# Patient Record
Sex: FEMALE | Race: WHITE | Employment: OTHER | URBAN - METROPOLITAN AREA
[De-identification: names, ages, dates, MRNs, and addresses within clinical notes are randomized per-mention and may not be internally consistent; named-entity substitution may affect disease eponyms.]

---

## 2017-01-04 ENCOUNTER — MED REC SCAN ONLY (OUTPATIENT)
Dept: INTERNAL MEDICINE CLINIC | Facility: CLINIC | Age: 82
End: 2017-01-04

## 2017-01-06 ENCOUNTER — OFFICE VISIT (OUTPATIENT)
Dept: INTERNAL MEDICINE CLINIC | Facility: CLINIC | Age: 82
End: 2017-01-06

## 2017-01-06 VITALS
HEART RATE: 72 BPM | HEIGHT: 60 IN | WEIGHT: 172 LBS | DIASTOLIC BLOOD PRESSURE: 68 MMHG | BODY MASS INDEX: 33.77 KG/M2 | SYSTOLIC BLOOD PRESSURE: 128 MMHG | TEMPERATURE: 99 F | RESPIRATION RATE: 16 BRPM
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
## 2017-01-06 DIAGNOSIS — R19.7 DIARRHEA, UNSPECIFIED TYPE: ICD-10-CM

## 2017-01-06 DIAGNOSIS — R53.83 FATIGUE, UNSPECIFIED TYPE: Primary | ICD-10-CM

## 2017-01-06 PROCEDURE — 99213 OFFICE O/P EST LOW 20 MIN: CPT | Performed by: NURSE PRACTITIONER

## 2017-01-06 NOTE — PROGRESS NOTES
Patient presents with:  Bite Sting,Insect (integumentary): two bites on right ankle- Aparment building had bed bugs       HPI:  Presents with 2 \"bug\" bites to right ankle/shin region that appeared red.  Stated noticed first bite on Wednesday morning and f Rotator cuff tendonitis     Cervical radiculopathy at C5     Osteoarthritis of right knee, unspecified osteoarthritis type     Lumbar disc herniation     At risk for falling     Irritable bowel syndrome with diarrhea     Coronary artery disease involving n Oral Tab Take 0.25 mg by mouth TID CC and HS. Pt takes 0.5mg at night  Disp:  Rfl:    EPINEPHrine 0.3 MG/0.3ML Injection Solution Auto-injector Inject 0.3 mL as directed one time.  Disp:  Rfl:    loratadine (CLARITIN) 10 MG Oral Tab Take 10 mg by mouth chip

## 2017-01-09 ENCOUNTER — TELEPHONE (OUTPATIENT)
Dept: INTERNAL MEDICINE CLINIC | Facility: CLINIC | Age: 82
End: 2017-01-09

## 2017-01-09 NOTE — TELEPHONE ENCOUNTER
Called pt to advise info per JV- Pt states that she will call tomorrow if she is not feeling better. Pt verbalized understanding, agreed with POC and had no further questions.

## 2017-01-09 NOTE — TELEPHONE ENCOUNTER
If she is feeling this sick she should go to urgent care or emergency room. Otherwise can she some see me tomorrow or the next day?

## 2017-01-09 NOTE — TELEPHONE ENCOUNTER
1/6/17- JV                Pt states that she was feeling nauseous and had a h/a on Friday night. Pt denies n/v/d or bite getting worst. Pt states that she was feeling weak and tired all weekend.   Today- Pt states that she has rib pain and chest congestion

## 2017-01-10 ENCOUNTER — HOSPITAL ENCOUNTER (EMERGENCY)
Facility: HOSPITAL | Age: 82
Discharge: HOME | End: 2017-01-10
Attending: EMERGENCY MEDICINE
Payer: MEDICARE

## 2017-01-10 ENCOUNTER — PRIOR ORIGINAL RECORDS (OUTPATIENT)
Dept: OTHER | Age: 82
End: 2017-01-10

## 2017-01-10 ENCOUNTER — APPOINTMENT (OUTPATIENT)
Dept: GENERAL RADIOLOGY | Facility: HOSPITAL | Age: 82
End: 2017-01-10
Attending: EMERGENCY MEDICINE
Payer: MEDICARE

## 2017-01-10 VITALS
WEIGHT: 165 LBS | HEART RATE: 82 BPM | OXYGEN SATURATION: 97 % | TEMPERATURE: 98 F | RESPIRATION RATE: 16 BRPM | SYSTOLIC BLOOD PRESSURE: 132 MMHG | HEIGHT: 60 IN | BODY MASS INDEX: 32.39 KG/M2 | DIASTOLIC BLOOD PRESSURE: 78 MMHG

## 2017-01-10 DIAGNOSIS — J20.9 ACUTE BRONCHITIS, UNSPECIFIED ORGANISM: Primary | ICD-10-CM

## 2017-01-10 LAB
ALBUMIN SERPL-MCNC: 3.8 G/DL (ref 3.5–4.8)
ALP LIVER SERPL-CCNC: 57 U/L (ref 55–142)
ALT SERPL-CCNC: 20 U/L (ref 14–54)
AST SERPL-CCNC: 22 U/L (ref 15–41)
ATRIAL RATE: 77 BPM
BASOPHILS # BLD AUTO: 0.06 X10(3) UL (ref 0–0.1)
BASOPHILS NFR BLD AUTO: 0.6 %
BILIRUB SERPL-MCNC: 0.6 MG/DL (ref 0.1–2)
BUN BLD-MCNC: 20 MG/DL (ref 8–20)
CALCIUM BLD-MCNC: 9.2 MG/DL (ref 8.3–10.3)
CHLORIDE: 106 MMOL/L (ref 101–111)
CO2: 24 MMOL/L (ref 22–32)
CREAT BLD-MCNC: 0.96 MG/DL (ref 0.55–1.02)
EOSINOPHIL # BLD AUTO: 0.11 X10(3) UL (ref 0–0.3)
EOSINOPHIL NFR BLD AUTO: 1.1 %
ERYTHROCYTE [DISTWIDTH] IN BLOOD BY AUTOMATED COUNT: 14.3 % (ref 11.5–16)
GLUCOSE BLD-MCNC: 115 MG/DL (ref 70–99)
HCT VFR BLD AUTO: 41.7 % (ref 34–50)
HGB BLD-MCNC: 14 G/DL (ref 12–16)
IMMATURE GRANULOCYTE COUNT: 0.04 X10(3) UL (ref 0–1)
IMMATURE GRANULOCYTE RATIO %: 0.4 %
LYMPHOCYTES # BLD AUTO: 3.78 X10(3) UL (ref 0.9–4)
LYMPHOCYTES NFR BLD AUTO: 39.3 %
M PROTEIN MFR SERPL ELPH: 7.3 G/DL (ref 6.1–8.3)
MCH RBC QN AUTO: 29.3 PG (ref 27–33.2)
MCHC RBC AUTO-ENTMCNC: 33.6 G/DL (ref 31–37)
MCV RBC AUTO: 87.2 FL (ref 81–100)
MONOCYTES # BLD AUTO: 0.67 X10(3) UL (ref 0.1–0.6)
MONOCYTES NFR BLD AUTO: 7 %
NEUTROPHIL ABS PRELIM: 4.96 X10 (3) UL (ref 1.3–6.7)
NEUTROPHILS # BLD AUTO: 4.96 X10(3) UL (ref 1.3–6.7)
NEUTROPHILS NFR BLD AUTO: 51.6 %
P AXIS: 33 DEGREES
P-R INTERVAL: 136 MS
PLATELET # BLD AUTO: 264 10(3)UL (ref 150–450)
POTASSIUM SERPL-SCNC: 4.3 MMOL/L (ref 3.6–5.1)
PRO-BETA NATRIURETIC PEPTIDE: 167 PG/ML (ref ?–450)
Q-T INTERVAL: 344 MS
QRS DURATION: 78 MS
QTC CALCULATION (BEZET): 389 MS
R AXIS: -7 DEGREES
RBC # BLD AUTO: 4.78 X10(6)UL (ref 3.8–5.1)
RED CELL DISTRIBUTION WIDTH-SD: 46.2 FL (ref 35.1–46.3)
SODIUM SERPL-SCNC: 140 MMOL/L (ref 136–144)
T AXIS: 44 DEGREES
TROPONIN: <0.046 NG/ML (ref ?–0.05)
VENTRICULAR RATE: 77 BPM
WBC # BLD AUTO: 9.6 X10(3) UL (ref 4–13)

## 2017-01-10 PROCEDURE — 93010 ELECTROCARDIOGRAM REPORT: CPT

## 2017-01-10 PROCEDURE — 36415 COLL VENOUS BLD VENIPUNCTURE: CPT

## 2017-01-10 PROCEDURE — 80053 COMPREHEN METABOLIC PANEL: CPT | Performed by: EMERGENCY MEDICINE

## 2017-01-10 PROCEDURE — 85025 COMPLETE CBC W/AUTO DIFF WBC: CPT | Performed by: EMERGENCY MEDICINE

## 2017-01-10 PROCEDURE — 93005 ELECTROCARDIOGRAM TRACING: CPT

## 2017-01-10 PROCEDURE — 71010 XR CHEST AP PORTABLE  (CPT=71010): CPT

## 2017-01-10 PROCEDURE — 84484 ASSAY OF TROPONIN QUANT: CPT | Performed by: EMERGENCY MEDICINE

## 2017-01-10 PROCEDURE — 99285 EMERGENCY DEPT VISIT HI MDM: CPT

## 2017-01-10 PROCEDURE — 83880 ASSAY OF NATRIURETIC PEPTIDE: CPT | Performed by: EMERGENCY MEDICINE

## 2017-01-10 RX ORDER — DOXYCYCLINE HYCLATE 100 MG/1
100 CAPSULE ORAL 2 TIMES DAILY
Qty: 20 CAPSULE | Refills: 0 | Status: SHIPPED | OUTPATIENT
Start: 2017-01-10 | End: 2017-01-20

## 2017-01-10 NOTE — ED INITIAL ASSESSMENT (HPI)
Pt started with diarrhea last Thursday and stopped the next day. Headache and nausea started Friday. KRISTEN especially lying down in bed at night since last week. Denies fever.

## 2017-01-10 NOTE — ED PROVIDER NOTES
Patient Seen in: [facility] Emergency Department    History   Patient presents with:  Dyspnea KRISTEN SOB (respiratory)    Stated Complaint: SOB    HPI    Patient is an 58-year-old female comes in to emergency room with chief complaint of shortness of b HYSTERECTOMY      Comment complete    CHOLECYSTECTOMY      BENIGN BIOPSY LEFT  a long time ago    Comment x 2    OTHER SURGICAL HISTORY  8/7/12    Comment Diag cardiac stent, multi-link mini vision 2mmx 12mm.      INJECTION, W/WO CONTRAST, DX/THERAPEUTIC GOMEZ Medications :   Doxycycline Hyclate 100 MG Oral Cap,  Take 1 capsule (100 mg total) by mouth 2 (two) times daily. predniSONE 5 MG Oral Tab,  Take 1 tablet (5 mg total) by mouth daily.    HYDROcodone-acetaminophen (NORCO) 5-325 MG Oral Tab,  Take 1 t • Heart Attack Maternal Grandfather    • Heart Attack Maternal Grandmother    • Heart Attack Brother    • Autoimmune disease [Other] [OTHER] Brother    • Thyroid Disorder Sister    • Asthma Sister    • Cancer Brother    • Allergies Daughter    • Domonique Rothman present. No clubbing. No cyanosis. Trace lower extremity edema  SKIN EXAMINATIoN: Warm and dry with normal appearance. No rashes or lesions. NEUROLOGICAL:  Motor strength intact all groups.   normal sensation, speech intact    ED Course     Labs Reviewed evidence of a moderate to large left knee joint effusion suggesting underlying osteoarthritis. SOFT TISSUES:  No soft tissue mass lesions or adenopathy about the left thigh noted. The musculature reveals no evidence of strain, edema or atrophy.  No tendinop congestive heart failure. I believe she is stable for discharge home.         Disposition and Plan     Clinical Impression:  Acute bronchitis, unspecified organism  (primary encounter diagnosis)    Disposition:  Discharge    Follow-up:  Harjeet Chester MD

## 2017-01-30 ENCOUNTER — OFFICE VISIT (OUTPATIENT)
Dept: INTERNAL MEDICINE CLINIC | Facility: CLINIC | Age: 82
End: 2017-01-30

## 2017-01-30 VITALS
RESPIRATION RATE: 17 BRPM | WEIGHT: 167 LBS | DIASTOLIC BLOOD PRESSURE: 68 MMHG | BODY MASS INDEX: 32.79 KG/M2 | TEMPERATURE: 98 F | OXYGEN SATURATION: 98 % | HEART RATE: 103 BPM | HEIGHT: 60 IN | SYSTOLIC BLOOD PRESSURE: 130 MMHG

## 2017-01-30 DIAGNOSIS — J40 BRONCHITIS: Primary | ICD-10-CM

## 2017-01-30 DIAGNOSIS — Z91.030 BEE STING ALLERGY: ICD-10-CM

## 2017-01-30 PROCEDURE — 99213 OFFICE O/P EST LOW 20 MIN: CPT | Performed by: INTERNAL MEDICINE

## 2017-01-30 RX ORDER — LEVALBUTEROL TARTRATE 45 UG/1
AEROSOL, METERED ORAL
Qty: 1 INHALER | Refills: 3 | Status: SHIPPED | OUTPATIENT
Start: 2017-01-30 | End: 2017-01-31

## 2017-01-30 RX ORDER — LEVALBUTEROL TARTRATE 45 UG/1
2 AEROSOL, METERED ORAL EVERY 4 HOURS PRN
Qty: 1 INHALER | Refills: 1 | Status: SHIPPED | OUTPATIENT
Start: 2017-01-30 | End: 2017-01-30

## 2017-01-30 RX ORDER — PREDNISONE 10 MG/1
TABLET ORAL
Qty: 10 TABLET | Refills: 0 | Status: SHIPPED | OUTPATIENT
Start: 2017-01-30 | End: 2017-04-27

## 2017-01-30 NOTE — PROGRESS NOTES
Mera Vickers is a 80year old female.   Patient presents with:  Er F/u: Bronchitis; had trouble coughing during vacation in [place] two weeks after Er visit l   Shortness Of Breath: During some activity thorughout the day at times       HPI:     Patient (VITAMIN B COMPLEX OR) Inject 1 tablet as directed daily.  Disp:  Rfl:    Verapamil HCl  MG Oral Capsule SR 24 Hr TK ONE C PO D Disp:  Rfl: 2   LOPERAMIDE HCL 2 MG Oral Cap TAKE ONE CAPSULE BY MOUTH AS NEEDED Disp: 90 capsule Rfl: 0   Calcium 250 MG O reaction    • High cholesterol    • Atherosclerosis of coronary artery    • Unspecified essential hypertension    • Other and unspecified hyperlipidemia    • Glucose intolerance (pre-diabetes)    • Back problem      cervical pain      Social History:    Sussy Nsaids                      Comment:Short of Breath  Other                   Rash, Shortness of Breath    Comment:Transdermal \"memo \" patch ;novacaine Pt states             she is okay with marcaine and xylocaine per             allergy Instructions on file for this visit. The patient indicates understanding of these issues and agrees to the plan.

## 2017-01-31 RX ORDER — LEVALBUTEROL TARTRATE 45 UG/1
AEROSOL, METERED ORAL
Qty: 3 INHALER | Refills: 3 | Status: SHIPPED | OUTPATIENT
Start: 2017-01-31 | End: 2018-02-02

## 2017-02-07 ENCOUNTER — HOSPITAL ENCOUNTER (EMERGENCY)
Facility: HOSPITAL | Age: 82
Discharge: HOME | End: 2017-02-07
Attending: EMERGENCY MEDICINE
Payer: MEDICARE

## 2017-02-07 ENCOUNTER — PRIOR ORIGINAL RECORDS (OUTPATIENT)
Dept: OTHER | Age: 82
End: 2017-02-07

## 2017-02-07 VITALS
RESPIRATION RATE: 16 BRPM | HEIGHT: 60 IN | OXYGEN SATURATION: 96 % | HEART RATE: 85 BPM | WEIGHT: 160 LBS | BODY MASS INDEX: 31.41 KG/M2 | SYSTOLIC BLOOD PRESSURE: 142 MMHG | DIASTOLIC BLOOD PRESSURE: 81 MMHG | TEMPERATURE: 98 F

## 2017-02-07 DIAGNOSIS — R19.7 DIARRHEA, UNSPECIFIED TYPE: Primary | ICD-10-CM

## 2017-02-07 LAB
ALBUMIN SERPL-MCNC: 3.4 G/DL (ref 3.5–4.8)
ALP LIVER SERPL-CCNC: 67 U/L (ref 55–142)
ALT SERPL-CCNC: 21 U/L (ref 14–54)
AST SERPL-CCNC: 14 U/L (ref 15–41)
BASOPHILS # BLD AUTO: 0.05 X10(3) UL (ref 0–0.1)
BASOPHILS NFR BLD AUTO: 0.5 %
BILIRUB SERPL-MCNC: 0.5 MG/DL (ref 0.1–2)
BUN BLD-MCNC: 17 MG/DL (ref 8–20)
CALCIUM BLD-MCNC: 8.5 MG/DL (ref 8.3–10.3)
CHLORIDE: 106 MMOL/L (ref 101–111)
CO2: 24 MMOL/L (ref 22–32)
CREAT BLD-MCNC: 1.01 MG/DL (ref 0.55–1.02)
EOSINOPHIL # BLD AUTO: 0.17 X10(3) UL (ref 0–0.3)
EOSINOPHIL NFR BLD AUTO: 1.7 %
ERYTHROCYTE [DISTWIDTH] IN BLOOD BY AUTOMATED COUNT: 14.8 % (ref 11.5–16)
GLUCOSE BLD-MCNC: 154 MG/DL (ref 70–99)
HCT VFR BLD AUTO: 41.8 % (ref 34–50)
HGB BLD-MCNC: 13.5 G/DL (ref 12–16)
IMMATURE GRANULOCYTE COUNT: 0.12 X10(3) UL (ref 0–1)
IMMATURE GRANULOCYTE RATIO %: 1.2 %
LYMPHOCYTES # BLD AUTO: 5.27 X10(3) UL (ref 0.9–4)
LYMPHOCYTES NFR BLD AUTO: 52.9 %
M PROTEIN MFR SERPL ELPH: 6.9 G/DL (ref 6.1–8.3)
MCH RBC QN AUTO: 29 PG (ref 27–33.2)
MCHC RBC AUTO-ENTMCNC: 32.3 G/DL (ref 31–37)
MCV RBC AUTO: 89.9 FL (ref 81–100)
MONOCYTES # BLD AUTO: 0.46 X10(3) UL (ref 0.1–0.6)
MONOCYTES NFR BLD AUTO: 4.6 %
NEUTROPHIL ABS PRELIM: 3.89 X10 (3) UL (ref 1.3–6.7)
NEUTROPHILS # BLD AUTO: 3.89 X10(3) UL (ref 1.3–6.7)
NEUTROPHILS NFR BLD AUTO: 39.1 %
PLATELET # BLD AUTO: 255 10(3)UL (ref 150–450)
POTASSIUM SERPL-SCNC: 3.8 MMOL/L (ref 3.6–5.1)
RBC # BLD AUTO: 4.65 X10(6)UL (ref 3.8–5.1)
RED CELL DISTRIBUTION WIDTH-SD: 49.1 FL (ref 35.1–46.3)
SODIUM SERPL-SCNC: 139 MMOL/L (ref 136–144)
WBC # BLD AUTO: 10 X10(3) UL (ref 4–13)

## 2017-02-07 PROCEDURE — 99284 EMERGENCY DEPT VISIT MOD MDM: CPT

## 2017-02-07 PROCEDURE — 80053 COMPREHEN METABOLIC PANEL: CPT | Performed by: EMERGENCY MEDICINE

## 2017-02-07 PROCEDURE — 87046 STOOL CULTR AEROBIC BACT EA: CPT | Performed by: EMERGENCY MEDICINE

## 2017-02-07 PROCEDURE — 87425 ROTAVIRUS AG IA: CPT | Performed by: EMERGENCY MEDICINE

## 2017-02-07 PROCEDURE — 85025 COMPLETE CBC W/AUTO DIFF WBC: CPT | Performed by: EMERGENCY MEDICINE

## 2017-02-07 PROCEDURE — 87045 FECES CULTURE AEROBIC BACT: CPT | Performed by: EMERGENCY MEDICINE

## 2017-02-07 PROCEDURE — 96360 HYDRATION IV INFUSION INIT: CPT

## 2017-02-07 PROCEDURE — 87427 SHIGA-LIKE TOXIN AG IA: CPT | Performed by: EMERGENCY MEDICINE

## 2017-02-07 PROCEDURE — 87015 SPECIMEN INFECT AGNT CONCNTJ: CPT | Performed by: EMERGENCY MEDICINE

## 2017-02-07 PROCEDURE — 87493 C DIFF AMPLIFIED PROBE: CPT | Performed by: EMERGENCY MEDICINE

## 2017-02-07 PROCEDURE — 96361 HYDRATE IV INFUSION ADD-ON: CPT

## 2017-02-07 RX ORDER — SODIUM CHLORIDE 9 MG/ML
INJECTION, SOLUTION INTRAVENOUS ONCE
Status: DISCONTINUED | OUTPATIENT
Start: 2017-02-07 | End: 2017-02-07

## 2017-02-07 NOTE — ED PROVIDER NOTES
Patient Seen in: [facility] Emergency Department    History   Patient presents with:  Nausea/Vomiting/Diarrhea (gastrointestinal)    Stated Complaint: diarrhea    HPI    Patient is a pleasant 24-year-old female, presenting for evaluation of diarrhea CERVICAL EPIDURAL;  Surgeon: Jose Carbone MD;  Location: [facility] FOR PAIN [facility]    PATIENT [address] FOR IV ANTIBIOTIC SURGICAL SITE INFECTION PROPHYLAXIS.  9/23/2013    Comment Procedure: CERVICAL EPIDURAL;  Surgeon: Hector Villagran 6 (six) hours as needed for Pain. B Complex Vitamins (VITAMIN B COMPLEX OR),  Inject 1 tablet as directed daily. Fluticasone Propionate  MCG/ACT Inhalation Aerosol,  Inhale 2 puffs into the lungs 2 (two) times daily.   Patient taking differently: Alcohol Use: No              Review of Systems    Positive for stated complaint: diarrhea  Other systems are as noted in HPI. Constitutional and vital signs reviewed. All other systems reviewed and negative except as noted above.     PSFH elements rev (*)     All other components within normal limits   CBC WITH DIFFERENTIAL WITH PLATELET    Narrative: The following orders were created for panel order CBC WITH DIFFERENTIAL WITH PLATELET.   Procedure                               Abnormality         St understanding and was subsequently discharged without incident.     Disposition and Plan     Clinical Impression:  Diarrhea, unspecified type  (primary encounter diagnosis)    Disposition:  Discharge    Follow-up:  Rex Mcpherson MD  [address]

## 2017-02-08 ENCOUNTER — OFFICE VISIT (OUTPATIENT)
Dept: RHEUMATOLOGY | Facility: CLINIC | Age: 82
End: 2017-02-08

## 2017-02-08 ENCOUNTER — TELEPHONE (OUTPATIENT)
Dept: RHEUMATOLOGY | Facility: CLINIC | Age: 82
End: 2017-02-08

## 2017-02-08 VITALS
HEART RATE: 76 BPM | WEIGHT: 166 LBS | RESPIRATION RATE: 20 BRPM | BODY MASS INDEX: 32 KG/M2 | DIASTOLIC BLOOD PRESSURE: 70 MMHG | SYSTOLIC BLOOD PRESSURE: 122 MMHG

## 2017-02-08 DIAGNOSIS — M17.12 PRIMARY OSTEOARTHRITIS OF LEFT KNEE: ICD-10-CM

## 2017-02-08 DIAGNOSIS — R29.898 WEAKNESS OF BOTH LEGS: ICD-10-CM

## 2017-02-08 DIAGNOSIS — M17.11 OSTEOARTHRITIS OF RIGHT KNEE, UNSPECIFIED OSTEOARTHRITIS TYPE: Primary | ICD-10-CM

## 2017-02-08 DIAGNOSIS — K58.0 IRRITABLE BOWEL SYNDROME WITH DIARRHEA: ICD-10-CM

## 2017-02-08 DIAGNOSIS — Z98.1 S/P LUMBAR FUSION: ICD-10-CM

## 2017-02-08 PROCEDURE — 99213 OFFICE O/P EST LOW 20 MIN: CPT | Performed by: INTERNAL MEDICINE

## 2017-02-08 RX ORDER — PREDNISONE 1 MG/1
5 TABLET ORAL 2 TIMES DAILY
Qty: 180 TABLET | Refills: 0 | Status: SHIPPED | OUTPATIENT
Start: 2017-02-08 | End: 2017-02-24

## 2017-02-08 NOTE — TELEPHONE ENCOUNTER
Pt called requesting new script for prednisone 5 mg tab. mp    2/7/17 OV NOTES   Plan is to follow a DEVANG diet which is a diet that consists of bananas, applesauce, rice, tea, and toast..  Also eat other light foods such as chicken noodle soup, or crackers

## 2017-02-08 NOTE — PATIENT INSTRUCTIONS
Plan is to follow a DEVANG diet which is a diet that consists of bananas, applesauce, rice, tea, and toast..  Also eat other light foods such as chicken noodle soup, or crackers. Avoid heavy meats, fried foods, and sugary starches.   Try to get years intesti

## 2017-02-08 NOTE — PROGRESS NOTES
[facility]  Dr. Mandi Childs Progress Note     Subjective:   Tiffany Patino is a(n) 80year old female. Current complaints: Patient presents with: Follow - Up: Pt states 'was bit by something in right foot last month. Had bronchitis.  Developed di electrolytes slightly elevated glucose of 154 normal being less than 100 if this is a fasting sample. Return to see Dr John Bundy in 3 weeks.     Elaine Joya MD 3/4/7357 6:51 PM

## 2017-02-10 ENCOUNTER — OFFICE VISIT (OUTPATIENT)
Dept: INTERNAL MEDICINE CLINIC | Facility: CLINIC | Age: 82
End: 2017-02-10

## 2017-02-10 ENCOUNTER — LAB ENCOUNTER (OUTPATIENT)
Dept: LAB | Age: 82
End: 2017-02-10
Attending: INTERNAL MEDICINE
Payer: MEDICARE

## 2017-02-10 ENCOUNTER — PRIOR ORIGINAL RECORDS (OUTPATIENT)
Dept: OTHER | Age: 82
End: 2017-02-10

## 2017-02-10 VITALS
DIASTOLIC BLOOD PRESSURE: 72 MMHG | RESPIRATION RATE: 16 BRPM | SYSTOLIC BLOOD PRESSURE: 124 MMHG | WEIGHT: 166.81 LBS | BODY MASS INDEX: 32.75 KG/M2 | TEMPERATURE: 98 F | HEIGHT: 60 IN | HEART RATE: 96 BPM

## 2017-02-10 DIAGNOSIS — R35.0 URINARY FREQUENCY: ICD-10-CM

## 2017-02-10 DIAGNOSIS — R53.1 GENERALIZED WEAKNESS: ICD-10-CM

## 2017-02-10 DIAGNOSIS — K59.1 FUNCTIONAL DIARRHEA: Primary | ICD-10-CM

## 2017-02-10 DIAGNOSIS — R35.0 URINE FREQUENCY: ICD-10-CM

## 2017-02-10 DIAGNOSIS — R10.9 RIGHT SIDED ABDOMINAL PAIN: ICD-10-CM

## 2017-02-10 DIAGNOSIS — K59.1 FUNCTIONAL DIARRHEA: ICD-10-CM

## 2017-02-10 LAB
ALBUMIN SERPL-MCNC: 3.7 G/DL (ref 3.5–4.8)
ALP LIVER SERPL-CCNC: 78 U/L (ref 55–142)
ALT SERPL-CCNC: 24 U/L (ref 14–54)
AMYLASE: 45 U/L (ref 25–115)
APPEARANCE: CLEAR
AST SERPL-CCNC: 13 U/L (ref 15–41)
BASOPHILS # BLD AUTO: 0.04 X10(3) UL (ref 0–0.1)
BASOPHILS NFR BLD AUTO: 0.4 %
BILIRUB SERPL-MCNC: 0.4 MG/DL (ref 0.1–2)
BUN BLD-MCNC: 17 MG/DL (ref 8–20)
CALCIUM BLD-MCNC: 9 MG/DL (ref 8.3–10.3)
CHLORIDE: 106 MMOL/L (ref 101–111)
CO2: 26 MMOL/L (ref 22–32)
CREAT BLD-MCNC: 0.91 MG/DL (ref 0.55–1.02)
EOSINOPHIL # BLD AUTO: 0.01 X10(3) UL (ref 0–0.3)
EOSINOPHIL NFR BLD AUTO: 0.1 %
ERYTHROCYTE [DISTWIDTH] IN BLOOD BY AUTOMATED COUNT: 14.7 % (ref 11.5–16)
GLUCOSE BLD-MCNC: 140 MG/DL (ref 70–99)
HAV IGM SER QL: 2.2 MG/DL (ref 1.7–3)
HCT VFR BLD AUTO: 42.5 % (ref 34–50)
HGB BLD-MCNC: 13.6 G/DL (ref 12–16)
IMMATURE GRANULOCYTE COUNT: 0.06 X10(3) UL (ref 0–1)
IMMATURE GRANULOCYTE RATIO %: 0.6 %
LIPASE: 107 U/L (ref 73–393)
LYMPHOCYTES # BLD AUTO: 1.45 X10(3) UL (ref 0.9–4)
LYMPHOCYTES NFR BLD AUTO: 14.2 %
M PROTEIN MFR SERPL ELPH: 7.4 G/DL (ref 6.1–8.3)
MCH RBC QN AUTO: 29.3 PG (ref 27–33.2)
MCHC RBC AUTO-ENTMCNC: 32 G/DL (ref 31–37)
MCV RBC AUTO: 91.6 FL (ref 81–100)
MONOCYTES # BLD AUTO: 0.25 X10(3) UL (ref 0.1–0.6)
MONOCYTES NFR BLD AUTO: 2.4 %
MULTISTIX LOT#: NORMAL NUMERIC
NEUTROPHIL ABS PRELIM: 8.41 X10 (3) UL (ref 1.3–6.7)
NEUTROPHILS # BLD AUTO: 8.41 X10(3) UL (ref 1.3–6.7)
NEUTROPHILS NFR BLD AUTO: 82.3 %
PH, URINE: 5 (ref 4.5–8)
PLATELET # BLD AUTO: 238 10(3)UL (ref 150–450)
POTASSIUM SERPL-SCNC: 4 MMOL/L (ref 3.6–5.1)
RBC # BLD AUTO: 4.64 X10(6)UL (ref 3.8–5.1)
RED CELL DISTRIBUTION WIDTH-SD: 48.7 FL (ref 35.1–46.3)
SODIUM SERPL-SCNC: 141 MMOL/L (ref 136–144)
SPECIFIC GRAVITY: 1.01 (ref 1–1.03)
URINE-COLOR: YELLOW
UROBILINOGEN,SEMI-QN: 0.2 MG/DL (ref 0–1.9)
WBC # BLD AUTO: 10.2 X10(3) UL (ref 4–13)

## 2017-02-10 PROCEDURE — 85025 COMPLETE CBC W/AUTO DIFF WBC: CPT

## 2017-02-10 PROCEDURE — 81003 URINALYSIS AUTO W/O SCOPE: CPT | Performed by: INTERNAL MEDICINE

## 2017-02-10 PROCEDURE — 80053 COMPREHEN METABOLIC PANEL: CPT

## 2017-02-10 PROCEDURE — 99214 OFFICE O/P EST MOD 30 MIN: CPT | Performed by: INTERNAL MEDICINE

## 2017-02-10 PROCEDURE — 87086 URINE CULTURE/COLONY COUNT: CPT | Performed by: INTERNAL MEDICINE

## 2017-02-10 PROCEDURE — 83735 ASSAY OF MAGNESIUM: CPT

## 2017-02-10 PROCEDURE — 83690 ASSAY OF LIPASE: CPT

## 2017-02-10 PROCEDURE — 82150 ASSAY OF AMYLASE: CPT

## 2017-02-10 NOTE — PROGRESS NOTES
Destini Cancer is a 80year old female. Patient presents with:  Er F/u: Diarrhea - has still been having to take Imodium , today has not had to go to the bathroom .    Abdominal Pain  Fatigue  Urinary Frequency           Patient here with her daught Disp: 100 tablet Rfl: 0   acetaminophen 500 MG Oral Tab Take 500 mg by mouth every 6 (six) hours as needed for Pain. Disp:  Rfl:    B Complex Vitamins (VITAMIN B COMPLEX OR) Inject 1 tablet as directed daily.  Disp:  Rfl:    Fluticasone Propionate  M transfusion reaction    • High cholesterol    • Atherosclerosis of coronary artery    • Unspecified essential hypertension    • Other and unspecified hyperlipidemia    • Glucose intolerance (pre-diabetes)    • Back problem      cervical pain      Social Hi Metronidazole And R*      Mold                      Nsaids                      Comment:Short of Breath  Other                   Rash, Shortness of Breath    Comment:Transdermal \"memo \" patch ;novacaine Pt states             she is okay with marcaine Encounter  COMP METABOLIC PANEL [83152] [Q]  Amylase [E]  Lipase [E]  CBC W Differential W Platelet [E]  Magnesium [E]  Urine Dip, auto without Micro    Meds & Refills for this Visit:  No prescriptions requested or ordered in this encounter    [employer]

## 2017-02-16 ENCOUNTER — HOSPITAL ENCOUNTER (OUTPATIENT)
Dept: ULTRASOUND IMAGING | Facility: HOSPITAL | Age: 82
Discharge: HOME | End: 2017-02-16
Attending: INTERNAL MEDICINE
Payer: MEDICARE

## 2017-02-16 ENCOUNTER — TELEPHONE (OUTPATIENT)
Dept: INTERNAL MEDICINE CLINIC | Facility: CLINIC | Age: 82
End: 2017-02-16

## 2017-02-16 DIAGNOSIS — R10.9 RIGHT SIDED ABDOMINAL PAIN: ICD-10-CM

## 2017-02-16 PROCEDURE — 76700 US EXAM ABDOM COMPLETE: CPT

## 2017-02-16 NOTE — TELEPHONE ENCOUNTER
Pt said she has an appointment with Dr Clint Halsted (I think she means Christine Cunha) regarding her swallowing issues on 2/28/17. She does not want to return to Dr. Grayson Reese. Do you think Dr Christine Cunha is ok for this issue as well?

## 2017-02-16 NOTE — TELEPHONE ENCOUNTER
Pt calling to inform TB completed US ABDOMEN COMPLETE today. Pt experienced BM on Tuesday (2/14/17)-upon wiping noted white yellowish puss and red streak. Pt has had normal brown BM yesterday and today- no puss or red streaks. No abdominal pain.  No N/V.

## 2017-02-16 NOTE — TELEPHONE ENCOUNTER
S/w pt, she will contact GI to add on this problem to appt on the 28th. Advised tcb if she feels worse. Rest, simple digestible foods suggested, avoiding high spice and heavy foods. Pt verbs und.

## 2017-02-24 ENCOUNTER — OFFICE VISIT (OUTPATIENT)
Dept: INTERNAL MEDICINE CLINIC | Facility: CLINIC | Age: 82
End: 2017-02-24

## 2017-02-24 VITALS
HEART RATE: 70 BPM | WEIGHT: 166 LBS | SYSTOLIC BLOOD PRESSURE: 124 MMHG | DIASTOLIC BLOOD PRESSURE: 84 MMHG | RESPIRATION RATE: 16 BRPM | HEIGHT: 60 IN | BODY MASS INDEX: 32.59 KG/M2 | TEMPERATURE: 98 F

## 2017-02-24 DIAGNOSIS — J06.9 URI, ACUTE: ICD-10-CM

## 2017-02-24 DIAGNOSIS — K58.0 IRRITABLE BOWEL SYNDROME WITH DIARRHEA: Primary | ICD-10-CM

## 2017-02-24 PROCEDURE — 99213 OFFICE O/P EST LOW 20 MIN: CPT | Performed by: INTERNAL MEDICINE

## 2017-02-24 NOTE — PROGRESS NOTES
Diego Flanagan is a 80year old female. Patient presents with:  Diarrhea: still having frequent bowel movements (not loose)  Cough  Weakness      HPI:     Patient c/o diarrhea for several weeks now. Intermittent, controlled with imodium. No n/v.  Gifty Almodovar Propionate  MCG/ACT Inhalation Aerosol Inhale 2 puffs into the lungs 2 (two) times daily.  (Patient taking differently: Inhale 2 puffs into the lungs every morning.  ) Disp: 12 g Rfl: 1   Verapamil HCl  MG Oral Capsule SR 24 Hr TK ONE C PO D Di Social History:    Smoking Status: Former Smoker                   Packs/Day: 0.30  Years: 5         Quit date: 02/13/1972    Smokeless Status: Never Used                        Alcohol Use: No              Family History   Problem Relation Age of Onset with marcaine and xylocaine per             allergy testing.   Pcn [Penicillins]           Comment:Short of Breath  Penicillin G            Rash    Comment:sob  Plavix [Clopidogrel*        Comment:Redness/choking  Shellfish-Derived P*        Comment:Sob  Alcazar

## 2017-02-26 ENCOUNTER — HOSPITAL ENCOUNTER (OUTPATIENT)
Dept: MRI IMAGING | Facility: HOSPITAL | Age: 82
Discharge: HOME | End: 2017-02-26
Attending: ORTHOPAEDIC SURGERY
Payer: MEDICARE

## 2017-02-26 DIAGNOSIS — R29.898 BILATERAL LEG WEAKNESS: ICD-10-CM

## 2017-02-26 DIAGNOSIS — M79.605 LEG PAIN, BILATERAL: ICD-10-CM

## 2017-02-26 DIAGNOSIS — M54.50 LOW BACK PAIN: ICD-10-CM

## 2017-02-26 DIAGNOSIS — M79.604 LEG PAIN, BILATERAL: ICD-10-CM

## 2017-02-26 PROCEDURE — 72148 MRI LUMBAR SPINE W/O DYE: CPT

## 2017-02-27 ENCOUNTER — OFFICE VISIT (OUTPATIENT)
Dept: NEUROLOGY | Facility: CLINIC | Age: 82
End: 2017-02-27

## 2017-02-27 VITALS
BODY MASS INDEX: 30.55 KG/M2 | HEIGHT: 62 IN | SYSTOLIC BLOOD PRESSURE: 116 MMHG | RESPIRATION RATE: 18 BRPM | HEART RATE: 104 BPM | DIASTOLIC BLOOD PRESSURE: 76 MMHG | WEIGHT: 166 LBS

## 2017-02-27 DIAGNOSIS — M54.50 ACUTE RIGHT-SIDED LOW BACK PAIN WITHOUT SCIATICA: Primary | ICD-10-CM

## 2017-02-27 DIAGNOSIS — G72.9 MYOPATHY: ICD-10-CM

## 2017-02-27 DIAGNOSIS — R29.898 BILATERAL LEG WEAKNESS: ICD-10-CM

## 2017-02-27 PROCEDURE — 99213 OFFICE O/P EST LOW 20 MIN: CPT | Performed by: OTHER

## 2017-02-27 NOTE — PROGRESS NOTES
HPI:    Patient ID: Les Pendleton is a 80year old female. HPI     Patient presents for follow up for myopathy likely post infectious.  She states the leg weakness has improved however 2 weeks ago she had an acute right low back pain and pain in Comment Diag cardiac stent, multi-link mini vision 2mmx 12mm.      INJECTION, W/WO CONTRAST, DX/THERAPEUTIC SUBSTANCE, EPIDURAL/SUBARACHNOID; CERVICAL/THORACIC  9/23/2013    Comment Procedure: CERVICAL EPIDURAL;  Surgeon: Cherylene Deters, MD;  Location: D • Heart Disease Father    • High Blood Pressure Mother    • Heart Attack Paternal Grandfather    • Heart Disease Paternal Grandfather    • Arthritis Paternal Grandmother    • Allergies Paternal Grandmother    • Cancer Maternal Grandfather    • Heart [place] [employer] B Complex Vitamins (VITAMIN B COMPLEX OR) Inject 1 tablet as directed daily. Disp:  Rfl:    Fluticasone Propionate  MCG/ACT Inhalation Aerosol Inhale 2 puffs into the lungs 2 (two) times daily.  (Patient taking differently: Inhale 2 puffs into the [Bupiv*      Metronidazole And R*      Mold                      Nsaids                      Comment:Short of Breath  Other                   Rash, Shortness of Breath    Comment:Transdermal \"memo \" patch ;novacaine Pt states             she is okay wit for support       ASSESSMENT/PLAN:   Acute right-sided low back pain without sciatica  (primary encounter diagnosis)  Bilateral leg weakness  Myopathy    Repeat MRI lumbar spine shows a minimal compression deformity at T11.  Back pain improved  Patient to f

## 2017-02-27 NOTE — PATIENT INSTRUCTIONS
Refill policies:    • Allow 2 business days for refills; controlled substances may take longer.   • Contact your pharmacy at least 5 days prior to running out of medication and have them send an electronic request or submit request through the “request re your physician has recommended that you have a procedure or additional testing performed. [facility]) will contact your insurance carrier to obtain pre-certification or prior authorization.     Unfortunately, JOHAN has seen an increas

## 2017-03-01 ENCOUNTER — APPOINTMENT (OUTPATIENT)
Dept: LAB | Age: 82
End: 2017-03-01
Attending: INTERNAL MEDICINE
Payer: MEDICARE

## 2017-03-01 DIAGNOSIS — R19.7 DIARRHEA, UNSPECIFIED TYPE: ICD-10-CM

## 2017-03-01 DIAGNOSIS — R13.10 DYSPHAGIA, UNSPECIFIED TYPE: ICD-10-CM

## 2017-03-01 PROCEDURE — 87045 FECES CULTURE AEROBIC BACT: CPT

## 2017-03-01 PROCEDURE — 87493 C DIFF AMPLIFIED PROBE: CPT

## 2017-03-01 PROCEDURE — 87046 STOOL CULTR AEROBIC BACT EA: CPT

## 2017-03-04 ENCOUNTER — OFFICE VISIT (OUTPATIENT)
Dept: FAMILY MEDICINE CLINIC | Facility: CLINIC | Age: 82
End: 2017-03-04

## 2017-03-04 VITALS
SYSTOLIC BLOOD PRESSURE: 138 MMHG | OXYGEN SATURATION: 96 % | HEART RATE: 64 BPM | BODY MASS INDEX: 31.41 KG/M2 | DIASTOLIC BLOOD PRESSURE: 76 MMHG | WEIGHT: 160 LBS | TEMPERATURE: 98 F | RESPIRATION RATE: 16 BRPM | HEIGHT: 60 IN

## 2017-03-04 DIAGNOSIS — R39.9 UTI SYMPTOMS: Primary | ICD-10-CM

## 2017-03-04 LAB
APPEARANCE: CLEAR
MULTISTIX LOT#: NORMAL NUMERIC
PH, URINE: 5.5 (ref 4.5–8)
SPECIFIC GRAVITY: 1 (ref 1–1.03)
URINE-COLOR: YELLOW
UROBILINOGEN,SEMI-QN: 0.2 MG/DL (ref 0–1.9)

## 2017-03-04 PROCEDURE — 99213 OFFICE O/P EST LOW 20 MIN: CPT | Performed by: NURSE PRACTITIONER

## 2017-03-04 PROCEDURE — 81003 URINALYSIS AUTO W/O SCOPE: CPT | Performed by: NURSE PRACTITIONER

## 2017-03-04 PROCEDURE — 87086 URINE CULTURE/COLONY COUNT: CPT | Performed by: NURSE PRACTITIONER

## 2017-03-04 PROCEDURE — 87147 CULTURE TYPE IMMUNOLOGIC: CPT | Performed by: NURSE PRACTITIONER

## 2017-03-04 NOTE — PROGRESS NOTES
CHIEF COMPLAINT:   Patient presents with:  UTI: frequent urination x 1 dy       HPI:   Carolyn Coleman is a 80year old female who presents with symptoms of UTI. Complaining of urinary frequency x1 day.   She called PCP who is not open today and was LOPERAMIDE HCL 2 MG Oral Cap TAKE ONE CAPSULE BY MOUTH AS NEEDED Disp: 90 capsule Rfl: 0   Calcium 250 MG Oral Cap Take 2 tablets by mouth 2 (two) times daily. Disp:  Rfl:    Cholecalciferol (VITAMIN D3) 5000 UNITS Oral Cap Take 1 tablet by mouth daily.  Esme [facility] GENERAL: Denies fever, chills, or body aches  SKIN: no rashes, no skin wounds or ulcers. GI: See HPI. No N/V/C/D. : See HPI. NEURO: no headaches.     EXAM:   /76 mmHg  Pulse 64  Temp(Src) 97.9 °F (36.6 °C) (Oral)  Resp 16  Ht 60\"  Wt 160 lb  BM Urine is normally doesn't have any bacteria in it. But bacteria can get into the urinary tract from the skin around the rectum. Or they can travel in the blood from elsewhere in the body.  Once they are in your urinary tract, they can cause infection in the · Procedures such as having a catheter inserted  · Older age  · Not emptying your bladder. This can allow bacteria a chance to grow in your urine.   · Dehydration  · Constipation  · Sex  · Use of a diaphragm for birth control   Treatment  Bladder infections · Urinate right after intercourse to flush out your bladder. · If you use birth control pills and have frequent bladder infections, discuss it with your doctor.   Follow-up care  Call your healthcare provider if all symptoms are not gone after 3 days of tr

## 2017-03-04 NOTE — PATIENT INSTRUCTIONS
We will call you with the urine culture results. Urine dip negative. Bladder Infection, Female (Adult)    Urine is normally doesn't have any bacteria in it. But bacteria can get into the urinary tract from the skin around the rectum.  Or they can meme · Wiping improperly after urinating. Always wipe from front to back. · Bowel incontinence  · Pregnancy  · Procedures such as having a catheter inserted  · Older age  · Not emptying your bladder. This can allow bacteria a chance to grow in your urine.   · D · Avoid sex until your symptoms are gone. · Avoid caffeine, alcohol, and spicy foods. These can irritate your bladder. · Urinate right after intercourse to flush out your bladder.   · If you use birth control pills and have frequent bladder infections, di

## 2017-03-07 ENCOUNTER — TELEPHONE (OUTPATIENT)
Dept: FAMILY MEDICINE CLINIC | Facility: CLINIC | Age: 82
End: 2017-03-07

## 2017-03-08 NOTE — TELEPHONE ENCOUNTER
Pt was called today to see how she was feeling, left  with call back number, info on why she was called and answering service number for her to return call, including when to call. . Lab stated that bacteria that grew out of the specimen was not urine ayaka

## 2017-03-30 ENCOUNTER — LAB ENCOUNTER (OUTPATIENT)
Dept: LAB | Age: 82
End: 2017-03-30
Attending: INTERNAL MEDICINE
Payer: MEDICARE

## 2017-03-30 ENCOUNTER — PRIOR ORIGINAL RECORDS (OUTPATIENT)
Dept: OTHER | Age: 82
End: 2017-03-30

## 2017-03-30 DIAGNOSIS — R19.7 DIARRHEA, UNSPECIFIED TYPE: ICD-10-CM

## 2017-03-30 PROCEDURE — 84302 ASSAY OF SWEAT SODIUM: CPT

## 2017-03-30 PROCEDURE — 84443 ASSAY THYROID STIM HORMONE: CPT

## 2017-03-30 PROCEDURE — 82438 ASSAY OTHER FLUID CHLORIDES: CPT

## 2017-03-30 PROCEDURE — 84999 UNLISTED CHEMISTRY PROCEDURE: CPT

## 2017-03-30 PROCEDURE — 82705 FATS/LIPIDS FECES QUAL: CPT

## 2017-03-30 PROCEDURE — 83516 IMMUNOASSAY NONANTIBODY: CPT

## 2017-03-30 PROCEDURE — 82656 EL-1 FECAL QUAL/SEMIQ: CPT

## 2017-03-30 PROCEDURE — 84439 ASSAY OF FREE THYROXINE: CPT

## 2017-03-30 PROCEDURE — 83735 ASSAY OF MAGNESIUM: CPT

## 2017-03-30 PROCEDURE — 36415 COLL VENOUS BLD VENIPUNCTURE: CPT

## 2017-03-30 PROCEDURE — 82784 ASSAY IGA/IGD/IGG/IGM EACH: CPT

## 2017-03-31 ENCOUNTER — OFFICE VISIT (OUTPATIENT)
Dept: RHEUMATOLOGY | Facility: CLINIC | Age: 82
End: 2017-03-31

## 2017-03-31 VITALS
HEART RATE: 68 BPM | DIASTOLIC BLOOD PRESSURE: 80 MMHG | WEIGHT: 160 LBS | BODY MASS INDEX: 31 KG/M2 | RESPIRATION RATE: 16 BRPM | SYSTOLIC BLOOD PRESSURE: 132 MMHG

## 2017-03-31 DIAGNOSIS — M81.0 OSTEOPOROSIS: Primary | ICD-10-CM

## 2017-03-31 DIAGNOSIS — M48.54XD: ICD-10-CM

## 2017-03-31 DIAGNOSIS — M17.12 PRIMARY OSTEOARTHRITIS OF LEFT KNEE: ICD-10-CM

## 2017-03-31 DIAGNOSIS — Z98.890 S/P LUMBAR LAMINECTOMY: ICD-10-CM

## 2017-03-31 DIAGNOSIS — G72.9 MYOPATHY, UNSPECIFIED: ICD-10-CM

## 2017-03-31 PROBLEM — S22.000A THORACIC COMPRESSION FRACTURE (HCC): Status: ACTIVE | Noted: 2017-03-31

## 2017-03-31 PROCEDURE — 99214 OFFICE O/P EST MOD 30 MIN: CPT | Performed by: INTERNAL MEDICINE

## 2017-03-31 RX ORDER — HYDROCODONE BITARTRATE AND ACETAMINOPHEN 5; 325 MG/1; MG/1
1 TABLET ORAL EVERY 6 HOURS PRN
Qty: 100 TABLET | Refills: 0 | Status: SHIPPED | OUTPATIENT
Start: 2017-03-31 | End: 2017-05-08

## 2017-03-31 RX ORDER — CALCITONIN SALMON 200 [IU]/.09ML
1 SPRAY, METERED NASAL DAILY
Qty: 1 BOTTLE | Refills: 1 | Status: SHIPPED | OUTPATIENT
Start: 2017-03-31 | End: 2017-04-05

## 2017-03-31 RX ORDER — CALCITONIN SALMON 200 [IU]/.09ML
SPRAY, METERED NASAL
Qty: 7.4 ML | Refills: 1 | OUTPATIENT
Start: 2017-03-31

## 2017-03-31 NOTE — PATIENT INSTRUCTIONS
Plan of action is to cut the prednisone from 10 mg to 5 mg per day. Do that for the next 2-3 weeks. If you continue to feel strong in your muscles then completely stop the prednisone altogether.   For the new compression fracture at T11, talk to Dr. Cristy Weaver

## 2017-03-31 NOTE — PROGRESS NOTES
[facility]  Dr. Ashlyn Nash Progress Note     Subjective:   Cruz Mcfarland is a(n) 80year old female. Current complaints: Patient presents with:  Osteoarthritis  Other: Pt states 'has a back for about six weeks.  Did not fall.'   Got a back fract are not sleeping within an hour and he can take another Norco pain pill if needed.   In the future after using this nasal spray we might need to try a bone building medicine such as Prolia which is 1 shot every 6 months to build the bones, or a pill called

## 2017-04-05 ENCOUNTER — OFFICE VISIT (OUTPATIENT)
Dept: NEUROLOGY | Facility: CLINIC | Age: 82
End: 2017-04-05

## 2017-04-05 VITALS
BODY MASS INDEX: 33 KG/M2 | DIASTOLIC BLOOD PRESSURE: 80 MMHG | RESPIRATION RATE: 20 BRPM | SYSTOLIC BLOOD PRESSURE: 130 MMHG | WEIGHT: 168 LBS | HEART RATE: 96 BPM

## 2017-04-05 DIAGNOSIS — M60.9 MYOSITIS OF MULTIPLE SITES, UNSPECIFIED MYOSITIS TYPE: ICD-10-CM

## 2017-04-05 DIAGNOSIS — M79.604 LEG PAIN, BILATERAL: Primary | ICD-10-CM

## 2017-04-05 DIAGNOSIS — M79.605 LEG PAIN, BILATERAL: Primary | ICD-10-CM

## 2017-04-05 PROCEDURE — 99213 OFFICE O/P EST LOW 20 MIN: CPT | Performed by: OTHER

## 2017-04-05 NOTE — PATIENT INSTRUCTIONS
Refill policies:    • Allow 2 business days for refills; controlled substances may take longer.   • Contact your pharmacy at least 5 days prior to running out of medication and have them send an electronic request or submit request through the “request re insurance carrier to obtain pre-certification or prior authorization. Unfortunately, JOHAN has seen an increase in denial of payment even though the procedure/test has been pre-certified.   You are strongly encouraged to contact your insurance carrier to v

## 2017-04-07 ENCOUNTER — OFFICE VISIT (OUTPATIENT)
Dept: ELECTROPHYSIOLOGY | Facility: HOSPITAL | Age: 82
End: 2017-04-07
Attending: Other
Payer: MEDICARE

## 2017-04-07 DIAGNOSIS — M79.604 LEG PAIN, BILATERAL: ICD-10-CM

## 2017-04-07 DIAGNOSIS — M79.605 LEG PAIN, BILATERAL: ICD-10-CM

## 2017-04-07 PROCEDURE — 95910 NRV CNDJ TEST 7-8 STUDIES: CPT

## 2017-04-07 PROCEDURE — 95886 MUSC TEST DONE W/N TEST COMP: CPT

## 2017-04-07 NOTE — PROCEDURES
[facility], [address] [place] [address]      PATIENT'S NAME: Karla Vuong   REFERRING PHYSICIAN: Jewel Galan M.D.    PATIENT ACCOUNT #: [de-identified] LOCATION: [facility]   MEDICAL RECORD #: [id number] DATE OF BIR Silent None None None   Normal   Normal   Normal  Lt med gastroc   Silent None None None   Normal   Normal   Normal  Lt ext nina       Silent None None None   Normal   Normal   Normal    IMPRESSION:  Nerve conduction studies and EMG of the lower extremities

## 2017-04-07 NOTE — PROGRESS NOTES
HPI:    Patient ID: Makayla Alonzo is a 80year old female. Neurologic Problem  The patient's pertinent negatives include no weakness. Associated symptoms include back pain. Patient presents with complaints of bilateral leg pain.  She is fo complete    CHOLECYSTECTOMY      BENIGN BIOPSY LEFT  a long time ago    Comment x 2    OTHER SURGICAL HISTORY  8/7/12    Comment Diag cardiac stent, multi-link mini vision 2mmx 12mm.      INJECTION, W/WO CONTRAST, DX/THERAPEUTIC SUBSTANCE, EPIDURAL/SUBARACH Relation Age of Onset   • Breast Cancer Mother 79   • Allergies Father    • Asthma Father    • Colon Cancer Father    • Heart Disease Father    • High Blood Pressure Mother    • Heart Attack Paternal Grandfather    • Heart Disease Paternal Grandfather    • tablet Rfl: 0   acetaminophen 500 MG Oral Tab Take 500 mg by mouth every 6 (six) hours as needed for Pain. Disp:  Rfl:    B Complex Vitamins (VITAMIN B COMPLEX OR) Inject 1 tablet as directed daily.  Disp:  Rfl:    Fluticasone Propionate  MCG/ACT Inh topical Iodine.   Levaquin [Levofloxa*        Comment:Nausea, leg pain  Linezolid               Diarrhea  Marcaine Hcl [Bupiv*      Metronidazole And R*      Mold                      Nsaids                      Comment:Short of Breath  Other achilles reflexes. No pathological reflexes  Coordination: Intact finger to nose test  Gait: waddling type.  Use walker for support       ASSESSMENT/PLAN:   Leg pain, bilateral  (primary encounter diagnosis)  Myositis of multiple sites, unspecified myositis

## 2017-04-10 ENCOUNTER — PRIOR ORIGINAL RECORDS (OUTPATIENT)
Dept: OTHER | Age: 82
End: 2017-04-10

## 2017-04-12 LAB
ALBUMIN: 3.4 G/DL
ALKALINE PHOSPHATATE(ALK PHOS): 67 IU/L
BILIRUBIN TOTAL: 0.5 MG/DL
BUN: 17 MG/DL
CALCIUM: 8.5 MG/DL
CHLORIDE: 106 MEQ/L
CREATININE, SERUM: 1.01 MG/DL
FREE T4: 1 MG/DL
GLUCOSE: 154 MG/DL
HEMATOCRIT: 42.5 %
HEMOGLOBIN: 13.6 G/DL
MAGNESIUM: 2.2 MG/DL
PLATELETS: 238 K/UL
POTASSIUM, SERUM: 3.8 MEQ/L
PROBNP: 167 PG/ML
PROTEIN, TOTAL: 6.9 G/DL
RED BLOOD COUNT: 4.64 X 10-6/U
SGOT (AST): 14 IU/L
SGPT (ALT): 21 IU/L
SODIUM: 139 MEQ/L
THYROID STIMULATING HORMONE: 1.81 MLU/L
WHITE BLOOD COUNT: 10.2 X 10-3/U

## 2017-04-13 ENCOUNTER — PATIENT OUTREACH (OUTPATIENT)
Dept: INTERNAL MEDICINE CLINIC | Facility: CLINIC | Age: 82
End: 2017-04-13

## 2017-04-13 NOTE — PROGRESS NOTES
Pt returned call. ACT completed. Score of 21. Pt also wanted to let you know that she has an upcoming appt next month for a sleep study. AAP pended for review. Please approve AAP/ route encounter back to me. I will contact pt to review AAP.    Pt states s

## 2017-04-14 NOTE — PROGRESS NOTES
Called pt and reviewed AAP with her. Pt at this time has no further questions and verbalized understanding of AAP directions. Asthma flow sheet updated. AAP mailed to patient. Pt thanked us for the call.

## 2017-04-17 ENCOUNTER — TELEPHONE (OUTPATIENT)
Dept: NEUROLOGY | Facility: CLINIC | Age: 82
End: 2017-04-17

## 2017-04-17 ENCOUNTER — PRIOR ORIGINAL RECORDS (OUTPATIENT)
Dept: OTHER | Age: 82
End: 2017-04-17

## 2017-04-17 DIAGNOSIS — M54.5 ACUTE LOW BACK PAIN, UNSPECIFIED BACK PAIN LATERALITY, WITH SCIATICA PRESENCE UNSPECIFIED: ICD-10-CM

## 2017-04-17 DIAGNOSIS — M54.12 CERVICAL RADICULOPATHY AT C5: ICD-10-CM

## 2017-04-17 DIAGNOSIS — M54.2 NECK PAIN: Primary | ICD-10-CM

## 2017-04-17 NOTE — TELEPHONE ENCOUNTER
EMG/NCS was fine. We will fax a copy to Dr Becerril Code office.  She would need to repeat MRI C spine to assess for any interval changes,

## 2017-04-17 NOTE — TELEPHONE ENCOUNTER
EMG report printed and faxed to pt's orthopaedic surgeon, Dr. Zach Edwards. Fax confirmation rec'd. MRI C Spine order pended for review by Dr. Esther Carpenter.   Will confer with her to determine if this test should completed prior to pts upcoming follow up in 3 w

## 2017-04-17 NOTE — TELEPHONE ENCOUNTER
Verbally consulted with Dr. Weller July. Order from MRI should come from Dr. Baljit Lacy. Pended order removed from [address] [place] [address] Rd. Spoke with patient and relayed EMG results.   Informed her that these results have been faxed to Dr. Baljit Lacy, and she should follow up

## 2017-04-17 NOTE — TELEPHONE ENCOUNTER
Pt calling for EMG results. EMG performed on 4/7/17 by Dr. Carlos Howell.  Results are as follows. Will consult with Dr. Jeff Quintanilla to see if she has other recommendations before calling patient back.     IMPRESSION:  Nerve conduction studies and EMG of the lowe

## 2017-04-21 ENCOUNTER — HOSPITAL ENCOUNTER (OUTPATIENT)
Dept: CARDIOLOGY CLINIC | Facility: HOSPITAL | Age: 82
Discharge: HOME | End: 2017-04-21
Attending: INTERNAL MEDICINE

## 2017-04-21 ENCOUNTER — PRIOR ORIGINAL RECORDS (OUTPATIENT)
Dept: OTHER | Age: 82
End: 2017-04-21

## 2017-04-21 ENCOUNTER — MYAURORA ACCOUNT LINK (OUTPATIENT)
Dept: OTHER | Age: 82
End: 2017-04-21

## 2017-04-21 DIAGNOSIS — I70.213 ATHEROSCLER OF NATIVE ARTERY OF BOTH LEGS WITH INTERMIT CLAUDICATION (HCC): ICD-10-CM

## 2017-04-24 ENCOUNTER — TELEPHONE (OUTPATIENT)
Dept: INTERNAL MEDICINE CLINIC | Facility: CLINIC | Age: 82
End: 2017-04-24

## 2017-04-27 ENCOUNTER — OFFICE VISIT (OUTPATIENT)
Dept: INTERNAL MEDICINE CLINIC | Facility: CLINIC | Age: 82
End: 2017-04-27

## 2017-04-27 VITALS
HEIGHT: 60 IN | BODY MASS INDEX: 33.89 KG/M2 | OXYGEN SATURATION: 95 % | RESPIRATION RATE: 16 BRPM | TEMPERATURE: 98 F | DIASTOLIC BLOOD PRESSURE: 76 MMHG | SYSTOLIC BLOOD PRESSURE: 124 MMHG | HEART RATE: 92 BPM | WEIGHT: 172.63 LBS

## 2017-04-27 DIAGNOSIS — R06.83 SNORING: ICD-10-CM

## 2017-04-27 DIAGNOSIS — R06.02 SHORTNESS OF BREATH: Primary | ICD-10-CM

## 2017-04-27 DIAGNOSIS — J45.40 MODERATE PERSISTENT ASTHMA WITHOUT COMPLICATION: ICD-10-CM

## 2017-04-27 DIAGNOSIS — Z88.9 MULTIPLE ALLERGIES: ICD-10-CM

## 2017-04-27 PROCEDURE — 99214 OFFICE O/P EST MOD 30 MIN: CPT | Performed by: INTERNAL MEDICINE

## 2017-04-27 NOTE — PROGRESS NOTES
Germaine Ellington is a 80year old female. Patient presents with:  Breathing Problem: Pt c/o hoarseness and SOB recently. Pt believes sx are due to bug spray in building.    Allergies  Sleep Problem      HPI:     Patient with asthma here with c/o SOB a 500 mg by mouth every 6 (six) hours as needed for Pain. Disp:  Rfl:    B Complex Vitamins (VITAMIN B COMPLEX OR) Inject 1 tablet as directed daily.  Disp:  Rfl:    Verapamil HCl  MG Oral Capsule SR 24 Hr TK ONE C PO D Disp:  Rfl: 2   LOPERAMIDE HCL 2 Atherosclerosis of coronary artery    • Unspecified essential hypertension    • Other and unspecified hyperlipidemia    • Glucose intolerance (pre-diabetes)    • Back problem      cervical pain      Social History:    Smoking Status: Former Smoker Nsaids                      Comment:Short of Breath  Other                   Rash, Shortness of Breath    Comment:Transdermal \"memo \" patch ;novacaine Pt states             she is okay with marcaine and xylocaine per             allergy testing. scheduled  Moderate persistent asthma without complication- increase flovent to 2p BID, f/u Dr. Edgar Castellon  Snoring- check sleep study next week    No orders of the defined types were placed in this encounter.        Meds & Refills for this Visit:    No prescrip

## 2017-04-28 ENCOUNTER — PRIOR ORIGINAL RECORDS (OUTPATIENT)
Dept: OTHER | Age: 82
End: 2017-04-28

## 2017-05-08 ENCOUNTER — OFFICE VISIT (OUTPATIENT)
Dept: NEUROLOGY | Facility: CLINIC | Age: 82
End: 2017-05-08

## 2017-05-08 VITALS
BODY MASS INDEX: 33.38 KG/M2 | SYSTOLIC BLOOD PRESSURE: 110 MMHG | RESPIRATION RATE: 16 BRPM | DIASTOLIC BLOOD PRESSURE: 76 MMHG | WEIGHT: 170 LBS | HEART RATE: 98 BPM | HEIGHT: 60 IN

## 2017-05-08 DIAGNOSIS — M47.816 DEGENERATIVE JOINT DISEASE OF CERVICAL AND LUMBAR SPINE: Primary | ICD-10-CM

## 2017-05-08 DIAGNOSIS — M47.812 DEGENERATIVE JOINT DISEASE OF CERVICAL AND LUMBAR SPINE: Primary | ICD-10-CM

## 2017-05-08 DIAGNOSIS — M54.50 RIGHT-SIDED LOW BACK PAIN WITHOUT SCIATICA, UNSPECIFIED CHRONICITY: ICD-10-CM

## 2017-05-08 DIAGNOSIS — M60.9 MYOSITIS OF MULTIPLE SITES, UNSPECIFIED MYOSITIS TYPE: ICD-10-CM

## 2017-05-08 PROCEDURE — 99213 OFFICE O/P EST LOW 20 MIN: CPT | Performed by: OTHER

## 2017-05-08 NOTE — PROGRESS NOTES
HPI:    Patient ID: Ja Moyer is a 80year old female. Leg Pain   Pertinent negatives include no numbness. Neurologic Problem  The patient's pertinent negatives include no weakness. Associated symptoms include back pain.  Pertinent negative vision 2mmx 12mm.      INJECTION, W/WO CONTRAST, DX/THERAPEUTIC SUBSTANCE, EPIDURAL/SUBARACHNOID; CERVICAL/THORACIC  9/23/2013    Comment Procedure: CERVICAL EPIDURAL;  Surgeon: Hollie Corrales MD;  Location: [address] Pressure Mother    • Heart Attack Paternal Grandfather    • Heart Disease Paternal Grandfather    • Arthritis Paternal Grandmother    • Allergies Paternal Grandmother    • Cancer Maternal Grandfather    • Heart Attack Maternal Grandfather    • Heart Attack Cholecalciferol (VITAMIN D3) 5000 UNITS Oral Cap Take 1 tablet by mouth daily. Disp:  Rfl:    Lactobacillus (ACIDOPHILUS PROBIOTIC) 10 MG Oral Tab Take 1 tablet by mouth daily.  Disp:  Rfl:    valsartan 80 MG Oral Tab Half tab po daily Disp: 45 tablet Rfl Comment:sob  Tramadol                    Comment:Arms turned red like a sunburn  Vancomycin              Rash    Comment:Rash to the face and neck  Wellbutrin [Bupropi*        Comment:constipation   PHYSICAL EXAM:   Physical Exam    Blood pressure 110/76, months  See orders and medications filed with this encounter. The patient indicates understanding of these issues and agrees with the plan. No orders of the defined types were placed in this encounter.        Meds This Visit:  No prescriptions re

## 2017-06-06 ENCOUNTER — OFFICE VISIT (OUTPATIENT)
Dept: RHEUMATOLOGY | Facility: CLINIC | Age: 82
End: 2017-06-06

## 2017-06-06 VITALS
WEIGHT: 167 LBS | SYSTOLIC BLOOD PRESSURE: 110 MMHG | DIASTOLIC BLOOD PRESSURE: 64 MMHG | RESPIRATION RATE: 16 BRPM | BODY MASS INDEX: 33 KG/M2 | HEART RATE: 68 BPM

## 2017-06-06 DIAGNOSIS — M81.0 AGE-RELATED OSTEOPOROSIS WITHOUT CURRENT PATHOLOGICAL FRACTURE: ICD-10-CM

## 2017-06-06 DIAGNOSIS — M17.12 PRIMARY OSTEOARTHRITIS OF LEFT KNEE: Primary | ICD-10-CM

## 2017-06-06 DIAGNOSIS — M17.11 OSTEOARTHRITIS OF RIGHT KNEE, UNSPECIFIED OSTEOARTHRITIS TYPE: ICD-10-CM

## 2017-06-06 DIAGNOSIS — Z98.890 S/P LUMBAR LAMINECTOMY: ICD-10-CM

## 2017-06-06 PROBLEM — M47.816 OSTEOARTHRITIS OF SPINE WITHOUT MYELOPATHY OR RADICULOPATHY, LUMBAR REGION: Status: ACTIVE | Noted: 2017-06-06

## 2017-06-06 PROCEDURE — 99213 OFFICE O/P EST LOW 20 MIN: CPT | Performed by: INTERNAL MEDICINE

## 2017-06-06 NOTE — PATIENT INSTRUCTIONS
If your allergies act up you can see Dr. Bella Estevez phone number [phone] she located and [address] [place]. For mild pain you can take Tylenol extra strength 1 or 2 tablets as needed twice a day.   If he gets severe pain you can take the hydrocodon

## 2017-06-06 NOTE — PROGRESS NOTES
[facility]  Dr. Mandi Childs Progress Note     Subjective:   Tiffany Patino is a(n) 80year old female. Current complaints: Patient presents with:  Osteoarthritis: Pt c/o bilateral leg stiffness. Wakes up in morning, legs are stiff.   After movin

## 2017-06-07 ENCOUNTER — HOSPITAL ENCOUNTER (OUTPATIENT)
Dept: CV DIAGNOSTICS | Facility: HOSPITAL | Age: 82
Discharge: HOME | End: 2017-06-07
Attending: INTERNAL MEDICINE
Payer: MEDICARE

## 2017-06-07 DIAGNOSIS — I25.10 CORONARY ATHEROSCLEROSIS OF NATIVE CORONARY ARTERY: ICD-10-CM

## 2017-06-07 DIAGNOSIS — R06.00 DYSPNEA: ICD-10-CM

## 2017-06-07 NOTE — PROGRESS NOTES
Patient here for outpatient Lexiscan nuclear stress test. Resting nuclear images completed. Patient c/o nausea just before stress portion of exam and did not want to continue. Patient instructed she may reschedule the 2nd portion of test within 2 weeks.  Al

## 2017-06-09 ENCOUNTER — PRIOR ORIGINAL RECORDS (OUTPATIENT)
Dept: OTHER | Age: 82
End: 2017-06-09

## 2017-06-23 ENCOUNTER — PRIOR ORIGINAL RECORDS (OUTPATIENT)
Dept: OTHER | Age: 82
End: 2017-06-23

## 2017-06-23 ENCOUNTER — MYAURORA ACCOUNT LINK (OUTPATIENT)
Dept: OTHER | Age: 82
End: 2017-06-23

## 2017-07-03 ENCOUNTER — OFFICE VISIT (OUTPATIENT)
Dept: INTERNAL MEDICINE CLINIC | Facility: CLINIC | Age: 82
End: 2017-07-03

## 2017-07-03 VITALS
DIASTOLIC BLOOD PRESSURE: 68 MMHG | BODY MASS INDEX: 33.77 KG/M2 | SYSTOLIC BLOOD PRESSURE: 118 MMHG | TEMPERATURE: 98 F | HEIGHT: 60 IN | HEART RATE: 88 BPM | RESPIRATION RATE: 16 BRPM | WEIGHT: 172 LBS

## 2017-07-03 DIAGNOSIS — M25.562 ACUTE PAIN OF LEFT KNEE: Primary | ICD-10-CM

## 2017-07-03 DIAGNOSIS — M17.12 PRIMARY OSTEOARTHRITIS OF LEFT KNEE: ICD-10-CM

## 2017-07-03 DIAGNOSIS — R51.9 ACUTE NONINTRACTABLE HEADACHE, UNSPECIFIED HEADACHE TYPE: ICD-10-CM

## 2017-07-03 DIAGNOSIS — R35.0 URINARY FREQUENCY: ICD-10-CM

## 2017-07-03 LAB
APPEARANCE: CLEAR
MULTISTIX LOT#: NORMAL NUMERIC
PH, URINE: 5 (ref 4.5–8)
SPECIFIC GRAVITY: 1.01 (ref 1–1.03)
URINE-COLOR: YELLOW
UROBILINOGEN,SEMI-QN: 0.2 MG/DL (ref 0–1.9)

## 2017-07-03 PROCEDURE — 99214 OFFICE O/P EST MOD 30 MIN: CPT | Performed by: INTERNAL MEDICINE

## 2017-07-03 NOTE — PROGRESS NOTES
Allegra Haynes is a 80year old female. Patient presents with:  Pain: pt c/o sharp pains in Left knee x  5days  Urinary Frequency  Headache      HPI:     Patient c/o acute on chronic left knee pain. She says pain became severe 5 days ago.  Known OA, ONE C PO D Disp:  Rfl: 2   LOPERAMIDE HCL 2 MG Oral Cap TAKE ONE CAPSULE BY MOUTH AS NEEDED Disp: 90 capsule Rfl: 0   Calcium 250 MG Oral Cap Take 2 tablets by mouth 2 (two) times daily.  Disp:  Rfl:    Cholecalciferol (VITAMIN D3) 5000 UNITS Oral Cap Take Family History   Problem Relation Age of Onset   • Breast Cancer Mother 79   • Allergies Father    • Asthma Father    • Colon Cancer Father    • Heart Disease Father    • High Blood Pressure Mother    • Heart Attack Paternal Grandfather    • H Comment:Redness/choking  Shellfish-Derived P*        Comment:Sob  Sulfa Antibiotics       Wheezing    Comment:sob  Tramadol                    Comment:Arms turned red like a sunburn  Vancomycin              Rash    Comment:Rash to the face and neck  Wellbu indicates understanding of these issues and agrees to the plan.

## 2017-07-11 ENCOUNTER — TELEPHONE (OUTPATIENT)
Dept: INTERNAL MEDICINE CLINIC | Facility: CLINIC | Age: 82
End: 2017-07-11

## 2017-07-11 NOTE — TELEPHONE ENCOUNTER
Pt stating received a cortisone injection in knee yesterday (7/10/17). Noted HR elevated @116 today in AM. When pt took BP noted HR is decreasing- does not recall either numbers at this time.   Informed after cortisone injection, slightly elevated HR is nor

## 2017-07-20 ENCOUNTER — TELEPHONE (OUTPATIENT)
Dept: INTERNAL MEDICINE CLINIC | Facility: CLINIC | Age: 82
End: 2017-07-20

## 2017-07-20 NOTE — TELEPHONE ENCOUNTER
Patient was in 2 weeks ago to see TB; just wasn't feeling good. Checked urine and that was fine at the time. This past Tuesday patient had left side back pain all day; then all evening she was up to urinate.   The next day she was fine and then went to

## 2017-07-20 NOTE — TELEPHONE ENCOUNTER
S/w pt. States she has noticed an increase in back pain on the L side (she does have chronic back pain but it is on the R side-even gets steroid injections). However, this pain was different. She also noted an increase in urination but denies any dysuria.

## 2017-07-27 ENCOUNTER — OFFICE VISIT (OUTPATIENT)
Dept: INTERNAL MEDICINE CLINIC | Facility: CLINIC | Age: 82
End: 2017-07-27

## 2017-07-27 VITALS
WEIGHT: 174 LBS | HEART RATE: 108 BPM | SYSTOLIC BLOOD PRESSURE: 110 MMHG | TEMPERATURE: 98 F | HEIGHT: 60 IN | BODY MASS INDEX: 34.16 KG/M2 | DIASTOLIC BLOOD PRESSURE: 78 MMHG

## 2017-07-27 DIAGNOSIS — G89.29 CHRONIC PAIN OF LEFT KNEE: ICD-10-CM

## 2017-07-27 DIAGNOSIS — R35.0 FREQUENCY OF URINATION: Primary | ICD-10-CM

## 2017-07-27 DIAGNOSIS — M25.562 CHRONIC PAIN OF LEFT KNEE: ICD-10-CM

## 2017-07-27 DIAGNOSIS — F41.1 GAD (GENERALIZED ANXIETY DISORDER): ICD-10-CM

## 2017-07-27 LAB
APPEARANCE: CLEAR
MULTISTIX EXPIRATION DATE: NORMAL DATE
PH, URINE: 7.5 (ref 4.5–8)
SPECIFIC GRAVITY: 1.01 (ref 1–1.03)
URINE-COLOR: YELLOW
UROBILINOGEN,SEMI-QN: 0.2 MG/DL (ref 0–1.9)

## 2017-07-27 PROCEDURE — 81003 URINALYSIS AUTO W/O SCOPE: CPT | Performed by: INTERNAL MEDICINE

## 2017-07-27 PROCEDURE — 99214 OFFICE O/P EST MOD 30 MIN: CPT | Performed by: INTERNAL MEDICINE

## 2017-07-27 NOTE — PROGRESS NOTES
Beni Solis is a 80year old female.   Patient presents with:  Urinary Frequency: pt concerned of possible UTI  Anxiety  Knee Pain      HPI:     7/20 she had urinary frequency and back pain, symptoms resolved so she did not come in until today, wa 6 (six) hours as needed for Pain. Disp:  Rfl:    B Complex Vitamins (VITAMIN B COMPLEX OR) Take 1 tablet by mouth daily.    Disp:  Rfl:    Verapamil HCl  MG Oral Capsule SR 24 Hr TK ONE C PO D Disp:  Rfl: 2   LOPERAMIDE HCL 2 MG Oral Cap TAKE ONE CAPS Shortness of breath    • Unspecified essential hypertension    • Visual impairment     glasses      Social History:  Smoking status: Former Smoker                                                              Packs/day: 0.30      Years: 5.00         Quit da Comment:BREATHING ISSUES -  Metronidazole And R*    Shortness of Breath  Mold                    Shortness of Breath  Nsaids                      Comment:Short of Breath  Other                   Rash, Shortness of Breath    Comment:Transdermal \"memo \" p On xanax daily which I do not recommend but pt says she cannot do without.  Referred to psychiatrist for evaluation and management  Chronic pain of left knee- we discussed that if injections no longer helping, replacement would be indicated for pain control

## 2017-08-08 ENCOUNTER — PRIOR ORIGINAL RECORDS (OUTPATIENT)
Dept: OTHER | Age: 82
End: 2017-08-08

## 2017-08-16 ENCOUNTER — PRIOR ORIGINAL RECORDS (OUTPATIENT)
Dept: OTHER | Age: 82
End: 2017-08-16

## 2017-08-16 ENCOUNTER — HOSPITAL ENCOUNTER (OUTPATIENT)
Dept: LAB | Facility: HOSPITAL | Age: 82
Discharge: HOME | End: 2017-08-16
Attending: INTERNAL MEDICINE
Payer: MEDICARE

## 2017-08-16 LAB
ALBUMIN SERPL-MCNC: 3.6 G/DL (ref 3.5–4.8)
ALP LIVER SERPL-CCNC: 94 U/L (ref 55–142)
ALT SERPL-CCNC: 31 U/L (ref 14–54)
AST SERPL-CCNC: 28 U/L (ref 15–41)
BASOPHILS # BLD AUTO: 0.08 X10(3) UL (ref 0–0.1)
BASOPHILS NFR BLD AUTO: 1 %
BILIRUB SERPL-MCNC: 0.5 MG/DL (ref 0.1–2)
BUN BLD-MCNC: 15 MG/DL (ref 8–20)
CALCIUM BLD-MCNC: 9.7 MG/DL (ref 8.3–10.3)
CHLORIDE: 110 MMOL/L (ref 101–111)
CO2: 23 MMOL/L (ref 22–32)
CREAT BLD-MCNC: 0.72 MG/DL (ref 0.55–1.02)
D-DIMER: 1.34 UG/ML FEU (ref 0–0.49)
EOSINOPHIL # BLD AUTO: 0.12 X10(3) UL (ref 0–0.3)
EOSINOPHIL NFR BLD AUTO: 1.6 %
ERYTHROCYTE [DISTWIDTH] IN BLOOD BY AUTOMATED COUNT: 14.8 % (ref 11.5–16)
GLUCOSE BLD-MCNC: 89 MG/DL (ref 70–99)
HCT VFR BLD AUTO: 43.2 % (ref 34–50)
HGB BLD-MCNC: 13.7 G/DL (ref 12–16)
IMMATURE GRANULOCYTE COUNT: 0.03 X10(3) UL (ref 0–1)
IMMATURE GRANULOCYTE RATIO %: 0.4 %
LYMPHOCYTES # BLD AUTO: 2.36 X10(3) UL (ref 0.9–4)
LYMPHOCYTES NFR BLD AUTO: 30.8 %
M PROTEIN MFR SERPL ELPH: 7.6 G/DL (ref 6.1–8.3)
MCH RBC QN AUTO: 28.3 PG (ref 27–33.2)
MCHC RBC AUTO-ENTMCNC: 31.7 G/DL (ref 31–37)
MCV RBC AUTO: 89.3 FL (ref 81–100)
MONOCYTES # BLD AUTO: 0.67 X10(3) UL (ref 0.1–0.6)
MONOCYTES NFR BLD AUTO: 8.7 %
NEUTROPHIL ABS PRELIM: 4.4 X10 (3) UL (ref 1.3–6.7)
NEUTROPHILS # BLD AUTO: 4.4 X10(3) UL (ref 1.3–6.7)
NEUTROPHILS NFR BLD AUTO: 57.5 %
PLATELET # BLD AUTO: 280 10(3)UL (ref 150–450)
POTASSIUM SERPL-SCNC: 4.1 MMOL/L (ref 3.6–5.1)
RBC # BLD AUTO: 4.84 X10(6)UL (ref 3.8–5.1)
RED CELL DISTRIBUTION WIDTH-SD: 48.2 FL (ref 35.1–46.3)
SODIUM SERPL-SCNC: 141 MMOL/L (ref 136–144)
TSI SER-ACNC: 1.4 MIU/ML (ref 0.35–5.5)
WBC # BLD AUTO: 7.7 X10(3) UL (ref 4–13)

## 2017-08-16 PROCEDURE — 80053 COMPREHEN METABOLIC PANEL: CPT | Performed by: INTERNAL MEDICINE

## 2017-08-16 PROCEDURE — 85378 FIBRIN DEGRADE SEMIQUANT: CPT | Performed by: INTERNAL MEDICINE

## 2017-08-16 PROCEDURE — 84443 ASSAY THYROID STIM HORMONE: CPT | Performed by: INTERNAL MEDICINE

## 2017-08-16 PROCEDURE — 36415 COLL VENOUS BLD VENIPUNCTURE: CPT | Performed by: INTERNAL MEDICINE

## 2017-08-16 PROCEDURE — 85025 COMPLETE CBC W/AUTO DIFF WBC: CPT | Performed by: INTERNAL MEDICINE

## 2017-08-17 ENCOUNTER — PRIOR ORIGINAL RECORDS (OUTPATIENT)
Dept: OTHER | Age: 82
End: 2017-08-17

## 2017-08-17 LAB
ALBUMIN: 3.6 G/DL
ALKALINE PHOSPHATATE(ALK PHOS): 94 IU/L
ALT (SGPT): 31 U/L
AST (SGOT): 28 U/L
BILIRUBIN TOTAL: 0.5 MG/DL
BUN: 15 MG/DL
CALCIUM: 9.7 MG/DL
CHLORIDE: 110 MEQ/L
CREATININE, SERUM: 0.72 MG/DL
GLUCOSE: 89 MG/DL
HEMATOCRIT: 43.2 %
HEMOGLOBIN: 13.7 G/DL
MCH: 28.3 PG
MCHC: 31.7 G/DL
MCV: 89.3 FL
PLATELETS: 280 K/UL
POTASSIUM, SERUM: 4.1 MEQ/L
PROTEIN, TOTAL: 7.6 G/DL
RED BLOOD COUNT: 4.84 X 10-6/U
SGOT (AST): 28 IU/L
SGPT (ALT): 31 IU/L
SODIUM: 141 MEQ/L
THYROID STIMULATING HORMONE: 1.4 MLU/L
WHITE BLOOD COUNT: 7.7 X 10-3/U

## 2017-08-18 ENCOUNTER — HOSPITAL ENCOUNTER (OUTPATIENT)
Dept: CT IMAGING | Facility: HOSPITAL | Age: 82
Discharge: HOME | End: 2017-08-18
Attending: INTERNAL MEDICINE
Payer: MEDICARE

## 2017-08-18 ENCOUNTER — PRIOR ORIGINAL RECORDS (OUTPATIENT)
Dept: OTHER | Age: 82
End: 2017-08-18

## 2017-08-18 ENCOUNTER — HOSPITAL ENCOUNTER (OUTPATIENT)
Dept: NUCLEAR MEDICINE | Facility: HOSPITAL | Age: 82
Discharge: HOME | End: 2017-08-18
Attending: INTERNAL MEDICINE
Payer: MEDICARE

## 2017-08-18 ENCOUNTER — HOSPITAL ENCOUNTER (OUTPATIENT)
Dept: GENERAL RADIOLOGY | Facility: HOSPITAL | Age: 82
Discharge: HOME | End: 2017-08-18
Attending: INTERNAL MEDICINE
Payer: MEDICARE

## 2017-08-18 DIAGNOSIS — R79.89 D-DIMER, ELEVATED: ICD-10-CM

## 2017-08-18 DIAGNOSIS — R06.00 DYSPNEA: ICD-10-CM

## 2017-08-18 PROCEDURE — 71020 XR CHEST PA + LAT CHEST (CPT=71020): CPT | Performed by: INTERNAL MEDICINE

## 2017-08-18 PROCEDURE — 78582 LUNG VENTILAT&PERFUS IMAGING: CPT | Performed by: INTERNAL MEDICINE

## 2017-08-21 ENCOUNTER — PRIOR ORIGINAL RECORDS (OUTPATIENT)
Dept: OTHER | Age: 82
End: 2017-08-21

## 2017-08-22 ENCOUNTER — PRIOR ORIGINAL RECORDS (OUTPATIENT)
Dept: OTHER | Age: 82
End: 2017-08-22

## 2017-08-29 ENCOUNTER — HOSPITAL ENCOUNTER (OUTPATIENT)
Dept: CV DIAGNOSTICS | Facility: HOSPITAL | Age: 82
Discharge: HOME | End: 2017-08-29
Attending: INTERNAL MEDICINE
Payer: MEDICARE

## 2017-08-29 PROCEDURE — 78452 HT MUSCLE IMAGE SPECT MULT: CPT | Performed by: INTERNAL MEDICINE

## 2017-08-29 PROCEDURE — 93017 CV STRESS TEST TRACING ONLY: CPT | Performed by: INTERNAL MEDICINE

## 2017-08-29 PROCEDURE — 93018 CV STRESS TEST I&R ONLY: CPT | Performed by: INTERNAL MEDICINE

## 2017-09-02 ENCOUNTER — HOSPITAL ENCOUNTER (EMERGENCY)
Facility: HOSPITAL | Age: 82
Discharge: HOME | End: 2017-09-02
Attending: EMERGENCY MEDICINE
Payer: MEDICARE

## 2017-09-02 ENCOUNTER — APPOINTMENT (OUTPATIENT)
Dept: GENERAL RADIOLOGY | Facility: HOSPITAL | Age: 82
End: 2017-09-02
Attending: EMERGENCY MEDICINE
Payer: MEDICARE

## 2017-09-02 VITALS
BODY MASS INDEX: 33.38 KG/M2 | SYSTOLIC BLOOD PRESSURE: 147 MMHG | RESPIRATION RATE: 16 BRPM | HEART RATE: 81 BPM | OXYGEN SATURATION: 97 % | TEMPERATURE: 98 F | HEIGHT: 60 IN | DIASTOLIC BLOOD PRESSURE: 75 MMHG | WEIGHT: 170 LBS

## 2017-09-02 DIAGNOSIS — M16.12 ARTHRITIS OF LEFT HIP: ICD-10-CM

## 2017-09-02 DIAGNOSIS — S76.012A HIP STRAIN, LEFT, INITIAL ENCOUNTER: Primary | ICD-10-CM

## 2017-09-02 PROCEDURE — 72110 X-RAY EXAM L-2 SPINE 4/>VWS: CPT | Performed by: EMERGENCY MEDICINE

## 2017-09-02 PROCEDURE — 99284 EMERGENCY DEPT VISIT MOD MDM: CPT

## 2017-09-02 PROCEDURE — 73502 X-RAY EXAM HIP UNI 2-3 VIEWS: CPT | Performed by: EMERGENCY MEDICINE

## 2017-09-02 RX ORDER — HYDROCODONE BITARTRATE AND ACETAMINOPHEN 5; 325 MG/1; MG/1
1 TABLET ORAL ONCE
Status: COMPLETED | OUTPATIENT
Start: 2017-09-02 | End: 2017-09-02

## 2017-09-02 RX ORDER — HYDROCODONE BITARTRATE AND ACETAMINOPHEN 5; 325 MG/1; MG/1
1-2 TABLET ORAL EVERY 4 HOURS PRN
Qty: 20 TABLET | Refills: 0 | Status: SHIPPED | OUTPATIENT
Start: 2017-09-02 | End: 2017-09-09

## 2017-09-02 NOTE — ED PROVIDER NOTES
Patient Seen in: [facility] Emergency Department    History   Patient presents with:  Back Pain (musculoskeletal)    Stated Complaint: back/leg pain    HPI    Patient presents with left lower lumbar and left buttock pain over the past 3 days.   Darrius hypertension    • Visual impairment     glasses       Past Surgical History:  No date: BACK SURGERY  a long time ago:  BENIGN BIOPSY LEFT      Comment: x 2  2012: CATH BARE METAL STENT (BMS)  No date: CHOLECYSTECTOMY  10/21/2013: COLONOSCOPY      Comment: P Linda Herring MD;  Location: [facility] FOR               PAIN MANAGEMENT  No date: UPPER GI ENDOSCOPY,EXAM    Medications :   HYDROcodone-acetaminophen 5-325 MG Oral Tab,  Take 1-2 tablets by mouth every 4 (four) hours as needed for [employer] Attack Maternal Grandmother    • Heart Attack Brother    • Autoimmune disease [OTHER] Brother    • Thyroid Disorder Sister    • Asthma Sister    • Cancer Brother    • Allergies Daughter    • Fibromyalgia Daughter    • Melanoma Daughter    • Hepatitis C [OT Course  ------------------------------------------------------------  Time: 09/02 2164  Comment: X-rays of lumbar spine and hip reveal degenerative changes without any acute bony abnormalities.   ------------------------------------------------------------

## 2017-09-07 ENCOUNTER — OFFICE VISIT (OUTPATIENT)
Dept: INTERNAL MEDICINE CLINIC | Facility: CLINIC | Age: 82
End: 2017-09-07

## 2017-09-07 VITALS
TEMPERATURE: 97 F | HEIGHT: 60 IN | BODY MASS INDEX: 33.57 KG/M2 | DIASTOLIC BLOOD PRESSURE: 80 MMHG | SYSTOLIC BLOOD PRESSURE: 122 MMHG | HEART RATE: 68 BPM | RESPIRATION RATE: 16 BRPM | WEIGHT: 171 LBS

## 2017-09-07 DIAGNOSIS — T78.40XA ALLERGIC REACTION TO DRUG, INITIAL ENCOUNTER: Primary | ICD-10-CM

## 2017-09-07 DIAGNOSIS — M54.42 CHRONIC LEFT-SIDED LOW BACK PAIN WITH LEFT-SIDED SCIATICA: ICD-10-CM

## 2017-09-07 DIAGNOSIS — G89.29 CHRONIC LEFT-SIDED LOW BACK PAIN WITH LEFT-SIDED SCIATICA: ICD-10-CM

## 2017-09-07 PROCEDURE — 99213 OFFICE O/P EST LOW 20 MIN: CPT | Performed by: INTERNAL MEDICINE

## 2017-09-07 RX ORDER — PREDNISONE 1 MG/1
30 TABLET ORAL
COMMUNITY
End: 2017-10-17 | Stop reason: ALTCHOICE

## 2017-09-07 NOTE — PROGRESS NOTES
Lei Humphrey is a 80year old female. Patient presents with:  Er F/u: for lower back pain  Allergies      HPI:     Patient here for ER f/u-  Went there for acute on chronic back pain and difficulty walking. Diagnosed with hip strain and OA.  Given Aerosol INHALE 2 PUFFS INTO THE LUNGS EVERY 4 HOURS AS NEEDED FOR WHEEZING Disp: 3 Inhaler Rfl: 3   acetaminophen 500 MG Oral Tab Take 500 mg by mouth every 6 (six) hours as needed for Pain.  Disp:  Rfl:    B Complex Vitamins (VITAMIN B COMPLEX OR) Take 1 t • Pinched nerve in neck    • Pneumonia, organism unspecified(486)    • PONV (postoperative nausea and vomiting)    • Problems with swallowing    • Shortness of breath    • Unspecified essential hypertension    • Visual impairment     glasses      Social Iodine.   Levaquin [Levofloxa*        Comment:Nausea, leg pain  Linezolid               Diarrhea  Marcaine Hcl [Bupiv*    Other (See Comments)    Comment:BREATHING ISSUES -  Metronidazole And R*    Shortness of Breath  Mold                    Shortness of B S2  EXTREMITIES: no cyanosis, clubbing or edema, normal strength and tone  Spine: lumbar spine tenderness, +SLR on left  NEURO: Alert and oriented    ASSESSMENT AND PLAN:   Allergic reaction to drug, initial encounter - note Jewels Wang as an allergy.  Prednis

## 2017-09-15 ENCOUNTER — HOSPITAL ENCOUNTER (OUTPATIENT)
Dept: MRI IMAGING | Facility: HOSPITAL | Age: 82
Discharge: HOME | End: 2017-09-15
Attending: ORTHOPAEDIC SURGERY
Payer: MEDICARE

## 2017-09-15 DIAGNOSIS — M79.604 LEG PAIN, BILATERAL: ICD-10-CM

## 2017-09-15 DIAGNOSIS — R29.898 LEG WEAKNESS, BILATERAL: ICD-10-CM

## 2017-09-15 DIAGNOSIS — M43.06 LUMBAR SPONDYLOLYSIS: ICD-10-CM

## 2017-09-15 DIAGNOSIS — M79.605 LEG PAIN, BILATERAL: ICD-10-CM

## 2017-09-15 PROCEDURE — 72148 MRI LUMBAR SPINE W/O DYE: CPT | Performed by: ORTHOPAEDIC SURGERY

## 2017-09-22 ENCOUNTER — HOSPITAL ENCOUNTER (OUTPATIENT)
Dept: MRI IMAGING | Facility: HOSPITAL | Age: 82
Discharge: HOME | End: 2017-09-22
Attending: ORTHOPAEDIC SURGERY
Payer: MEDICARE

## 2017-09-22 DIAGNOSIS — R10.32 GROIN PAIN, LEFT: ICD-10-CM

## 2017-09-22 DIAGNOSIS — M25.552 HIP PAIN, LEFT: ICD-10-CM

## 2017-09-22 PROCEDURE — 73721 MRI JNT OF LWR EXTRE W/O DYE: CPT | Performed by: ORTHOPAEDIC SURGERY

## 2017-10-02 ENCOUNTER — TELEPHONE (OUTPATIENT)
Dept: INTERNAL MEDICINE CLINIC | Facility: CLINIC | Age: 82
End: 2017-10-02

## 2017-10-02 NOTE — TELEPHONE ENCOUNTER
Pt is feeling nauseous since last weekend on and off no other symptoms. Today she is feeling nauseous.  Call given to triage

## 2017-10-02 NOTE — TELEPHONE ENCOUNTER
Per triage to schedule an appt. Pt unable to come in today. Requested an appt for tomorrow.  Future Appointments  Date Time Provider Jeremiah Gonzalez   10/3/2017 9:30 AM Vu Kitchen NP [facility]   10/27/2017 9:45 AM Perico Gamino MD [facility]

## 2017-10-03 ENCOUNTER — OFFICE VISIT (OUTPATIENT)
Dept: INTERNAL MEDICINE CLINIC | Facility: CLINIC | Age: 82
End: 2017-10-03

## 2017-10-03 VITALS
WEIGHT: 170 LBS | OXYGEN SATURATION: 92 % | HEIGHT: 61 IN | HEART RATE: 102 BPM | DIASTOLIC BLOOD PRESSURE: 80 MMHG | RESPIRATION RATE: 16 BRPM | SYSTOLIC BLOOD PRESSURE: 128 MMHG | BODY MASS INDEX: 32.1 KG/M2 | TEMPERATURE: 98 F

## 2017-10-03 DIAGNOSIS — R53.83 FATIGUE, UNSPECIFIED TYPE: ICD-10-CM

## 2017-10-03 DIAGNOSIS — J02.9 SORE THROAT: Primary | ICD-10-CM

## 2017-10-03 DIAGNOSIS — R11.0 NAUSEA: ICD-10-CM

## 2017-10-03 PROCEDURE — 99213 OFFICE O/P EST LOW 20 MIN: CPT | Performed by: NURSE PRACTITIONER

## 2017-10-03 NOTE — PATIENT INSTRUCTIONS
Gargle with warm salt water solution 3-5 times daily. Dissolve 1/2 teaspoon salt in half cup of warm tap water. Gargle and spit.        Try a premixed saline nasal spray, available over the counter, such as Ocean Nasal Spray, 4 times daily

## 2017-10-03 NOTE — PROGRESS NOTES
Patient presents with:  Nausea: pt states that she was sick last week with nausea and sore throat. Pt states that as of today shes better, no nausea or difficulty swallowing.       HPI:  Presents with approx 1 week history of sore throat, fatigue, nausea an • PONV (postoperative nausea and vomiting)    • Problems with swallowing    • Shortness of breath    • Unspecified essential hypertension    • Visual impairment     glasses       Patient Active Problem List:     Rotator cuff tendonitis     Cervical radic (ACIDOPHILUS PROBIOTIC) 10 MG Oral Tab Take 1 tablet by mouth daily. Disp:  Rfl:    valsartan 80 MG Oral Tab Half tab po daily Disp: 45 tablet Rfl: 0   alprazolam (XANAX) 0.5 MG Oral Tab Take 0.25 mg by mouth TID CC and HS.  Pt takes 0.5mg at night  Disp: requested or ordered in this encounter    Imaging & Consults:  None    No Follow-up on file. Patient Instructions                     Gargle with warm salt water solution 3-5 times daily. Dissolve 1/2 teaspoon salt in half cup of warm tap water.  Gargle an

## 2017-10-05 ENCOUNTER — TELEPHONE (OUTPATIENT)
Dept: NEUROLOGY | Facility: CLINIC | Age: 82
End: 2017-10-05

## 2017-10-05 NOTE — TELEPHONE ENCOUNTER
We will see what Dr Cullen Reeves from [facility] recommends for left hip issues. If he does not think the left leg is due to hip arthritis then we may consider restarting low dose steroids.  Can we give her a sooner appt than 10/31

## 2017-10-05 NOTE — TELEPHONE ENCOUNTER
Pt last seen in office on 5/8/17. Pt calling to update doctor the her lower extremity weakness is now affecting both legs, and left leg has gotten worse. Pt had MRI of lumbar spine on 9/15/17 and MRI left hip on 9/22/17.   Pt reports she has appt schedule

## 2017-10-09 ENCOUNTER — PRIOR ORIGINAL RECORDS (OUTPATIENT)
Dept: OTHER | Age: 82
End: 2017-10-09

## 2017-10-10 NOTE — TELEPHONE ENCOUNTER
Spoke with patient. She stated she is seeing her orthopedic doctor tomorrow. She states she started herself on steroids on Saturday from a script she had from Dr Harsha Carey in February. Patient currently taking Prednisone 5 mg BID since Saturday 10/7/17.

## 2017-10-11 ENCOUNTER — MED REC SCAN ONLY (OUTPATIENT)
Dept: INTERNAL MEDICINE CLINIC | Facility: CLINIC | Age: 82
End: 2017-10-11

## 2017-10-11 NOTE — TELEPHONE ENCOUNTER
Patient saw ortho yesterday who told her to stop Prednisone which she did. Ortho advised either doing injections or seeing hip specialist. Patient has appointment with hip specialist 11/2/17 and wants to keep her 10/31/17 appointment with Dr El Hobson.

## 2017-10-17 ENCOUNTER — OFFICE VISIT (OUTPATIENT)
Dept: NEUROLOGY | Facility: CLINIC | Age: 82
End: 2017-10-17

## 2017-10-17 VITALS
HEART RATE: 95 BPM | WEIGHT: 175 LBS | DIASTOLIC BLOOD PRESSURE: 80 MMHG | HEIGHT: 60 IN | BODY MASS INDEX: 34.36 KG/M2 | SYSTOLIC BLOOD PRESSURE: 122 MMHG | RESPIRATION RATE: 16 BRPM

## 2017-10-17 DIAGNOSIS — R29.898 LEFT LEG WEAKNESS: ICD-10-CM

## 2017-10-17 DIAGNOSIS — M54.32 LEFT SIDED SCIATICA: ICD-10-CM

## 2017-10-17 DIAGNOSIS — M25.552 LEFT HIP PAIN: Primary | ICD-10-CM

## 2017-10-17 PROCEDURE — 99213 OFFICE O/P EST LOW 20 MIN: CPT | Performed by: OTHER

## 2017-10-17 RX ORDER — GABAPENTIN 100 MG/1
100 CAPSULE ORAL 3 TIMES DAILY
Qty: 90 CAPSULE | Refills: 1 | Status: SHIPPED | OUTPATIENT
Start: 2017-10-17 | End: 2018-02-15

## 2017-10-17 NOTE — PATIENT INSTRUCTIONS
Refill policies:    • Allow 2-3 business days for refills; controlled substances may take longer.   • Contact your pharmacy at least 5 days prior to running out of medication and have them send an electronic request or submit request through the [facility] have a procedure or additional testing performed. [facility]) will contact your insurance carrier to obtain pre-certification or prior authorization.     Unfortunately, JOHAN has seen an increase in denial of payment even though the p

## 2017-10-18 NOTE — PROGRESS NOTES
HPI:    Patient ID: Sharon Hammonds is a 80year old female. HPI     Patient presented with complaints of left leg pain and weakness.  She has history of lumbar degenerative disease s/p fusion surgery, cervical spinal stenosis, polymyalgia rheumati essential hypertension    • Visual impairment     glasses      Past Surgical History:  No date: BACK SURGERY  a long time ago:  BENIGN BIOPSY LEFT      Comment: x 2  2012: CATH BARE METAL STENT (BMS)  No date: CHOLECYSTECTOMY  10/21/2013: COLONOSCOPY      C Surgeon:                Marylee Parry, MD;  Location: [facility] FOR               PAIN MANAGEMENT  No date: UPPER GI ENDOSCOPY,EXAM   Family History   Problem Relation Age of Onset   • Breast Cancer Mother 79   • High Blood Pressure Mother    • Allergi B Complex Vitamins (VITAMIN B COMPLEX OR) Take 1 tablet by mouth daily.    Disp:  Rfl:    Verapamil HCl  MG Oral Capsule SR 24 Hr TK ONE C PO D Disp:  Rfl: 2   LOPERAMIDE HCL 2 MG Oral Cap TAKE ONE CAPSULE BY MOUTH AS NEEDED Disp: 90 capsule Rfl: 0 Shortness of Breath  Nsaids                      Comment:Short of Breath  Other                   Rash, Shortness of Breath    Comment:Transdermal \"memo \" patch ;novacaine Pt states             she is okay with marcaine and xylocaine per             all pain  (primary encounter diagnosis)  Left sided sciatica  Left leg weakness    Left leg pain and mild weakness due to left hip arthritis and possible left knee arthritis.  MRI lumbar spine shows stable degenerative and post operative changes    No indicatio

## 2017-11-27 ENCOUNTER — TELEPHONE (OUTPATIENT)
Dept: RHEUMATOLOGY | Facility: CLINIC | Age: 82
End: 2017-11-27

## 2017-11-27 NOTE — TELEPHONE ENCOUNTER
Pt called stating that she is feeling tired and weak with continued left hip pain. Pt stated she took left over prednisone 5mg BID left over from previous script. Informed pt she should not self medicate and should go to PCP since Aman Méndez is OOT.  Pt verb

## 2017-12-05 ENCOUNTER — TELEPHONE (OUTPATIENT)
Dept: NEUROLOGY | Facility: CLINIC | Age: 82
End: 2017-12-05

## 2017-12-07 ENCOUNTER — OFFICE VISIT (OUTPATIENT)
Dept: INTERNAL MEDICINE CLINIC | Facility: CLINIC | Age: 82
End: 2017-12-07

## 2017-12-07 VITALS
SYSTOLIC BLOOD PRESSURE: 134 MMHG | RESPIRATION RATE: 16 BRPM | DIASTOLIC BLOOD PRESSURE: 72 MMHG | WEIGHT: 174 LBS | HEIGHT: 60 IN | HEART RATE: 83 BPM | BODY MASS INDEX: 34.16 KG/M2

## 2017-12-07 DIAGNOSIS — M81.0 AGE-RELATED OSTEOPOROSIS WITHOUT CURRENT PATHOLOGICAL FRACTURE: ICD-10-CM

## 2017-12-07 DIAGNOSIS — I25.10 CORONARY ARTERY DISEASE INVOLVING NATIVE CORONARY ARTERY OF NATIVE HEART WITHOUT ANGINA PECTORIS: ICD-10-CM

## 2017-12-07 DIAGNOSIS — E78.5 DYSLIPIDEMIA: ICD-10-CM

## 2017-12-07 DIAGNOSIS — Z00.00 WELLNESS EXAMINATION: Primary | ICD-10-CM

## 2017-12-07 DIAGNOSIS — J45.40 MODERATE PERSISTENT ASTHMA WITHOUT COMPLICATION: ICD-10-CM

## 2017-12-07 DIAGNOSIS — I10 BENIGN ESSENTIAL HTN: ICD-10-CM

## 2017-12-07 DIAGNOSIS — M16.12 PRIMARY OSTEOARTHRITIS OF LEFT HIP: ICD-10-CM

## 2017-12-07 PROCEDURE — G0439 PPPS, SUBSEQ VISIT: HCPCS | Performed by: INTERNAL MEDICINE

## 2017-12-07 NOTE — PROGRESS NOTES
Germaine Ellington is a 80year old female who presents for a Medicare Annual Wellness visit.     Patient with multiple problems including advanced left hip OA (thinking about replacement in Spring), chronic back pain, cervical stenosis, asthma, anxiety, MCG/ACT Inhalation Aerosol INHALE 2 PUFFS INTO THE LUNGS EVERY 4 HOURS AS NEEDED FOR WHEEZING Disp: 3 Inhaler Rfl: 3   acetaminophen 500 MG Oral Tab Take 500 mg by mouth every 6 (six) hours as needed for Pain.  Disp:  Rfl:    B Complex Vitamins (VITAMIN B C Results  Component Value Date   TRIG 271 (H) 06/13/2016   TRIG 116 05/12/2013   TRIG 311 (H) 12/21/2012       Lab Results  Component Value Date    (H) 06/13/2016    (H) 05/12/2013    (H) 12/21/2012       Lab Results  Component Value Da daily activities? : Yes    Memory Problems?: No      Fall/Risk Assessment          Have you fallen in the last 12 months?: 0-No                                        Fall/Risk Scorin          Depression Screening (PHQ-2/PHQ-9): Over the LAST 2 WEEKS flowsheet data found. Glaucoma Screening      Ophthalmology Visit Annually y    Bone Density Screening      Dexascan Every two years Last Dexa Scan:   DEXA BONE DENSITY AXIAL (CPT=77080) 03/13/2015    No flowsheet data found.     Pap and Pelvic      Pap: LDL  Annually LDL CHOLESTROL (mg/dL)   Date Value   05/12/2013 198 (H)     LDL Cholesterol (mg/dL)   Date Value   06/13/2016 169 (H)    No flowsheet data found. Dilated Eye exam  Annually No flowsheet data found. No flowsheet data found.     COPD Comment:Arms turned red like a sunburn  Vancomycin              Rash    Comment:Rash to the face and neck  Wellbutrin [Bupropi*        Comment:constipation    CURRENT MEDICATIONS:     Current Outpatient Prescriptions:  Fluticasone Propionate HFA disease)    • Cancer ([facility] 2010    skin cancer    • Coronary atherosclerosis of unspecified type of vessel, native or graft    • Dyslipidemia    • Esophageal reflux    • Extrinsic asthma, unspecified    • Glucose intolerance (pre-diabetes)    • Hearing imp 9/23/2013: PATIENT DOCUMENTED NOT TO HAVE EXPERIENCED ANY*      Comment: Procedure: CERVICAL EPIDURAL;  Surgeon:                Harvey Anton MD;  Location: [place] [address] MANAGEMENT  8/17/2015: PATIENT DOCUMENTED NOT TO HAVE EXPERI lesions  EYES: denies blurred vision or double vision  HEENT: denies nasal congestion, sinus pain or ST  LUNGS: denies shortness of breath   CARDIOVASCULAR: denies chest pain on exertion  GI: denies abdominal pain, denies heartburn  : denies dysuria   MU asthma without complication- symptoms controlled, f/u Dr. Hakan Thorpe    Age-related osteoporosis without current pathological fracture- continue calcium and vit d.  Patient declined any other medications or repeat dexa    Benign essential HTN- controlled, cpm

## 2017-12-09 ENCOUNTER — TELEPHONE (OUTPATIENT)
Dept: INTERNAL MEDICINE CLINIC | Facility: CLINIC | Age: 82
End: 2017-12-09

## 2017-12-09 NOTE — TELEPHONE ENCOUNTER
D/w pt. C/o congestion. No f/c/SOB  On claritin for congestion/allergies, advised to continue and call back next week if still not feeling well.

## 2017-12-09 NOTE — TELEPHONE ENCOUNTER
Pt states she saw TB the other day for wellness visit and she told her she did not feel well-head congestion-she called today stating she feels worse-no cough-no fever just does not feel good-not sure if TB wants to give her something or not? ?  Please call

## 2017-12-13 ENCOUNTER — HOSPITAL ENCOUNTER (EMERGENCY)
Facility: HOSPITAL | Age: 82
Discharge: HOME | End: 2017-12-13
Attending: EMERGENCY MEDICINE
Payer: MEDICARE

## 2017-12-13 ENCOUNTER — APPOINTMENT (OUTPATIENT)
Dept: GENERAL RADIOLOGY | Facility: HOSPITAL | Age: 82
End: 2017-12-13
Attending: EMERGENCY MEDICINE
Payer: MEDICARE

## 2017-12-13 VITALS
OXYGEN SATURATION: 97 % | TEMPERATURE: 98 F | HEIGHT: 60 IN | RESPIRATION RATE: 14 BRPM | DIASTOLIC BLOOD PRESSURE: 76 MMHG | WEIGHT: 170 LBS | SYSTOLIC BLOOD PRESSURE: 153 MMHG | BODY MASS INDEX: 33.38 KG/M2 | HEART RATE: 101 BPM

## 2017-12-13 DIAGNOSIS — J06.9 UPPER RESPIRATORY TRACT INFECTION, UNSPECIFIED TYPE: Primary | ICD-10-CM

## 2017-12-13 PROCEDURE — 94640 AIRWAY INHALATION TREATMENT: CPT

## 2017-12-13 PROCEDURE — 93005 ELECTROCARDIOGRAM TRACING: CPT

## 2017-12-13 PROCEDURE — 99284 EMERGENCY DEPT VISIT MOD MDM: CPT

## 2017-12-13 PROCEDURE — 93010 ELECTROCARDIOGRAM REPORT: CPT

## 2017-12-13 PROCEDURE — 71020 XR CHEST PA + LAT CHEST (CPT=71020): CPT | Performed by: EMERGENCY MEDICINE

## 2017-12-13 PROCEDURE — 99285 EMERGENCY DEPT VISIT HI MDM: CPT

## 2017-12-13 RX ORDER — ALBUTEROL SULFATE 2.5 MG/3ML
2.5 SOLUTION RESPIRATORY (INHALATION) ONCE
Status: COMPLETED | OUTPATIENT
Start: 2017-12-13 | End: 2017-12-13

## 2017-12-13 RX ORDER — PREDNISONE 20 MG/1
40 TABLET ORAL DAILY
Qty: 6 TABLET | Refills: 0 | Status: SHIPPED | OUTPATIENT
Start: 2017-12-13 | End: 2017-12-16

## 2017-12-13 RX ORDER — DOXYCYCLINE HYCLATE 100 MG/1
100 CAPSULE ORAL 2 TIMES DAILY
Qty: 14 CAPSULE | Refills: 0 | Status: SHIPPED | OUTPATIENT
Start: 2017-12-13 | End: 2017-12-20

## 2017-12-13 NOTE — ED INITIAL ASSESSMENT (HPI)
Pt states feeling SOB. Pt states she used her inhaler and felt better. Pt states she woke with laryngitis. Pt states today she feels that she is having a harder time taking deep breath.

## 2017-12-13 NOTE — ED PROVIDER NOTES
Patient Seen in: [facility] Emergency Department    History   Patient presents with:  Dyspnea ALLISON SOB (respiratory)    Stated Complaint: allison    HPI    Triage complaint reviewed and I disagree slightly with the history obtained.       Is an 82-year-ol (postoperative nausea and vomiting)    • Problems with swallowing    • Shortness of breath    • Unspecified essential hypertension    • Visual impairment     glasses       Past Surgical History:  No date: BACK SURGERY  a long time ago:  BENIGN BIOPSY LEFT MANAGEMENT  8/17/2015: PATIENT [place] PREOPERATIVE ORDER FOR IV ANT* N/A      Comment: Procedure: CERVICAL EPIDURAL;  Surgeon:                Roberta Sarmiento MD;  Location: [facility] FOR               PAIN MANAGEMENT  No date: UPPER GI ENDOSCOPY,EXAM Report. Rate: 93  Rhythm: Sinus Rhythm  Reading: Left bundle branch block. A new finding.            ED Course as of Dec 13 1752  ------------------------------------------------------------       MDM     80-year-old female here with what sounds like an u [id number]  [phone]    In 1 week          Medications Prescribed:  Current Discharge Medication List    START taking these medications    Doxycycline Hyclate 100 MG Oral Cap  Take 1 capsule (100 mg total) by mouth 2 (two) times daily.   Qty: 14 capsule

## 2017-12-13 NOTE — ED NOTES
D/c instructions and scripts given to pt, pt in no distress, pt requires wheelchair out of ED, thanks staff for care.

## 2017-12-14 ENCOUNTER — TELEPHONE (OUTPATIENT)
Dept: INTERNAL MEDICINE CLINIC | Facility: CLINIC | Age: 82
End: 2017-12-14

## 2017-12-14 NOTE — TELEPHONE ENCOUNTER
This is just an FYI to you. Pt had changes on her EKG (when compared to previous EKG) when in the ER so surgeon would like for her to have cardiac clearance. Seeing cardiologist Monday 12/18/17. Will call back to reschedule pre-op.

## 2017-12-14 NOTE — TELEPHONE ENCOUNTER
TAHIR. Yuvonne Manifold Yuvonne Manifold Yuvonne Manifold Yuvonne Manifold Yuvonne Manifold Pt had pre-op scheduled Monday 12/18/17, canceled appt. Pt may not be having surgery until she see cardio, have appt through [facility], on Monday 12/18/17.  Pt already  Talked to Dr. Alicia Cyr office, twice, about previous EKG being different/changed t

## 2017-12-15 ENCOUNTER — HOSPITAL ENCOUNTER (EMERGENCY)
Facility: HOSPITAL | Age: 82
Discharge: HOME | End: 2017-12-15
Attending: EMERGENCY MEDICINE
Payer: MEDICARE

## 2017-12-15 ENCOUNTER — PRIOR ORIGINAL RECORDS (OUTPATIENT)
Dept: OTHER | Age: 82
End: 2017-12-15

## 2017-12-15 VITALS
DIASTOLIC BLOOD PRESSURE: 87 MMHG | HEART RATE: 88 BPM | TEMPERATURE: 98 F | RESPIRATION RATE: 18 BRPM | WEIGHT: 160 LBS | BODY MASS INDEX: 31.41 KG/M2 | HEIGHT: 60 IN | OXYGEN SATURATION: 99 % | SYSTOLIC BLOOD PRESSURE: 135 MMHG

## 2017-12-15 DIAGNOSIS — R00.2 PALPITATIONS: Primary | ICD-10-CM

## 2017-12-15 PROCEDURE — 99285 EMERGENCY DEPT VISIT HI MDM: CPT

## 2017-12-15 PROCEDURE — 93010 ELECTROCARDIOGRAM REPORT: CPT

## 2017-12-15 PROCEDURE — 99284 EMERGENCY DEPT VISIT MOD MDM: CPT

## 2017-12-15 PROCEDURE — 83735 ASSAY OF MAGNESIUM: CPT | Performed by: EMERGENCY MEDICINE

## 2017-12-15 PROCEDURE — 84484 ASSAY OF TROPONIN QUANT: CPT | Performed by: EMERGENCY MEDICINE

## 2017-12-15 PROCEDURE — 84443 ASSAY THYROID STIM HORMONE: CPT | Performed by: EMERGENCY MEDICINE

## 2017-12-15 PROCEDURE — 36415 COLL VENOUS BLD VENIPUNCTURE: CPT

## 2017-12-15 PROCEDURE — 80053 COMPREHEN METABOLIC PANEL: CPT | Performed by: EMERGENCY MEDICINE

## 2017-12-15 PROCEDURE — 93005 ELECTROCARDIOGRAM TRACING: CPT

## 2017-12-15 PROCEDURE — 85025 COMPLETE CBC W/AUTO DIFF WBC: CPT | Performed by: EMERGENCY MEDICINE

## 2017-12-15 NOTE — ED PROVIDER NOTES
Patient Seen in: [facility] Emergency Department    History   Patient presents with:  Arrythmia/Palpitations (cardiovascular)    Stated Complaint: palpitation    HPI    Patient is an 44-year-old female comes emergency room for evaluation of palpitat COLONOSCOPY      Comment: Procedure: COLONOSCOPY;  Surgeon: Azucena Ibrahim MD;  Location: [facility] ENDOSCOPY  9/23/2013: [address] NDL/CATH SPI DX/THER JRK      Comment: Procedure: CERVICAL EPIDURAL;  Surgeon:                Sena Diaz Packs/day: 0.30      Years: 5.00         Quit date: 2/13/1972  Smokeless tobacco: Never Used                      Alcohol use: No                Review of Systems    Positive for stated complaint: palpitation  Other systems are as noted in HPI.   Constituti following orders were created for panel order CBC WITH DIFFERENTIAL WITH PLATELET.   Procedure                               Abnormality         Status                     ---------                               -----------         ------ treatment, but to return immediately to the ER for worsening, concerning, new, changing or persisting symptoms. I discussed the case with the patient and they had no questions, complaints, or concerns. Patient felt comfortable going home.               Sara Perea

## 2017-12-15 NOTE — ED NOTES
Calling the [facility] to see if they have monitor DR ramos will be the ordering physician and the results should go to her

## 2017-12-15 NOTE — ED NOTES
Up to the bathroom via wheel chair family is at the bedside I discharged the patient with the Plan to follow up with [facility]

## 2017-12-15 NOTE — ED NOTES
I called [facility] [phone] They do not have a 30 day monitor available at this time . I gave her the patient name and home number and she feels sometime next week one will be made ready for her.

## 2017-12-15 NOTE — ED INITIAL ASSESSMENT (HPI)
Patient lives alone last night she was experiencing palpitations. Constantly during the night when she got up this am she took her morning meds and felt better.   She was seen here a few days ago for the same think no new findings

## 2017-12-18 ENCOUNTER — PRIOR ORIGINAL RECORDS (OUTPATIENT)
Dept: OTHER | Age: 82
End: 2017-12-18

## 2017-12-18 ENCOUNTER — HOSPITAL ENCOUNTER (OUTPATIENT)
Dept: CV DIAGNOSTICS | Facility: HOSPITAL | Age: 82
Discharge: HOME | End: 2017-12-18
Attending: EMERGENCY MEDICINE
Payer: MEDICARE

## 2017-12-18 DIAGNOSIS — R00.2 PALPITATIONS: ICD-10-CM

## 2017-12-18 PROCEDURE — 93271 ECG/MONITORING AND ANALYSIS: CPT | Performed by: EMERGENCY MEDICINE

## 2017-12-18 PROCEDURE — 93272 ECG/REVIEW INTERPRET ONLY: CPT | Performed by: EMERGENCY MEDICINE

## 2017-12-18 PROCEDURE — 93270 REMOTE 30 DAY ECG REV/REPORT: CPT | Performed by: EMERGENCY MEDICINE

## 2017-12-20 ENCOUNTER — PRIOR ORIGINAL RECORDS (OUTPATIENT)
Dept: OTHER | Age: 82
End: 2017-12-20

## 2017-12-21 ENCOUNTER — OFFICE VISIT (OUTPATIENT)
Dept: RHEUMATOLOGY | Facility: CLINIC | Age: 82
End: 2017-12-21

## 2017-12-21 VITALS
WEIGHT: 170 LBS | DIASTOLIC BLOOD PRESSURE: 80 MMHG | SYSTOLIC BLOOD PRESSURE: 134 MMHG | BODY MASS INDEX: 33 KG/M2 | HEART RATE: 72 BPM | RESPIRATION RATE: 16 BRPM

## 2017-12-21 DIAGNOSIS — Z98.890 S/P LUMBAR LAMINECTOMY: ICD-10-CM

## 2017-12-21 DIAGNOSIS — Z88.6 ALLERGY TO NONSTEROIDAL ANTI-INFLAMMATORY DRUG (NSAID): ICD-10-CM

## 2017-12-21 DIAGNOSIS — I25.10 CORONARY ARTERY DISEASE INVOLVING NATIVE CORONARY ARTERY OF NATIVE HEART WITHOUT ANGINA PECTORIS: Primary | ICD-10-CM

## 2017-12-21 DIAGNOSIS — M16.12 OSTEOARTHRITIS OF ONE HIP, LEFT: ICD-10-CM

## 2017-12-21 DIAGNOSIS — M17.11 OSTEOARTHRITIS OF RIGHT KNEE, UNSPECIFIED OSTEOARTHRITIS TYPE: ICD-10-CM

## 2017-12-21 DIAGNOSIS — M17.12 PRIMARY OSTEOARTHRITIS OF LEFT KNEE: ICD-10-CM

## 2017-12-21 PROCEDURE — 99213 OFFICE O/P EST LOW 20 MIN: CPT | Performed by: INTERNAL MEDICINE

## 2017-12-21 RX ORDER — PREDNISONE 1 MG/1
5 TABLET ORAL DAILY
Qty: 90 TABLET | Refills: 1 | Status: SHIPPED | OUTPATIENT
Start: 2017-12-21 | End: 2018-03-27 | Stop reason: ALTCHOICE

## 2017-12-21 RX ORDER — ACETAMINOPHEN AND CODEINE PHOSPHATE 300; 30 MG/1; MG/1
1 TABLET ORAL EVERY 8 HOURS PRN
Qty: 60 TABLET | Refills: 1 | Status: SHIPPED | OUTPATIENT
Start: 2017-12-21 | End: 2018-05-07

## 2017-12-21 NOTE — PROGRESS NOTES
[facility]  Dr. Radha Fisher Progress Note     Subjective:   Marichuy Lewis is a(n) 80year old female. Current complaints: Patient presents with:  Osteoarthritis: 3 month f/u.  Pt states 'was supposed to have hip surgery this weekend but canceled

## 2017-12-21 NOTE — PATIENT INSTRUCTIONS
Tonya Enamorado is allergic to NSAIDS. Therefore for pain of arthritis, use Prednisone 5 mg per day. Allergic to tramadol also. For pain use tylenol #3 one every 8 hours in addition. Apply capsacin cream, otc salves, to your joints as needed.   For swallowing is

## 2017-12-27 ENCOUNTER — TELEPHONE (OUTPATIENT)
Dept: INTERNAL MEDICINE CLINIC | Facility: CLINIC | Age: 82
End: 2017-12-27

## 2017-12-27 NOTE — TELEPHONE ENCOUNTER
Pt is feeling weak vomiting yesterday she also has diarrhea. She declined an appt. She would like to know what she can do at home.  Please advise

## 2017-12-27 NOTE — TELEPHONE ENCOUNTER
Pt stating sx started yesterday (12/26/17). 1 episode of vomiting after lunch. Several episodes of diarrhea. Today feeling much better- 2 episodes of diarrhea. No blood in stool. No N/V. No abdominal pain or swelling. No weakness. Fatigued as did not sleep well yesterday due to constantly up to go to BR. No OTC meds. No Dizziness. Pushing fluids. No fever at this time. No body aches or chills. Reviewed bland/BRAT diet. Continue to keep hydrated. Advanced diet as tolerated. If worsening sx or new sx- abdominal pain. N/V. To go to ER. Pt verbalized understanding and agreed with POC, will follow up if symptoms fail to improve or any other concerns. TAHIR.

## 2018-01-04 ENCOUNTER — TELEPHONE (OUTPATIENT)
Dept: INTERNAL MEDICINE CLINIC | Facility: CLINIC | Age: 83
End: 2018-01-04

## 2018-01-04 NOTE — TELEPHONE ENCOUNTER
Pt stating experiencing cough, sinus and head congestion x2 weeks. No fever at this time. No N/V. No body aches or chills. No weakness. Diarrhea started after Marjorie and subsided after imodium. Eating bland foods and soups.  Feeling slightly better today

## 2018-01-04 NOTE — TELEPHONE ENCOUNTER
Patient called a couple of weeks ago and spoke to a nurse regarding diarrhea, etc.  Patient now has the cough,sinus, congestion. Patient needs an appointment for tomorrow and is patient of TB.   Please advise

## 2018-01-05 ENCOUNTER — OFFICE VISIT (OUTPATIENT)
Dept: INTERNAL MEDICINE CLINIC | Facility: CLINIC | Age: 83
End: 2018-01-05

## 2018-01-05 VITALS
HEART RATE: 111 BPM | BODY MASS INDEX: 32.79 KG/M2 | RESPIRATION RATE: 16 BRPM | TEMPERATURE: 98 F | WEIGHT: 167 LBS | HEIGHT: 60 IN | DIASTOLIC BLOOD PRESSURE: 80 MMHG | SYSTOLIC BLOOD PRESSURE: 130 MMHG

## 2018-01-05 DIAGNOSIS — R19.7 DIARRHEA OF PRESUMED INFECTIOUS ORIGIN: ICD-10-CM

## 2018-01-05 DIAGNOSIS — J11.1 FLU SYNDROME: Primary | ICD-10-CM

## 2018-01-05 LAB
ALBUMIN: 3.8 G/DL
ALKALINE PHOSPHATATE(ALK PHOS): 76 IU/L
BILIRUBIN TOTAL: 0.6 MG/DL
BUN: 22 MG/DL
CALCIUM: 9.9 MG/DL
CHLORIDE: 108 MEQ/L
CREATININE, SERUM: 0.92 MG/DL
GLUCOSE: 92 MG/DL
HEMATOCRIT: 43.3 %
HEMOGLOBIN: 14.2 G/DL
PLATELETS: 289 K/UL
POTASSIUM, SERUM: 3.9 MEQ/L
PROTEIN, TOTAL: 7.6 G/DL
RED BLOOD COUNT: 5.02 X 10-6/U
SGOT (AST): 16 IU/L
SGPT (ALT): 22 IU/L
SODIUM: 139 MEQ/L
THYROID STIMULATING HORMONE: 1.43 MLU/L
WHITE BLOOD COUNT: 9.8 X 10-3/U

## 2018-01-05 PROCEDURE — 99213 OFFICE O/P EST LOW 20 MIN: CPT | Performed by: INTERNAL MEDICINE

## 2018-01-05 NOTE — PROGRESS NOTES
Marichuy Lewis is a 80year old female. Patient presents with:  Cough: patient has been experiencing symptoms of the flu as well as diarrhea and vomiting, thought it was food poisioning but not so sure anymore. ..having strong bowel movements      H total) by mouth daily. Disp: 90 tablet Rfl: 1   Acetaminophen-Codeine (TYLENOL WITH CODEINE #3) 300-30 MG Oral Tab Take 1 tablet by mouth every 8 (eight) hours as needed for Pain.  Disp: 60 tablet Rfl: 1   gabapentin 100 MG Oral Cap Take 1 capsule (100 mg t ([facility] 2010    skin cancer    • Coronary atherosclerosis of unspecified type of vessel, native or graft    • Diarrhea, unspecified    • Dyslipidemia    • Esophageal reflux    • Extrinsic asthma, unspecified    • Fatigue    • Glucose intolerance (pre-diabete Anaphylaxis  Iodine Solution [Po*    Anaphylaxis    Comment:IVP Dye  Lidocaine               Anaphylaxis    Comment:\"Breathing issues\"-- per patient okay with             marcaine and zylacaine  Nsaids                  Shortness of Breath  Ra arm, Patient Position: Sitting, Cuff Size: adult)   Pulse 111   Temp 98 °F (36.7 °C) (Oral)   Resp 16   Ht 60\"   Wt 167 lb   BMI 32.61 kg/m²   GENERAL: well developed, well nourished,in no apparent distress  SKIN: no rashes,no suspicious lesions  HEENT: a

## 2018-01-11 ENCOUNTER — HOSPITAL ENCOUNTER (OUTPATIENT)
Dept: CV DIAGNOSTICS | Facility: HOSPITAL | Age: 83
Discharge: HOME | End: 2018-01-11
Attending: INTERNAL MEDICINE

## 2018-01-11 ENCOUNTER — MYAURORA ACCOUNT LINK (OUTPATIENT)
Dept: OTHER | Age: 83
End: 2018-01-11

## 2018-01-11 DIAGNOSIS — R94.31 NONSPECIFIC ABNORMAL ELECTROCARDIOGRAM (ECG) (EKG): ICD-10-CM

## 2018-01-11 DIAGNOSIS — R06.00 DYSPNEA, UNSPECIFIED TYPE: ICD-10-CM

## 2018-01-11 DIAGNOSIS — R00.2 PALPITATIONS: ICD-10-CM

## 2018-01-15 ENCOUNTER — PRIOR ORIGINAL RECORDS (OUTPATIENT)
Dept: OTHER | Age: 83
End: 2018-01-15

## 2018-01-16 ENCOUNTER — LAB ENCOUNTER (OUTPATIENT)
Dept: LAB | Age: 83
End: 2018-01-16
Attending: INTERNAL MEDICINE
Payer: MEDICARE

## 2018-01-16 DIAGNOSIS — R19.7 DIARRHEA OF PRESUMED INFECTIOUS ORIGIN: ICD-10-CM

## 2018-01-16 PROCEDURE — 87427 SHIGA-LIKE TOXIN AG IA: CPT

## 2018-01-16 PROCEDURE — 87046 STOOL CULTR AEROBIC BACT EA: CPT

## 2018-01-16 PROCEDURE — 87045 FECES CULTURE AEROBIC BACT: CPT

## 2018-01-16 PROCEDURE — 87493 C DIFF AMPLIFIED PROBE: CPT

## 2018-01-16 PROCEDURE — 87077 CULTURE AEROBIC IDENTIFY: CPT

## 2018-01-17 ENCOUNTER — TELEPHONE (OUTPATIENT)
Dept: INTERNAL MEDICINE CLINIC | Facility: CLINIC | Age: 83
End: 2018-01-17

## 2018-01-17 ENCOUNTER — PRIOR ORIGINAL RECORDS (OUTPATIENT)
Dept: OTHER | Age: 83
End: 2018-01-17

## 2018-01-17 ENCOUNTER — HOSPITAL ENCOUNTER (EMERGENCY)
Facility: HOSPITAL | Age: 83
Discharge: HOME | End: 2018-01-17
Attending: EMERGENCY MEDICINE
Payer: MEDICARE

## 2018-01-17 VITALS
SYSTOLIC BLOOD PRESSURE: 121 MMHG | DIASTOLIC BLOOD PRESSURE: 63 MMHG | RESPIRATION RATE: 18 BRPM | HEART RATE: 74 BPM | OXYGEN SATURATION: 95 % | TEMPERATURE: 99 F | WEIGHT: 165 LBS | HEIGHT: 60 IN | BODY MASS INDEX: 32.39 KG/M2

## 2018-01-17 DIAGNOSIS — R19.7 DIARRHEA, UNSPECIFIED TYPE: Primary | ICD-10-CM

## 2018-01-17 DIAGNOSIS — E86.0 DEHYDRATION: ICD-10-CM

## 2018-01-17 LAB
ALBUMIN SERPL-MCNC: 3.4 G/DL (ref 3.5–4.8)
ALP LIVER SERPL-CCNC: 78 U/L (ref 55–142)
ALT SERPL-CCNC: 31 U/L (ref 14–54)
BASOPHILS # BLD AUTO: 0.06 X10(3) UL (ref 0–0.1)
BASOPHILS NFR BLD AUTO: 0.6 %
BILIRUB SERPL-MCNC: 0.6 MG/DL (ref 0.1–2)
BILIRUB UR QL STRIP.AUTO: NEGATIVE
BUN BLD-MCNC: 8 MG/DL (ref 8–20)
CALCIUM BLD-MCNC: 9.3 MG/DL (ref 8.3–10.3)
CHLORIDE: 108 MMOL/L (ref 101–111)
CLARITY UR REFRACT.AUTO: CLEAR
CO2: 22 MMOL/L (ref 22–32)
CREAT BLD-MCNC: 0.82 MG/DL (ref 0.55–1.02)
EOSINOPHIL # BLD AUTO: 0.06 X10(3) UL (ref 0–0.3)
EOSINOPHIL NFR BLD AUTO: 0.6 %
ERYTHROCYTE [DISTWIDTH] IN BLOOD BY AUTOMATED COUNT: 14.7 % (ref 11.5–16)
GLUCOSE BLD-MCNC: 100 MG/DL (ref 70–99)
GLUCOSE UR STRIP.AUTO-MCNC: NEGATIVE MG/DL
HCT VFR BLD AUTO: 42.5 % (ref 34–50)
HGB BLD-MCNC: 13.8 G/DL (ref 12–16)
IMMATURE GRANULOCYTE COUNT: 0.07 X10(3) UL (ref 0–1)
IMMATURE GRANULOCYTE RATIO %: 0.8 %
KETONES UR STRIP.AUTO-MCNC: NEGATIVE MG/DL
LYMPHOCYTES # BLD AUTO: 2.7 X10(3) UL (ref 0.9–4)
LYMPHOCYTES NFR BLD AUTO: 29.2 %
M PROTEIN MFR SERPL ELPH: 7.3 G/DL (ref 6.1–8.3)
MCH RBC QN AUTO: 27.8 PG (ref 27–33.2)
MCHC RBC AUTO-ENTMCNC: 32.5 G/DL (ref 31–37)
MCV RBC AUTO: 85.7 FL (ref 81–100)
MONOCYTES # BLD AUTO: 0.84 X10(3) UL (ref 0.1–0.6)
MONOCYTES NFR BLD AUTO: 9.1 %
NEUTROPHIL ABS PRELIM: 5.53 X10 (3) UL (ref 1.3–6.7)
NEUTROPHILS # BLD AUTO: 5.53 X10(3) UL (ref 1.3–6.7)
NEUTROPHILS NFR BLD AUTO: 59.7 %
NITRITE UR QL STRIP.AUTO: NEGATIVE
PH UR STRIP.AUTO: 6 [PH] (ref 4.5–8)
PLATELET # BLD AUTO: 331 10(3)UL (ref 150–450)
POTASSIUM SERPL-SCNC: 4.3 MMOL/L (ref 3.6–5.1)
PROT UR STRIP.AUTO-MCNC: NEGATIVE MG/DL
RBC # BLD AUTO: 4.96 X10(6)UL (ref 3.8–5.1)
RBC UR QL AUTO: NEGATIVE
RED CELL DISTRIBUTION WIDTH-SD: 45.8 FL (ref 35.1–46.3)
SODIUM SERPL-SCNC: 140 MMOL/L (ref 136–144)
SP GR UR STRIP.AUTO: <1.005 (ref 1–1.03)
UROBILINOGEN UR STRIP.AUTO-MCNC: <2 MG/DL
WBC # BLD AUTO: 9.3 X10(3) UL (ref 4–13)

## 2018-01-17 PROCEDURE — 96360 HYDRATION IV INFUSION INIT: CPT

## 2018-01-17 PROCEDURE — 80053 COMPREHEN METABOLIC PANEL: CPT | Performed by: EMERGENCY MEDICINE

## 2018-01-17 PROCEDURE — 96361 HYDRATE IV INFUSION ADD-ON: CPT

## 2018-01-17 PROCEDURE — 85025 COMPLETE CBC W/AUTO DIFF WBC: CPT | Performed by: EMERGENCY MEDICINE

## 2018-01-17 PROCEDURE — 84132 ASSAY OF SERUM POTASSIUM: CPT | Performed by: EMERGENCY MEDICINE

## 2018-01-17 PROCEDURE — 99285 EMERGENCY DEPT VISIT HI MDM: CPT

## 2018-01-17 PROCEDURE — 87086 URINE CULTURE/COLONY COUNT: CPT | Performed by: EMERGENCY MEDICINE

## 2018-01-17 PROCEDURE — 81001 URINALYSIS AUTO W/SCOPE: CPT | Performed by: EMERGENCY MEDICINE

## 2018-01-17 PROCEDURE — 99284 EMERGENCY DEPT VISIT MOD MDM: CPT

## 2018-01-17 NOTE — ED INITIAL ASSESSMENT (HPI)
Pt c/o diarrhea on and off since December. Patient sts she thinks she had the flu but never was tested. Pt sts dropped a stool sample off negative for c.diff, cultures are still pending from yesterday. PMD was concerned for dehydration.

## 2018-01-17 NOTE — TELEPHONE ENCOUNTER
Patient called for lab test results. I told her that I see that they were done yesterday and are still In Process, it usually takes a full 24-48 hours. She said when she called the lab, they said some results were in and she would like those results.

## 2018-01-17 NOTE — TELEPHONE ENCOUNTER
Called pt to inform, per TB, agrees to go to ER as may need IVF due to possible dehydration. Pt verbalized understanding and agreed with POC, stating will call Daughter to take to ER.

## 2018-01-17 NOTE — ED PROVIDER NOTES
Patient Seen in: [facility] Emergency Department    History   Patient presents with:  Nausea/Vomiting/Diarrhea (gastrointestinal)    Stated Complaint: diarrhea, dehydration    HPI    Patient is an 26-year-old woman presents the emergency room with i vomiting)    • Problems with swallowing    • Shortness of breath    • Unspecified essential hypertension    • Visual impairment     glasses   • Wears glasses        Past Surgical History:  No date: ANGIOPLASTY (CORONARY)  No date: APPENDECTOMY  No date: BA Birgit Heard MD;  Location: [place] [address] MANAGEMENT  [facility]17/2015: [facility]T* N/A      Comment: Procedure: CERVICAL EPIDURAL;  Surgeon:                Marshall Montero MD;  Location: [address] [place] [address]  components within normal limits   URINALYSIS WITH CULTURE REFLEX - Abnormal; Notable for the following:     Leukocyte Esterase Urine Trace (*)     Mucous Urine 1+ (*)     All other components within normal limits   CBC W/ DIFFERENTIAL - Abnormal; Notable f

## 2018-01-17 NOTE — TELEPHONE ENCOUNTER
Called pt only test finalized is c-diff which is negative. Patient verbalized understanding that all other tests are still pending.   Pt still having about 3 episodes of watery stools a day, currently using Imodium as previously instructed and following a B

## 2018-01-24 ENCOUNTER — TELEPHONE (OUTPATIENT)
Dept: INTERNAL MEDICINE CLINIC | Facility: CLINIC | Age: 83
End: 2018-01-24

## 2018-01-24 NOTE — TELEPHONE ENCOUNTER
Pt stating feeling better. No flu-like sx at this time. Cough subsided. No body aches or chills. Runny nose subsides, now nasal congestion. No fever at this time. No N/V. HA last week- subsided. Appetite ok. No chest congestion. Pushing fluids.  Last week f

## 2018-01-24 NOTE — TELEPHONE ENCOUNTER
Pt saw TB on 1/5 for flu-no meds given-this week now has opposite issue-her head is congested-was runny before-no cough-call to advise-some chest congestion-did use inhaler

## 2018-02-02 RX ORDER — LEVALBUTEROL TARTRATE 45 UG/1
AEROSOL, METERED ORAL
Qty: 3 INHALER | Refills: 3 | Status: SHIPPED | OUTPATIENT
Start: 2018-02-02 | End: 2021-02-01

## 2018-02-02 NOTE — TELEPHONE ENCOUNTER
LOV: 1/5/18 TB   Future office visit: No upcoming visit   Last labs: 1/17/18 Cmp Cbc   Last RX: 1/31/17 #3 #3 Refill   Per protocol: Route to provider

## 2018-02-02 NOTE — TELEPHONE ENCOUNTER
Patient states the Pharmacy faxed over a refill for LEVALBUTEROL TARTRATE 45 MCG/ACT Inhalation Aerosol. Patient states she would like this refilled today because they will deliver this to her home.

## 2018-02-05 ENCOUNTER — PRIOR ORIGINAL RECORDS (OUTPATIENT)
Dept: OTHER | Age: 83
End: 2018-02-05

## 2018-02-08 ENCOUNTER — MED REC SCAN ONLY (OUTPATIENT)
Dept: INTERNAL MEDICINE CLINIC | Facility: CLINIC | Age: 83
End: 2018-02-08

## 2018-02-12 LAB
ALBUMIN: 3.4 G/DL
ALKALINE PHOSPHATATE(ALK PHOS): 78 IU/L
BILIRUBIN TOTAL: 0.6 MG/DL
BUN: 8 MG/DL
CALCIUM: 9.3 MG/DL
CHLORIDE: 108 MEQ/L
CREATININE, SERUM: 0.82 MG/DL
GLUCOSE: 100 MG/DL
HEMATOCRIT: 42.5 %
HEMOGLOBIN: 13.8 G/DL
PLATELETS: 331 K/UL
POTASSIUM, SERUM: 4.3 MEQ/L
PROTEIN, TOTAL: 7.3 G/DL
RED BLOOD COUNT: 4.96 X 10-6/U
SGPT (ALT): 31 IU/L
SODIUM: 140 MEQ/L
WHITE BLOOD COUNT: 9.3 X 10-3/U

## 2018-02-15 ENCOUNTER — OFFICE VISIT (OUTPATIENT)
Dept: INTERNAL MEDICINE CLINIC | Facility: CLINIC | Age: 83
End: 2018-02-15

## 2018-02-15 VITALS
WEIGHT: 170 LBS | DIASTOLIC BLOOD PRESSURE: 78 MMHG | HEART RATE: 104 BPM | SYSTOLIC BLOOD PRESSURE: 136 MMHG | BODY MASS INDEX: 33 KG/M2 | OXYGEN SATURATION: 96 % | RESPIRATION RATE: 17 BRPM | TEMPERATURE: 98 F

## 2018-02-15 DIAGNOSIS — J01.90 ACUTE RHINOSINUSITIS: Primary | ICD-10-CM

## 2018-02-15 PROCEDURE — 99212 OFFICE O/P EST SF 10 MIN: CPT | Performed by: INTERNAL MEDICINE

## 2018-02-15 NOTE — PROGRESS NOTES
Marissa Le  [birth year]    Patient presents with:  Sinus Problem: Per patient called a few weeks ago when it was starting, per patient did not come in but was seeing if it would go away and it has not. All the pain is on the right side.  Mostly ju OR) Take 1 tablet by mouth daily.    Disp:  Rfl:    Verapamil HCl  MG Oral Capsule SR 24 Hr TK ONE C PO D Disp:  Rfl: 2   LOPERAMIDE HCL 2 MG Oral Cap TAKE ONE CAPSULE BY MOUTH AS NEEDED Disp: 90 capsule Rfl: 0   Calcium 250 MG Oral Cap Take 2 tablets Comment:Short of Breath  Other                   Rash, Shortness of Breath    Comment:Transdermal \"memo \" patch ;novacaine Pt states             she is okay with marcaine and xylocaine per             allergy testing.   Pcn [Penicillins]           Commen tendonitis     Cervical radiculopathy at C5     Osteoarthritis of right knee, unspecified osteoarthritis type     Lumbar disc herniation     At risk for falling     Irritable bowel syndrome with diarrhea     Coronary artery disease involving native coronar mini vision                2mmx 12mm.    9/23/2013: PATIENT DOCUMENTED NOT TO HAVE EXPERIENCED ANY*      Comment: Procedure: CERVICAL EPIDURAL;  Surgeon:                Alpesh Meneses MD;  Location: [place] [address] MANAGEMENT  8/17/2015: normal. No respiratory distress. Lymphadenopathy: No cervical adenopathy. ASSESSMENT AND PLAN:   Duane Danes is a 80year old female who presents with a two-week history of sinus congestion.     1 - Acute rhinosinusitis:   No suspected bacter

## 2018-02-26 ENCOUNTER — OFFICE VISIT (OUTPATIENT)
Dept: INTERNAL MEDICINE CLINIC | Facility: CLINIC | Age: 83
End: 2018-02-26

## 2018-02-26 VITALS
WEIGHT: 169 LBS | DIASTOLIC BLOOD PRESSURE: 70 MMHG | HEART RATE: 66 BPM | RESPIRATION RATE: 16 BRPM | BODY MASS INDEX: 33.18 KG/M2 | TEMPERATURE: 98 F | HEIGHT: 60 IN | SYSTOLIC BLOOD PRESSURE: 132 MMHG

## 2018-02-26 DIAGNOSIS — J01.01 ACUTE RECURRENT MAXILLARY SINUSITIS: Primary | ICD-10-CM

## 2018-02-26 PROCEDURE — 99213 OFFICE O/P EST LOW 20 MIN: CPT | Performed by: INTERNAL MEDICINE

## 2018-02-26 RX ORDER — LEVOFLOXACIN 250 MG/1
250 TABLET ORAL DAILY
Qty: 10 TABLET | Refills: 0 | Status: SHIPPED | OUTPATIENT
Start: 2018-02-26 | End: 2018-03-27 | Stop reason: ALTCHOICE

## 2018-02-26 NOTE — PROGRESS NOTES
Makayla Alonzo is a 80year old female. Patient presents with:  Sinus Problem: AB RM 4, pt c/o still having sinus injection, sinus pressure, bug bite, diarrhea X 2 days       HPI:     C/o sinus tenderness and headache for 1 month now.  Drained yello 1 tablet by mouth every 8 (eight) hours as needed for Pain. Disp: 60 tablet Rfl: 1   Fluticasone Propionate  MCG/ACT Inhalation Aerosol Inhale 2 puffs into the lungs 2 (two) times daily.  Disp: 12 g Rfl: 1   acetaminophen 500 MG Oral Tab Take 500 mg • Hip pain    • History of blood transfusion AT AGE 44    AFTER HYST   • HTN (hypertension)    • Loss of appetite    • Osteoarthrosis, unspecified whether generalized or localized, unspecified site    • Other and unspecified hyperlipidemia    • Pinched n Breath  Ctd Aspirin [Aspiri*    Wheezing    Comment:Asthma attack  Effient [Prasugrel *    Shortness of Breath  Erythromycin                Comment:Short of Breath  Flagyl [Metronidazo*    Asthma  Iodine (Topical)        Rash, Shortness of Breath    Commen levaquin 250mg daily for 10 days.  Continue flonase and nasal saline prn  - continue probiotic        Meds & Refills for this Visit:  Signed Prescriptions Disp Refills    levofloxacin 250 MG Oral Tab 10 tablet 0      Sig: Take 1 tablet (250 mg total) by winston

## 2018-03-12 ENCOUNTER — OFFICE VISIT (OUTPATIENT)
Dept: INTERNAL MEDICINE CLINIC | Facility: CLINIC | Age: 83
End: 2018-03-12

## 2018-03-12 VITALS
HEIGHT: 60 IN | SYSTOLIC BLOOD PRESSURE: 138 MMHG | HEART RATE: 64 BPM | BODY MASS INDEX: 33.18 KG/M2 | TEMPERATURE: 98 F | DIASTOLIC BLOOD PRESSURE: 80 MMHG | RESPIRATION RATE: 16 BRPM | WEIGHT: 169 LBS

## 2018-03-12 DIAGNOSIS — H92.01 RIGHT EAR PAIN: ICD-10-CM

## 2018-03-12 DIAGNOSIS — R11.0 NAUSEA: ICD-10-CM

## 2018-03-12 DIAGNOSIS — R51.9 PERSISTENT HEADACHES: Primary | ICD-10-CM

## 2018-03-12 PROCEDURE — 99213 OFFICE O/P EST LOW 20 MIN: CPT | Performed by: INTERNAL MEDICINE

## 2018-03-12 NOTE — PROGRESS NOTES
Fideleverett Hagen is a 80year old female. Patient presents with: Follow - Up: AB RM 3, pt states still having a headache, nausea, ear pain,       HPI:     Patient here for f/u-  Sinus pressure better but still with headaches and nausea daily.  Headac #3) 300-30 MG Oral Tab Take 1 tablet by mouth every 8 (eight) hours as needed for Pain. Disp: 60 tablet Rfl: 1   acetaminophen 500 MG Oral Tab Take 500 mg by mouth every 6 (six) hours as needed for Pain.  Disp:  Rfl:    B Complex Vitamins (VITAMIN B COMPLEX PRESSURE    • High cholesterol    • Hip pain    • History of blood transfusion AT AGE 39    AFTER HYST   • HTN (hypertension)    • Loss of appetite    • Osteoarthrosis, unspecified whether generalized or localized, unspecified site    • Other and unspecifi [Citalopram]     Shortness of Breath  Ctd Aspirin [Aspiri*    Wheezing    Comment:Asthma attack  Effient [Prasugrel *    Shortness of Breath  Erythromycin                Comment:Short of Breath  Flagyl [Metronidazo*    Asthma  Iodine (Topical)        Rash, S2  GI: normal bowel sounds, no masses, HSM or tenderness  EXTREMITIES: no cyanosis, clubbing or edema, normal strength and tone  NEURO: Alert and oriented, uses walker to ambulate (chronic back issues), no acute abnormalities    ASSESSMENT AND PLAN:   Per

## 2018-03-17 ENCOUNTER — HOSPITAL ENCOUNTER (OUTPATIENT)
Dept: CT IMAGING | Facility: HOSPITAL | Age: 83
Discharge: HOME | End: 2018-03-17
Attending: INTERNAL MEDICINE
Payer: MEDICARE

## 2018-03-17 DIAGNOSIS — R51.9 PERSISTENT HEADACHES: ICD-10-CM

## 2018-03-17 DIAGNOSIS — R11.0 NAUSEA: ICD-10-CM

## 2018-03-17 PROCEDURE — 70450 CT HEAD/BRAIN W/O DYE: CPT | Performed by: INTERNAL MEDICINE

## 2018-03-22 ENCOUNTER — OFFICE VISIT (OUTPATIENT)
Dept: RHEUMATOLOGY | Facility: CLINIC | Age: 83
End: 2018-03-22

## 2018-03-22 VITALS
HEART RATE: 76 BPM | WEIGHT: 172 LBS | DIASTOLIC BLOOD PRESSURE: 80 MMHG | RESPIRATION RATE: 20 BRPM | BODY MASS INDEX: 34 KG/M2 | SYSTOLIC BLOOD PRESSURE: 132 MMHG

## 2018-03-22 DIAGNOSIS — M17.11 OSTEOARTHRITIS OF RIGHT KNEE, UNSPECIFIED OSTEOARTHRITIS TYPE: ICD-10-CM

## 2018-03-22 DIAGNOSIS — M16.12 OSTEOARTHRITIS OF ONE HIP, LEFT: ICD-10-CM

## 2018-03-22 DIAGNOSIS — S22.000D: ICD-10-CM

## 2018-03-22 DIAGNOSIS — M47.816 OSTEOARTHRITIS OF SPINE WITHOUT MYELOPATHY OR RADICULOPATHY, LUMBAR REGION: Primary | ICD-10-CM

## 2018-03-22 DIAGNOSIS — M17.12 PRIMARY OSTEOARTHRITIS OF LEFT KNEE: ICD-10-CM

## 2018-03-22 DIAGNOSIS — Z91.81 AT RISK FOR FALLING: ICD-10-CM

## 2018-03-22 DIAGNOSIS — Z98.890 S/P LUMBAR LAMINECTOMY: ICD-10-CM

## 2018-03-22 DIAGNOSIS — Z88.6 ALLERGY TO NONSTEROIDAL ANTI-INFLAMMATORY DRUG (NSAID): ICD-10-CM

## 2018-03-22 PROCEDURE — 99213 OFFICE O/P EST LOW 20 MIN: CPT | Performed by: INTERNAL MEDICINE

## 2018-03-22 RX ORDER — PREDNISONE 2.5 MG
2.5 TABLET ORAL DAILY
Qty: 90 TABLET | Refills: 1 | Status: SHIPPED | OUTPATIENT
Start: 2018-03-22 | End: 2018-04-18

## 2018-03-22 NOTE — PATIENT INSTRUCTIONS
No NSAIDs, no ibuprofen due to allergies. Decreased Prednisone to  2.5 mg dose, use daily as needed. You can take tylenol also. Use a very rare Tylenol#3. There is no easy treatment for fatigue, get enough rest.   Try to exercise regularly.   Return to

## 2018-03-26 ENCOUNTER — TELEPHONE (OUTPATIENT)
Dept: RHEUMATOLOGY | Facility: CLINIC | Age: 83
End: 2018-03-26

## 2018-03-26 NOTE — TELEPHONE ENCOUNTER
Pt stated that she has decreased prednisone to 2.5mg daily. Due to side effects pt would like to decrease to 2.5mg every other day. Pt to start every other day on Thursday. Pt to call if symptoms worsen. Pt verbalized understanding.

## 2018-03-27 RX ORDER — MULTIVIT WITH MINERALS/LUTEIN
1000 TABLET ORAL DAILY
COMMUNITY
End: 2018-07-27

## 2018-04-03 ENCOUNTER — OFFICE VISIT (OUTPATIENT)
Dept: INTERNAL MEDICINE CLINIC | Facility: CLINIC | Age: 83
End: 2018-04-03

## 2018-04-03 VITALS
RESPIRATION RATE: 16 BRPM | TEMPERATURE: 98 F | SYSTOLIC BLOOD PRESSURE: 126 MMHG | WEIGHT: 173 LBS | HEIGHT: 60 IN | BODY MASS INDEX: 33.96 KG/M2 | DIASTOLIC BLOOD PRESSURE: 72 MMHG | HEART RATE: 92 BPM

## 2018-04-03 DIAGNOSIS — J32.2 CHRONIC ETHMOIDAL SINUSITIS: ICD-10-CM

## 2018-04-03 DIAGNOSIS — R19.7 ACUTE DIARRHEA: Primary | ICD-10-CM

## 2018-04-03 PROCEDURE — 99213 OFFICE O/P EST LOW 20 MIN: CPT | Performed by: INTERNAL MEDICINE

## 2018-04-03 NOTE — PROGRESS NOTES
Marissa Le  [birth year]    Patient presents with:  Diarrhea: x 3 days. LB-room 14  Headache      SUBJECTIVE   Marissa Le is a 80year old female who presents with a three day history of loose stools.     The patient was in her usual sta Rfl: 1   Levalbuterol Tartrate 45 MCG/ACT Inhalation Aerosol INHALE 2 PUFFS INTO THE LUNGS EVERY 4 HOURS AS NEEDED FOR WHEEZING Disp: 3 Inhaler Rfl: 3   Acetaminophen-Codeine (TYLENOL WITH CODEINE #3) 300-30 MG Oral Tab Take 1 tablet by mouth every 8 (eigh Other (See Comments)    Comment:Pneumonia  Celexa [Citalopram]     Shortness of Breath  Ctd Aspirin [Warfar*    Wheezing    Comment:Asthma attack  Effient [Prasugrel *    Shortness of Breath  Erythromycin                Comment:Short of Breath  Flagyl [Met of appetite    • Osteoarthrosis, unspecified whether generalized or localized, unspecified site    • Other and unspecified hyperlipidemia    • Pinched nerve in neck    • Pneumonia, organism unspecified(486)    • PONV (postoperative nausea and vomiting) Surgeon:                Aguilar Ocampo MD;  Location: [place] [address] MANAGEMENT  8/17/2015: INJECTION, W/WO CONTRAST, DX/THERAPEUTIC SUBST* N/A      Comment: Procedure: CERVICAL EPIDURAL;  Surgeon:                Sen Pang MD; to person, place, and time. No distress. HEENT:  Normocephalic and atraumatic. TM's wnl. Right ethmoid and maxillary sinus tenderness. nose normal. Oropharynx is clear and moist.   Eyes: Conjunctivae wnl. Neck: Normal range of motion. Neck supple.  Nor Consults:  None    Return if symptoms worsen or fail to improve. There are no Patient Instructions on file for this visit. All questions were answered and the patient agrees with the plan.      Thank you,  Marianna Lira MD

## 2018-04-06 ENCOUNTER — PRIOR ORIGINAL RECORDS (OUTPATIENT)
Dept: OTHER | Age: 83
End: 2018-04-06

## 2018-04-06 ENCOUNTER — TELEPHONE (OUTPATIENT)
Dept: INTERNAL MEDICINE CLINIC | Facility: CLINIC | Age: 83
End: 2018-04-06

## 2018-04-06 NOTE — TELEPHONE ENCOUNTER
Called s/w patient informed to keep appointment with ENT next week. Patient verbalized understanding and agreeable to POC.

## 2018-04-06 NOTE — TELEPHONE ENCOUNTER
I am glad to hear that the patient's symptoms have improved. Given that her sinus congestion has been chronic, Dr. Rj Rocha and I advise that she be evaluated by ENT, and thus, keep her appointment for next week.  Please advise the patient of these recommen

## 2018-04-06 NOTE — TELEPHONE ENCOUNTER
Pt was to call today and let AD know how she is doing-Wed she had 3 bouts of diarrhea and yesterday did not have any diarrhea-thinks she is done with that now-her sinus issue/HA are better now as well-should she cx appt w/Sinus splst on Monday?

## 2018-04-18 ENCOUNTER — TELEPHONE (OUTPATIENT)
Dept: RHEUMATOLOGY | Facility: CLINIC | Age: 83
End: 2018-04-18

## 2018-04-18 ENCOUNTER — PRIOR ORIGINAL RECORDS (OUTPATIENT)
Dept: OTHER | Age: 83
End: 2018-04-18

## 2018-04-18 RX ORDER — PREDNISONE 2.5 MG
2.5 TABLET ORAL DAILY
Qty: 90 TABLET | Refills: 1 | Status: SHIPPED | OUTPATIENT
Start: 2018-04-18 | End: 2018-07-24

## 2018-04-18 NOTE — TELEPHONE ENCOUNTER
Pt called. Pt seen in office 3/22/18. Pt was given a printed script for prednisone 2.5 mg, once daily, qty 90 w/1 refill. Pt states 'can not find the script, would like medication sent to [place].' Med sent.  mp     Future Appointments  Date Time Provider

## 2018-04-24 ENCOUNTER — SURGERY (OUTPATIENT)
Age: 83
End: 2018-04-24

## 2018-04-24 ENCOUNTER — ANESTHESIA EVENT (OUTPATIENT)
Dept: ENDOSCOPY | Facility: HOSPITAL | Age: 83
End: 2018-04-24
Payer: MEDICARE

## 2018-04-24 ENCOUNTER — ANESTHESIA (OUTPATIENT)
Dept: ENDOSCOPY | Facility: HOSPITAL | Age: 83
End: 2018-04-24
Payer: MEDICARE

## 2018-04-24 ENCOUNTER — HOSPITAL ENCOUNTER (OUTPATIENT)
Facility: HOSPITAL | Age: 83
Setting detail: HOSPITAL OUTPATIENT SURGERY
Discharge: HOME | End: 2018-04-24
Attending: INTERNAL MEDICINE | Admitting: INTERNAL MEDICINE
Payer: MEDICARE

## 2018-04-24 VITALS
SYSTOLIC BLOOD PRESSURE: 138 MMHG | DIASTOLIC BLOOD PRESSURE: 76 MMHG | TEMPERATURE: 98 F | WEIGHT: 170 LBS | BODY MASS INDEX: 33.38 KG/M2 | HEIGHT: 60 IN | OXYGEN SATURATION: 98 % | RESPIRATION RATE: 16 BRPM | HEART RATE: 82 BPM

## 2018-04-24 DIAGNOSIS — R13.10 DYSPHAGIA, UNSPECIFIED TYPE: ICD-10-CM

## 2018-04-24 PROCEDURE — 0DB68ZX EXCISION OF STOMACH, VIA NATURAL OR ARTIFICIAL OPENING ENDOSCOPIC, DIAGNOSTIC: ICD-10-PCS | Performed by: INTERNAL MEDICINE

## 2018-04-24 PROCEDURE — 0DB58ZX EXCISION OF ESOPHAGUS, VIA NATURAL OR ARTIFICIAL OPENING ENDOSCOPIC, DIAGNOSTIC: ICD-10-PCS | Performed by: INTERNAL MEDICINE

## 2018-04-24 PROCEDURE — 0DB98ZX EXCISION OF DUODENUM, VIA NATURAL OR ARTIFICIAL OPENING ENDOSCOPIC, DIAGNOSTIC: ICD-10-PCS | Performed by: INTERNAL MEDICINE

## 2018-04-24 PROCEDURE — 0D748ZZ DILATION OF ESOPHAGOGASTRIC JUNCTION, VIA NATURAL OR ARTIFICIAL OPENING ENDOSCOPIC: ICD-10-PCS | Performed by: INTERNAL MEDICINE

## 2018-04-24 PROCEDURE — 88305 TISSUE EXAM BY PATHOLOGIST: CPT | Performed by: INTERNAL MEDICINE

## 2018-04-24 RX ORDER — NALOXONE HYDROCHLORIDE 0.4 MG/ML
80 INJECTION, SOLUTION INTRAMUSCULAR; INTRAVENOUS; SUBCUTANEOUS AS NEEDED
Status: DISCONTINUED | OUTPATIENT
Start: 2018-04-24 | End: 2018-04-24

## 2018-04-24 RX ORDER — SODIUM CHLORIDE, SODIUM LACTATE, POTASSIUM CHLORIDE, CALCIUM CHLORIDE 600; 310; 30; 20 MG/100ML; MG/100ML; MG/100ML; MG/100ML
INJECTION, SOLUTION INTRAVENOUS CONTINUOUS
Status: DISCONTINUED | OUTPATIENT
Start: 2018-04-24 | End: 2018-04-24

## 2018-04-24 NOTE — H&P
[place] Patient Status:  Hospital Outpatient Surgery    1934 MRN [id number]   [facility] ENDOSCOPY Attending Marco Fenton MD   Hosp Day # 0 PCP Rachelle Anderson MD     CC: Dysphagia DX/THER VUU      Comment: Procedure: CERVICAL EPIDURAL;  Surgeon:                Lela aLne MD;  Location: [place] [address]  No date: HYSTERECTOMY      Comment: complete  9/23/2013: INJECTION, W/WO CONTRAST, DX/THERAPEUTIC Father    • Heart Disease Father    • Heart Attack Father    • Heart Attack Paternal Grandfather    • Heart Disease Paternal Grandfather    • Arthritis Paternal Grandmother    • Allergies Paternal Grandmother    • Cancer Maternal Grandfather    • Heart Att Rash    Comment:sob  Plavix [Clopidogrel*        Comment:Redness/choking  Questran [Cholestyr*    Hives, Coughing  Risperdal [Risperid*    Shortness of Breath  Shellfish-Derived P*        Comment:Sob  Sulfa Antibiotics       Wheezing    Comment:so Stan  4/24/2018  10:06 AM

## 2018-04-24 NOTE — OPERATIVE REPORT
Tiffany Patino Patient Status:  Hospital Outpatient Surgery    1934 MRN [id number]   [facility] ENDOSCOPY Attending Frank Wood MD   Hosp Day # 0 PCP Anthony Jefferson MD     PREOPERATIVE DIAGNOSIS/INDICATION: Dysphagia reactions   - Follow biopsies.       Mary Oliveira  4/24/2018  10:50 AM

## 2018-04-24 NOTE — ANESTHESIA POSTPROCEDURE EVALUATION
[id number] [facility] Patient Status:  Hospital Outpatient Surgery   Age/Gender 80year old female MRN [id number]   Location [address] [facility]. Attending Allan Franco MD   [facility] Day # 0 PCP Rosita Rojas MD       Anesthesia P

## 2018-04-24 NOTE — ANESTHESIA PREPROCEDURE EVALUATION
PRE-OP EVALUATION    Patient Name: Salomon Sainz    Pre-op Diagnosis: Dysphagia, unspecified type [R13.10]    Procedure(s):  ESOPHAGOGASTRODUODENOSCOPY (EGD)    Surgeon(s) and Role:     Paula Aguiar MD - Primary    Pre-op vitals reviewed. Disp:  Rfl:    vitamin E 400 UNITS Oral Cap Take 400 Units by mouth daily. Disp:  Rfl:        Allergies: Celebrex [Celecoxib]; Fish Oil; Iodine Solution [Povidone Iodine]; Lidocaine; Nsaids; Radiology Contrast Iodinated Dyes;  Advair Diskus [Fluticasone-Sal Mary Monteiro MD;  Location: [address] [place] [address] Drive MANAGEMENT  8/17/2015: INJECTION, W/WO CONTRAST, DX/THERAPEUTIC SUBST* N/A      Comment: Procedure: CERVICAL EPIDURAL;  Surgeon:                Lucille Mata MD;  Location: [facility] normal.  Breath sounds clear to auscultation bilaterally. Other findings            ASA: 3   Plan: MAC  NPO status verified and patient meets guidelines. Post-procedure pain management plan discussed with surgeon and patient.       Plan/ris

## 2018-05-07 ENCOUNTER — OFFICE VISIT (OUTPATIENT)
Dept: INTERNAL MEDICINE CLINIC | Facility: CLINIC | Age: 83
End: 2018-05-07

## 2018-05-07 VITALS
SYSTOLIC BLOOD PRESSURE: 126 MMHG | RESPIRATION RATE: 17 BRPM | BODY MASS INDEX: 34 KG/M2 | DIASTOLIC BLOOD PRESSURE: 70 MMHG | TEMPERATURE: 98 F | WEIGHT: 172 LBS | HEART RATE: 72 BPM

## 2018-05-07 DIAGNOSIS — L98.9 SKIN LESION OF RIGHT UPPER EXTREMITY: ICD-10-CM

## 2018-05-07 DIAGNOSIS — L98.9 SKIN LESION OF LEFT UPPER EXTREMITY: ICD-10-CM

## 2018-05-07 DIAGNOSIS — J02.9 SORE THROAT: Primary | ICD-10-CM

## 2018-05-07 PROCEDURE — 87880 STREP A ASSAY W/OPTIC: CPT | Performed by: INTERNAL MEDICINE

## 2018-05-07 PROCEDURE — 99213 OFFICE O/P EST LOW 20 MIN: CPT | Performed by: INTERNAL MEDICINE

## 2018-05-07 NOTE — PROGRESS NOTES
Duane Danes is a 80year old female.   Patient presents with:  Sore Throat: Room 3 AH- Per patient not feeling well thought she was getting possible flu   Skin: Skin tag like areas on fore arms       HPI:     Patient c/o sore throat since 4/24/18, Rfl: 1   acetaminophen 500 MG Oral Tab Take 500 mg by mouth every 6 (six) hours as needed for Pain. Disp:  Rfl:    B Complex Vitamins (VITAMIN B COMPLEX OR) Take 1 tablet by mouth daily.    Disp:  Rfl:    Verapamil HCl  MG Oral Capsule SR 24 Hr TK ONE (postoperative nausea and vomiting)    • Problems with swallowing    • Shortness of breath     no oxygen   • Unspecified essential hypertension    • Visual impairment     glasses   • Wears glasses       Social History:  Smoking status: Former Smoker problem  Ctd Aspirin [Warfar*    WHEEZING    Comment:Asthma attack  Effient [Prasugrel *    SHORTNESS OF BREATH  Erythromycin                Comment:Short of Breath  Flagyl [Metronidazo*    ASTHMA  Iodine (Topical)        RASH, SHORTNESS OF BREATH    Comme above      Orders Placed This Encounter      Rapid Strep    Meds & Refills for this Visit:  No prescriptions requested or ordered in this encounter    Imaging & Consults:  DERM - INTERNAL    Return in about 3 months (around 8/7/2018) for f/u.   There are no

## 2018-05-14 PROBLEM — M26.609 TMJ DYSFUNCTION: Status: ACTIVE | Noted: 2018-05-14

## 2018-05-21 ENCOUNTER — MED REC SCAN ONLY (OUTPATIENT)
Dept: INTERNAL MEDICINE CLINIC | Facility: CLINIC | Age: 83
End: 2018-05-21

## 2018-06-01 ENCOUNTER — OFFICE VISIT (OUTPATIENT)
Dept: SURGERY | Facility: CLINIC | Age: 83
End: 2018-06-01

## 2018-06-01 VITALS
HEIGHT: 60 IN | BODY MASS INDEX: 33.77 KG/M2 | OXYGEN SATURATION: 99 % | RESPIRATION RATE: 20 BRPM | WEIGHT: 172 LBS | DIASTOLIC BLOOD PRESSURE: 80 MMHG | SYSTOLIC BLOOD PRESSURE: 135 MMHG | HEART RATE: 91 BPM

## 2018-06-01 DIAGNOSIS — N64.89 NIPPLE CRUSTING: Primary | ICD-10-CM

## 2018-06-01 PROCEDURE — 99204 OFFICE O/P NEW MOD 45 MIN: CPT | Performed by: SURGERY

## 2018-06-04 NOTE — PROGRESS NOTES
Breast Surgery New Patient Consultation    This is the first visit for this 80year old woman, referred by Dr. Nlea Tijerina, who presents for evaluation of Left nipple changes.     History of Present Illness:   Ms. Kenneth Banks is a 80year old [facility] PONV (postoperative nausea and vomiting)    • Problems with swallowing    • Shortness of breath     no oxygen   • Unspecified essential hypertension    • Visual impairment     glasses   • Wears glasses        Past Surgical History:  No date: ANGIOPLASTY (C EPIDURAL;  Surgeon:                Cherylene Deters, MD;  Location: [place] [address] MANAGEMENT  8/17/2015: [facility] [place] [facility] PREOPERAT[facility] ORDER FOR IV ANT* N/A      Comment: Procedure: CERVICAL EPIDURAL;  Surgeon:                Agnieszka Montes, tablet Rfl: 0   alprazolam (XANAX) 0.5 MG Oral Tab Take 0.25 mg by mouth TID CC and HS. Pt takes 0.5mg at night  Disp:  Rfl:    loratadine (CLARITIN) 10 MG Oral Tab Take 10 mg by mouth daily.  Disp:  Rfl:    vitamin E 400 UNITS Oral Cap Take 400 Units by mo Comment:constipation    Family History:   Family History   Problem Relation Age of Onset   • Breast Cancer Mother 79   • High Blood Pressure Mother    • Allergies Father    • Asthma Father    • Colon Cancer Father    • Heart Disease Father    • Heart Attac walking.     Breasts:  See history of present illness    Gastrointestinal:     There is no history of difficulty or pain with swallowing, reflux symptoms, vomiting, dark or bloody stools, constipation, yellowing of the skin, indigestion, nausea, change in b appropriate. HEENT: The head is normocephalic. The neck is supple. The thyroid is not enlarged and is without palpable masses/nodules. There are no palpable masses. The trachea is in the midline. Conjunctiva are clear, non-icteric.     Chest: The chest e I found a hyperpigmented spot near the 11 o'clock position on the base of the left nipple with no other clinical findings. The etiology of this lesion is unknown. It is superficial and not amenable to a punch biopsy.   I do think pathological biopsy is ne

## 2018-06-05 ENCOUNTER — PRIOR ORIGINAL RECORDS (OUTPATIENT)
Dept: OTHER | Age: 83
End: 2018-06-05

## 2018-06-05 ENCOUNTER — HOSPITAL ENCOUNTER (EMERGENCY)
Facility: HOSPITAL | Age: 83
Discharge: HOME | End: 2018-06-05
Attending: EMERGENCY MEDICINE
Payer: MEDICARE

## 2018-06-05 VITALS
HEART RATE: 87 BPM | SYSTOLIC BLOOD PRESSURE: 146 MMHG | BODY MASS INDEX: 33.38 KG/M2 | WEIGHT: 170 LBS | DIASTOLIC BLOOD PRESSURE: 73 MMHG | RESPIRATION RATE: 15 BRPM | OXYGEN SATURATION: 98 % | TEMPERATURE: 98 F | HEIGHT: 60 IN

## 2018-06-05 DIAGNOSIS — K92.1 BLACK STOOLS: Primary | ICD-10-CM

## 2018-06-05 PROCEDURE — 99285 EMERGENCY DEPT VISIT HI MDM: CPT

## 2018-06-05 PROCEDURE — 96361 HYDRATE IV INFUSION ADD-ON: CPT

## 2018-06-05 PROCEDURE — 82272 OCCULT BLD FECES 1-3 TESTS: CPT

## 2018-06-05 PROCEDURE — 96360 HYDRATION IV INFUSION INIT: CPT

## 2018-06-05 PROCEDURE — 80053 COMPREHEN METABOLIC PANEL: CPT | Performed by: EMERGENCY MEDICINE

## 2018-06-05 PROCEDURE — 85025 COMPLETE CBC W/AUTO DIFF WBC: CPT | Performed by: EMERGENCY MEDICINE

## 2018-06-05 PROCEDURE — 99284 EMERGENCY DEPT VISIT MOD MDM: CPT

## 2018-06-05 RX ORDER — PANTOPRAZOLE SODIUM 40 MG/1
40 TABLET, DELAYED RELEASE ORAL DAILY
Qty: 30 TABLET | Refills: 0 | Status: SHIPPED | OUTPATIENT
Start: 2018-06-05 | End: 2018-06-28

## 2018-06-05 RX ORDER — SODIUM CHLORIDE 9 MG/ML
125 INJECTION, SOLUTION INTRAVENOUS CONTINUOUS
Status: DISCONTINUED | OUTPATIENT
Start: 2018-06-05 | End: 2018-06-05

## 2018-06-05 NOTE — ED NOTES
Pt assisted to washroom. Solid brown bowel movement noted. Pt mostly missed collection canister. Small amount of stool on edge hemoccult tested, test negative.

## 2018-06-05 NOTE — ED INITIAL ASSESSMENT (HPI)
Pt had diarrhea all last week, was using imodium to help. Yesterday had a bowel movement that was half normal and half soft black in color. Pt states she has not had any bowel movements since yesterday.   Today was feeling tired, called her PCP and was in

## 2018-06-05 NOTE — ED PROVIDER NOTES
Patient Seen in: [facility] Emergency Department    History   Patient presents with:  GI Bleeding (gastrointestinal)    Stated Complaint: gi bleed    HPI  This is a 80-year-old female who arrives here with black stools.   The patient states she had d vomiting)    • Problems with swallowing    • Shortness of breath     no oxygen   • Unspecified essential hypertension    • Visual impairment     glasses   • Wears glasses        Past Surgical History:  No date: ANGIOPLASTY (CORONARY)  No date: APPENDECTOMY Monster Oliver MD;  Location: [place] [address] MANAGEMENT  8/17/2015: PATIENT [place] PREOPERATIVE ORDER FOR IV ANT* N/A      Comment: Procedure: CERVICAL EPIDURAL;  Surgeon:                Ren Unger MD;  Location: [address] [place] [address] Course     Labs Reviewed   COMP METABOLIC PANEL (14) - Abnormal; Notable for the following:        Result Value    Albumin 3.2 (*)     Chloride 112 (*)     CO2 21.0 (*)     All other components within normal limits   CBC WITH DIFFERENTIAL WITH PLATELET MD  [address] [place] [address]  [phone]              Medications Prescribed:  Current Discharge Medication List    START taking these medications    Pantoprazole Sodium 40 MG Oral Tab EC  Take 1 tablet (40 mg total) by mouth da

## 2018-06-07 ENCOUNTER — TELEPHONE (OUTPATIENT)
Dept: SURGERY | Facility: CLINIC | Age: 83
End: 2018-06-07

## 2018-06-07 ENCOUNTER — HOSPITAL ENCOUNTER (OUTPATIENT)
Dept: MAMMOGRAPHY | Facility: HOSPITAL | Age: 83
Discharge: HOME | End: 2018-06-07
Attending: SURGERY
Payer: MEDICARE

## 2018-06-07 DIAGNOSIS — N64.89 NIPPLE CRUSTING: ICD-10-CM

## 2018-06-07 PROCEDURE — 76642 ULTRASOUND BREAST LIMITED: CPT | Performed by: SURGERY

## 2018-06-07 PROCEDURE — 77066 DX MAMMO INCL CAD BI: CPT | Performed by: SURGERY

## 2018-06-07 PROCEDURE — 77062 BREAST TOMOSYNTHESIS BI: CPT | Performed by: SURGERY

## 2018-06-07 NOTE — TELEPHONE ENCOUNTER
I contacted the patient to let her know the mammogram did not explain the changes to her left nipple. Reviewed that Dr Ciera Mary has recommended with schedule a biopsy in the OR. Pt a little reluctant.  I offered to talk to her daughter about it, and she vivi

## 2018-06-08 ENCOUNTER — TELEPHONE (OUTPATIENT)
Dept: INTERNAL MEDICINE CLINIC | Facility: CLINIC | Age: 83
End: 2018-06-08

## 2018-06-08 ENCOUNTER — TELEPHONE (OUTPATIENT)
Dept: SURGERY | Facility: CLINIC | Age: 83
End: 2018-06-08

## 2018-06-08 NOTE — TELEPHONE ENCOUNTER
Patient is cancelling appointment for now. She has too many appointments next week and will call to r/s.

## 2018-06-08 NOTE — TELEPHONE ENCOUNTER
Pt's daughter phoned in for clarification about what was recommended for her mother. Reviewed plan for biopsy in the OR  \"the pictures did not explain the appearance of the nipple and therefore she will need an open biopsy.  This will need to be coordina

## 2018-06-11 ENCOUNTER — TELEPHONE (OUTPATIENT)
Dept: SURGERY | Facility: CLINIC | Age: 83
End: 2018-06-11

## 2018-06-11 NOTE — TELEPHONE ENCOUNTER
I contacted the patient about the biopsy scheduled for Monday 6/18/18. She talked with 1 of her daughters over the weekend, and doesn't have a ride that day.      In additional, she states, that \"that spot has been there a long time, years even and is unc

## 2018-06-11 NOTE — TELEPHONE ENCOUNTER
We discussed in detail, that a biopsy was recommended because the mammogram and US did not explain the appearance of the nipple. The radiologist had also told her this at her imaging appointment.    Pt does not want to do anything about this right now, but

## 2018-06-15 ENCOUNTER — LAB ENCOUNTER (OUTPATIENT)
Dept: LAB | Facility: HOSPITAL | Age: 83
End: 2018-06-15
Attending: INTERNAL MEDICINE
Payer: MEDICARE

## 2018-06-15 ENCOUNTER — PRIOR ORIGINAL RECORDS (OUTPATIENT)
Dept: OTHER | Age: 83
End: 2018-06-15

## 2018-06-15 DIAGNOSIS — E78.00 PURE HYPERCHOLESTEROLEMIA: ICD-10-CM

## 2018-06-15 DIAGNOSIS — I25.10 CORONARY ATHEROSCLEROSIS OF NATIVE CORONARY ARTERY: Primary | ICD-10-CM

## 2018-06-15 DIAGNOSIS — E55.9 VITAMIN D DEFICIENCY: ICD-10-CM

## 2018-06-15 PROCEDURE — 80053 COMPREHEN METABOLIC PANEL: CPT

## 2018-06-15 PROCEDURE — 82306 VITAMIN D 25 HYDROXY: CPT

## 2018-06-15 PROCEDURE — 80061 LIPID PANEL: CPT

## 2018-06-15 PROCEDURE — 36415 COLL VENOUS BLD VENIPUNCTURE: CPT

## 2018-06-18 ENCOUNTER — PRIOR ORIGINAL RECORDS (OUTPATIENT)
Dept: OTHER | Age: 83
End: 2018-06-18

## 2018-06-19 LAB
ALBUMIN: 3.4 G/DL
ALKALINE PHOSPHATATE(ALK PHOS): 72 IU/L
BILIRUBIN TOTAL: 0.5 MG/DL
BUN: 12 MG/DL
CALCIUM: 9.6 MG/DL
CHLORIDE: 112 MEQ/L
CHOLESTEROL, TOTAL: 218 MG/DL
CREATININE, SERUM: 0.75 MG/DL
GLUCOSE: 96 MG/DL
HDL CHOLESTEROL: 45 MG/DL
LDL CHOLESTEROL: 136 MG/DL
NON-HDL CHOLESTEROL: 173 MG/DL
POTASSIUM, SERUM: 3.8 MEQ/L
PROTEIN, TOTAL: 6.7 G/DL
SGOT (AST): 19 IU/L
SGPT (ALT): 25 IU/L
SODIUM: 145 MEQ/L
TOTAL CHOLESTEROL / HDL RATIO: 4.84 RATIO UN
TRIGLYCERIDES: 186 MG/DL
VITAMIN D 25-OH: 35.8 NG/ML

## 2018-06-25 ENCOUNTER — APPOINTMENT (OUTPATIENT)
Dept: CT IMAGING | Facility: HOSPITAL | Age: 83
End: 2018-06-25
Attending: EMERGENCY MEDICINE
Payer: MEDICARE

## 2018-06-25 ENCOUNTER — APPOINTMENT (OUTPATIENT)
Dept: GENERAL RADIOLOGY | Facility: HOSPITAL | Age: 83
End: 2018-06-25
Attending: EMERGENCY MEDICINE
Payer: MEDICARE

## 2018-06-25 ENCOUNTER — HOSPITAL ENCOUNTER (EMERGENCY)
Facility: HOSPITAL | Age: 83
Discharge: HOME | End: 2018-06-25
Attending: EMERGENCY MEDICINE
Payer: MEDICARE

## 2018-06-25 VITALS
HEART RATE: 81 BPM | RESPIRATION RATE: 14 BRPM | BODY MASS INDEX: 33.38 KG/M2 | OXYGEN SATURATION: 97 % | TEMPERATURE: 98 F | DIASTOLIC BLOOD PRESSURE: 71 MMHG | WEIGHT: 170 LBS | SYSTOLIC BLOOD PRESSURE: 143 MMHG | HEIGHT: 60 IN

## 2018-06-25 DIAGNOSIS — R42 DIZZINESS: Primary | ICD-10-CM

## 2018-06-25 PROCEDURE — 87086 URINE CULTURE/COLONY COUNT: CPT | Performed by: EMERGENCY MEDICINE

## 2018-06-25 PROCEDURE — 81001 URINALYSIS AUTO W/SCOPE: CPT | Performed by: EMERGENCY MEDICINE

## 2018-06-25 PROCEDURE — 82962 GLUCOSE BLOOD TEST: CPT

## 2018-06-25 PROCEDURE — 96360 HYDRATION IV INFUSION INIT: CPT

## 2018-06-25 PROCEDURE — 71045 X-RAY EXAM CHEST 1 VIEW: CPT | Performed by: EMERGENCY MEDICINE

## 2018-06-25 PROCEDURE — 85025 COMPLETE CBC W/AUTO DIFF WBC: CPT | Performed by: EMERGENCY MEDICINE

## 2018-06-25 PROCEDURE — 80053 COMPREHEN METABOLIC PANEL: CPT | Performed by: EMERGENCY MEDICINE

## 2018-06-25 PROCEDURE — 93005 ELECTROCARDIOGRAM TRACING: CPT

## 2018-06-25 PROCEDURE — 70450 CT HEAD/BRAIN W/O DYE: CPT | Performed by: EMERGENCY MEDICINE

## 2018-06-25 PROCEDURE — 84484 ASSAY OF TROPONIN QUANT: CPT | Performed by: EMERGENCY MEDICINE

## 2018-06-25 PROCEDURE — 99285 EMERGENCY DEPT VISIT HI MDM: CPT

## 2018-06-25 PROCEDURE — 93010 ELECTROCARDIOGRAM REPORT: CPT

## 2018-06-25 PROCEDURE — 96361 HYDRATE IV INFUSION ADD-ON: CPT

## 2018-06-25 PROCEDURE — 84450 TRANSFERASE (AST) (SGOT): CPT | Performed by: EMERGENCY MEDICINE

## 2018-06-25 PROCEDURE — 84132 ASSAY OF SERUM POTASSIUM: CPT | Performed by: EMERGENCY MEDICINE

## 2018-06-25 RX ORDER — SODIUM CHLORIDE 9 MG/ML
125 INJECTION, SOLUTION INTRAVENOUS CONTINUOUS
Status: DISCONTINUED | OUTPATIENT
Start: 2018-06-25 | End: 2018-06-25

## 2018-06-25 NOTE — ED NOTES
Lab called with low blood suger. Pt is alert with no current symptoms of hypoglycemia. Apple juice given and taken well.

## 2018-06-25 NOTE — ED NOTES
Pt has been getting up to bedside commode with assist due to monitor lines. No problems during transfer. No further dizziness.   Feels well at this time

## 2018-06-25 NOTE — ED PROVIDER NOTES
Patient Seen in: [facility] Emergency Department    History   Patient presents with:  Dizziness (neurologic)    Stated Complaint: dizziness    HPI    Patient is a pleasant 20-year-old female who presents via EMS from home for evaluation of weakness Osteoarthrosis, unspecified whether generalized or localized, unspecified site    • Other and unspecified hyperlipidemia    • Pinched nerve in neck    • Pneumonia, organism unspecified(486)    • PONV (postoperative nausea and vomiting)    • Problems with s Surgeon:                Katy Draper MD;  Location: [place] [address] MANAGEMENT  9/23/2013: PATIENT [place] PREOPERATIVE ORDER FOR IV ANT*      Comment: Procedure: CERVICAL EPIDURAL;  Surgeon:                Gracie Ramos MD;  Donna Daniel Neurological: She is alert and oriented to person, place, and time. Cranial nerves II through XII intact. Strength 5 out of 5 bilateral upper and lower extremities. Skin: Skin is warm and dry. Psychiatric: She has a normal mood and affect.    Nursi No lobar pneumonia or overt congestive failure. Mild atelectasis/scarring in the lower lungs.      Dictated by: Barney Marcus MD on 6/25/2018 at 9:59     Approved by: Barney Marcus MD            [facility]  [place] (TVA=[id number]) Melia Hammer MD            EKG    Rate, intervals and axes as noted on EKG Report. Rate: 80  Rhythm: Sinus Rhythm  Reading: No ST segment or T-wave changes. No old available for comparison.            ED Course as of Jun 25 1401  -----------------------

## 2018-06-27 ENCOUNTER — TELEPHONE (OUTPATIENT)
Dept: INTERNAL MEDICINE CLINIC | Facility: CLINIC | Age: 83
End: 2018-06-27

## 2018-06-27 NOTE — TELEPHONE ENCOUNTER
Called pt to inform will need to keep ER F/U on 7/5 as scheduled. Pt with intermittent diarrhea for several months, managed by GI- Dr. Cal Schafer.  Pt awaiting call back regarding recent episodes as was instructed to call GI if episodes reoccurred- pt will tr

## 2018-06-27 NOTE — TELEPHONE ENCOUNTER
Pt had appt today w/CB for ED fup for dizziness and I called pt and she states she called lastnight to ans service and cancelled the appt-she stated she has diarrhea again and Dr. Nazanin Nieves told her to call him if she got it again so her daughter told her t

## 2018-06-30 PROBLEM — M21.41 ACQUIRED PES PLANUS OF RIGHT FOOT: Status: ACTIVE | Noted: 2018-06-30

## 2018-07-05 ENCOUNTER — OFFICE VISIT (OUTPATIENT)
Dept: INTERNAL MEDICINE CLINIC | Facility: CLINIC | Age: 83
End: 2018-07-05

## 2018-07-05 VITALS
BODY MASS INDEX: 32.86 KG/M2 | HEART RATE: 76 BPM | TEMPERATURE: 98 F | DIASTOLIC BLOOD PRESSURE: 70 MMHG | WEIGHT: 171.81 LBS | SYSTOLIC BLOOD PRESSURE: 138 MMHG | HEIGHT: 60.5 IN

## 2018-07-05 DIAGNOSIS — R92.8 ABNORMAL MAMMOGRAM OF LEFT BREAST: ICD-10-CM

## 2018-07-05 DIAGNOSIS — M25.571 ACUTE RIGHT ANKLE PAIN: ICD-10-CM

## 2018-07-05 DIAGNOSIS — R42 DIZZINESS: ICD-10-CM

## 2018-07-05 DIAGNOSIS — R19.7 ACUTE DIARRHEA: Primary | ICD-10-CM

## 2018-07-05 PROCEDURE — 99214 OFFICE O/P EST MOD 30 MIN: CPT | Performed by: INTERNAL MEDICINE

## 2018-07-05 NOTE — PROGRESS NOTES
Gabriel Urbano is a 80year old female. Patient presents with: Follow - Up: cn room 3 : follow up to multiple health issues. diarrhea, dizziness appt with gastro scedualed,trouble with ankle, brace has been fitted and ordered.   Abnormal Mammogram by mouth daily.  Disp:  Rfl:    Levalbuterol Tartrate 45 MCG/ACT Inhalation Aerosol INHALE 2 PUFFS INTO THE LUNGS EVERY 4 HOURS AS NEEDED FOR WHEEZING Disp: 3 Inhaler Rfl: 3   Fluticasone Propionate  MCG/ACT Inhalation Aerosol Inhale 2 puffs into the cholesterol    • Hip pain    • History of blood transfusion AT AGE 44    AFTER HYST   • HTN (hypertension)    • Loss of appetite    • Osteoarthrosis, unspecified whether generalized or localized, unspecified site    • Other and unspecified hyperlipidemia ANAPHYLAXIS    Comment:\"Breathing issues\"-- per patient okay with             marcaine  Advair Diskus [Flut*    Coughing, SHORTNESS OF BREATH  Breo Ellipta            OTHER (SEE COMMENTS)    Comment:Pneumonia  Celexa [Citalopram]     SHORTNESS OF BREATH distress, lungs clear to auscultation  CARDIO: RRR nl S1 S2  GI: normal bowel sounds, no masses, HSM or tenderness  EXTREMITIES: no cyanosis, clubbing or edema, normal strength and tone  NEURO: Alert and oriented    ASSESSMENT AND PLAN:   Acute diarrhea -

## 2018-07-09 LAB
HEMATOCRIT: 41.6 %
HEMOGLOBIN: 13.2 G/DL
PLATELETS: 258 K/UL
RED BLOOD COUNT: 4.72 X 10-6/U
WHITE BLOOD COUNT: 7.5 X 10-3/U

## 2018-07-18 ENCOUNTER — TELEPHONE (OUTPATIENT)
Dept: SURGERY | Facility: CLINIC | Age: 83
End: 2018-07-18

## 2018-07-18 NOTE — TELEPHONE ENCOUNTER
Pt phoned in to let me know she has reconsidered, and wants to schedule the biopsy. She is unsure if it looks any different from when she was seen in the office. She does have some crusting, that seems to go away with a shower.    I let her know I would ve

## 2018-07-24 ENCOUNTER — OFFICE VISIT (OUTPATIENT)
Dept: RHEUMATOLOGY | Facility: CLINIC | Age: 83
End: 2018-07-24
Payer: MEDICARE

## 2018-07-24 VITALS — RESPIRATION RATE: 16 BRPM | DIASTOLIC BLOOD PRESSURE: 60 MMHG | SYSTOLIC BLOOD PRESSURE: 124 MMHG | HEART RATE: 82 BPM

## 2018-07-24 DIAGNOSIS — I25.10 CORONARY ARTERY DISEASE INVOLVING NATIVE CORONARY ARTERY OF NATIVE HEART WITHOUT ANGINA PECTORIS: ICD-10-CM

## 2018-07-24 DIAGNOSIS — M47.816 OSTEOARTHRITIS OF SPINE WITHOUT MYELOPATHY OR RADICULOPATHY, LUMBAR REGION: Primary | ICD-10-CM

## 2018-07-24 DIAGNOSIS — M17.12 PRIMARY OSTEOARTHRITIS OF LEFT KNEE: ICD-10-CM

## 2018-07-24 DIAGNOSIS — M17.11 OSTEOARTHRITIS OF RIGHT KNEE, UNSPECIFIED OSTEOARTHRITIS TYPE: ICD-10-CM

## 2018-07-24 DIAGNOSIS — Z98.890 S/P LUMBAR LAMINECTOMY: ICD-10-CM

## 2018-07-24 DIAGNOSIS — Z88.6 ALLERGY TO NONSTEROIDAL ANTI-INFLAMMATORY DRUG (NSAID): ICD-10-CM

## 2018-07-24 PROCEDURE — 99213 OFFICE O/P EST LOW 20 MIN: CPT | Performed by: INTERNAL MEDICINE

## 2018-07-24 RX ORDER — PREDNISONE 2.5 MG
2.5 TABLET ORAL DAILY
Qty: 90 TABLET | Refills: 3 | Status: SHIPPED | OUTPATIENT
Start: 2018-07-24 | End: 2019-03-14

## 2018-07-24 NOTE — PROGRESS NOTES
[facility]  Dr. Layla Rowland Progress Note     Subjective:   Gomez Cheng is a(n) 80year old female. Current complaints: Patient presents with:  Osteoarthritis: 4 month f/u. Followed by Dr. Santa Sharif ankle brace ordered.  Wears abdominal brace of antidepressant medicine. Eat a well-balanced diet. Try to lose a little weight. That might help your knees. Get your new right ankle brace as soon as possible. Return to office  6 months.      Soledad Tucker MD 8/71/9922 12:31 PM

## 2018-07-24 NOTE — PATIENT INSTRUCTIONS
Continue to take Prednisone 2.5 mg per day for arthrits. Use the strength Tylenol 500 mg 1-2 tablets at a time 2-3 times a day if needed. No nonsteroidal anti-inflammatory drugs due to previous allergies.   No codeine products due to intolerance of codein

## 2018-07-25 ENCOUNTER — TELEPHONE (OUTPATIENT)
Dept: SURGERY | Facility: CLINIC | Age: 83
End: 2018-07-25

## 2018-07-25 NOTE — TELEPHONE ENCOUNTER
CINTHYAM to confirm with patient that she has a ride to have the procedure August 6th. Asked her to call the office.

## 2018-07-26 ENCOUNTER — TELEPHONE (OUTPATIENT)
Dept: SURGERY | Facility: CLINIC | Age: 83
End: 2018-07-26

## 2018-07-27 ENCOUNTER — ANESTHESIA EVENT (OUTPATIENT)
Dept: SURGERY | Facility: HOSPITAL | Age: 83
End: 2018-07-27
Payer: MEDICARE

## 2018-07-30 ENCOUNTER — PRIOR ORIGINAL RECORDS (OUTPATIENT)
Dept: OTHER | Age: 83
End: 2018-07-30

## 2018-07-31 ENCOUNTER — MED REC SCAN ONLY (OUTPATIENT)
Dept: INTERNAL MEDICINE CLINIC | Facility: CLINIC | Age: 83
End: 2018-07-31

## 2018-08-06 ENCOUNTER — ANESTHESIA (OUTPATIENT)
Dept: SURGERY | Facility: HOSPITAL | Age: 83
End: 2018-08-06
Payer: MEDICARE

## 2018-08-06 ENCOUNTER — SURGERY (OUTPATIENT)
Age: 83
End: 2018-08-06

## 2018-08-06 ENCOUNTER — HOSPITAL ENCOUNTER (OUTPATIENT)
Facility: HOSPITAL | Age: 83
Setting detail: HOSPITAL OUTPATIENT SURGERY
Discharge: HOME | End: 2018-08-06
Attending: SURGERY | Admitting: SURGERY
Payer: MEDICARE

## 2018-08-06 VITALS
DIASTOLIC BLOOD PRESSURE: 59 MMHG | SYSTOLIC BLOOD PRESSURE: 111 MMHG | WEIGHT: 167.06 LBS | RESPIRATION RATE: 16 BRPM | HEIGHT: 61 IN | HEART RATE: 79 BPM | BODY MASS INDEX: 31.54 KG/M2 | OXYGEN SATURATION: 95 % | TEMPERATURE: 98 F

## 2018-08-06 DIAGNOSIS — N64.89 NIPPLE CRUSTING: ICD-10-CM

## 2018-08-06 PROCEDURE — 88342 IMHCHEM/IMCYTCHM 1ST ANTB: CPT | Performed by: SURGERY

## 2018-08-06 PROCEDURE — 0HBU0ZZ EXCISION OF LEFT BREAST, OPEN APPROACH: ICD-10-PCS | Performed by: SURGERY

## 2018-08-06 PROCEDURE — 88305 TISSUE EXAM BY PATHOLOGIST: CPT | Performed by: SURGERY

## 2018-08-06 PROCEDURE — S0028 INJECTION, FAMOTIDINE, 20 MG: HCPCS

## 2018-08-06 PROCEDURE — 88341 IMHCHEM/IMCYTCHM EA ADD ANTB: CPT | Performed by: SURGERY

## 2018-08-06 RX ORDER — HYDROCODONE BITARTRATE AND ACETAMINOPHEN 5; 325 MG/1; MG/1
1 TABLET ORAL AS NEEDED
Status: DISCONTINUED | OUTPATIENT
Start: 2018-08-06 | End: 2018-08-06

## 2018-08-06 RX ORDER — HEPARIN SODIUM 5000 [USP'U]/ML
5000 INJECTION, SOLUTION INTRAVENOUS; SUBCUTANEOUS ONCE
Status: DISCONTINUED | OUTPATIENT
Start: 2018-08-06 | End: 2018-08-06 | Stop reason: HOSPADM

## 2018-08-06 RX ORDER — BUPIVACAINE HYDROCHLORIDE AND EPINEPHRINE 5; 5 MG/ML; UG/ML
INJECTION, SOLUTION EPIDURAL; INTRACAUDAL; PERINEURAL AS NEEDED
Status: DISCONTINUED | OUTPATIENT
Start: 2018-08-06 | End: 2018-08-06 | Stop reason: HOSPADM

## 2018-08-06 RX ORDER — SODIUM CHLORIDE, SODIUM LACTATE, POTASSIUM CHLORIDE, CALCIUM CHLORIDE 600; 310; 30; 20 MG/100ML; MG/100ML; MG/100ML; MG/100ML
INJECTION, SOLUTION INTRAVENOUS CONTINUOUS
Status: DISCONTINUED | OUTPATIENT
Start: 2018-08-06 | End: 2018-08-06

## 2018-08-06 RX ORDER — DEXAMETHASONE SODIUM PHOSPHATE 4 MG/ML
8 VIAL (ML) INJECTION AS NEEDED
Status: DISCONTINUED | OUTPATIENT
Start: 2018-08-06 | End: 2018-08-06

## 2018-08-06 RX ORDER — MEPERIDINE HYDROCHLORIDE 25 MG/ML
12.5 INJECTION INTRAMUSCULAR; INTRAVENOUS; SUBCUTANEOUS AS NEEDED
Status: DISCONTINUED | OUTPATIENT
Start: 2018-08-06 | End: 2018-08-06

## 2018-08-06 RX ORDER — NALOXONE HYDROCHLORIDE 0.4 MG/ML
80 INJECTION, SOLUTION INTRAMUSCULAR; INTRAVENOUS; SUBCUTANEOUS AS NEEDED
Status: DISCONTINUED | OUTPATIENT
Start: 2018-08-06 | End: 2018-08-06

## 2018-08-06 RX ORDER — CLINDAMYCIN PHOSPHATE 900 MG/50ML
900 INJECTION INTRAVENOUS ONCE
Status: COMPLETED | OUTPATIENT
Start: 2018-08-06 | End: 2018-08-06

## 2018-08-06 RX ORDER — DEXAMETHASONE SODIUM PHOSPHATE 4 MG/ML
VIAL (ML) INJECTION
Status: COMPLETED
Start: 2018-08-06 | End: 2018-08-06

## 2018-08-06 RX ORDER — ONDANSETRON 2 MG/ML
4 INJECTION INTRAMUSCULAR; INTRAVENOUS AS NEEDED
Status: DISCONTINUED | OUTPATIENT
Start: 2018-08-06 | End: 2018-08-06

## 2018-08-06 RX ORDER — METOCLOPRAMIDE HYDROCHLORIDE 5 MG/ML
10 INJECTION INTRAMUSCULAR; INTRAVENOUS AS NEEDED
Status: DISCONTINUED | OUTPATIENT
Start: 2018-08-06 | End: 2018-08-06

## 2018-08-06 RX ORDER — ACETAMINOPHEN 500 MG
1000 TABLET ORAL ONCE
Status: DISCONTINUED | OUTPATIENT
Start: 2018-08-06 | End: 2018-08-06 | Stop reason: HOSPADM

## 2018-08-06 RX ORDER — MIDAZOLAM HYDROCHLORIDE 1 MG/ML
1 INJECTION INTRAMUSCULAR; INTRAVENOUS EVERY 5 MIN PRN
Status: DISCONTINUED | OUTPATIENT
Start: 2018-08-06 | End: 2018-08-06

## 2018-08-06 RX ORDER — HYDROCODONE BITARTRATE AND ACETAMINOPHEN 5; 325 MG/1; MG/1
2 TABLET ORAL AS NEEDED
Status: DISCONTINUED | OUTPATIENT
Start: 2018-08-06 | End: 2018-08-06

## 2018-08-06 RX ORDER — ONDANSETRON 2 MG/ML
INJECTION INTRAMUSCULAR; INTRAVENOUS
Status: COMPLETED
Start: 2018-08-06 | End: 2018-08-06

## 2018-08-06 NOTE — BRIEF OP NOTE
Pre-Operative Diagnosis: Nipple crusting [N64.89]     Post-Operative Diagnosis: Nipple crusting [N64.89]      Procedure Performed:   Procedure(s):  Left nipple biopsy    Surgeon(s) and Role:     Sven Shen MD - Primary    Assistant(s):        Monique

## 2018-08-06 NOTE — OPERATIVE REPORT
[address]    PATIENT'S NAME: Edie Thompson   ATTENDING PHYSICIAN: Abner Manzano M.D. OPERATING PHYSICIAN: Abner Manzano M.D.    PATIENT ACCOUNT#:   [de-identified]    LOCATION:  PACU [facility] PACU 3 EDWP 10  MEDICAL RECORD #:   [id number] used to infiltrate the skin and subcutaneous tissues at the incision site. An elliptical incision was made encompassing the entirety of the hyperpigmented lesion near the 12 o'clock position of the nipple with care to ensure adequate margins of excision.

## 2018-08-06 NOTE — ANESTHESIA POSTPROCEDURE EVALUATION
[id number] [facility] Patient Status:  Hospital Outpatient Surgery   Age/Gender 80year old female MRN [id number]   [facility] SURGERY Attending Henrry Louis MD   Hosp Day # 0 PCP Josué Barillas MD       Anesthesia Pos

## 2018-08-06 NOTE — H&P
History of Present Illness:   Ms. Beni Solis is a 80year old woman who presents with concerns of a discoloration and abnormal appearance to the left nipple that has developed over the past year.   She was seen by her dermatologist recently who n hypertension     • Visual impairment       glasses   • Wears glasses           Past Surgical History:  No date: ANGIOPLASTY (CORONARY)  No date: APPENDECTOMY  No date: BACK SURGERY  a long time ago:  BENIGN BIOPSY LEFT      Comment: x 2  2012: CATH ESTEBAN LOZANO [facility] PREOPERATIVE ORDER FOR IV ANT* N/A      Comment: Procedure: CERVICAL EPIDURAL;  Surgeon:                Katy Draper MD;  Location: [facility] FOR               PAIN MANAGEMENT  No date: STENT, COATED/COV W/DEL SYS  No date: TONSILLECTOMY  No loratadine (CLARITIN) 10 MG Oral Tab Take 10 mg by mouth daily. Disp:  Rfl:    vitamin E 400 UNITS Oral Cap Take 400 Units by mouth daily.  Disp:  Rfl:          Allergies:       Celebrex [Celecoxib]    SHORTNESS OF BREATH  Fish Oil                ANAPHYLA High Blood Pressure Mother     • Allergies Father     • Asthma Father     • Colon Cancer Father     • Heart Disease Father     • Heart Attack Father     • Heart Attack Paternal Grandfather     • Heart Disease Paternal Grandfather     • Arthritis Paternal G is no history of difficulty or pain with swallowing, reflux symptoms, vomiting, dark or bloody stools, constipation, yellowing of the skin, indigestion, nausea, change in bowel habits, diarrhea, abdominal pain or vomiting blood.      Genitourinary:  The pa not enlarged and is without palpable masses/nodules. There are no palpable masses. The trachea is in the midline. Conjunctiva are clear, non-icteric.     Chest: The chest expands symmetrically. The lungs are clear to auscultation.     Heart:  The rhythm is the left nipple with no other clinical findings. The etiology of this lesion is unknown. It is superficial and not amenable to a punch biopsy. I do think pathological biopsy is necessary to confirm the underlying pathology.   I recommended a diagnostic e

## 2018-08-06 NOTE — ANESTHESIA PREPROCEDURE EVALUATION
PRE-OP EVALUATION    Patient Name: Lei Humphrey    Pre-op Diagnosis: Nipple crusting [N64.89]    Procedure(s):  Left nipple biopsy    Surgeon(s) and Role:     Harriet Del Rosario MD - Primary    Pre-op vitals reviewed.   Temp: 97.7 °F (36.5 °C)  P Advair Diskus [Fluticasone-Salmeterol]; Breo Ellipta; Celexa [Citalopram]; Codeine; Ctd Aspirin [Warfarin Sodium]; Effient [Prasugrel Hydrochloride]; Erythromycin; Flagyl [Metronidazole]; Iodine (Topical);  Linezolid; Metronidazole And Related; Mold; Pcn [P of both legs     Osteoarthritis of left knee     Thoracic compression fracture (HCC)     S/P lumbar laminectomy     Osteoarthritis of spine without myelopathy or radiculopathy, lumbar region     Osteoarthritis of one hip, left     Allergy to nonsteroidal a [address] MANAGEMENT  9/23/2013: PATIENT [place] PREOPERATIVE ORDER FOR IV ANT*      Comment: Procedure: CERVICAL EPIDURAL;  Surgeon:                Lisandra Martinez MD;  Location: [facility]                 PAIN MANAGEMENT  8/17 procedure is unlikely although intraop recall, if it occurs, may be a reasonable and comfortable experience with this anesthetic.   Aware that general anesthesia is not intended though necessary care may involve deep sedation with transient moments of gener

## 2018-08-07 ENCOUNTER — TELEPHONE (OUTPATIENT)
Dept: SURGERY | Facility: CLINIC | Age: 83
End: 2018-08-07

## 2018-08-07 NOTE — TELEPHONE ENCOUNTER
Pt phoned in after reading her discharge instructions. She did have a HA last night, and this morning. She did take a tylenol this am, and has been pushing fluids. She is urinating a lot, but is drinking a lot of fluids, including gatorade.      Denies

## 2018-08-08 ENCOUNTER — PRIOR ORIGINAL RECORDS (OUTPATIENT)
Dept: OTHER | Age: 83
End: 2018-08-08

## 2018-08-10 ENCOUNTER — TELEPHONE (OUTPATIENT)
Dept: SURGERY | Facility: CLINIC | Age: 83
End: 2018-08-10

## 2018-08-10 NOTE — TELEPHONE ENCOUNTER
I contacted the patient regarding her pathology, benign adenoma. Pt verbalized understanding, No issues with the incision. Pt has made an appt with her PCP for frequency of urination, but otherwise she is feeling well.    Confirmed her post op appt on T

## 2018-08-14 ENCOUNTER — OFFICE VISIT (OUTPATIENT)
Dept: SURGERY | Facility: CLINIC | Age: 83
End: 2018-08-14
Payer: MEDICARE

## 2018-08-14 VITALS
WEIGHT: 167 LBS | HEIGHT: 61 IN | RESPIRATION RATE: 20 BRPM | SYSTOLIC BLOOD PRESSURE: 124 MMHG | DIASTOLIC BLOOD PRESSURE: 73 MMHG | HEART RATE: 82 BPM | OXYGEN SATURATION: 98 % | BODY MASS INDEX: 31.53 KG/M2

## 2018-08-14 DIAGNOSIS — D24.2 ADENOMA OF LEFT NIPPLE: Primary | ICD-10-CM

## 2018-08-14 PROCEDURE — 99024 POSTOP FOLLOW-UP VISIT: CPT | Performed by: SURGERY

## 2018-08-14 RX ORDER — OLMESARTAN MEDOXOMIL 5 MG/1
TABLET ORAL
COMMUNITY
Start: 2018-08-09 | End: 2018-10-19 | Stop reason: ALTCHOICE

## 2018-08-20 PROBLEM — R13.19 ESOPHAGEAL DYSPHAGIA: Status: ACTIVE | Noted: 2018-08-20

## 2018-08-21 ENCOUNTER — PRIOR ORIGINAL RECORDS (OUTPATIENT)
Dept: OTHER | Age: 83
End: 2018-08-21

## 2018-08-24 ENCOUNTER — PRIOR ORIGINAL RECORDS (OUTPATIENT)
Dept: OTHER | Age: 83
End: 2018-08-24

## 2018-08-31 ENCOUNTER — MED REC SCAN ONLY (OUTPATIENT)
Dept: INTERNAL MEDICINE CLINIC | Facility: CLINIC | Age: 83
End: 2018-08-31

## 2018-08-31 PROBLEM — M25.561 RIGHT KNEE PAIN, UNSPECIFIED CHRONICITY: Status: ACTIVE | Noted: 2018-08-31

## 2018-09-30 ENCOUNTER — HOSPITAL ENCOUNTER (EMERGENCY)
Facility: HOSPITAL | Age: 83
Discharge: HOME | End: 2018-09-30
Attending: EMERGENCY MEDICINE
Payer: MEDICARE

## 2018-09-30 VITALS
HEART RATE: 82 BPM | RESPIRATION RATE: 18 BRPM | BODY MASS INDEX: 32.1 KG/M2 | SYSTOLIC BLOOD PRESSURE: 132 MMHG | OXYGEN SATURATION: 98 % | TEMPERATURE: 99 F | WEIGHT: 170 LBS | DIASTOLIC BLOOD PRESSURE: 78 MMHG | HEIGHT: 61 IN

## 2018-09-30 DIAGNOSIS — T18.108A FOREIGN BODY IN ESOPHAGUS, INITIAL ENCOUNTER: Primary | ICD-10-CM

## 2018-09-30 PROCEDURE — 99285 EMERGENCY DEPT VISIT HI MDM: CPT

## 2018-09-30 NOTE — ED NOTES
Difficulty swallowing. Patient went up to GI lab for possible endoscopy. After evaluation of GI, they do not feel that she needs this emergently. She is swallowing. Will follow up with GI as an outpatient.   Return if unable swallow, vomiting, new compl

## 2018-09-30 NOTE — ED PROVIDER NOTES
Patient Seen in: [facility] Emergency Department    History   Patient presents with:  FB in Throat (GI, respiratory)    Stated Complaint: food bolus    HPI    80-year-old female presents to the ER complaining having food bolus stuck in her throat. BREAST BIOPSY;  Left      Comment:  Performed by Katlin Mae MD at [facility] MAIN OR  2012: [place] ([facility]  8/17/2015: CERVICAL EPIDURAL; N/A      Comment:  Performed by Linda Herring MD at [place]  9/23 vision 2mmx 12mm.    9/23/2013: PATIENT DOCUMENTED NOT TO HAVE EXPERIENCED ANY OF THE   FOLLOWING EVENTS      Comment:  Procedure: CERVICAL EPIDURAL;  Surgeon: Kandy Holm MD;  Location: [address] [place] [address] MANAGEMENT  8/17/2015: PATIENT patient in no apparent distress alert and oriented ×3. She is constantly clearing her throat. HEENT: Pupils are equal reactive to light. Extra ocular motions are intact.   No scleral icterus or conjunctival pallor: Neck is supple without tenderness on pa

## 2018-09-30 NOTE — CONSULTS
[facility]                       Gastroenterology Consultation-[facility]    [facility]:  [facility]    1934 MRN [id number]   Location [address] [facility] Attending No at unspecified(486)    • Unspecified essential hypertension    • Visual impairment     glasses   • Wears glasses      PSHx: Past Surgical History:  No date: ANGIOPLASTY (CORONARY)      Comment:  stent  No date: APPENDECTOMY  No date: BACK SURGERY      Comment EPIDURAL/SUBARACHNOID; CERVICAL/THORACIC; N/A      Comment:  Procedure: CERVICAL EPIDURAL;  Surgeon: Violet Santos MD;  Location: [facility] FOR PAIN MANAGEMENT  1980'S: [facility] NEEDLE LOCALIZATION W/ SPECIMEN 1 SITE LEFT      Comment:  Be Comment:\"Breathing issues\"-- per patient okay with             marcaine  Advair Diskus [Flut*    Coughing, SHORTNESS OF BREATH  Breo Ellipta            OTHER (SEE COMMENTS)    Comment:Pneumonia  Celexa [Citalopram]     SHORTNESS OF BREATH  Codeine no history of known chronic anemia            Dermatologic: The patient reports no recent rashes or chronic skin disorders            Rheumatologic: The patient reports no history of chronic arthritis, myalgias, arthralgias            Genitourinary:  The p past admitted with recurrent dysphagia-currently tolerating liquids and farina without difficulty, suspect the sensation she's feeling is from residual esophagitis from the impaction    Recommendations:  1.  Resume Omeprazole or OTC Prilosec 40mg po daily

## 2018-09-30 NOTE — ED INITIAL ASSESSMENT (HPI)
Pt complaining of difficulty swallowing. Feels foreign body in esophagus since eating pizza last night. Pt points to midchest. Had EGD done April with esophageal dilatation. pt Able to drink fluids.

## 2018-10-02 ENCOUNTER — OFFICE VISIT (OUTPATIENT)
Dept: INTERNAL MEDICINE CLINIC | Facility: CLINIC | Age: 83
End: 2018-10-02
Payer: MEDICARE

## 2018-10-02 VITALS
WEIGHT: 171.38 LBS | DIASTOLIC BLOOD PRESSURE: 66 MMHG | HEART RATE: 96 BPM | SYSTOLIC BLOOD PRESSURE: 126 MMHG | BODY MASS INDEX: 32.36 KG/M2 | HEIGHT: 61 IN | RESPIRATION RATE: 18 BRPM | TEMPERATURE: 98 F

## 2018-10-02 DIAGNOSIS — R13.19 ESOPHAGEAL DYSPHAGIA: Primary | ICD-10-CM

## 2018-10-02 DIAGNOSIS — I10 ESSENTIAL HYPERTENSION: ICD-10-CM

## 2018-10-02 DIAGNOSIS — Z63.79 SICKNESS IN FAMILY: ICD-10-CM

## 2018-10-02 PROCEDURE — 99214 OFFICE O/P EST MOD 30 MIN: CPT | Performed by: INTERNAL MEDICINE

## 2018-10-02 RX ORDER — OMEPRAZOLE 20 MG/1
20 CAPSULE, DELAYED RELEASE ORAL
COMMUNITY
End: 2018-10-19 | Stop reason: ALTCHOICE

## 2018-10-02 NOTE — PROGRESS NOTES
Bandar Krishnamurthy is a 80year old female. Patient presents with: Follow - Up: cn room 3: E.R follow up choked on pizza feels like theres something stuck .    HTN: valsartan recalled  Stress      HPI:     Patient here with one of her daughters-  C/o c left     Allergy to nonsteroidal anti-inflammatory drug (NSAID)     TMJ dysfunction     Acquired pes planus of right foot     Esophageal dysphagia     Right knee pain, unspecified chronicity      Current Outpatient Medications:  omeprazole 20 MG Oral Capsu pain   • Black stools    • Blood in the stool    • Blood transfusion reaction    • CAD (coronary artery disease)    • [facility]    skin cancer    • Constipation    • Coronary atherosclerosis of unspecified type of vessel, native or graft    • Diarr Allergies    Celebrex [Celecoxib]    SHORTNESS OF BREATH  Fish Oil                ANAPHYLAXIS  Iodine Solution [Po*    ANAPHYLAXIS    Comment:IVP Dye  Nsaids                  SHORTNESS OF BREATH  Other                   RASH, SHORTNESS OF BREATH    Com denies focal weakness  PSYCH: as above  RHEUM: +OA and chronic pain  HEME: No adenopathy      EXAM:   /66 (BP Location: Right arm, Patient Position: Sitting, Cuff Size: large)   Pulse 96   Temp 97.9 °F (36.6 °C) (Oral)   Resp 18   Ht 61\"   Wt 171 lb

## 2018-10-15 NOTE — PROGRESS NOTES
Breast Surgery Post-Operative Visit    Diagnosis: Left nipple adenoma status post surgical excision on August 6, 2018. Stage: N/A    Disease Status:  Surgical treatment complete, no further treatment pending. History:    This 80year old woman present pain    • History of blood transfusion AT AGE 44    AFTER HYST   • HTN (hypertension)    • Loss of appetite    • Osteoarthrosis, unspecified whether generalized or localized, unspecified site    • Other and unspecified hyperlipidemia    • Pinched nerve in W/WO CONTRAST, DX/THERAPEUTIC SUBSTANCE, EPIDURAL/SUBARACHNOID; CERVICAL/THORACIC N/A 8/17/2015    Procedure: CERVICAL EPIDURAL;  Surgeon: Isabella Pelaez MD;  Location: [facility] FOR PAIN MANAGEMENT   • ALCIRA NEEDLE LOCALIZATION W/ SPECIMEN 1 SITE LEFT Inhalation Aerosol INHALE 2 PUFFS INTO THE LUNGS EVERY 4 HOURS AS NEEDED FOR WHEEZING Disp: 3 Inhaler Rfl: 3   Fluticasone Propionate  MCG/ACT Inhalation Aerosol Inhale 2 puffs into the lungs daily.    Disp: 12 g Rfl: 1   acetaminophen 500 MG Oral Ta Comment:Asthma attack  Effient [Prasugrel *    SHORTNESS OF BREATH  Erythromycin                Comment:Short of Breath  Flagyl [Metronidazo*    ASTHMA  Iodine (Topical)        RASH, SHORTNESS OF BREATH    Comment:Rash with topical Iodine.   Linezolid change in appetite or weight loss. HEENT:     The patient denies eye irritation, cataracts, redness, glaucoma, yellowing of the eyes, change in vision or color blindness.  The patient denies hearing loss, ringing in the ears, ear drainage, earaches, nasa memory problems, loss of sensation/numbness, problems walking, weakness, tingling or burning in hands/feet. There is no history of abusive relationship, bipolar disorder, sleep disturbance, anxiety, depression or feeling of despair. Endocrine:     There

## 2018-10-17 ENCOUNTER — PRIOR ORIGINAL RECORDS (OUTPATIENT)
Dept: OTHER | Age: 83
End: 2018-10-17

## 2018-10-17 ENCOUNTER — APPOINTMENT (OUTPATIENT)
Dept: GENERAL RADIOLOGY | Facility: HOSPITAL | Age: 83
End: 2018-10-17
Attending: EMERGENCY MEDICINE
Payer: MEDICARE

## 2018-10-17 ENCOUNTER — HOSPITAL ENCOUNTER (EMERGENCY)
Facility: HOSPITAL | Age: 83
Discharge: HOME | End: 2018-10-17
Attending: EMERGENCY MEDICINE
Payer: MEDICARE

## 2018-10-17 VITALS
HEIGHT: 61 IN | BODY MASS INDEX: 32.1 KG/M2 | OXYGEN SATURATION: 95 % | RESPIRATION RATE: 21 BRPM | WEIGHT: 170 LBS | TEMPERATURE: 97 F | DIASTOLIC BLOOD PRESSURE: 58 MMHG | SYSTOLIC BLOOD PRESSURE: 142 MMHG | HEART RATE: 76 BPM

## 2018-10-17 DIAGNOSIS — R06.00 DYSPNEA, UNSPECIFIED TYPE: Primary | ICD-10-CM

## 2018-10-17 PROCEDURE — 93010 ELECTROCARDIOGRAM REPORT: CPT

## 2018-10-17 PROCEDURE — 71046 X-RAY EXAM CHEST 2 VIEWS: CPT | Performed by: EMERGENCY MEDICINE

## 2018-10-17 PROCEDURE — 99285 EMERGENCY DEPT VISIT HI MDM: CPT

## 2018-10-17 PROCEDURE — 84484 ASSAY OF TROPONIN QUANT: CPT | Performed by: EMERGENCY MEDICINE

## 2018-10-17 PROCEDURE — 80053 COMPREHEN METABOLIC PANEL: CPT | Performed by: EMERGENCY MEDICINE

## 2018-10-17 PROCEDURE — 85025 COMPLETE CBC W/AUTO DIFF WBC: CPT | Performed by: EMERGENCY MEDICINE

## 2018-10-17 PROCEDURE — 36415 COLL VENOUS BLD VENIPUNCTURE: CPT

## 2018-10-17 PROCEDURE — 83880 ASSAY OF NATRIURETIC PEPTIDE: CPT | Performed by: EMERGENCY MEDICINE

## 2018-10-17 PROCEDURE — 93005 ELECTROCARDIOGRAM TRACING: CPT

## 2018-10-17 RX ORDER — ALPRAZOLAM 0.5 MG/1
0.5 TABLET ORAL ONCE
Status: COMPLETED | OUTPATIENT
Start: 2018-10-17 | End: 2018-10-17

## 2018-10-17 NOTE — ED INITIAL ASSESSMENT (HPI)
Pt presented to ED c/o of SOB after starting on after being started on mupirocin ointment for wound to lower left extremity. Started using medication 1 week ago. SOB x 2 days not improving prompting visit today.

## 2018-10-17 NOTE — ED NOTES
Pt presents to ER with SOB. Symptoms started yesterday morning, but resolved, however today has not resolved after breathing tx at home. Yes cough, productive. No fever. No chest pains. Pt had a lesion removed from left lower leg, 2 weeks ago.  Pt has rx Mu

## 2018-10-17 NOTE — ED PROVIDER NOTES
Patient Seen in: [facility] Emergency Department    History   Patient presents with:  Dyspnea ALLISON SOB (respiratory)    Stated Complaint: allison    HPI    The patient is an 77-year-old female with a history of coronary artery disease, has 1 stent that w Osteoarthrosis, unspecified whether generalized or localized, unspecified site    • Other and unspecified hyperlipidemia    • Pinched nerve in neck    • Pneumonia, organism unspecified(486)    • Unspecified essential hypertension    • Visual impairment CERVICAL EPIDURAL;  Surgeon: Marylee Parry, MD;  Location: [facility] FOR PAIN MANAGEMENT   • ALCIRA NEEDLE LOCALIZATION W/ SPECIMEN 1 SITE LEFT  1980'S    Benign   • OTHER SURGICAL HISTORY  8/7/12    Diag cardiac stent, multi-link mini vision 2mmx 12mm. Ht 154.9 cm (5' 1\")   Wt 77.1 kg   SpO2 95%   BMI 32.12 kg/m²         Physical Exam    General: Well-developed, well-nourished elderly female who is very pleasant, conversant, comfortable and well appearing. Alert and oriented in no distress.  She is clear Normal   PRO BETA NATRIURETIC PEPTIDE - Normal   CBC WITH DIFFERENTIAL WITH PLATELET    Narrative: The following orders were created for panel order CBC WITH DIFFERENTIAL WITH PLATELET.   Procedure                               Abnormality         Statu discussed it with her cardiologist, and ordered a Rian Severe, however she apparently had black tea this morning, and she will not be able to have a stress test today. I did offer admission for observation, for serial troponins and a stress test tomorrow.   H

## 2018-10-18 ENCOUNTER — PRIOR ORIGINAL RECORDS (OUTPATIENT)
Dept: OTHER | Age: 83
End: 2018-10-18

## 2018-10-18 ENCOUNTER — MYAURORA ACCOUNT LINK (OUTPATIENT)
Dept: OTHER | Age: 83
End: 2018-10-18

## 2018-10-19 ENCOUNTER — PRIOR ORIGINAL RECORDS (OUTPATIENT)
Dept: OTHER | Age: 83
End: 2018-10-19

## 2018-10-22 ENCOUNTER — PRIOR ORIGINAL RECORDS (OUTPATIENT)
Dept: OTHER | Age: 83
End: 2018-10-22

## 2018-10-25 ENCOUNTER — OFFICE VISIT (OUTPATIENT)
Dept: INTERNAL MEDICINE CLINIC | Facility: CLINIC | Age: 83
End: 2018-10-25
Payer: MEDICARE

## 2018-10-25 VITALS
TEMPERATURE: 98 F | BODY MASS INDEX: 31.75 KG/M2 | HEIGHT: 61 IN | WEIGHT: 168.19 LBS | DIASTOLIC BLOOD PRESSURE: 78 MMHG | SYSTOLIC BLOOD PRESSURE: 142 MMHG | RESPIRATION RATE: 16 BRPM | HEART RATE: 80 BPM

## 2018-10-25 DIAGNOSIS — R06.02 SHORTNESS OF BREATH: Primary | ICD-10-CM

## 2018-10-25 DIAGNOSIS — Z63.4 GRIEF AT LOSS OF CHILD: ICD-10-CM

## 2018-10-25 DIAGNOSIS — I10 BENIGN ESSENTIAL HTN: ICD-10-CM

## 2018-10-25 DIAGNOSIS — F43.21 GRIEF AT LOSS OF CHILD: ICD-10-CM

## 2018-10-25 PROCEDURE — 99213 OFFICE O/P EST LOW 20 MIN: CPT | Performed by: INTERNAL MEDICINE

## 2018-10-25 SDOH — SOCIAL STABILITY - SOCIAL INSECURITY: DISSAPEARANCE AND DEATH OF FAMILY MEMBER: Z63.4

## 2018-10-25 NOTE — PROGRESS NOTES
Clari Nephew is a 80year old female. Patient presents with: Follow - Up: RE rm 4: FU ER, her daughter passed away recently      HPI:     S/p ER visit last week for SOB, came for f/u  Patient s/p left leg skin biopsy 10/8 .  Patient says since sh Aerosol INHALE 2 PUFFS INTO THE LUNGS EVERY 4 HOURS AS NEEDED FOR WHEEZING Disp: 3 Inhaler Rfl: 3   Fluticasone Propionate  MCG/ACT Inhalation Aerosol Inhale 2 puffs into the lungs 2 (two) times daily.    Disp: 12 g Rfl: 1   acetaminophen 500 MG Oral generalized or localized, unspecified site    • Other and unspecified hyperlipidemia    • Pinched nerve in neck    • Pneumonia, organism unspecified(486)    • PONV (postoperative nausea and vomiting)    • Unspecified essential hypertension    • Visual impa [Flut*    Coughing, SHORTNESS OF BREATH  Breo Ellipta            OTHER (SEE COMMENTS)    Comment:Pneumonia  Celexa [Citalopram]     SHORTNESS OF BREATH  Codeine                 OTHER (SEE COMMENTS)    Comment:Causes breathing problem  Ctd Aspirin [Warfar* rxn to Bactroban.  Patient stopped it and feels much better  Grief at loss of child- Patient seeing therapist, medications did not suit her in the past and she does not want to try medications again      No orders of the defined types were placed in this en

## 2018-10-30 LAB
ALBUMIN: 3.8 G/DL
ALKALINE PHOSPHATATE(ALK PHOS): 75 IU/L
BILIRUBIN TOTAL: 0.6 MG/DL
BUN: 10 MG/DL
CALCIUM: 9.6 MG/DL
CHLORIDE: 110 MEQ/L
CREATININE, SERUM: 0.85 MG/DL
GLOBULIN: 3.5 G/DL
GLUCOSE: 84 MG/DL
HEMATOCRIT: 46.2 %
HEMOGLOBIN: 15.1 G/DL
PLATELETS: 225 K/UL
POTASSIUM, SERUM: 3.7 MEQ/L
PROBNP: 174 PG/ML
PROTEIN, TOTAL: 7.3 G/DL
RED BLOOD COUNT: 5.18 X 10-6/U
SGOT (AST): 16 IU/L
SGPT (ALT): 21 IU/L
SODIUM: 144 MEQ/L
WHITE BLOOD COUNT: 11.1 X 10-3/U

## 2018-11-01 ENCOUNTER — MED REC SCAN ONLY (OUTPATIENT)
Dept: INTERNAL MEDICINE CLINIC | Facility: CLINIC | Age: 83
End: 2018-11-01

## 2018-11-10 ENCOUNTER — HOSPITAL ENCOUNTER (OUTPATIENT)
Dept: CV DIAGNOSTICS | Facility: HOSPITAL | Age: 83
Discharge: HOME | End: 2018-11-10
Attending: INTERNAL MEDICINE
Payer: MEDICARE

## 2018-11-10 DIAGNOSIS — R06.00 DYSPNEA: ICD-10-CM

## 2018-11-10 DIAGNOSIS — I25.10 CORONARY ATHEROSCLEROSIS OF NATIVE CORONARY ARTERY: ICD-10-CM

## 2018-11-10 PROCEDURE — 78452 HT MUSCLE IMAGE SPECT MULT: CPT | Performed by: INTERNAL MEDICINE

## 2018-11-10 PROCEDURE — 93017 CV STRESS TEST TRACING ONLY: CPT | Performed by: INTERNAL MEDICINE

## 2018-11-10 PROCEDURE — 93018 CV STRESS TEST I&R ONLY: CPT | Performed by: INTERNAL MEDICINE

## 2018-11-13 ENCOUNTER — PRIOR ORIGINAL RECORDS (OUTPATIENT)
Dept: OTHER | Age: 83
End: 2018-11-13

## 2018-11-20 ENCOUNTER — OFFICE VISIT (OUTPATIENT)
Dept: INTERNAL MEDICINE CLINIC | Facility: CLINIC | Age: 83
End: 2018-11-20
Payer: MEDICARE

## 2018-11-20 VITALS
DIASTOLIC BLOOD PRESSURE: 78 MMHG | WEIGHT: 170 LBS | TEMPERATURE: 98 F | BODY MASS INDEX: 32.1 KG/M2 | SYSTOLIC BLOOD PRESSURE: 136 MMHG | HEART RATE: 76 BPM | RESPIRATION RATE: 16 BRPM | HEIGHT: 61 IN

## 2018-11-20 DIAGNOSIS — S81.802S WOUND OF LEFT LOWER EXTREMITY, SEQUELA: Primary | ICD-10-CM

## 2018-11-20 DIAGNOSIS — K58.0 IRRITABLE BOWEL SYNDROME WITH DIARRHEA: ICD-10-CM

## 2018-11-20 DIAGNOSIS — I10 BENIGN ESSENTIAL HTN: ICD-10-CM

## 2018-11-20 DIAGNOSIS — F41.9 ANXIETY: ICD-10-CM

## 2018-11-20 PROCEDURE — 99214 OFFICE O/P EST MOD 30 MIN: CPT | Performed by: INTERNAL MEDICINE

## 2018-11-20 NOTE — PROGRESS NOTES
Cruz Mcfarland is a 80year old female. Patient presents with:  Wound: Pt would like L leg wound checked. Pt had biopsy on 9/28/18.  LB-rm 3  HTN  Irritable Bowel      HPI:     Patient here for f/u  C/o persistent wound on Left shin since shave biop daily. For arthritis ) Disp: 90 tablet Rfl: 3   Levalbuterol Tartrate 45 MCG/ACT Inhalation Aerosol INHALE 2 PUFFS INTO THE LUNGS EVERY 4 HOURS AS NEEDED FOR WHEEZING Disp: 3 Inhaler Rfl: 3   Fluticasone Propionate  MCG/ACT Inhalation Aerosol Inhale • HTN (hypertension)    • Loss of appetite    • Osteoarthrosis, unspecified whether generalized or localized, unspecified site    • Other and unspecified hyperlipidemia    • Pinched nerve in neck    • Pneumonia, organism unspecified(486)    • PONV (posto amol  Bactroban [Mupiroci*    OTHER (SEE COMMENTS)    Comment:Felt short of breath  Advair Diskus [Flut*    Coughing, SHORTNESS OF BREATH  Breo Ellipta            OTHER (SEE COMMENTS)    Comment:Pneumonia  Celexa [Citalopram]     SHORTNESS OF BREATH  C auscultation  CARDIO: RRR nl S1 S2  GI: normal bowel sounds, soft, NT/ND  EXTREMITIES: no cyanosis, clubbing or edema, L shin with small scab (smaller than size of dime), no TTP, peeling dry skin LLE  NEURO: Alert and oriented, no focal deficits    ASSESSM

## 2018-12-04 ENCOUNTER — TELEPHONE (OUTPATIENT)
Dept: INTERNAL MEDICINE CLINIC | Facility: CLINIC | Age: 83
End: 2018-12-04

## 2018-12-04 NOTE — TELEPHONE ENCOUNTER
Pt stated she hasn't been feeling well since Thanksgiving. She was recently seen on 11/20 for a leg wound. Pt stated she is having some loose stool and she is feeling tired all the time. Not eating well.  Pt would like to talk to nurse first to make sure it

## 2018-12-04 NOTE — TELEPHONE ENCOUNTER
Pt returned call. Pt c/o \"not feeling well\", fatigue and some loose stools in which she takes OTC imodium for with some relief. Decreased appetite. Denies fevers/chills.   Offered to r/s appt for sooner with TB - r/s to 12/11 at 4:30 pm - pt verbalized

## 2018-12-11 ENCOUNTER — OFFICE VISIT (OUTPATIENT)
Dept: INTERNAL MEDICINE CLINIC | Facility: CLINIC | Age: 83
End: 2018-12-11
Payer: MEDICARE

## 2018-12-11 VITALS
SYSTOLIC BLOOD PRESSURE: 136 MMHG | WEIGHT: 168.19 LBS | OXYGEN SATURATION: 98 % | DIASTOLIC BLOOD PRESSURE: 80 MMHG | HEART RATE: 92 BPM | TEMPERATURE: 98 F | HEIGHT: 61 IN | BODY MASS INDEX: 31.75 KG/M2 | RESPIRATION RATE: 16 BRPM

## 2018-12-11 DIAGNOSIS — Z91.09 ENVIRONMENTAL ALLERGIES: ICD-10-CM

## 2018-12-11 DIAGNOSIS — J45.31 MILD PERSISTENT ASTHMA WITH ACUTE EXACERBATION: Primary | ICD-10-CM

## 2018-12-11 PROCEDURE — 99213 OFFICE O/P EST LOW 20 MIN: CPT | Performed by: INTERNAL MEDICINE

## 2018-12-11 RX ORDER — PREDNISONE 10 MG/1
TABLET ORAL
Qty: 30 TABLET | Refills: 0 | Status: SHIPPED | OUTPATIENT
Start: 2018-12-11 | End: 2019-02-04 | Stop reason: ALTCHOICE

## 2018-12-13 ENCOUNTER — LAB ENCOUNTER (OUTPATIENT)
Dept: LAB | Facility: HOSPITAL | Age: 83
End: 2018-12-13
Attending: INTERNAL MEDICINE
Payer: MEDICARE

## 2018-12-13 ENCOUNTER — PRIOR ORIGINAL RECORDS (OUTPATIENT)
Dept: OTHER | Age: 83
End: 2018-12-13

## 2018-12-13 DIAGNOSIS — I25.10 CORONARY ATHEROSCLEROSIS OF NATIVE CORONARY ARTERY: Primary | ICD-10-CM

## 2018-12-13 DIAGNOSIS — I10 ESSENTIAL HYPERTENSION, MALIGNANT: ICD-10-CM

## 2018-12-13 DIAGNOSIS — E78.00 PURE HYPERCHOLESTEROLEMIA: ICD-10-CM

## 2018-12-13 PROCEDURE — 80061 LIPID PANEL: CPT

## 2018-12-13 PROCEDURE — 80053 COMPREHEN METABOLIC PANEL: CPT

## 2018-12-13 PROCEDURE — 36415 COLL VENOUS BLD VENIPUNCTURE: CPT

## 2018-12-17 ENCOUNTER — PRIOR ORIGINAL RECORDS (OUTPATIENT)
Dept: OTHER | Age: 83
End: 2018-12-17

## 2018-12-17 ENCOUNTER — MYAURORA ACCOUNT LINK (OUTPATIENT)
Dept: OTHER | Age: 83
End: 2018-12-17

## 2018-12-17 LAB
ALBUMIN: 3.5 G/DL
ALKALINE PHOSPHATATE(ALK PHOS): 70 IU/L
BILIRUBIN TOTAL: 0.7 MG/DL
BUN: 26 MG/DL
CALCIUM: 9 MG/DL
CHLORIDE: 111 MEQ/L
CHOLESTEROL, TOTAL: 252 MG/DL
CREATININE, SERUM: 0.93 MG/DL
GLOBULIN: 3.4 G/DL
GLUCOSE: 84 MG/DL
HDL CHOLESTEROL: 61 MG/DL
LDL CHOLESTEROL: 162 MG/DL
NON-HDL CHOLESTEROL: 191 MG/DL
POTASSIUM, SERUM: 3.8 MEQ/L
PROTEIN, TOTAL: 6.9 G/DL
SGOT (AST): 9 IU/L
SGPT (ALT): 20 IU/L
SODIUM: 143 MEQ/L
TRIGLYCERIDES: 145 MG/DL

## 2018-12-26 ENCOUNTER — MED REC SCAN ONLY (OUTPATIENT)
Dept: INTERNAL MEDICINE CLINIC | Facility: CLINIC | Age: 83
End: 2018-12-26

## 2019-01-05 ENCOUNTER — APPOINTMENT (OUTPATIENT)
Dept: GENERAL RADIOLOGY | Facility: HOSPITAL | Age: 84
End: 2019-01-05
Attending: EMERGENCY MEDICINE
Payer: MEDICARE

## 2019-01-05 ENCOUNTER — HOSPITAL ENCOUNTER (EMERGENCY)
Facility: HOSPITAL | Age: 84
Discharge: HOME | End: 2019-01-05
Attending: EMERGENCY MEDICINE
Payer: MEDICARE

## 2019-01-05 ENCOUNTER — APPOINTMENT (OUTPATIENT)
Dept: ULTRASOUND IMAGING | Facility: HOSPITAL | Age: 84
End: 2019-01-05
Attending: EMERGENCY MEDICINE
Payer: MEDICARE

## 2019-01-05 VITALS
SYSTOLIC BLOOD PRESSURE: 187 MMHG | BODY MASS INDEX: 32 KG/M2 | WEIGHT: 168.19 LBS | RESPIRATION RATE: 22 BRPM | HEART RATE: 84 BPM | DIASTOLIC BLOOD PRESSURE: 107 MMHG | OXYGEN SATURATION: 94 %

## 2019-01-05 DIAGNOSIS — R07.89 CHEST PAIN, ATYPICAL: Primary | ICD-10-CM

## 2019-01-05 LAB
ALBUMIN SERPL-MCNC: 3.5 G/DL (ref 3.1–4.5)
ALBUMIN/GLOB SERPL: 1.1 {RATIO} (ref 1–2)
ALP LIVER SERPL-CCNC: 74 U/L (ref 55–142)
ALT SERPL-CCNC: 26 U/L (ref 14–54)
ANION GAP SERPL CALC-SCNC: 6 MMOL/L (ref 0–18)
AST SERPL-CCNC: 22 U/L (ref 15–41)
BASOPHILS # BLD AUTO: 0.03 X10(3) UL (ref 0–0.1)
BASOPHILS NFR BLD AUTO: 0.4 %
BILIRUB SERPL-MCNC: 0.5 MG/DL (ref 0.1–2)
BUN BLD-MCNC: 16 MG/DL (ref 8–20)
BUN/CREAT SERPL: 19 (ref 10–20)
CALCIUM BLD-MCNC: 8.9 MG/DL (ref 8.3–10.3)
CHLORIDE SERPL-SCNC: 112 MMOL/L (ref 101–111)
CO2 SERPL-SCNC: 26 MMOL/L (ref 22–32)
CREAT BLD-MCNC: 0.84 MG/DL (ref 0.55–1.02)
D-DIMER: 0.87 UG/ML FEU (ref 0–0.49)
EOSINOPHIL # BLD AUTO: 0.17 X10(3) UL (ref 0–0.3)
EOSINOPHIL NFR BLD AUTO: 2.5 %
ERYTHROCYTE [DISTWIDTH] IN BLOOD BY AUTOMATED COUNT: 14.6 % (ref 11.5–16)
GLOBULIN PLAS-MCNC: 3.1 G/DL (ref 2.8–4.4)
GLUCOSE BLD-MCNC: 88 MG/DL (ref 70–99)
HCT VFR BLD AUTO: 42.4 % (ref 34–50)
HGB BLD-MCNC: 13.7 G/DL (ref 12–16)
IMMATURE GRANULOCYTE COUNT: 0.03 X10(3) UL (ref 0–1)
IMMATURE GRANULOCYTE RATIO %: 0.4 %
LYMPHOCYTES # BLD AUTO: 2.93 X10(3) UL (ref 0.9–4)
LYMPHOCYTES NFR BLD AUTO: 42.4 %
M PROTEIN MFR SERPL ELPH: 6.6 G/DL (ref 6.4–8.2)
MCH RBC QN AUTO: 28.4 PG (ref 27–33.2)
MCHC RBC AUTO-ENTMCNC: 32.3 G/DL (ref 31–37)
MCV RBC AUTO: 87.8 FL (ref 81–100)
MONOCYTES # BLD AUTO: 0.57 X10(3) UL (ref 0.1–1)
MONOCYTES NFR BLD AUTO: 8.2 %
NEUTROPHIL ABS PRELIM: 3.18 X10 (3) UL (ref 1.3–6.7)
NEUTROPHILS # BLD AUTO: 3.18 X10(3) UL (ref 1.3–6.7)
NEUTROPHILS NFR BLD AUTO: 46.1 %
OSMOLALITY SERPL CALC.SUM OF ELEC: 299 MOSM/KG (ref 275–295)
PLATELET # BLD AUTO: 244 10(3)UL (ref 150–450)
POTASSIUM SERPL-SCNC: 4.2 MMOL/L (ref 3.6–5.1)
RBC # BLD AUTO: 4.83 X10(6)UL (ref 3.8–5.1)
RED CELL DISTRIBUTION WIDTH-SD: 46.5 FL (ref 35.1–46.3)
SODIUM SERPL-SCNC: 144 MMOL/L (ref 136–144)
TROPONIN I SERPL-MCNC: <0.046 NG/ML (ref ?–0.05)
WBC # BLD AUTO: 6.9 X10(3) UL (ref 4–13)

## 2019-01-05 PROCEDURE — 71045 X-RAY EXAM CHEST 1 VIEW: CPT | Performed by: EMERGENCY MEDICINE

## 2019-01-05 PROCEDURE — 85025 COMPLETE CBC W/AUTO DIFF WBC: CPT | Performed by: EMERGENCY MEDICINE

## 2019-01-05 PROCEDURE — 93971 EXTREMITY STUDY: CPT | Performed by: EMERGENCY MEDICINE

## 2019-01-05 PROCEDURE — 93010 ELECTROCARDIOGRAM REPORT: CPT

## 2019-01-05 PROCEDURE — 85378 FIBRIN DEGRADE SEMIQUANT: CPT | Performed by: EMERGENCY MEDICINE

## 2019-01-05 PROCEDURE — 80053 COMPREHEN METABOLIC PANEL: CPT | Performed by: EMERGENCY MEDICINE

## 2019-01-05 PROCEDURE — 36415 COLL VENOUS BLD VENIPUNCTURE: CPT

## 2019-01-05 PROCEDURE — 99285 EMERGENCY DEPT VISIT HI MDM: CPT

## 2019-01-05 PROCEDURE — 93005 ELECTROCARDIOGRAM TRACING: CPT

## 2019-01-05 PROCEDURE — 84484 ASSAY OF TROPONIN QUANT: CPT | Performed by: EMERGENCY MEDICINE

## 2019-01-06 LAB
ATRIAL RATE: 106 BPM
ATRIAL RATE: 90 BPM
P AXIS: 61 DEGREES
P AXIS: 62 DEGREES
P-R INTERVAL: 148 MS
P-R INTERVAL: 156 MS
Q-T INTERVAL: 358 MS
Q-T INTERVAL: 386 MS
QRS DURATION: 118 MS
QRS DURATION: 120 MS
QTC CALCULATION (BEZET): 472 MS
QTC CALCULATION (BEZET): 475 MS
R AXIS: -32 DEGREES
R AXIS: -35 DEGREES
T AXIS: 105 DEGREES
T AXIS: 113 DEGREES
VENTRICULAR RATE: 106 BPM
VENTRICULAR RATE: 90 BPM

## 2019-01-06 NOTE — ED PROVIDER NOTES
Patient Seen in: [facility] Emergency Department    History   Patient presents with:  Chest Pain Angina (cardiovascular)    Stated Complaint: chest pain    HPI    19-year-old woman who comes to the emergency department with chest tightness.   Was in glasses   • Wears glasses        Past Surgical History:   Procedure Laterality Date   • ANGIOPLASTY (CORONARY)      stent   • APPENDECTOMY     • BACK SURGERY      lumbar x 4   • BENIGN BIOPSY LEFT  a long time ago    x 2   • BREAST BIOPSY Left 8/6/2018 Resp 20   Temp    Temp src    SpO2 100 %   O2 Device None (Room air)       Current:BP (!) 187/107   Pulse 84   Resp 22   Wt 76.3 kg   SpO2 94%   BMI 31.78 kg/m²         Physical Exam    General:  Vitals as listed.   No acute distress   HEENT: Sclerae anic tests on the individual orders. RAINBOW DRAW BLUE   RAINBOW DRAW LAVENDER   RAINBOW DRAW LIGHT GREEN   RAINBOW DRAW GOLD     EKG done at 2007    Rate, intervals and axes as noted on EKG Report. Rate: 106  Rhythm: Sinus Rhythm  Reading: Frequent PVCs.   Allegra Baar

## 2019-01-10 ENCOUNTER — OFFICE VISIT (OUTPATIENT)
Dept: INTERNAL MEDICINE CLINIC | Facility: CLINIC | Age: 84
End: 2019-01-10
Payer: MEDICARE

## 2019-01-10 VITALS
WEIGHT: 169.38 LBS | HEART RATE: 96 BPM | OXYGEN SATURATION: 98 % | DIASTOLIC BLOOD PRESSURE: 84 MMHG | BODY MASS INDEX: 31.98 KG/M2 | RESPIRATION RATE: 16 BRPM | SYSTOLIC BLOOD PRESSURE: 130 MMHG | HEIGHT: 61 IN | TEMPERATURE: 98 F

## 2019-01-10 DIAGNOSIS — J45.40 MODERATE PERSISTENT ASTHMA WITHOUT COMPLICATION: ICD-10-CM

## 2019-01-10 DIAGNOSIS — R07.89 ATYPICAL CHEST PAIN: ICD-10-CM

## 2019-01-10 DIAGNOSIS — R30.0 DYSURIA: Primary | ICD-10-CM

## 2019-01-10 DIAGNOSIS — I10 BENIGN ESSENTIAL HTN: ICD-10-CM

## 2019-01-10 LAB
APPEARANCE: CLEAR
BILIRUBIN: NEGATIVE
GLUCOSE (URINE DIPSTICK): NEGATIVE MG/DL
KETONES (URINE DIPSTICK): NEGATIVE MG/DL
MULTISTIX LOT#: NORMAL NUMERIC
NITRITE, URINE: NEGATIVE
PH, URINE: 5.5 (ref 4.5–8)
PROTEIN (URINE DIPSTICK): NEGATIVE MG/DL
SPECIFIC GRAVITY: 1 (ref 1–1.03)
URINE-COLOR: YELLOW
UROBILINOGEN,SEMI-QN: 0.2 MG/DL (ref 0–1.9)

## 2019-01-10 PROCEDURE — 87086 URINE CULTURE/COLONY COUNT: CPT | Performed by: INTERNAL MEDICINE

## 2019-01-10 PROCEDURE — 81003 URINALYSIS AUTO W/O SCOPE: CPT | Performed by: INTERNAL MEDICINE

## 2019-01-10 PROCEDURE — 99214 OFFICE O/P EST MOD 30 MIN: CPT | Performed by: INTERNAL MEDICINE

## 2019-01-10 RX ORDER — NITROFURANTOIN 25; 75 MG/1; MG/1
100 CAPSULE ORAL 2 TIMES DAILY
Qty: 10 CAPSULE | Refills: 0 | Status: SHIPPED | OUTPATIENT
Start: 2019-01-10 | End: 2019-02-06

## 2019-01-10 NOTE — PROGRESS NOTES
Destini Cancer is a 80year old female. Patient presents with:  Er F/u: cn room 3: er follow up for chest pain , patient been having a hard time urinating       HPI:     C/o dysuria since yesterday. Lower abd discomfort/spasm as well.  Low grade tem PUFFS INTO THE LUNGS EVERY 4 HOURS AS NEEDED FOR WHEEZING Disp: 3 Inhaler Rfl: 3   Fluticasone Propionate  MCG/ACT Inhalation Aerosol Inhale 2 puffs into the lungs 2 (two) times daily.    Disp: 12 g Rfl: 1   acetaminophen 500 MG Oral Tab Take 1,000 m localized, unspecified site    • Other and unspecified hyperlipidemia    • Pinched nerve in neck    • Pneumonia, organism unspecified(486)    • PONV (postoperative nausea and vomiting)    • Unspecified essential hypertension    • Visual impairment     glas Coughing, SHORTNESS OF BREATH  Breo Ellipta            OTHER (SEE COMMENTS)    Comment:Pneumonia  Celexa [Citalopram]     SHORTNESS OF BREATH  Codeine                 OTHER (SEE COMMENTS)    Comment:Causes breathing problem  Ctd Aspirin [WarfarJone Sameer Dysuria  - urine dip with blood and rosanna so will start macrobid for presumed UTI and check urine culture . Atypical chest pain - resolved, cardiac w/u negative.  Likely it was muscular pain  Benign essential htn- controlled, CPM  Moderate persistent asth

## 2019-01-11 ENCOUNTER — TELEPHONE (OUTPATIENT)
Dept: INTERNAL MEDICINE CLINIC | Facility: CLINIC | Age: 84
End: 2019-01-11

## 2019-01-11 NOTE — TELEPHONE ENCOUNTER
Pt was seen yesterday by TB and stated she prescribed   Nitrofurantoin Monohyd Macro 100 MG Oral Cap 10 capsule 0 1/10/2019    Sig :  Take 1 capsule (100 mg total) by mouth 2 (two) times daily. Pt took the medication soon after appt (around noon).  Sh

## 2019-01-11 NOTE — TELEPHONE ENCOUNTER
Urine culture still pending but can hold ABX for now  Let me see what grows and then I can direct her further, will likely need to change ABX

## 2019-01-11 NOTE — TELEPHONE ENCOUNTER
Called pt to inform, per TB, urine culture still pending. Advised can hold abx for now. Lets see what grows and then will direct her further once we receive results. Pt stating is feeling better today. Pt verbalized understanding and agreed with POC.

## 2019-01-17 ENCOUNTER — TELEPHONE (OUTPATIENT)
Dept: INTERNAL MEDICINE CLINIC | Facility: CLINIC | Age: 84
End: 2019-01-17

## 2019-01-17 ENCOUNTER — OFFICE VISIT (OUTPATIENT)
Dept: INTERNAL MEDICINE CLINIC | Facility: CLINIC | Age: 84
End: 2019-01-17
Payer: MEDICARE

## 2019-01-17 ENCOUNTER — HOSPITAL ENCOUNTER (OUTPATIENT)
Dept: CT IMAGING | Age: 84
Discharge: HOME | End: 2019-01-17
Attending: PHYSICIAN ASSISTANT
Payer: MEDICARE

## 2019-01-17 VITALS
DIASTOLIC BLOOD PRESSURE: 80 MMHG | BODY MASS INDEX: 31.72 KG/M2 | RESPIRATION RATE: 16 BRPM | OXYGEN SATURATION: 96 % | SYSTOLIC BLOOD PRESSURE: 140 MMHG | TEMPERATURE: 98 F | HEART RATE: 100 BPM | WEIGHT: 168 LBS | HEIGHT: 61 IN

## 2019-01-17 DIAGNOSIS — R10.30 LOWER ABDOMINAL PAIN: Primary | ICD-10-CM

## 2019-01-17 DIAGNOSIS — R31.29 OTHER MICROSCOPIC HEMATURIA: ICD-10-CM

## 2019-01-17 DIAGNOSIS — R10.2 PELVIC PAIN: ICD-10-CM

## 2019-01-17 LAB
APPEARANCE: CLEAR
BILIRUBIN: NEGATIVE
GLUCOSE (URINE DIPSTICK): NEGATIVE MG/DL
KETONES (URINE DIPSTICK): NEGATIVE MG/DL
MULTISTIX LOT#: NORMAL NUMERIC
NITRITE, URINE: NEGATIVE
PH, URINE: 6.5 (ref 4.5–8)
PROTEIN (URINE DIPSTICK): NEGATIVE MG/DL
SPECIFIC GRAVITY: 1.01 (ref 1–1.03)
URINE-COLOR: YELLOW
UROBILINOGEN,SEMI-QN: 0.2 MG/DL (ref 0–1.9)

## 2019-01-17 PROCEDURE — 87186 SC STD MICRODIL/AGAR DIL: CPT | Performed by: PHYSICIAN ASSISTANT

## 2019-01-17 PROCEDURE — 87088 URINE BACTERIA CULTURE: CPT | Performed by: PHYSICIAN ASSISTANT

## 2019-01-17 PROCEDURE — 81003 URINALYSIS AUTO W/O SCOPE: CPT | Performed by: PHYSICIAN ASSISTANT

## 2019-01-17 PROCEDURE — 74176 CT ABD & PELVIS W/O CONTRAST: CPT | Performed by: PHYSICIAN ASSISTANT

## 2019-01-17 PROCEDURE — 87086 URINE CULTURE/COLONY COUNT: CPT | Performed by: PHYSICIAN ASSISTANT

## 2019-01-17 PROCEDURE — 99214 OFFICE O/P EST MOD 30 MIN: CPT | Performed by: PHYSICIAN ASSISTANT

## 2019-01-17 NOTE — PROGRESS NOTES
Patient presents with:  Pelvic Pain: Pt. is c/o pelvic pain while urinating. She states her flow will stop and she will start having the pelvic pain. Per pt. the pain is intermittent      HPI:  Pt presents with c/o continued pain when urinating.   Does not • Visual impairment     glasses   • Wears glasses        Patient Active Problem List:     Rotator cuff tendonitis     Cervical radiculopathy at C5     Primary osteoarthritis of right knee     Lumbar disc herniation     At risk for falling     Irritable b Cap Take by mouth. 500 mg BID  Disp:  Rfl:    Cholecalciferol (VITAMIN D3) 5000 UNITS Oral Cap Take 1 tablet by mouth daily. Disp:  Rfl:    alprazolam (XANAX) 0.5 MG Oral Tab Take 0.25 mg by mouth TID CC and HS.  Pt takes 0.5mg at night  Disp:  Rfl:    JAYLEN pain.  There are no Patient Instructions on file for this visit. All questions were answered and the patient understands the plan.

## 2019-01-17 NOTE — TELEPHONE ENCOUNTER
Re: stat order that was placed today. Pt arrived an 1 hr and a half earlier to  her barium. She is calling to clarify order placed and what pt is requesting do not match. Is pt confused ?  Did we order the wrong test? Please advise

## 2019-01-18 NOTE — PROGRESS NOTES
No kidney stone. Nothing specific to explain pt's pain. I would suggest pt follow up with Urology Xochilt Galan).

## 2019-01-21 ENCOUNTER — TELEPHONE (OUTPATIENT)
Dept: INTERNAL MEDICINE CLINIC | Facility: CLINIC | Age: 84
End: 2019-01-21

## 2019-01-21 NOTE — TELEPHONE ENCOUNTER
Pt spoke to TB on 01/20/19 about her UX results and called back and stated that she would like TB to prescribe what she thinks would work best and the pt will just see how it goes in regards to allergies     Please advise

## 2019-01-21 NOTE — TELEPHONE ENCOUNTER
Results never routed to Triage  Notes recorded by [place] MD on 1/20/2019 at 11:48 AM CST  D/w pt   She feels well, no urinary complaints and lower abdominal pain better. Drinking water and cranberry juice.    Will hold off on any ABX due to multipl

## 2019-01-22 ENCOUNTER — TELEPHONE (OUTPATIENT)
Dept: INTERNAL MEDICINE CLINIC | Facility: CLINIC | Age: 84
End: 2019-01-22

## 2019-01-22 RX ORDER — CIPROFLOXACIN 250 MG/1
250 TABLET, FILM COATED ORAL 2 TIMES DAILY
Qty: 6 TABLET | Refills: 0 | Status: SHIPPED | OUTPATIENT
Start: 2019-01-22 | End: 2019-01-25

## 2019-01-22 NOTE — TELEPHONE ENCOUNTER
Pt would like a call back to further discuss UTI. Pt will see urology in 2 weeks. Not sure if anything meds were called in.

## 2019-01-22 NOTE — TELEPHONE ENCOUNTER
Spoke with patient informed per TB sent x3 days of cipro to [facility] on file. Patient verbalized understanding and agreeable to POC.

## 2019-01-22 NOTE — TELEPHONE ENCOUNTER
Spoke with patient asking if should be taking antibiotic, complains of minimal dysuria, No blood in urine, no flank pain, No fevers or chills, always nauseas-not new. No other symptoms at this time.  Verified with patient has not had reaction to cipro in th

## 2019-01-23 NOTE — TELEPHONE ENCOUNTER
Called pt to confirm is not currently experiencing any SE or sx at this time while on Cipro. Confirmed dysuria is much better after starting Cipro yesterday.  Pt stating had experienced in past leg/ankle pain with Levaquin, had concerns will reoccur with Ci

## 2019-01-23 NOTE — TELEPHONE ENCOUNTER
Pt stated she had a problem with her ankle last time when she was on Levaquin. She stated she was just prescribed cipro and she read that is the same medication but a different name. Pt is concerned about her ankles. She is on a lower dose.  She hasn't had

## 2019-02-12 ENCOUNTER — PRIOR ORIGINAL RECORDS (OUTPATIENT)
Dept: OTHER | Age: 84
End: 2019-02-12

## 2019-02-12 ENCOUNTER — TELEPHONE (OUTPATIENT)
Dept: INTERNAL MEDICINE CLINIC | Facility: CLINIC | Age: 84
End: 2019-02-12

## 2019-02-12 NOTE — TELEPHONE ENCOUNTER
Pt went to the dentist, who suggested to her that she should have clearance going forward for a cleaning. She did have a stent in [place], 8-7-12. Also has appt coming up with eye doctor. Pt has a small pimple under her eye.  Again it was suggested that she let

## 2019-02-22 ENCOUNTER — OFFICE VISIT (OUTPATIENT)
Dept: INTERNAL MEDICINE CLINIC | Facility: CLINIC | Age: 84
End: 2019-02-22
Payer: MEDICARE

## 2019-02-22 VITALS
HEIGHT: 61 IN | TEMPERATURE: 98 F | BODY MASS INDEX: 31.15 KG/M2 | DIASTOLIC BLOOD PRESSURE: 82 MMHG | SYSTOLIC BLOOD PRESSURE: 136 MMHG | WEIGHT: 165 LBS | HEART RATE: 76 BPM | RESPIRATION RATE: 16 BRPM

## 2019-02-22 DIAGNOSIS — R11.0 NAUSEA: ICD-10-CM

## 2019-02-22 DIAGNOSIS — L81.4 SOLAR LENTIGO: Primary | ICD-10-CM

## 2019-02-22 PROCEDURE — 99213 OFFICE O/P EST LOW 20 MIN: CPT | Performed by: INTERNAL MEDICINE

## 2019-02-22 NOTE — PROGRESS NOTES
Tierney Carolina is a 80year old female. Patient presents with:  Skin: WG exam room 2 spots on hands and not feeling well, tired and some nausea      HPI:     Patient c/o fatigue, brown spots on her hand and nausea for a few days.  Fatigue is somewha hours as needed for Pain.    Disp:  Rfl:    Verapamil HCl  MG Oral Capsule SR 24 Hr TK ONE C PO D Disp:  Rfl: 2   LOPERAMIDE HCL 2 MG Oral Cap TAKE ONE CAPSULE BY MOUTH AS NEEDED Disp: 90 capsule Rfl: 0   Cholecalciferol (VITAMIN D3) 5000 UNITS Oral C Former Smoker        Packs/day: 0.30        Years: 3.00        Pack years: .5        Quit date: 1972        Years since quittin.0      Smokeless tobacco: Never Used    Alcohol use: No      Alcohol/week: 0.0 oz    Drug use: No    Family History SHORTNESS OF BREATH  Erythromycin                Comment:Short of Breath  Flagyl [Metronidazo*    ASTHMA  Iodine (Topical)        RASH, SHORTNESS OF BREATH    Comment:Rash with topical Iodine.   Linezolid               DIARRHEA  Metronidazole And R*    SHOR prescriptions requested or ordered in this encounter       Imaging & Consults:  None    No Follow-up on file. There are no Patient Instructions on file for this visit. The patient indicates understanding of these issues and agrees to the plan.

## 2019-02-25 ENCOUNTER — TELEPHONE (OUTPATIENT)
Dept: INTERNAL MEDICINE CLINIC | Facility: CLINIC | Age: 84
End: 2019-02-25

## 2019-02-25 NOTE — TELEPHONE ENCOUNTER
Pt calling to say she was just in on Friday, 2-22-19 and talked to TB over the weekend to discuss drainage down her throat. Said she had a bad connection and is not sure TB heard what she said. Does not know what to do.  The only thing she remembers gagandeep

## 2019-02-25 NOTE — TELEPHONE ENCOUNTER
Called pt stating had one episode over weekend where awoke in middle of night with nasal drainage into throat. Spit drainage out- brown-like in color. Takes Claritin daily. No further issues since. No sore throat. No body aches. No HA. No URI. No N/V.  No

## 2019-02-28 VITALS
HEIGHT: 62 IN | DIASTOLIC BLOOD PRESSURE: 62 MMHG | HEART RATE: 52 BPM | SYSTOLIC BLOOD PRESSURE: 134 MMHG | BODY MASS INDEX: 30.18 KG/M2 | WEIGHT: 164 LBS

## 2019-02-28 VITALS
SYSTOLIC BLOOD PRESSURE: 114 MMHG | HEIGHT: 62 IN | DIASTOLIC BLOOD PRESSURE: 70 MMHG | WEIGHT: 175 LBS | BODY MASS INDEX: 32.2 KG/M2 | HEART RATE: 75 BPM

## 2019-02-28 VITALS
HEIGHT: 62 IN | DIASTOLIC BLOOD PRESSURE: 60 MMHG | BODY MASS INDEX: 31.28 KG/M2 | WEIGHT: 170 LBS | SYSTOLIC BLOOD PRESSURE: 114 MMHG

## 2019-02-28 VITALS
HEIGHT: 62 IN | SYSTOLIC BLOOD PRESSURE: 130 MMHG | DIASTOLIC BLOOD PRESSURE: 70 MMHG | HEART RATE: 100 BPM | BODY MASS INDEX: 31.65 KG/M2 | WEIGHT: 172 LBS

## 2019-02-28 VITALS
HEIGHT: 61 IN | WEIGHT: 166 LBS | DIASTOLIC BLOOD PRESSURE: 70 MMHG | BODY MASS INDEX: 31.34 KG/M2 | HEART RATE: 82 BPM | SYSTOLIC BLOOD PRESSURE: 118 MMHG

## 2019-02-28 VITALS
SYSTOLIC BLOOD PRESSURE: 108 MMHG | DIASTOLIC BLOOD PRESSURE: 72 MMHG | BODY MASS INDEX: 31.34 KG/M2 | HEIGHT: 61 IN | HEART RATE: 96 BPM | WEIGHT: 166 LBS

## 2019-02-28 VITALS
BODY MASS INDEX: 31.28 KG/M2 | HEART RATE: 83 BPM | WEIGHT: 170 LBS | HEIGHT: 62 IN | SYSTOLIC BLOOD PRESSURE: 131 MMHG | DIASTOLIC BLOOD PRESSURE: 78 MMHG

## 2019-02-28 VITALS — HEART RATE: 70 BPM | SYSTOLIC BLOOD PRESSURE: 136 MMHG | WEIGHT: 169 LBS | DIASTOLIC BLOOD PRESSURE: 70 MMHG

## 2019-03-01 VITALS
WEIGHT: 165 LBS | HEART RATE: 75 BPM | SYSTOLIC BLOOD PRESSURE: 114 MMHG | HEIGHT: 62 IN | BODY MASS INDEX: 30.36 KG/M2 | DIASTOLIC BLOOD PRESSURE: 60 MMHG

## 2019-03-04 ENCOUNTER — OFFICE VISIT (OUTPATIENT)
Dept: INTERNAL MEDICINE CLINIC | Facility: CLINIC | Age: 84
End: 2019-03-04
Payer: MEDICARE

## 2019-03-04 VITALS
BODY MASS INDEX: 31.34 KG/M2 | RESPIRATION RATE: 16 BRPM | HEART RATE: 86 BPM | WEIGHT: 166 LBS | HEIGHT: 61 IN | TEMPERATURE: 98 F | OXYGEN SATURATION: 97 % | SYSTOLIC BLOOD PRESSURE: 128 MMHG | DIASTOLIC BLOOD PRESSURE: 80 MMHG

## 2019-03-04 DIAGNOSIS — J01.00 ACUTE NON-RECURRENT MAXILLARY SINUSITIS: Primary | ICD-10-CM

## 2019-03-04 PROCEDURE — 99213 OFFICE O/P EST LOW 20 MIN: CPT | Performed by: INTERNAL MEDICINE

## 2019-03-04 RX ORDER — ALPRAZOLAM 0.25 MG/1
TABLET ORAL
Refills: 0 | COMMUNITY
Start: 2019-02-11 | End: 2019-09-25

## 2019-03-04 RX ORDER — LEVOFLOXACIN 250 MG/1
250 TABLET ORAL DAILY
Qty: 10 TABLET | Refills: 0 | Status: SHIPPED | OUTPATIENT
Start: 2019-03-04 | End: 2019-03-14

## 2019-03-04 NOTE — PROGRESS NOTES
Salomon Sainz is a 80year old female. Patient presents with: Follow - Up: Room 5. No relief since she saw you. Pt states nasal congestion that is causing pressure in head. States brown mucus last week which is now yellow.        HPI:     C/o faci PUFFS INTO THE LUNGS EVERY 4 HOURS AS NEEDED FOR WHEEZING Disp: 3 Inhaler Rfl: 3   Fluticasone Propionate  MCG/ACT Inhalation Aerosol Inhale 2 puffs into the lungs 2 (two) times daily.    Disp: 12 g Rfl: 1   acetaminophen 500 MG Oral Tab Take 1,000 m Osteoarthrosis, unspecified whether generalized or localized, unspecified site    • Other and unspecified hyperlipidemia    • Pinched nerve in neck    • Pneumonia, organism unspecified(486)    • PONV (postoperative nausea and vomiting)    • Unspecified ess ANAPHYLAXIS    Comment:\"Breathing issues\"-- per patient okay with             marcaine  Bactroban [Mupiroci*    OTHER (SEE COMMENTS)    Comment:Felt short of breath  Advair Diskus [Flut*    Coughing, SHORTNESS OF BREATH  Celexa [Citalopram]     SHORTNESS days, nasal saline prn, tylenol prn fevers or chills    No orders of the defined types were placed in this encounter.       Meds & Refills for this Visit:  Requested Prescriptions     Signed Prescriptions Disp Refills   • levofloxacin 250 MG Oral Tab 10 tab

## 2019-03-05 ENCOUNTER — TELEPHONE (OUTPATIENT)
Dept: INTERNAL MEDICINE CLINIC | Facility: CLINIC | Age: 84
End: 2019-03-05

## 2019-03-05 NOTE — TELEPHONE ENCOUNTER
To provider on call SD. Spoke with patient stating was prescribed Levofloxacin at yesterday's visit with TB for acute non recurrent maxillary sinusitis.  She indicates every time she takes it she has slight SOB, no CP, no headaches, no blurred vision, no

## 2019-03-05 NOTE — TELEPHONE ENCOUNTER
Patient aware TB out of office will return tomorrow morning. Will hold message for TB. She will hold off on abx.

## 2019-03-05 NOTE — TELEPHONE ENCOUNTER
Re levofloxacin 250 MG Oral Tab. Pt was given script at yesterday's visit. Pt states that after she took the script she was short of breath. But then she used her nebulizer, it was better, was not fine. The same thing happened today.      Then today she vivi

## 2019-03-06 ENCOUNTER — TELEPHONE (OUTPATIENT)
Dept: INTERNAL MEDICINE CLINIC | Facility: CLINIC | Age: 84
End: 2019-03-06

## 2019-03-06 RX ORDER — DOXYCYCLINE HYCLATE 100 MG/1
100 CAPSULE ORAL 2 TIMES DAILY
Qty: 20 CAPSULE | Refills: 0 | Status: SHIPPED | OUTPATIENT
Start: 2019-03-06 | End: 2019-03-21 | Stop reason: ALTCHOICE

## 2019-03-06 NOTE — TELEPHONE ENCOUNTER
She tolerated in past so she can try again today but if symptoms persist, she can stop the medicine. I do not have much alternative medications for her due to allergies except possibly doxycycline. Does she have problems with it that she recalls?

## 2019-03-06 NOTE — TELEPHONE ENCOUNTER
Spoke with patient informed per TB she can try taking Levofloxacin again today but if symptoms persist, she can stop the medicine.  Patient refused to take again and would like to try the doxycycline, indicates this might have given her diarrhea in the past

## 2019-03-14 ENCOUNTER — OFFICE VISIT (OUTPATIENT)
Dept: RHEUMATOLOGY | Facility: CLINIC | Age: 84
End: 2019-03-14
Payer: MEDICARE

## 2019-03-14 ENCOUNTER — TELEPHONE (OUTPATIENT)
Dept: RHEUMATOLOGY | Facility: CLINIC | Age: 84
End: 2019-03-14

## 2019-03-14 VITALS
DIASTOLIC BLOOD PRESSURE: 72 MMHG | HEIGHT: 61 IN | BODY MASS INDEX: 31.15 KG/M2 | SYSTOLIC BLOOD PRESSURE: 142 MMHG | WEIGHT: 165 LBS | HEART RATE: 72 BPM

## 2019-03-14 DIAGNOSIS — M47.816 OSTEOARTHRITIS OF SPINE WITHOUT MYELOPATHY OR RADICULOPATHY, LUMBAR REGION: ICD-10-CM

## 2019-03-14 DIAGNOSIS — M17.11 PRIMARY OSTEOARTHRITIS OF RIGHT KNEE: Primary | ICD-10-CM

## 2019-03-14 DIAGNOSIS — Z91.81 AT RISK FOR FALLING: ICD-10-CM

## 2019-03-14 DIAGNOSIS — Z88.6 ALLERGY TO NONSTEROIDAL ANTI-INFLAMMATORY DRUG (NSAID): ICD-10-CM

## 2019-03-14 DIAGNOSIS — I25.10 CORONARY ARTERY DISEASE INVOLVING NATIVE CORONARY ARTERY OF NATIVE HEART WITHOUT ANGINA PECTORIS: ICD-10-CM

## 2019-03-14 DIAGNOSIS — Z95.5 HISTORY OF HEART ARTERY STENT: ICD-10-CM

## 2019-03-14 DIAGNOSIS — Z98.890 S/P LUMBAR LAMINECTOMY: ICD-10-CM

## 2019-03-14 DIAGNOSIS — M17.12 PRIMARY OSTEOARTHRITIS OF LEFT KNEE: ICD-10-CM

## 2019-03-14 PROBLEM — M26.609 TMJ DYSFUNCTION: Status: RESOLVED | Noted: 2018-05-14 | Resolved: 2019-03-14

## 2019-03-14 PROBLEM — M25.561 RIGHT KNEE PAIN, UNSPECIFIED CHRONICITY: Status: RESOLVED | Noted: 2018-08-31 | Resolved: 2019-03-14

## 2019-03-14 PROCEDURE — 99213 OFFICE O/P EST LOW 20 MIN: CPT | Performed by: INTERNAL MEDICINE

## 2019-03-14 RX ORDER — ACETAMINOPHEN AND CODEINE PHOSPHATE 300; 30 MG/1; MG/1
1 TABLET ORAL NIGHTLY PRN
Qty: 60 TABLET | Refills: 1 | Status: SHIPPED | OUTPATIENT
Start: 2019-03-14 | End: 2019-04-25

## 2019-03-14 RX ORDER — PREDNISONE 2.5 MG
2.5 TABLET ORAL DAILY
Qty: 90 TABLET | Refills: 3 | Status: SHIPPED | OUTPATIENT
Start: 2019-03-14 | End: 2019-07-23 | Stop reason: ALTCHOICE

## 2019-03-14 NOTE — PATIENT INSTRUCTIONS
For arthritis use prednisone 2.5 mg in the morning. No NSAIDs, no naproxen or ibuprofen due to heart diseases. Use Tylenol #3 at night to relieve arthritis pain. Use plain tylenol as needed during the day.   Use your knee brace as needed and your back br

## 2019-03-14 NOTE — TELEPHONE ENCOUNTER
[phone] Jarett MENDOZA done OTP for tylenol #3. Determination will be sent out in 48 hours.

## 2019-03-14 NOTE — TELEPHONE ENCOUNTER
Reference number [id number] approved for the 7 day supply limit exception. Pharmacy notified and tylenol #3 amount went through.

## 2019-03-14 NOTE — PROGRESS NOTES
[facility]  Dr. Mandi Childs Progress Note     Subjective:   Tiffany Patino is a(n) 80year old female. Current complaints: Patient presents with:  Osteoarthritis: 6 month f/u. Reports brown spots on right forearm.   History of skin cancer on lef cream regularly and if necessary 1% hydrocortisone cream which she can buy over-the-counter which is good for itching in the skin. Return to office 6 months.         David Joe MD 5/30/1441 11:11 AM

## 2019-03-18 ENCOUNTER — DOCUMENTATION (OUTPATIENT)
Dept: CARDIOLOGY | Age: 84
End: 2019-03-18

## 2019-03-20 ENCOUNTER — TELEPHONE (OUTPATIENT)
Dept: INTERNAL MEDICINE CLINIC | Facility: CLINIC | Age: 84
End: 2019-03-20

## 2019-03-20 NOTE — TELEPHONE ENCOUNTER
Called pt stating has been experiencing diarrhea x2 weeks. Will trial with imodium which helps for a couple days and diarrhea returns. No blood in stool. Keeping hydrated.  Tries to follow up bland/ BRAT diet- yesterday had corn beef and cabbage which does

## 2019-03-20 NOTE — TELEPHONE ENCOUNTER
Pt would like a call back to discuss change her bowel movements. Has taken IMODIUM and it stops her from having diarrhea. She has finished taking it. Today she had a BM that was green, which is not normal. Does not why it is green.

## 2019-03-21 ENCOUNTER — LAB ENCOUNTER (OUTPATIENT)
Dept: LAB | Age: 84
End: 2019-03-21
Attending: INTERNAL MEDICINE
Payer: MEDICARE

## 2019-03-21 ENCOUNTER — OFFICE VISIT (OUTPATIENT)
Dept: INTERNAL MEDICINE CLINIC | Facility: CLINIC | Age: 84
End: 2019-03-21
Payer: MEDICARE

## 2019-03-21 VITALS
RESPIRATION RATE: 16 BRPM | WEIGHT: 166.63 LBS | DIASTOLIC BLOOD PRESSURE: 84 MMHG | TEMPERATURE: 98 F | SYSTOLIC BLOOD PRESSURE: 134 MMHG | HEART RATE: 85 BPM | OXYGEN SATURATION: 96 % | BODY MASS INDEX: 31 KG/M2

## 2019-03-21 DIAGNOSIS — R19.7 DIARRHEA, UNSPECIFIED TYPE: ICD-10-CM

## 2019-03-21 DIAGNOSIS — R19.7 DIARRHEA, UNSPECIFIED TYPE: Primary | ICD-10-CM

## 2019-03-21 DIAGNOSIS — R06.02 SHORTNESS OF BREATH: ICD-10-CM

## 2019-03-21 DIAGNOSIS — J32.0 MAXILLARY SINUSITIS, UNSPECIFIED CHRONICITY: ICD-10-CM

## 2019-03-21 LAB
ALBUMIN SERPL-MCNC: 3.8 G/DL (ref 3.4–5)
ALBUMIN/GLOB SERPL: 1 {RATIO} (ref 1–2)
ALP LIVER SERPL-CCNC: 73 U/L (ref 55–142)
ALT SERPL-CCNC: 26 U/L (ref 13–56)
ANION GAP SERPL CALC-SCNC: 9 MMOL/L (ref 0–18)
AST SERPL-CCNC: 20 U/L (ref 15–37)
BASOPHILS # BLD AUTO: 0.06 X10(3) UL (ref 0–0.2)
BASOPHILS NFR BLD AUTO: 0.7 %
BILIRUB SERPL-MCNC: 0.6 MG/DL (ref 0.1–2)
BUN BLD-MCNC: 14 MG/DL (ref 7–18)
BUN/CREAT SERPL: 17.5 (ref 10–20)
CALCIUM BLD-MCNC: 9.7 MG/DL (ref 8.5–10.1)
CHLORIDE SERPL-SCNC: 107 MMOL/L (ref 98–107)
CO2 SERPL-SCNC: 25 MMOL/L (ref 21–32)
CREAT BLD-MCNC: 0.8 MG/DL (ref 0.55–1.02)
DEPRECATED RDW RBC AUTO: 49 FL (ref 35.1–46.3)
EOSINOPHIL # BLD AUTO: 0.08 X10(3) UL (ref 0–0.7)
EOSINOPHIL NFR BLD AUTO: 1 %
ERYTHROCYTE [DISTWIDTH] IN BLOOD BY AUTOMATED COUNT: 14.8 % (ref 11–15)
GLOBULIN PLAS-MCNC: 3.7 G/DL (ref 2.8–4.4)
GLUCOSE BLD-MCNC: 88 MG/DL (ref 70–99)
HCT VFR BLD AUTO: 47.4 % (ref 35–48)
HGB BLD-MCNC: 14.8 G/DL (ref 12–16)
IMM GRANULOCYTES # BLD AUTO: 0.04 X10(3) UL (ref 0–1)
IMM GRANULOCYTES NFR BLD: 0.5 %
LYMPHOCYTES # BLD AUTO: 2.26 X10(3) UL (ref 1–4)
LYMPHOCYTES NFR BLD AUTO: 27.5 %
M PROTEIN MFR SERPL ELPH: 7.5 G/DL (ref 6.4–8.2)
MCH RBC QN AUTO: 28.2 PG (ref 26–34)
MCHC RBC AUTO-ENTMCNC: 31.2 G/DL (ref 31–37)
MCV RBC AUTO: 90.5 FL (ref 80–100)
MONOCYTES # BLD AUTO: 0.5 X10(3) UL (ref 0.1–1)
MONOCYTES NFR BLD AUTO: 6.1 %
NEUTROPHILS # BLD AUTO: 5.28 X10 (3) UL (ref 1.5–7.7)
NEUTROPHILS # BLD AUTO: 5.28 X10(3) UL (ref 1.5–7.7)
NEUTROPHILS NFR BLD AUTO: 64.2 %
OSMOLALITY SERPL CALC.SUM OF ELEC: 292 MOSM/KG (ref 275–295)
PLATELET # BLD AUTO: 239 10(3)UL (ref 150–450)
POTASSIUM SERPL-SCNC: 4.1 MMOL/L (ref 3.5–5.1)
RBC # BLD AUTO: 5.24 X10(6)UL (ref 3.8–5.3)
SODIUM SERPL-SCNC: 141 MMOL/L (ref 136–145)
WBC # BLD AUTO: 8.2 X10(3) UL (ref 4–11)

## 2019-03-21 PROCEDURE — 99214 OFFICE O/P EST MOD 30 MIN: CPT | Performed by: INTERNAL MEDICINE

## 2019-03-21 PROCEDURE — 85025 COMPLETE CBC W/AUTO DIFF WBC: CPT

## 2019-03-21 PROCEDURE — 80053 COMPREHEN METABOLIC PANEL: CPT

## 2019-03-21 NOTE — PROGRESS NOTES
Allegra Haynes is a 80year old female. Patient presents with: Follow - Up: diarrhea, recent sinus infection, shortness of breath from spray      HPI:     C/o diarrhea since she started doxycycline on 3/6/19 for sinusitis.  At first, diarrhea was i Disp: 90 tablet Rfl: 3   Acetaminophen-Codeine (TYLENOL WITH CODEINE #3) 300-30 MG Oral Tab Take 1 tablet by mouth nightly as needed for Pain.  Disp: 60 tablet Rfl: 1   Levalbuterol Tartrate 45 MCG/ACT Inhalation Aerosol INHALE 2 PUFFS INTO THE LUNGS EVERY • Hearing impairment     HEARING LOSS BUT NO AIDS   • Heart palpitations    • HIGH BLOOD PRESSURE    • Hip pain    • History of blood transfusion AT AGE 44    AFTER HYST   • HTN (hypertension)    • Loss of appetite    • Osteoarthrosis, unspecified whethe BREATH  Nsaids                  SHORTNESS OF BREATH  Other                   RASH, SHORTNESS OF BREATH    Comment:Transdermal \"memo \" patch ;novacaine Pt states             she is okay with marcaine  per allergy testing.   Radiology Contrast Cheryl Mohan no sinus tenderness  NECK: supple,no adenopathy  LUNGS: normal rate without respiratory distress, lungs clear to auscultation  CARDIO: RRR nl S1 S2  GI: normal bowel sounds, soft, NT/ND  EXTREMITIES: no cyanosis, clubbing   NEURO: Alert and oriented, no fo

## 2019-03-23 ENCOUNTER — LAB ENCOUNTER (OUTPATIENT)
Dept: LAB | Facility: HOSPITAL | Age: 84
End: 2019-03-23
Attending: INTERNAL MEDICINE
Payer: MEDICARE

## 2019-03-23 DIAGNOSIS — R19.7 DIARRHEA, UNSPECIFIED TYPE: ICD-10-CM

## 2019-03-23 PROCEDURE — 87046 STOOL CULTR AEROBIC BACT EA: CPT

## 2019-03-23 PROCEDURE — 87427 SHIGA-LIKE TOXIN AG IA: CPT

## 2019-03-23 PROCEDURE — 87045 FECES CULTURE AEROBIC BACT: CPT

## 2019-03-25 ENCOUNTER — TELEPHONE (OUTPATIENT)
Dept: INTERNAL MEDICINE CLINIC | Facility: CLINIC | Age: 84
End: 2019-03-25

## 2019-03-25 NOTE — TELEPHONE ENCOUNTER
Meli Martínez from [facility] calling to say the canceled CDIFF by PGR stool test. Lab received a formed stool.

## 2019-04-05 ENCOUNTER — TELEPHONE (OUTPATIENT)
Dept: INTERNAL MEDICINE CLINIC | Facility: CLINIC | Age: 84
End: 2019-04-05

## 2019-04-05 RX ORDER — ACETAMINOPHEN 500 MG
TABLET ORAL
COMMUNITY

## 2019-04-05 RX ORDER — FLUTICASONE PROPIONATE 220 UG/1
AEROSOL, METERED RESPIRATORY (INHALATION)
COMMUNITY

## 2019-04-05 RX ORDER — PREDNISONE 2.5 MG/1
1 TABLET ORAL DAILY
COMMUNITY
Start: 2018-10-18 | End: 2019-06-28 | Stop reason: SDUPTHER

## 2019-04-05 RX ORDER — LOPERAMIDE HYDROCHLORIDE 2 MG/1
1 CAPSULE ORAL PRN
COMMUNITY
Start: 2018-12-17 | End: 2019-06-28 | Stop reason: SDUPTHER

## 2019-04-05 RX ORDER — VERAPAMIL HYDROCHLORIDE 120 MG/1
1 CAPSULE, EXTENDED RELEASE ORAL DAILY
COMMUNITY
Start: 2018-06-06 | End: 2019-06-28 | Stop reason: SDUPTHER

## 2019-04-05 RX ORDER — ALPRAZOLAM 0.5 MG/1
TABLET ORAL
COMMUNITY
Start: 2018-10-18 | End: 2019-06-28 | Stop reason: SDUPTHER

## 2019-04-05 RX ORDER — ALBUTEROL SULFATE 2.5 MG/3ML
SOLUTION RESPIRATORY (INHALATION)
COMMUNITY
End: 2019-12-01 | Stop reason: ALTCHOICE

## 2019-04-05 RX ORDER — LORATADINE 10 MG/1
1 TABLET ORAL DAILY
COMMUNITY
Start: 2017-04-10 | End: 2019-06-28 | Stop reason: SDUPTHER

## 2019-04-05 RX ORDER — EPINEPHRINE 0.3 MG/.3ML
INJECTION SUBCUTANEOUS
COMMUNITY

## 2019-04-05 RX ORDER — CYANOCOBALAMIN (VITAMIN B-12) 500 MCG
LOZENGE ORAL
COMMUNITY
End: 2019-06-28 | Stop reason: SDUPTHER

## 2019-04-05 NOTE — TELEPHONE ENCOUNTER
Pt saw TB on 3/21 for diarrhea and was told to call in a few weeks if no better-she called today to let us know she is no better-normal in the AM and then loose all day-call to advise

## 2019-04-05 NOTE — TELEPHONE ENCOUNTER
Spoke with pt, confirmed has GI appt on 4/10. Pt ate earlier in week normal food- stuffing, chicken, high fat foods, that upset stomach, worsened diarrhea. Reviewed and advised on bland/BRAT diet. keeping hydrated. No abd pain. No fever at this time.  No

## 2019-04-05 NOTE — TELEPHONE ENCOUNTER
Called pt to inform, per TB, ok to trial with low dose imodium. Continue bland/ BRAT diet. Push fluids. Pt stating no diarrhea episodes at this time- will continue to monitor.  Will follow up early next week if symptoms worsen, fail to improve, or any other

## 2019-04-10 ENCOUNTER — TELEPHONE (OUTPATIENT)
Dept: INTERNAL MEDICINE CLINIC | Facility: CLINIC | Age: 84
End: 2019-04-10

## 2019-04-10 ENCOUNTER — PATIENT OUTREACH (OUTPATIENT)
Dept: CASE MANAGEMENT | Age: 84
End: 2019-04-10

## 2019-04-10 NOTE — TELEPHONE ENCOUNTER
Future Appointments   Date Time Provider Jeremiah Gonzalez   5/16/2019  1:45 PM Neo Fitzgerald MD [facility]     Pt has , wants to do BW at time of appt, Too hard to get a second ride.

## 2019-04-15 ENCOUNTER — APPOINTMENT (OUTPATIENT)
Dept: LAB | Facility: HOSPITAL | Age: 84
End: 2019-04-15
Attending: NURSE PRACTITIONER
Payer: MEDICARE

## 2019-04-15 DIAGNOSIS — R19.7 DIARRHEA, UNSPECIFIED TYPE: ICD-10-CM

## 2019-04-15 PROCEDURE — 87493 C DIFF AMPLIFIED PROBE: CPT

## 2019-04-17 ENCOUNTER — MED REC SCAN ONLY (OUTPATIENT)
Dept: INTERNAL MEDICINE CLINIC | Facility: CLINIC | Age: 84
End: 2019-04-17

## 2019-04-25 ENCOUNTER — PATIENT OUTREACH (OUTPATIENT)
Dept: CASE MANAGEMENT | Age: 84
End: 2019-04-25

## 2019-04-25 DIAGNOSIS — I10 BENIGN ESSENTIAL HTN: ICD-10-CM

## 2019-04-25 DIAGNOSIS — M47.816 OSTEOARTHRITIS OF SPINE WITHOUT MYELOPATHY OR RADICULOPATHY, LUMBAR REGION: ICD-10-CM

## 2019-04-25 DIAGNOSIS — M81.0 AGE-RELATED OSTEOPOROSIS WITHOUT CURRENT PATHOLOGICAL FRACTURE: ICD-10-CM

## 2019-04-25 DIAGNOSIS — E78.5 DYSLIPIDEMIA: ICD-10-CM

## 2019-04-25 DIAGNOSIS — Z91.81 AT RISK FOR FALLING: ICD-10-CM

## 2019-04-25 NOTE — PROGRESS NOTES
I want to know a little about you. Patient lives in [address] living apartments on [facility]. • Pt states that she keeps herself busy with house chores.    · Likes to go on the computer and browse     Social support:  • Do you have someone you states that the meals were too simple and she prefers to cook at home. Pt states that she receives food pantry. She picks and chooses what she wants. They deliver to her once every month.     Pt states that she had her esophagus stretched because she was ha if she has a question or needs help with  something.        Follow up:  • You can call me anytime you have a question or need help with achieving your goals  • I will follow up with you in a few weeks to see how you are doing and if we need to change anythi Exercise : encouraged patient to stay active   Stress: managing, also taking xanax as needed   Current Issue: none   Pain: patient to follow up with her ortho doctor regarding back pain   Meds: Detailed medication review with Priscilla Vazquez.  Discussed with [facility] improve functional status, maintain a healthy quality of life.     Care Plan: Reviewed and updated where needed  Sending education on: none   Time Spent This Encounter Total: 34 min medical record review, telephone communication, care plan updates where nee

## 2019-04-30 PROCEDURE — 99490 CHRNC CARE MGMT STAFF 1ST 20: CPT

## 2019-05-16 ENCOUNTER — APPOINTMENT (OUTPATIENT)
Dept: LAB | Age: 84
End: 2019-05-16
Attending: INTERNAL MEDICINE
Payer: MEDICARE

## 2019-05-16 ENCOUNTER — OFFICE VISIT (OUTPATIENT)
Dept: INTERNAL MEDICINE CLINIC | Facility: CLINIC | Age: 84
End: 2019-05-16
Payer: MEDICARE

## 2019-05-16 VITALS
TEMPERATURE: 98 F | SYSTOLIC BLOOD PRESSURE: 128 MMHG | HEART RATE: 85 BPM | WEIGHT: 167 LBS | DIASTOLIC BLOOD PRESSURE: 70 MMHG | RESPIRATION RATE: 16 BRPM | HEIGHT: 60.04 IN | BODY MASS INDEX: 32.36 KG/M2

## 2019-05-16 DIAGNOSIS — Z12.11 SCREEN FOR COLON CANCER: ICD-10-CM

## 2019-05-16 DIAGNOSIS — M17.12 PRIMARY OSTEOARTHRITIS OF LEFT KNEE: ICD-10-CM

## 2019-05-16 DIAGNOSIS — Z00.00 ENCOUNTER FOR ANNUAL HEALTH EXAMINATION: Primary | ICD-10-CM

## 2019-05-16 DIAGNOSIS — I25.10 CORONARY ARTERY DISEASE INVOLVING NATIVE CORONARY ARTERY OF NATIVE HEART WITHOUT ANGINA PECTORIS: ICD-10-CM

## 2019-05-16 DIAGNOSIS — K59.1 FUNCTIONAL DIARRHEA: ICD-10-CM

## 2019-05-16 DIAGNOSIS — I10 BENIGN ESSENTIAL HTN: ICD-10-CM

## 2019-05-16 DIAGNOSIS — J45.40 MODERATE PERSISTENT ASTHMA WITHOUT COMPLICATION: ICD-10-CM

## 2019-05-16 DIAGNOSIS — M47.816 OSTEOARTHRITIS OF SPINE WITHOUT MYELOPATHY OR RADICULOPATHY, LUMBAR REGION: ICD-10-CM

## 2019-05-16 PROCEDURE — 84439 ASSAY OF FREE THYROXINE: CPT

## 2019-05-16 PROCEDURE — 84443 ASSAY THYROID STIM HORMONE: CPT

## 2019-05-16 PROCEDURE — G0439 PPPS, SUBSEQ VISIT: HCPCS | Performed by: INTERNAL MEDICINE

## 2019-05-16 NOTE — PROGRESS NOTES
HPI:   Ansel Burkitt is a 80year old female who presents for a Medicare Subsequent Annual Wellness visit (Pt already had Initial Annual Wellness).     Patient with multiple medical problems including CAD (follows with Dr. Zhanna Douglas), OA of spine and screening of functional status.    Difficulty walking?: Yes(\"uses walker\")             Depression Screening (PHQ-2/PHQ-9): Over the LAST 2 WEEKS   Little interest or pleasure in doing things (over the last two weeks)?: Several days  Feeling down, depresse diarrhea     Coronary artery disease involving native coronary artery of native heart without angina pectoris     Hx of migraines     Moderate persistent asthma without complication     Osteoporosis     Benign essential HTN     Dyslipidemia     Myopathy, u wellbutrin [bupropion].     CURRENT MEDICATIONS:     Outpatient Medications Marked as Taking for the 5/16/19 encounter (Office Visit) with Harjeet Chester MD:  Probiotic Product (PROBIOTIC-10 OR) Take by mouth.   predniSONE 2.5 MG Oral Tab Take 1 tablet (2. generalized or localized, unspecified site, Other and unspecified hyperlipidemia, Pinched nerve in neck, Pneumonia, organism unspecified(486), PONV (postoperative nausea and vomiting), Unspecified essential hypertension, Visual impairment, and Wears glasse daughter; Thyroid Disorder in her sister. SOCIAL HISTORY:   She  reports that she quit smoking about 47 years ago. She has a 0.90 pack-year smoking history. She has never used smokeless tobacco. She reports that she does not drink alcohol or use drugs. Inj 04/21/2014, 09/21/2016, 08/30/2018   • Kenalog Per 10mg Inj 07/11/2013, 06/09/2015, 03/14/2016, 07/10/2017   Deferred Date(s) Deferred   • Influenza 03/22/2016        ASSESSMENT AND OTHER RELEVANT CHRONIC CONDITIONS:   Paulvicki Bryant is a 80 yea activity?: Light  How would you describe your current health state?: Fair  How do you maintain positive mental well-being?: Social Interaction(\"computer\")      This section provided for quick review of chart, separate sheet to patient  PREVENTATIVE SERVI data found. Screening Mammogram      Mammogram Annually to 76, then as discussed There are no preventive care reminders to display for this patient.  Update Health Maintenance if applicable     Immunizations (Update Immunization Activity if applicable)

## 2019-05-16 NOTE — PATIENT INSTRUCTIONS
Abigail Vallecillo's SCREENING SCHEDULE   Tests on this list are recommended by your physician but may not be covered, or covered at this frequency, by your insurer. Please check with your insurance carrier before scheduling to verify coverage.    PREV record, not all encounters were searched. Please check Results Review for a complete set of results.  Limited to patients who meet one of the following criteria:   • Men who are 73-68 years old and have smoked more than 100 cigarettes in their lifetime   • No flowsheet data found. Screening Mammogram      Mammogram    Recommend Annually to at least age 76, and as needed after 76 There are no preventive care reminders to display for this patient.  Please get this Mammogram regularly   Immunizations      Inf searched. Please check Results Review for a complete set of results. This may be covered with your pharmacy  prescription benefits     Recommended Websites for Advanced Directives    [email or web address]  An informati

## 2019-05-17 ENCOUNTER — LAB ENCOUNTER (OUTPATIENT)
Dept: LAB | Facility: HOSPITAL | Age: 84
End: 2019-05-17
Attending: INTERNAL MEDICINE
Payer: MEDICARE

## 2019-05-17 DIAGNOSIS — Z12.11 SCREEN FOR COLON CANCER: ICD-10-CM

## 2019-05-24 ENCOUNTER — PATIENT OUTREACH (OUTPATIENT)
Dept: CASE MANAGEMENT | Age: 84
End: 2019-05-24

## 2019-05-24 DIAGNOSIS — I25.10 CORONARY ARTERY DISEASE INVOLVING NATIVE CORONARY ARTERY OF NATIVE HEART WITHOUT ANGINA PECTORIS: ICD-10-CM

## 2019-05-24 DIAGNOSIS — I10 BENIGN ESSENTIAL HTN: ICD-10-CM

## 2019-05-24 DIAGNOSIS — Z95.5 HISTORY OF HEART ARTERY STENT: ICD-10-CM

## 2019-05-24 DIAGNOSIS — J45.40 MODERATE PERSISTENT ASTHMA WITHOUT COMPLICATION: ICD-10-CM

## 2019-05-24 DIAGNOSIS — M17.12 PRIMARY OSTEOARTHRITIS OF LEFT KNEE: ICD-10-CM

## 2019-05-24 DIAGNOSIS — M81.0 AGE-RELATED OSTEOPOROSIS WITHOUT CURRENT PATHOLOGICAL FRACTURE: ICD-10-CM

## 2019-05-24 DIAGNOSIS — E78.5 DYSLIPIDEMIA: ICD-10-CM

## 2019-05-24 DIAGNOSIS — Z88.6 ALLERGY TO NONSTEROIDAL ANTI-INFLAMMATORY DRUG (NSAID): ICD-10-CM

## 2019-05-24 DIAGNOSIS — Z91.81 AT RISK FOR FALLING: ICD-10-CM

## 2019-05-24 PROCEDURE — 82274 ASSAY TEST FOR BLOOD FECAL: CPT

## 2019-05-24 NOTE — PROGRESS NOTES
5/24/2019  Spoke to Eliud Angeles for CCM.     Verified HIPPA    Patient Active Problem List:     Rotator cuff tendonitis     Cervical radiculopathy at C5     Primary osteoarthritis of right knee     Lumbar disc herniation     At risk for falling     Irritable bow Pt relates she is going to start medication today. Informed patient to always take med with meals, she verbalized understanding and reports she is going to take with her lunch.        Previous goal met: progressing     Self Management Goals/Action Plan: patient's concerns. Monthly Minute Total including today: 23    Physical assessment, complete health history, and care plan established by Mansoor Robertson MD, need for CCM determined from these assessments and data.

## 2019-05-31 PROCEDURE — 99490 CHRNC CARE MGMT STAFF 1ST 20: CPT

## 2019-06-18 ENCOUNTER — PATIENT OUTREACH (OUTPATIENT)
Dept: CASE MANAGEMENT | Age: 84
End: 2019-06-18

## 2019-06-18 DIAGNOSIS — I10 BENIGN ESSENTIAL HTN: ICD-10-CM

## 2019-06-18 DIAGNOSIS — Z91.81 AT RISK FOR FALLING: ICD-10-CM

## 2019-06-18 DIAGNOSIS — Z95.5 HISTORY OF HEART ARTERY STENT: ICD-10-CM

## 2019-06-18 DIAGNOSIS — M17.12 PRIMARY OSTEOARTHRITIS OF LEFT KNEE: ICD-10-CM

## 2019-06-18 DIAGNOSIS — M81.0 AGE-RELATED OSTEOPOROSIS WITHOUT CURRENT PATHOLOGICAL FRACTURE: ICD-10-CM

## 2019-06-18 DIAGNOSIS — Z88.6 ALLERGY TO NONSTEROIDAL ANTI-INFLAMMATORY DRUG (NSAID): ICD-10-CM

## 2019-06-18 DIAGNOSIS — E78.5 DYSLIPIDEMIA: ICD-10-CM

## 2019-06-18 DIAGNOSIS — M54.12 CERVICAL RADICULOPATHY AT C5: ICD-10-CM

## 2019-06-18 DIAGNOSIS — R13.19 ESOPHAGEAL DYSPHAGIA: ICD-10-CM

## 2019-06-18 NOTE — PROGRESS NOTES
6/18/2019  Spoke to Eliud Angeles for CCM.     Verified HIPPA    Patient Active Problem List:     Rotator cuff tendonitis     Cervical radiculopathy at C5     Primary osteoarthritis of right knee     Lumbar disc herniation     At risk for falling     Irritable bow leave her apartment for the day. Current issue: Diarrhea  Called MENDOZA Valentine's office. Relayed patients concerns over to medical assistant   At this point patient is unsure if she should start the creon or she needs to be re evaluated in the office.

## 2019-06-27 ENCOUNTER — MED REC SCAN ONLY (OUTPATIENT)
Dept: INTERNAL MEDICINE CLINIC | Facility: CLINIC | Age: 84
End: 2019-06-27

## 2019-06-28 ENCOUNTER — TELEPHONE (OUTPATIENT)
Dept: RHEUMATOLOGY | Facility: CLINIC | Age: 84
End: 2019-06-28

## 2019-06-28 ENCOUNTER — LAB ENCOUNTER (OUTPATIENT)
Dept: LAB | Facility: HOSPITAL | Age: 84
End: 2019-06-28
Attending: INTERNAL MEDICINE
Payer: MEDICARE

## 2019-06-28 DIAGNOSIS — R06.00 DYSPNEA, PAROXYSMAL NOCTURNAL: Primary | ICD-10-CM

## 2019-06-28 DIAGNOSIS — I25.10 CORONARY ATHEROSCLEROSIS OF NATIVE CORONARY ARTERY: ICD-10-CM

## 2019-06-28 DIAGNOSIS — E78.00 PURE HYPERCHOLESTEROLEMIA: ICD-10-CM

## 2019-06-28 LAB
CHOLEST SERPL-MCNC: 241 MG/DL
CHOLEST/HDLC SERPL: NORMAL {RATIO}
HDLC SERPL-MCNC: 56 MG/DL
LDLC SERPL CALC-MCNC: 136 MG/DL
LENGTH OF FAST TIME PATIENT: NORMAL H
NONHDLC SERPL-MCNC: 185 MG/DL
TRIGL SERPL-MCNC: 247 MG/DL
VLDLC SERPL CALC-MCNC: NORMAL MG/DL

## 2019-06-28 PROCEDURE — 83880 ASSAY OF NATRIURETIC PEPTIDE: CPT

## 2019-06-28 PROCEDURE — 80061 LIPID PANEL: CPT

## 2019-06-28 PROCEDURE — 85025 COMPLETE CBC W/AUTO DIFF WBC: CPT

## 2019-06-28 PROCEDURE — 80053 COMPREHEN METABOLIC PANEL: CPT

## 2019-06-28 PROCEDURE — 36415 COLL VENOUS BLD VENIPUNCTURE: CPT

## 2019-06-28 RX ORDER — ALPRAZOLAM 0.5 MG/1
0.5 TABLET ORAL
COMMUNITY
Start: 2019-02-11

## 2019-06-28 RX ORDER — PREDNISONE 2.5 MG/1
2.5 TABLET ORAL DAILY
COMMUNITY
Start: 2018-07-24 | End: 2021-01-05

## 2019-06-28 RX ORDER — VITAMIN E 268 MG
400 CAPSULE ORAL
COMMUNITY

## 2019-06-28 RX ORDER — LORATADINE 10 MG/1
10 TABLET ORAL PRN
COMMUNITY

## 2019-06-28 RX ORDER — LOPERAMIDE HYDROCHLORIDE 2 MG/1
CAPSULE ORAL
COMMUNITY
Start: 2016-10-17

## 2019-06-28 RX ORDER — LEVALBUTEROL TARTRATE 45 UG/1
AEROSOL, METERED ORAL
COMMUNITY
Start: 2018-02-02

## 2019-06-28 RX ORDER — VERAPAMIL HYDROCHLORIDE 120 MG/1
CAPSULE, EXTENDED RELEASE ORAL
COMMUNITY
Start: 2016-10-14 | End: 2019-08-21 | Stop reason: SDUPTHER

## 2019-06-28 NOTE — TELEPHONE ENCOUNTER
Called and spoke with pt. Offered two appointment times for pt- 7/1/19 @ 4 pm & 7/3/19 @ 11:30 am. Pt declined both appointments- has other appointments.  Pt aware that continues with pain or increased pain, new symptoms, SOB, should follow up with PCP and

## 2019-06-30 PROCEDURE — 99490 CHRNC CARE MGMT STAFF 1ST 20: CPT

## 2019-07-01 ENCOUNTER — OFFICE VISIT (OUTPATIENT)
Dept: CARDIOLOGY | Age: 84
End: 2019-07-01

## 2019-07-01 VITALS
HEART RATE: 86 BPM | WEIGHT: 170 LBS | HEIGHT: 61 IN | DIASTOLIC BLOOD PRESSURE: 62 MMHG | SYSTOLIC BLOOD PRESSURE: 118 MMHG | BODY MASS INDEX: 32.1 KG/M2

## 2019-07-01 DIAGNOSIS — K58.0 IRRITABLE BOWEL SYNDROME WITH DIARRHEA: ICD-10-CM

## 2019-07-01 DIAGNOSIS — E78.00 PURE HYPERCHOLESTEROLEMIA: ICD-10-CM

## 2019-07-01 DIAGNOSIS — R06.02 SHORTNESS OF BREATH: ICD-10-CM

## 2019-07-01 DIAGNOSIS — E78.5 DYSLIPIDEMIA: ICD-10-CM

## 2019-07-01 DIAGNOSIS — Z95.5 HISTORY OF HEART ARTERY STENT: ICD-10-CM

## 2019-07-01 DIAGNOSIS — Z86.69 HX OF MIGRAINES: ICD-10-CM

## 2019-07-01 DIAGNOSIS — I10 HYPERTENSION, BENIGN: ICD-10-CM

## 2019-07-01 DIAGNOSIS — M54.12 CERVICAL RADICULOPATHY AT C5: Primary | ICD-10-CM

## 2019-07-01 DIAGNOSIS — I25.10 CORONARY ARTERY DISEASE INVOLVING NATIVE CORONARY ARTERY OF NATIVE HEART WITHOUT ANGINA PECTORIS: ICD-10-CM

## 2019-07-01 PROBLEM — R06.00 DYSPNEA: Status: ACTIVE | Noted: 2017-04-10

## 2019-07-01 PROCEDURE — 99214 OFFICE O/P EST MOD 30 MIN: CPT | Performed by: INTERNAL MEDICINE

## 2019-07-01 SDOH — HEALTH STABILITY: PHYSICAL HEALTH: ON AVERAGE, HOW MANY DAYS PER WEEK DO YOU ENGAGE IN MODERATE TO STRENUOUS EXERCISE (LIKE A BRISK WALK)?: 0 DAYS

## 2019-07-01 SDOH — HEALTH STABILITY: PHYSICAL HEALTH: ON AVERAGE, HOW MANY MINUTES DO YOU ENGAGE IN EXERCISE AT THIS LEVEL?: 0 MIN

## 2019-07-02 ENCOUNTER — TELEPHONE (OUTPATIENT)
Dept: RHEUMATOLOGY | Facility: CLINIC | Age: 84
End: 2019-07-02

## 2019-07-02 NOTE — TELEPHONE ENCOUNTER
Pt returned phone call-pt continues with bilateral leg weakness worsening, difficult to walk. Offered appt for am could not take it,  Made appt for next Monday.

## 2019-07-08 ENCOUNTER — OFFICE VISIT (OUTPATIENT)
Dept: RHEUMATOLOGY | Facility: CLINIC | Age: 84
End: 2019-07-08
Payer: MEDICARE

## 2019-07-08 VITALS
SYSTOLIC BLOOD PRESSURE: 130 MMHG | DIASTOLIC BLOOD PRESSURE: 76 MMHG | HEART RATE: 88 BPM | WEIGHT: 167 LBS | RESPIRATION RATE: 20 BRPM | HEIGHT: 61 IN | BODY MASS INDEX: 31.53 KG/M2

## 2019-07-08 DIAGNOSIS — Z88.6 ALLERGY TO NONSTEROIDAL ANTI-INFLAMMATORY DRUG (NSAID): ICD-10-CM

## 2019-07-08 DIAGNOSIS — R29.898 WEAKNESS OF BOTH LEGS: ICD-10-CM

## 2019-07-08 DIAGNOSIS — J45.40 MODERATE PERSISTENT ASTHMA WITHOUT COMPLICATION: ICD-10-CM

## 2019-07-08 DIAGNOSIS — M17.11 PRIMARY OSTEOARTHRITIS OF RIGHT KNEE: Primary | ICD-10-CM

## 2019-07-08 DIAGNOSIS — Z98.890 S/P LUMBAR LAMINECTOMY: ICD-10-CM

## 2019-07-08 DIAGNOSIS — M47.816 OSTEOARTHRITIS OF SPINE WITHOUT MYELOPATHY OR RADICULOPATHY, LUMBAR REGION: ICD-10-CM

## 2019-07-08 PROCEDURE — 20610 DRAIN/INJ JOINT/BURSA W/O US: CPT | Performed by: INTERNAL MEDICINE

## 2019-07-08 PROCEDURE — 99213 OFFICE O/P EST LOW 20 MIN: CPT | Performed by: INTERNAL MEDICINE

## 2019-07-08 RX ORDER — PREDNISONE 1 MG/1
30 TABLET ORAL DAILY
Qty: 100 TABLET | Refills: 0 | Status: SHIPPED | OUTPATIENT
Start: 2019-07-08 | End: 2019-07-23 | Stop reason: ALTCHOICE

## 2019-07-08 RX ORDER — METHYLPREDNISOLONE ACETATE 40 MG/ML
40 INJECTION, SUSPENSION INTRA-ARTICULAR; INTRALESIONAL; INTRAMUSCULAR; SOFT TISSUE ONCE
Status: COMPLETED | OUTPATIENT
Start: 2019-07-08 | End: 2019-07-08

## 2019-07-08 RX ADMIN — METHYLPREDNISOLONE ACETATE 40 MG: 40 INJECTION, SUSPENSION INTRA-ARTICULAR; INTRALESIONAL; INTRAMUSCULAR; SOFT TISSUE at 17:43:00

## 2019-07-08 NOTE — PROGRESS NOTES
[facility]  Dr. Ramos Rosales Progress Note     Subjective:   Alelgra Haynes is a(n) 80year old female.    Current complaints: Patient presents with:  Osteoarthritis: 4 month f/u, having a allergic reaction, used inhaler today,arthritis about the feroz months. Once her breathing has improved return to use of prednisone at 2.5 mg once a day for treatment of your arthritis pain. You can take extra strength Tylenol with your arthritis medicine. 2 tablets twice a day.         David Doss MD 3/1/2594

## 2019-07-08 NOTE — PROCEDURES
After obtaining consent the patient, the right knee was cleaned with alcohol. Patient is allergic to Betadine. Then the right knee was injected with 40 mg of methylprednisolone and 1 cc of 1% lidocaine.   Patient tolerated the right knee injection without

## 2019-07-08 NOTE — PATIENT INSTRUCTIONS
Today a cortisone injection was done into the right knee. Go home and rest.  Apply ice if necessary for 15 minutes if there is pain. No NSAIDs due to your history of heart disease.   Due to recent difficulty with your breathing continue to use her inhaler

## 2019-07-09 ENCOUNTER — TELEPHONE (OUTPATIENT)
Dept: CARDIOLOGY | Age: 84
End: 2019-07-09

## 2019-07-15 RX ORDER — ALPRAZOLAM 0.25 MG/1
0.25 TABLET ORAL 3 TIMES DAILY PRN
COMMUNITY
End: 2019-12-01 | Stop reason: SDUPTHER

## 2019-07-19 ENCOUNTER — PATIENT OUTREACH (OUTPATIENT)
Dept: CASE MANAGEMENT | Age: 84
End: 2019-07-19

## 2019-07-19 DIAGNOSIS — R13.19 ESOPHAGEAL DYSPHAGIA: ICD-10-CM

## 2019-07-19 DIAGNOSIS — M17.11 PRIMARY OSTEOARTHRITIS OF RIGHT KNEE: ICD-10-CM

## 2019-07-19 DIAGNOSIS — K58.0 IRRITABLE BOWEL SYNDROME WITH DIARRHEA: ICD-10-CM

## 2019-07-19 DIAGNOSIS — M54.12 CERVICAL RADICULOPATHY AT C5: ICD-10-CM

## 2019-07-19 DIAGNOSIS — R29.898 WEAKNESS OF BOTH LEGS: ICD-10-CM

## 2019-07-19 DIAGNOSIS — M51.26 LUMBAR DISC HERNIATION: ICD-10-CM

## 2019-07-19 DIAGNOSIS — Z95.5 HISTORY OF HEART ARTERY STENT: ICD-10-CM

## 2019-07-19 DIAGNOSIS — Z88.6 ALLERGY TO NONSTEROIDAL ANTI-INFLAMMATORY DRUG (NSAID): ICD-10-CM

## 2019-07-19 DIAGNOSIS — M81.0 AGE-RELATED OSTEOPOROSIS WITHOUT CURRENT PATHOLOGICAL FRACTURE: ICD-10-CM

## 2019-07-19 DIAGNOSIS — I25.10 CORONARY ARTERY DISEASE INVOLVING NATIVE CORONARY ARTERY OF NATIVE HEART WITHOUT ANGINA PECTORIS: ICD-10-CM

## 2019-07-19 DIAGNOSIS — J45.40 MODERATE PERSISTENT ASTHMA WITHOUT COMPLICATION: ICD-10-CM

## 2019-07-19 DIAGNOSIS — Z98.890 S/P LUMBAR LAMINECTOMY: ICD-10-CM

## 2019-07-19 DIAGNOSIS — Z86.69 HX OF MIGRAINES: ICD-10-CM

## 2019-07-19 DIAGNOSIS — G72.9 MYOPATHY, UNSPECIFIED: ICD-10-CM

## 2019-07-19 DIAGNOSIS — M17.12 PRIMARY OSTEOARTHRITIS OF LEFT KNEE: ICD-10-CM

## 2019-07-19 DIAGNOSIS — E78.5 DYSLIPIDEMIA: ICD-10-CM

## 2019-07-19 DIAGNOSIS — I10 BENIGN ESSENTIAL HTN: ICD-10-CM

## 2019-07-19 DIAGNOSIS — M16.12 OSTEOARTHRITIS OF ONE HIP, LEFT: ICD-10-CM

## 2019-07-19 DIAGNOSIS — Z91.81 AT RISK FOR FALLING: ICD-10-CM

## 2019-07-19 NOTE — PROGRESS NOTES
7/19/2019  Spoke to Eliud Angeles for CCM.     Verified HIPPA    Patient Active Problem List:     Rotator cuff tendonitis     Cervical radiculopathy at C5     Primary osteoarthritis of right knee     Lumbar disc herniation     At risk for falling     Irritable bow apartment is not being worked on yet. Osteoarthritis of right knee: pt relates that she did get a cortisone injection done. Notes below from 7/8 visit w Dr. Norma Schroeder:     Return in about 6 months (around 1/8/2020).      Today a cortisone injection wa Up: review progress and or barriers towards patients goals. Care managers interventions: Continue to provide encouragement, support and education for healthy coping and dx.       Time Spent This Encounter Total: 24 min medical record review, telephone c

## 2019-07-23 ENCOUNTER — OFFICE VISIT (OUTPATIENT)
Dept: INTERNAL MEDICINE CLINIC | Facility: CLINIC | Age: 84
End: 2019-07-23
Payer: MEDICARE

## 2019-07-23 VITALS
BODY MASS INDEX: 33.25 KG/M2 | HEART RATE: 94 BPM | TEMPERATURE: 98 F | OXYGEN SATURATION: 98 % | HEIGHT: 60 IN | RESPIRATION RATE: 18 BRPM | SYSTOLIC BLOOD PRESSURE: 130 MMHG | DIASTOLIC BLOOD PRESSURE: 80 MMHG | WEIGHT: 169.38 LBS

## 2019-07-23 DIAGNOSIS — J45.30 MILD PERSISTENT ASTHMA WITHOUT COMPLICATION: ICD-10-CM
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
## 2019-07-23 PROCEDURE — 99213 OFFICE O/P EST LOW 20 MIN: CPT | Performed by: INTERNAL MEDICINE

## 2019-07-23 NOTE — PROGRESS NOTES
Ja Moyer is a 80year old female. Patient presents with: Insect Bite: Bed Bug Bites or pesticides room 4 as      HPI:     Patient c/o bug bites last week on her left upper arm.  Site was red and itchy for several days, she used aquaphor and t 4 HOURS AS NEEDED FOR WHEEZING Disp: 3 Inhaler Rfl: 3   Fluticasone Propionate  MCG/ACT Inhalation Aerosol Inhale 2 puffs into the lungs 2 (two) times daily.    Disp: 12 g Rfl: 1   acetaminophen 500 MG Oral Tab Take 1,000 mg by mouth every 6 (six) ho • Pneumonia, organism unspecified(486)    • PONV (postoperative nausea and vomiting)    • Unspecified essential hypertension    • Visual impairment     glasses   • Wears glasses       Social History:  Social History    Tobacco Use      Smoking status:  Fo amol  Bactroban [Mupiroci*    OTHER (SEE COMMENTS)    Comment:Felt short of breath  Advair Diskus [Flut*    Coughing, SHORTNESS OF BREATH  Celexa [Citalopram]     SHORTNESS OF BREATH  Codeine                 OTHER (SEE COMMENTS)    Comment:Causes br types were placed in this encounter. Meds & Refills for this Visit:  Requested Prescriptions      No prescriptions requested or ordered in this encounter       Imaging & Consults:  None    No follow-ups on file.   There are no Patient Instructions on f

## 2019-07-31 ENCOUNTER — TELEPHONE (OUTPATIENT)
Dept: CARDIOLOGY | Age: 84
End: 2019-07-31

## 2019-07-31 PROCEDURE — 99490 CHRNC CARE MGMT STAFF 1ST 20: CPT

## 2019-07-31 RX ORDER — VITAMIN E 268 MG
400 CAPSULE ORAL
Status: CANCELLED | OUTPATIENT
Start: 2019-07-31

## 2019-08-13 ENCOUNTER — TELEPHONE (OUTPATIENT)
Dept: INTERNAL MEDICINE CLINIC | Facility: CLINIC | Age: 84
End: 2019-08-13

## 2019-08-13 ENCOUNTER — PATIENT OUTREACH (OUTPATIENT)
Dept: CASE MANAGEMENT | Age: 84
End: 2019-08-13

## 2019-08-13 DIAGNOSIS — Z91.81 AT RISK FOR FALLING: ICD-10-CM

## 2019-08-13 DIAGNOSIS — R29.898 WEAKNESS OF BOTH LEGS: ICD-10-CM

## 2019-08-13 DIAGNOSIS — Z86.69 HX OF MIGRAINES: ICD-10-CM

## 2019-08-13 DIAGNOSIS — J45.40 MODERATE PERSISTENT ASTHMA WITHOUT COMPLICATION: ICD-10-CM

## 2019-08-13 DIAGNOSIS — G72.9 MYOPATHY, UNSPECIFIED: ICD-10-CM

## 2019-08-13 DIAGNOSIS — R13.19 ESOPHAGEAL DYSPHAGIA: ICD-10-CM

## 2019-08-13 DIAGNOSIS — M51.26 LUMBAR DISC HERNIATION: ICD-10-CM

## 2019-08-13 DIAGNOSIS — M17.12 PRIMARY OSTEOARTHRITIS OF LEFT KNEE: ICD-10-CM

## 2019-08-13 DIAGNOSIS — M21.41 ACQUIRED PES PLANUS OF RIGHT FOOT: ICD-10-CM

## 2019-08-13 DIAGNOSIS — M17.11 PRIMARY OSTEOARTHRITIS OF RIGHT KNEE: ICD-10-CM

## 2019-08-13 DIAGNOSIS — E78.5 DYSLIPIDEMIA: ICD-10-CM

## 2019-08-13 DIAGNOSIS — M54.12 CERVICAL RADICULOPATHY AT C5: ICD-10-CM

## 2019-08-13 DIAGNOSIS — I10 BENIGN ESSENTIAL HTN: ICD-10-CM

## 2019-08-13 DIAGNOSIS — Z95.5 HISTORY OF HEART ARTERY STENT: ICD-10-CM

## 2019-08-13 DIAGNOSIS — K58.0 IRRITABLE BOWEL SYNDROME WITH DIARRHEA: ICD-10-CM

## 2019-08-13 DIAGNOSIS — Z88.6 ALLERGY TO NONSTEROIDAL ANTI-INFLAMMATORY DRUG (NSAID): ICD-10-CM

## 2019-08-13 DIAGNOSIS — I25.10 CORONARY ARTERY DISEASE INVOLVING NATIVE CORONARY ARTERY OF NATIVE HEART WITHOUT ANGINA PECTORIS: ICD-10-CM

## 2019-08-13 NOTE — TELEPHONE ENCOUNTER
Patient called me. She states that she went to the dentist last week on Monday for a checkup. She states that today she woke up and her upper lip is swollen over her tooth. She states it is not an open sore but the skin inside is white and painful.    Darrius

## 2019-08-13 NOTE — TELEPHONE ENCOUNTER
Spoke with patient states she contacted her dentist office and they gave her further recommendations, to use salt water gargles as they believe it is a canker sore.  I offered OV for patient but declined at this time stating she will follow her dentist sarkis

## 2019-08-13 NOTE — TELEPHONE ENCOUNTER
FYI:   Patient called me, she has changed her mind and at this time wishes to see the doctor. Appointment made tomorrow with Dr. David Betancourt to be further evaluated. Thank you Dr. Milner Pretty.

## 2019-08-13 NOTE — PROGRESS NOTES
8/13/2019  Spoke to Eliud Angeles for CCM.     Verified HIPPA    Patient Active Problem List:     Rotator cuff tendonitis     Cervical radiculopathy at C5     Primary osteoarthritis of right knee     Lumbar disc herniation     At risk for falling     Irritable bow documentation:   Patient called me back and requesting to see the doctor  Appointment made for tomorrow with Dr. Jatin Cid   Date Time Provider Jeremiah Lisa   8/14/2019 11:45 AM Salty Harmon MD [facility]   9/13/2019 1

## 2019-08-14 ENCOUNTER — OFFICE VISIT (OUTPATIENT)
Dept: INTERNAL MEDICINE CLINIC | Facility: CLINIC | Age: 84
End: 2019-08-14
Payer: MEDICARE

## 2019-08-14 VITALS
BODY MASS INDEX: 33.54 KG/M2 | RESPIRATION RATE: 18 BRPM | HEART RATE: 92 BPM | SYSTOLIC BLOOD PRESSURE: 138 MMHG | WEIGHT: 170.81 LBS | DIASTOLIC BLOOD PRESSURE: 80 MMHG | HEIGHT: 60 IN

## 2019-08-14 DIAGNOSIS — K13.70 MOUTH LESION: Primary | ICD-10-CM

## 2019-08-14 DIAGNOSIS — I10 BENIGN ESSENTIAL HTN: ICD-10-CM

## 2019-08-14 PROCEDURE — 99213 OFFICE O/P EST LOW 20 MIN: CPT | Performed by: NURSE PRACTITIONER

## 2019-08-14 RX ORDER — CLINDAMYCIN HYDROCHLORIDE 300 MG/1
300 CAPSULE ORAL 3 TIMES DAILY
Qty: 21 CAPSULE | Refills: 0 | Status: SHIPPED | OUTPATIENT
Start: 2019-08-14 | End: 2019-09-09 | Stop reason: ALTCHOICE

## 2019-08-14 NOTE — PROGRESS NOTES
Pat Mistry is a 80year old female. Patient presents with:  Swelling: cn room 10: upper inside of lip swelling and pain       HPI:   Presents for c/o pain and swelling of her inside lip. Started with teeth pain and dentist visit.   Had teeth cl Levalbuterol Tartrate 45 MCG/ACT Inhalation Aerosol INHALE 2 PUFFS INTO THE LUNGS EVERY 4 HOURS AS NEEDED FOR WHEEZING Disp: 3 Inhaler Rfl: 3   Fluticasone Propionate  MCG/ACT Inhalation Aerosol Inhale 2 puffs into the lungs 2 (two) times daily. PRESSURE    • Hip pain    • History of blood transfusion AT AGE 44    AFTER HYST   • HTN (hypertension)    • Loss of appetite    • Osteoarthrosis, unspecified whether generalized or localized, unspecified site    • Other and unspecified hyperlipidemia    • RASH, SHORTNESS OF BREATH    Comment:Transdermal \"memo \" patch ;novacaine Pt states             she is okay with marcaine  per allergy testing.   Radiology Contrast *    ANAPHYLAXIS  Xylocaine [Lidocain*    ANAPHYLAXIS    Comment:\"Breathing issues\"-- p nourished,in no apparent distress  HEENT: atraumatic, normocephalic,ears and throat are clear  Upper inside lip right side with eryhematous ucleratiion. No swelling  No external erythema. No fluctulance.     NECK: supple,no adenopathy,  LUNGS: normal rate

## 2019-08-20 ENCOUNTER — TELEPHONE (OUTPATIENT)
Dept: CARDIOLOGY | Age: 84
End: 2019-08-20

## 2019-08-20 DIAGNOSIS — I10 HYPERTENSION, BENIGN: Primary | ICD-10-CM

## 2019-08-21 RX ORDER — VERAPAMIL HYDROCHLORIDE 120 MG/1
120 CAPSULE, EXTENDED RELEASE ORAL DAILY
Qty: 30 CAPSULE | Refills: 11 | Status: SHIPPED | OUTPATIENT
Start: 2019-08-21 | End: 2019-09-17 | Stop reason: SDUPTHER

## 2019-08-31 PROCEDURE — 99490 CHRNC CARE MGMT STAFF 1ST 20: CPT

## 2019-09-09 ENCOUNTER — PATIENT OUTREACH (OUTPATIENT)
Dept: CASE MANAGEMENT | Age: 84
End: 2019-09-09

## 2019-09-09 DIAGNOSIS — Z91.81 AT RISK FOR FALLING: ICD-10-CM

## 2019-09-09 DIAGNOSIS — Z88.6 ALLERGY TO NONSTEROIDAL ANTI-INFLAMMATORY DRUG (NSAID): ICD-10-CM

## 2019-09-09 DIAGNOSIS — I25.10 CORONARY ARTERY DISEASE INVOLVING NATIVE CORONARY ARTERY OF NATIVE HEART WITHOUT ANGINA PECTORIS: ICD-10-CM

## 2019-09-09 DIAGNOSIS — R13.19 ESOPHAGEAL DYSPHAGIA: ICD-10-CM

## 2019-09-09 DIAGNOSIS — M54.12 CERVICAL RADICULOPATHY AT C5: ICD-10-CM

## 2019-09-09 DIAGNOSIS — M81.0 AGE-RELATED OSTEOPOROSIS WITHOUT CURRENT PATHOLOGICAL FRACTURE: ICD-10-CM

## 2019-09-09 DIAGNOSIS — G72.9 MYOPATHY, UNSPECIFIED: ICD-10-CM

## 2019-09-09 DIAGNOSIS — M16.12 OSTEOARTHRITIS OF ONE HIP, LEFT: ICD-10-CM

## 2019-09-09 DIAGNOSIS — E78.5 DYSLIPIDEMIA: ICD-10-CM

## 2019-09-09 DIAGNOSIS — I10 BENIGN ESSENTIAL HTN: ICD-10-CM

## 2019-09-09 DIAGNOSIS — J45.40 MODERATE PERSISTENT ASTHMA WITHOUT COMPLICATION: ICD-10-CM

## 2019-09-09 DIAGNOSIS — Z98.890 S/P LUMBAR LAMINECTOMY: ICD-10-CM

## 2019-09-09 DIAGNOSIS — K58.0 IRRITABLE BOWEL SYNDROME WITH DIARRHEA: ICD-10-CM

## 2019-09-09 DIAGNOSIS — Z95.5 HISTORY OF HEART ARTERY STENT: ICD-10-CM

## 2019-09-09 NOTE — PROGRESS NOTES
9/9/2019  Spoke to Eliud Angeles for CCM.     Verified HIPPA    Patient Active Problem List:     Rotator cuff tendonitis     Cervical radiculopathy at C5     Primary osteoarthritis of right knee     Lumbar disc herniation     At risk for falling     Irritable jacqueline Plan:    Spoke with patient    Mouth lesion: patient relates she saw Rambo DONALDSON- patient relates that she finished the Clindamycin and it is gone. She relates that she started having dental issues then had the mouth sore.  She relates she may see ano for healthy coping and dx. Time Spent This Encounter Total: 33  min medical record review, telephone communication, care plan updates where needed, education, goals and action plan recreation/update.  Provided acknowledgment and validation to patient's

## 2019-09-12 ENCOUNTER — PATIENT OUTREACH (OUTPATIENT)
Dept: CASE MANAGEMENT | Age: 84
End: 2019-09-12

## 2019-09-12 NOTE — PROGRESS NOTES
Patient called me to discuss Colonoscopy. She relates that she has been doing research and found that there are MRIs that can be done and is requesting an order or an alternative to the colonoscopy.    Informed patient I would check with Dr. Rivas Blunt of

## 2019-09-13 ENCOUNTER — OFFICE VISIT (OUTPATIENT)
Dept: RHEUMATOLOGY | Facility: CLINIC | Age: 84
End: 2019-09-13
Payer: MEDICARE

## 2019-09-13 VITALS
DIASTOLIC BLOOD PRESSURE: 84 MMHG | RESPIRATION RATE: 16 BRPM | HEART RATE: 76 BPM | SYSTOLIC BLOOD PRESSURE: 136 MMHG | WEIGHT: 170 LBS | BODY MASS INDEX: 33 KG/M2

## 2019-09-13 DIAGNOSIS — Z91.81 AT RISK FOR FALLING: ICD-10-CM

## 2019-09-13 DIAGNOSIS — M17.12 PRIMARY OSTEOARTHRITIS OF LEFT KNEE: ICD-10-CM

## 2019-09-13 DIAGNOSIS — Z98.890 S/P LUMBAR LAMINECTOMY: ICD-10-CM

## 2019-09-13 DIAGNOSIS — Z98.1 STATUS POST LUMBAR SPINAL FUSION: ICD-10-CM

## 2019-09-13 DIAGNOSIS — M17.11 PRIMARY OSTEOARTHRITIS OF RIGHT KNEE: Primary | ICD-10-CM

## 2019-09-13 DIAGNOSIS — Z85.828 HISTORY OF SKIN CANCER: ICD-10-CM

## 2019-09-13 DIAGNOSIS — K86.89 PANCREATIC INSUFFICIENCY: ICD-10-CM

## 2019-09-13 DIAGNOSIS — I25.10 CORONARY ARTERY DISEASE INVOLVING NATIVE CORONARY ARTERY OF NATIVE HEART WITHOUT ANGINA PECTORIS: ICD-10-CM

## 2019-09-13 DIAGNOSIS — Z98.890 HISTORY OF BREAST BIOPSY: ICD-10-CM

## 2019-09-13 DIAGNOSIS — J45.40 MODERATE PERSISTENT ASTHMA WITHOUT COMPLICATION: ICD-10-CM

## 2019-09-13 DIAGNOSIS — M47.816 OSTEOARTHRITIS OF SPINE WITHOUT MYELOPATHY OR RADICULOPATHY, LUMBAR REGION: ICD-10-CM

## 2019-09-13 DIAGNOSIS — R29.898 WEAKNESS OF BOTH LEGS: ICD-10-CM

## 2019-09-13 DIAGNOSIS — Z95.5 HISTORY OF HEART ARTERY STENT: ICD-10-CM

## 2019-09-13 PROCEDURE — 99213 OFFICE O/P EST LOW 20 MIN: CPT | Performed by: INTERNAL MEDICINE

## 2019-09-13 NOTE — PROGRESS NOTES
[facility]  Dr. Ila Reagan Progress Note     Subjective:   Chelly Mg is a(n) 80year old female. Current complaints: Patient presents with:  Osteoarthritis: 3 month f/u. Pt states 'is on a new medication- zenpep.  Legs are feeling weaker.' stress as needed. No NSAIDs, ie ibuprofen or aleve or aspirin can't be used. Heating pad can be used as needed. Not interested in codeine. Another pain medication to consider is tramadol 50 ng one or two per day.   In 2 month you could have another cor

## 2019-09-17 DIAGNOSIS — I10 HYPERTENSION, BENIGN: ICD-10-CM

## 2019-09-18 RX ORDER — VERAPAMIL HYDROCHLORIDE 120 MG/1
CAPSULE, EXTENDED RELEASE ORAL
Qty: 90 CAPSULE | Refills: 0 | Status: SHIPPED | OUTPATIENT
Start: 2019-09-18 | End: 2019-11-06 | Stop reason: SDUPTHER

## 2019-09-23 ENCOUNTER — HOSPITAL ENCOUNTER (EMERGENCY)
Facility: HOSPITAL | Age: 84
Discharge: HOME | End: 2019-09-23
Attending: EMERGENCY MEDICINE
Payer: MEDICARE

## 2019-09-23 VITALS
DIASTOLIC BLOOD PRESSURE: 89 MMHG | HEIGHT: 61 IN | RESPIRATION RATE: 14 BRPM | OXYGEN SATURATION: 96 % | HEART RATE: 85 BPM | SYSTOLIC BLOOD PRESSURE: 190 MMHG | TEMPERATURE: 98 F | WEIGHT: 170 LBS | BODY MASS INDEX: 32.1 KG/M2

## 2019-09-23 DIAGNOSIS — R19.5 DARK STOOLS: Primary | ICD-10-CM

## 2019-09-23 LAB
ALBUMIN SERPL-MCNC: 4 G/DL (ref 3.4–5)
ALBUMIN/GLOB SERPL: 1 {RATIO} (ref 1–2)
ALP LIVER SERPL-CCNC: 80 U/L (ref 55–142)
ALT SERPL-CCNC: 22 U/L (ref 13–56)
ANION GAP SERPL CALC-SCNC: 8 MMOL/L (ref 0–18)
ANTIBODY SCREEN: NEGATIVE
APTT PPP: 28.1 SECONDS (ref 25.4–36.1)
AST SERPL-CCNC: 15 U/L (ref 15–37)
BASOPHILS # BLD AUTO: 0.05 X10(3) UL (ref 0–0.2)
BASOPHILS NFR BLD AUTO: 0.7 %
BILIRUB SERPL-MCNC: 0.7 MG/DL (ref 0.1–2)
BUN BLD-MCNC: 14 MG/DL (ref 7–18)
BUN/CREAT SERPL: 14.3 (ref 10–20)
CALCIUM BLD-MCNC: 9.5 MG/DL (ref 8.5–10.1)
CHLORIDE SERPL-SCNC: 108 MMOL/L (ref 98–112)
CO2 SERPL-SCNC: 26 MMOL/L (ref 21–32)
CREAT BLD-MCNC: 0.98 MG/DL (ref 0.55–1.02)
DEPRECATED RDW RBC AUTO: 46.5 FL (ref 35.1–46.3)
EOSINOPHIL # BLD AUTO: 0.07 X10(3) UL (ref 0–0.7)
EOSINOPHIL NFR BLD AUTO: 0.9 %
ERYTHROCYTE [DISTWIDTH] IN BLOOD BY AUTOMATED COUNT: 14.3 % (ref 11–15)
GLOBULIN PLAS-MCNC: 3.9 G/DL (ref 2.8–4.4)
GLUCOSE BLD-MCNC: 83 MG/DL (ref 70–99)
HCT VFR BLD AUTO: 45.2 % (ref 35–48)
HGB BLD-MCNC: 14.7 G/DL (ref 12–16)
IMM GRANULOCYTES # BLD AUTO: 0.01 X10(3) UL (ref 0–1)
IMM GRANULOCYTES NFR BLD: 0.1 %
INR BLD: 0.95 (ref 0.9–1.1)
LYMPHOCYTES # BLD AUTO: 3.06 X10(3) UL (ref 1–4)
LYMPHOCYTES NFR BLD AUTO: 41 %
M PROTEIN MFR SERPL ELPH: 7.9 G/DL (ref 6.4–8.2)
MCH RBC QN AUTO: 28.7 PG (ref 26–34)
MCHC RBC AUTO-ENTMCNC: 32.5 G/DL (ref 31–37)
MCV RBC AUTO: 88.3 FL (ref 80–100)
MONOCYTES # BLD AUTO: 0.54 X10(3) UL (ref 0.1–1)
MONOCYTES NFR BLD AUTO: 7.2 %
NEUTROPHILS # BLD AUTO: 3.74 X10 (3) UL (ref 1.5–7.7)
NEUTROPHILS # BLD AUTO: 3.74 X10(3) UL (ref 1.5–7.7)
NEUTROPHILS NFR BLD AUTO: 50.1 %
OSMOLALITY SERPL CALC.SUM OF ELEC: 294 MOSM/KG (ref 275–295)
PLATELET # BLD AUTO: 272 10(3)UL (ref 150–450)
POTASSIUM SERPL-SCNC: 3.7 MMOL/L (ref 3.5–5.1)
PSA SERPL DL<=0.01 NG/ML-MCNC: 13 SECONDS (ref 12.5–14.7)
RBC # BLD AUTO: 5.12 X10(6)UL (ref 3.8–5.3)
RH BLOOD TYPE: POSITIVE
SODIUM SERPL-SCNC: 142 MMOL/L (ref 136–145)
WBC # BLD AUTO: 7.5 X10(3) UL (ref 4–11)

## 2019-09-23 PROCEDURE — 82272 OCCULT BLD FECES 1-3 TESTS: CPT

## 2019-09-23 PROCEDURE — 85730 THROMBOPLASTIN TIME PARTIAL: CPT | Performed by: EMERGENCY MEDICINE

## 2019-09-23 PROCEDURE — 36415 COLL VENOUS BLD VENIPUNCTURE: CPT

## 2019-09-23 PROCEDURE — 85610 PROTHROMBIN TIME: CPT | Performed by: EMERGENCY MEDICINE

## 2019-09-23 PROCEDURE — 93010 ELECTROCARDIOGRAM REPORT: CPT

## 2019-09-23 PROCEDURE — 86900 BLOOD TYPING SEROLOGIC ABO: CPT | Performed by: EMERGENCY MEDICINE

## 2019-09-23 PROCEDURE — 86901 BLOOD TYPING SEROLOGIC RH(D): CPT | Performed by: EMERGENCY MEDICINE

## 2019-09-23 PROCEDURE — 99284 EMERGENCY DEPT VISIT MOD MDM: CPT

## 2019-09-23 PROCEDURE — 80053 COMPREHEN METABOLIC PANEL: CPT | Performed by: EMERGENCY MEDICINE

## 2019-09-23 PROCEDURE — 93005 ELECTROCARDIOGRAM TRACING: CPT

## 2019-09-23 PROCEDURE — 85025 COMPLETE CBC W/AUTO DIFF WBC: CPT | Performed by: EMERGENCY MEDICINE

## 2019-09-23 PROCEDURE — 86850 RBC ANTIBODY SCREEN: CPT | Performed by: EMERGENCY MEDICINE

## 2019-09-23 NOTE — ED INITIAL ASSESSMENT (HPI)
To the ED from home with concern for GI bleeding. States had 2 normal bowel movements on Saturday, the 3rd bowel movement she had was black and loose, took Immodium.  States yesterday had a more formed black bowel movement, took Immodium again and no bowel

## 2019-09-24 LAB
ATRIAL RATE: 80 BPM
P AXIS: 60 DEGREES
P-R INTERVAL: 160 MS
Q-T INTERVAL: 364 MS
QRS DURATION: 78 MS
QTC CALCULATION (BEZET): 419 MS
R AXIS: 11 DEGREES
T AXIS: 52 DEGREES
VENTRICULAR RATE: 80 BPM

## 2019-09-24 NOTE — ED PROVIDER NOTES
Patient Seen in: [facility] Emergency Department      History   Patient presents with:  Rectal Bleeding    Stated Complaint: black stool x 2 days    HPI    This 60-year-old female here because of rectal bleeding the patient states she had 3 episodes 4   • BENIGN BIOPSY LEFT  a long time ago    x 2   • BREAST BIOPSY Left 8/6/2018    Performed by Alicia Borrego MD at [facility] MAIN [place]   [place] [place]   • CERVICAL EPIDURAL N/A 8/17/2015    Performed by Jaziel Dasilva MD at [address] [place] [address] Current:BP (!) 190/89   Pulse 85   Temp 98.1 °F (36.7 °C) (Temporal)   Resp 14   Ht 154.9 cm (5' 1\")   Wt 77.1 kg   SpO2 96%   BMI 32.12 kg/m²         Physical Exam    Patient is alert orient x3 no acute distress abdomen soft nontender lungs are jorge Report.   Rate: 80  Rhythm: Sinus Rhythm  Reading: Normal EKG              CBC and chemistry unremarkable peer        MDM     His labs unremarkable is no evidence of current GI bleed peer              Disposition and Plan     Clinical Impression:  Dark stoo

## 2019-09-25 ENCOUNTER — OFFICE VISIT (OUTPATIENT)
Dept: INTERNAL MEDICINE CLINIC | Facility: CLINIC | Age: 84
End: 2019-09-25
Payer: MEDICARE

## 2019-09-25 VITALS
HEART RATE: 76 BPM | SYSTOLIC BLOOD PRESSURE: 130 MMHG | BODY MASS INDEX: 33.45 KG/M2 | HEIGHT: 60 IN | RESPIRATION RATE: 18 BRPM | WEIGHT: 170.38 LBS | TEMPERATURE: 97 F | DIASTOLIC BLOOD PRESSURE: 72 MMHG

## 2019-09-25 DIAGNOSIS — K52.9 CHRONIC DIARRHEA: ICD-10-CM

## 2019-09-25 DIAGNOSIS — I10 ESSENTIAL HYPERTENSION: ICD-10-CM

## 2019-09-25 DIAGNOSIS — S80.12XA TRAUMATIC ECCHYMOSIS OF LEFT LOWER LEG, INITIAL ENCOUNTER: ICD-10-CM

## 2019-09-25 DIAGNOSIS — R19.5 DARK STOOLS: Primary | ICD-10-CM

## 2019-09-25 PROCEDURE — 99214 OFFICE O/P EST MOD 30 MIN: CPT | Performed by: INTERNAL MEDICINE

## 2019-09-25 NOTE — PROGRESS NOTES
Paul Bryant is a 80year old female.   Patient presents with:  Er F/u: cn room 4: er follow up for black diarrhea   Medication Follow-Up: htn  Other: leg pain      HPI:     Patient with IBS with chronic diarrhea as well as h/o pancreatic insuffici foot     Esophageal dysphagia     History of heart artery stent     Status post lumbar spinal fusion     Pancreatic insufficiency     History of skin cancer     History of breast biopsy      Current Outpatient Medications:  pancrelipase, Lip-Prot-Amyl, (ZE vessel, native or graft    • Diarrhea, unspecified    • Dyslipidemia    • Extrinsic asthma, unspecified    • Fatigue    • Glucose intolerance (pre-diabetes)    • Hearing impairment     HEARING LOSS BUT NO AIDS   • Heart palpitations    • HIGH BLOOD PRESSUR ANAPHYLAXIS  Iodine Solution [Po*    ANAPHYLAXIS    Comment:IVP Dye  Levofloxacin            SHORTNESS OF BREATH    Comment:Nausea, leg pain  Macrobid [Nitrofura*    SHORTNESS OF BREATH  Nsaids                  SHORTNESS OF BREATH  Other anxiety  RHEUM: chronic knee pain from OA  HEME: No adenopathy      EXAM:   /72 (BP Location: Right arm, Patient Position: Sitting, Cuff Size: large)   Pulse 76   Temp 97.2 °F (36.2 °C) (Oral)   Resp 18   Ht 60\"   Wt 170 lb 6.4 oz   BMI 33.28 kg/m²

## 2019-09-29 ENCOUNTER — HOSPITAL ENCOUNTER (EMERGENCY)
Facility: HOSPITAL | Age: 84
Discharge: HOME | End: 2019-09-29
Attending: EMERGENCY MEDICINE
Payer: MEDICARE

## 2019-09-29 ENCOUNTER — APPOINTMENT (OUTPATIENT)
Dept: GENERAL RADIOLOGY | Facility: HOSPITAL | Age: 84
End: 2019-09-29
Attending: EMERGENCY MEDICINE
Payer: MEDICARE

## 2019-09-29 VITALS
RESPIRATION RATE: 16 BRPM | DIASTOLIC BLOOD PRESSURE: 95 MMHG | WEIGHT: 170 LBS | BODY MASS INDEX: 32.1 KG/M2 | SYSTOLIC BLOOD PRESSURE: 184 MMHG | HEART RATE: 73 BPM | TEMPERATURE: 98 F | OXYGEN SATURATION: 100 % | HEIGHT: 61 IN

## 2019-09-29 DIAGNOSIS — R05.9 COUGH: Primary | ICD-10-CM

## 2019-09-29 PROCEDURE — 93010 ELECTROCARDIOGRAM REPORT: CPT

## 2019-09-29 PROCEDURE — 84484 ASSAY OF TROPONIN QUANT: CPT | Performed by: EMERGENCY MEDICINE

## 2019-09-29 PROCEDURE — 99285 EMERGENCY DEPT VISIT HI MDM: CPT

## 2019-09-29 PROCEDURE — 71045 X-RAY EXAM CHEST 1 VIEW: CPT | Performed by: EMERGENCY MEDICINE

## 2019-09-29 PROCEDURE — 99284 EMERGENCY DEPT VISIT MOD MDM: CPT

## 2019-09-29 PROCEDURE — 93005 ELECTROCARDIOGRAM TRACING: CPT

## 2019-09-29 PROCEDURE — 36415 COLL VENOUS BLD VENIPUNCTURE: CPT

## 2019-09-29 NOTE — ED PROVIDER NOTES
Patient Seen in: [facility] Emergency Department      History   Patient presents with:  Cough/URI  Chest Pain Angina (cardiovascular)    Stated Complaint: cough    HPI    Patient here for evaluation of a cough.   There has been work going on in her b nausea and vomiting)    • Unspecified essential hypertension    • Visual impairment     glasses   • Wears glasses               Past Surgical History:   Procedure Laterality Date   • ANGIOPLASTY (CORONARY)      stent   • APPENDECTOMY     • BACK SURGERY All other systems reviewed and negative except as noted above.     Physical Exam     ED Triage Vitals [09/29/19 0940]   BP (!) 173/80   Pulse 92   Resp 18   Temp 98.4 °F (36.9 °C)   Temp src Temporal   SpO2 99 %   O2 Device None (Room air)       Adriana patient's  symptoms were considered, including ACS, PE, aortic dissection, pneumothorax. Her history and diagnostic tests do not suggest any serious etiology. Most likely related to her cough. Continue symptom Medicare at home.               Dispositio

## 2019-09-29 NOTE — ED INITIAL ASSESSMENT (HPI)
Pt aox4. Pt presents to ed from home, pts granddaughter drove patient to ED. Pt arrived with walker. Pt c/o throat pain and chest pain with cough. Pt denies fever. Pt states there is construction going on in apartment building x 1 week.  Pt states s/s start

## 2019-09-30 PROCEDURE — 99490 CHRNC CARE MGMT STAFF 1ST 20: CPT

## 2019-10-01 ENCOUNTER — OFFICE VISIT (OUTPATIENT)
Dept: INTERNAL MEDICINE CLINIC | Facility: CLINIC | Age: 84
End: 2019-10-01
Payer: MEDICARE

## 2019-10-01 VITALS
TEMPERATURE: 98 F | HEART RATE: 104 BPM | HEIGHT: 61 IN | WEIGHT: 173 LBS | BODY MASS INDEX: 32.66 KG/M2 | DIASTOLIC BLOOD PRESSURE: 80 MMHG | OXYGEN SATURATION: 97 % | SYSTOLIC BLOOD PRESSURE: 148 MMHG

## 2019-10-01 DIAGNOSIS — I10 BENIGN ESSENTIAL HTN: ICD-10-CM

## 2019-10-01 DIAGNOSIS — R05.9 COUGH: Primary | ICD-10-CM

## 2019-10-01 DIAGNOSIS — J45.40 MODERATE PERSISTENT ASTHMA WITHOUT COMPLICATION: ICD-10-CM

## 2019-10-01 PROCEDURE — 99214 OFFICE O/P EST MOD 30 MIN: CPT | Performed by: NURSE PRACTITIONER

## 2019-10-01 RX ORDER — PREDNISONE 10 MG/1
10 TABLET ORAL DAILY
Qty: 5 TABLET | Refills: 0 | Status: SHIPPED | OUTPATIENT
Start: 2019-10-01 | End: 2019-10-06

## 2019-10-01 NOTE — PROGRESS NOTES
Paul Bryant is a 80year old female. Patient presents with:  Cough: LG. Room 10. Cough and sore throat for about 1 week.  They are doing some work in her apartment building and itsn't sure if its that triggering the cough       HPI:   Here for ev Particles Take 2 PO with meals and snacks. Disp: 100 capsule Rfl: 0   Probiotic Product (PROBIOTIC-10 OR) Take by mouth.  Disp:  Rfl:    Levalbuterol Tartrate 45 MCG/ACT Inhalation Aerosol INHALE 2 PUFFS INTO THE LUNGS EVERY 4 HOURS AS NEEDED FOR WHEEZING D blood transfusion AT AGE 44    AFTER HYST   • HTN (hypertension)    • Loss of appetite    • Osteoarthrosis, unspecified whether generalized or localized, unspecified site    • Other and unspecified hyperlipidemia    • Pinched nerve in neck    • Pneumonia, Comment:Transdermal \"memo \" patch ;novacaine Pt states             she is okay with marcaine  per allergy testing.   Radiology Contrast *    ANAPHYLAXIS  Xylocaine [Lidocain*    ANAPHYLAXIS    Comment:\"Breathing issues\"-- per patient okay with atraumatic, normocephalic,ears and throat are clear  NECK: supple,no adenopathy,  LUNGS: normal rate without respiratory distress, lungs clear to auscultation  No wheezing.    CARDIO: RRR   PSYCH: alert and oriented x 3    ASSESSMENT AND PLAN:     Cough  (p

## 2019-10-02 ENCOUNTER — PATIENT OUTREACH (OUTPATIENT)
Dept: CASE MANAGEMENT | Age: 84
End: 2019-10-02

## 2019-10-02 DIAGNOSIS — M81.0 AGE-RELATED OSTEOPOROSIS WITHOUT CURRENT PATHOLOGICAL FRACTURE: ICD-10-CM

## 2019-10-02 DIAGNOSIS — M17.12 PRIMARY OSTEOARTHRITIS OF LEFT KNEE: ICD-10-CM

## 2019-10-02 DIAGNOSIS — Z98.890 S/P LUMBAR LAMINECTOMY: ICD-10-CM

## 2019-10-02 DIAGNOSIS — Z91.81 AT RISK FOR FALLING: ICD-10-CM

## 2019-10-02 DIAGNOSIS — M54.12 CERVICAL RADICULOPATHY AT C5: ICD-10-CM

## 2019-10-02 DIAGNOSIS — I10 BENIGN ESSENTIAL HTN: ICD-10-CM

## 2019-10-02 DIAGNOSIS — E78.5 DYSLIPIDEMIA: ICD-10-CM

## 2019-10-02 NOTE — PROGRESS NOTES
10/2/2019  Spoke to Eliud Angeles for CCM. Updates to patient care team/ comments: UTD  Patient reported changes in medications: pt relates she was prescribed prednisone 10 mg.    Med Adherence  Comment:  Reports taking medications as prescribed     Health Ma 4    Care Manager Follow Up: 1 month   Reason For Follow Up: review progress and or barriers towards patients goals. Care managers interventions: Continue to provide encouragement, support and education for healthy coping and dx.       Time Spent This E

## 2019-10-03 ENCOUNTER — PATIENT OUTREACH (OUTPATIENT)
Dept: CASE MANAGEMENT | Age: 84
End: 2019-10-03

## 2019-10-03 NOTE — PROGRESS NOTES
Patient called me regarding her medications and informed me of her prescription benefits. Also patient informed me she has started to feel better.    Reviewed chart    Time spent this encounter: 12 mins   Total time spent this month: 37

## 2019-10-07 ENCOUNTER — TELEPHONE (OUTPATIENT)
Dept: INTERNAL MEDICINE CLINIC | Facility: CLINIC | Age: 84
End: 2019-10-07

## 2019-10-07 ENCOUNTER — PATIENT OUTREACH (OUTPATIENT)
Dept: CASE MANAGEMENT | Age: 84
End: 2019-10-07

## 2019-10-07 NOTE — PROGRESS NOTES
Patient called me. Patient requesting f/u appt with [facility]. Called and made appointment for patient with   [facility] for Friday at 12:45. TE sent to SD to update her.

## 2019-10-07 NOTE — TELEPHONE ENCOUNTER
Patient called me to inform me she feels she is not getting better. I called and made f/u for patient with Dr. Suzanne Bruner for Friday at 12:45. Patient wanted to let SD know.

## 2019-10-23 PROBLEM — Z95.5 HISTORY OF HEART ARTERY STENT: Status: ACTIVE | Noted: 2017-04-10

## 2019-10-23 PROBLEM — E78.00 PURE HYPERCHOLESTEROLEMIA: Status: ACTIVE | Noted: 2017-04-10

## 2019-10-25 ENCOUNTER — PATIENT OUTREACH (OUTPATIENT)
Dept: CASE MANAGEMENT | Age: 84
End: 2019-10-25

## 2019-10-25 NOTE — PROGRESS NOTES
Called patient to introduce myself as new CCM care manager. No answer left detailed message ok per hipaa.     Time Spent This Encounter Total: 2 minutes medical record review, telephone communication, care plan updates where needed, education, goals and act

## 2019-10-31 PROCEDURE — 99490 CHRNC CARE MGMT STAFF 1ST 20: CPT

## 2019-11-06 ENCOUNTER — PATIENT OUTREACH (OUTPATIENT)
Dept: CASE MANAGEMENT | Age: 84
End: 2019-11-06

## 2019-11-06 DIAGNOSIS — Z95.5 HISTORY OF HEART ARTERY STENT: ICD-10-CM

## 2019-11-06 DIAGNOSIS — Z85.828 HISTORY OF SKIN CANCER: ICD-10-CM

## 2019-11-06 DIAGNOSIS — M16.12 OSTEOARTHRITIS OF ONE HIP, LEFT: ICD-10-CM

## 2019-11-06 DIAGNOSIS — Z86.69 HX OF MIGRAINES: ICD-10-CM

## 2019-11-06 DIAGNOSIS — Z91.81 AT RISK FOR FALLING: ICD-10-CM

## 2019-11-06 DIAGNOSIS — E78.5 DYSLIPIDEMIA: ICD-10-CM

## 2019-11-06 DIAGNOSIS — J45.40 MODERATE PERSISTENT ASTHMA WITHOUT COMPLICATION: ICD-10-CM

## 2019-11-06 DIAGNOSIS — I10 HYPERTENSION, BENIGN: ICD-10-CM

## 2019-11-06 DIAGNOSIS — K58.0 IRRITABLE BOWEL SYNDROME WITH DIARRHEA: ICD-10-CM

## 2019-11-06 RX ORDER — IPRATROPIUM BROMIDE AND ALBUTEROL SULFATE 2.5; .5 MG/3ML; MG/3ML
3 SOLUTION RESPIRATORY (INHALATION) AS NEEDED
COMMUNITY
End: 2021-02-04

## 2019-11-06 RX ORDER — VERAPAMIL HYDROCHLORIDE 120 MG/1
CAPSULE, EXTENDED RELEASE ORAL
Qty: 90 CAPSULE | Refills: 0 | Status: SHIPPED | OUTPATIENT
Start: 2019-11-06 | End: 2020-06-22 | Stop reason: SDUPTHER

## 2019-11-06 NOTE — PROGRESS NOTES
11/6/2019  Spoke to Sarai White for CCM. Updates to patient care team/ comments: Added REJI Fry, Marvin Ramirez psychologist and Dr. Vincent Camp pulmonologist  Patient reported changes in medications: Added Ipratropium nebulizer.    Med Adherence  Comment: her cough got better and she followed up w/ Dr. Kayleigh Quinteros her pulmonologist. Pt will continue to use the nebulizer once day which helps the most with cough.       • Update to previous barriers: N/A    • Patient Reported New Barriers And Concerns: None at this t

## 2019-11-14 ENCOUNTER — OFFICE VISIT (OUTPATIENT)
Dept: RHEUMATOLOGY | Facility: CLINIC | Age: 84
End: 2019-11-14
Payer: MEDICARE

## 2019-11-14 VITALS
WEIGHT: 167 LBS | RESPIRATION RATE: 20 BRPM | HEIGHT: 61 IN | SYSTOLIC BLOOD PRESSURE: 138 MMHG | BODY MASS INDEX: 31.53 KG/M2 | DIASTOLIC BLOOD PRESSURE: 68 MMHG | HEART RATE: 88 BPM

## 2019-11-14 DIAGNOSIS — M17.11 PRIMARY OSTEOARTHRITIS OF RIGHT KNEE: ICD-10-CM

## 2019-11-14 DIAGNOSIS — M17.12 PRIMARY OSTEOARTHRITIS OF LEFT KNEE: ICD-10-CM

## 2019-11-14 DIAGNOSIS — Z98.1 STATUS POST LUMBAR SPINAL FUSION: ICD-10-CM

## 2019-11-14 DIAGNOSIS — M47.816 OSTEOARTHRITIS OF SPINE WITHOUT MYELOPATHY OR RADICULOPATHY, LUMBAR REGION: Primary | ICD-10-CM

## 2019-11-14 PROCEDURE — 99213 OFFICE O/P EST LOW 20 MIN: CPT | Performed by: INTERNAL MEDICINE

## 2019-11-14 RX ORDER — GABAPENTIN 300 MG/1
300 CAPSULE ORAL 3 TIMES DAILY PRN
Qty: 60 CAPSULE | Refills: 1 | Status: SHIPPED | OUTPATIENT
Start: 2019-11-14 | End: 2020-04-16

## 2019-11-14 RX ORDER — PREDNISONE 1 MG/1
5 TABLET ORAL DAILY
Qty: 90 TABLET | Refills: 1 | Status: SHIPPED | OUTPATIENT
Start: 2019-11-14 | End: 2020-08-31

## 2019-11-14 NOTE — PROGRESS NOTES
[facility]  Dr. Harsha Carey Progress Note     Subjective:   Ana Christensen is a(n) 80year old female.    Current complaints: Patient presents with:  Osteoarthritis: 2 month f/u, not doing well, breathing trouble due to odors from work crews in her

## 2019-11-14 NOTE — PATIENT INSTRUCTIONS
For arthritis and lung problems use Prednisone 2.5 mg per day, half tablet of the 5 mg dose presently. On bad days take an entire 5 mg tablet of Prednisone. Use your nebulizor daily. For mild pain use ES Tylenol.   Use otc slaves like Saba or Halie Warren

## 2019-11-25 ENCOUNTER — OFFICE VISIT (OUTPATIENT)
Dept: INTERNAL MEDICINE CLINIC | Facility: CLINIC | Age: 84
End: 2019-11-25
Payer: MEDICARE

## 2019-11-25 ENCOUNTER — PATIENT OUTREACH (OUTPATIENT)
Dept: CASE MANAGEMENT | Age: 84
End: 2019-11-25

## 2019-11-25 ENCOUNTER — LAB ENCOUNTER (OUTPATIENT)
Dept: LAB | Age: 84
End: 2019-11-25
Attending: NURSE PRACTITIONER
Payer: MEDICARE

## 2019-11-25 VITALS
SYSTOLIC BLOOD PRESSURE: 138 MMHG | DIASTOLIC BLOOD PRESSURE: 82 MMHG | HEART RATE: 80 BPM | BODY MASS INDEX: 32.47 KG/M2 | OXYGEN SATURATION: 98 % | HEIGHT: 61 IN | TEMPERATURE: 98 F | WEIGHT: 172 LBS | RESPIRATION RATE: 18 BRPM

## 2019-11-25 DIAGNOSIS — J45.40 MODERATE PERSISTENT ASTHMA WITHOUT COMPLICATION: ICD-10-CM

## 2019-11-25 DIAGNOSIS — E78.00 PURE HYPERCHOLESTEROLEMIA: ICD-10-CM

## 2019-11-25 DIAGNOSIS — Z87.898 HX OF CHEMICAL EXPOSURE: ICD-10-CM

## 2019-11-25 DIAGNOSIS — I10 HYPERTENSION, BENIGN: ICD-10-CM

## 2019-11-25 DIAGNOSIS — J45.40 MODERATE PERSISTENT ASTHMA WITHOUT COMPLICATION: Primary | ICD-10-CM

## 2019-11-25 LAB
ABSOLUTE IMMATURE GRANULOCYTES (OFFPRE24): NORMAL
ALBUMIN SERPL-MCNC: 3.6 G/DL
ALBUMIN/GLOB SERPL: NORMAL {RATIO}
ALP SERPL-CCNC: 72 U/L
ALT SERPL-CCNC: 21 U/L
ANION GAP SERPL CALC-SCNC: NORMAL MMOL/L
AST SERPL-CCNC: 15 U/L
BASO+EOS+MONOS # BLD: NORMAL 10*3/UL
BASO+EOS+MONOS NFR BLD: NORMAL %
BASOPHILS # BLD: NORMAL 10*3/UL
BASOPHILS NFR BLD: NORMAL %
BILIRUB SERPL-MCNC: 0.4 MG/DL
BUN SERPL-MCNC: 23 MG/DL
BUN/CREAT SERPL: NORMAL
CALCIUM SERPL-MCNC: 9.5 MG/DL
CHLORIDE SERPL-SCNC: 110 MMOL/L
CO2 SERPL-SCNC: NORMAL MMOL/L
CREAT SERPL-MCNC: 1.09 MG/DL
DIFFERENTIAL METHOD BLD: NORMAL
EOSINOPHIL # BLD: NORMAL 10*3/UL
EOSINOPHIL NFR BLD: NORMAL %
ERYTHROCYTE [DISTWIDTH] IN BLOOD: NORMAL %
GLOBULIN SER-MCNC: 3.5 G/DL
GLUCOSE SERPL-MCNC: 100 MG/DL
HCT VFR BLD CALC: 42.3 %
HGB BLD-MCNC: 13.4 G/DL
IMMATURE GRANULOCYTES (OFFPRE25): NORMAL
LENGTH OF FAST TIME PATIENT: NORMAL H
LYMPHOCYTES # BLD: NORMAL 10*3/UL
LYMPHOCYTES NFR BLD: NORMAL %
MCH RBC QN AUTO: NORMAL PG
MCHC RBC AUTO-ENTMCNC: NORMAL G/DL
MCV RBC AUTO: NORMAL FL
MONOCYTES # BLD: NORMAL 10*3/UL
MONOCYTES NFR BLD: NORMAL %
MPV (OFFPRE2): NORMAL
NEUTROPHILS # BLD: NORMAL 10*3/UL
NEUTROPHILS NFR BLD: NORMAL %
NRBC BLD MANUAL-RTO: NORMAL %
PLAT MORPH BLD: NORMAL
PLATELET # BLD: 264 10*3/UL
POTASSIUM SERPL-SCNC: 4.5 MMOL/L
PROT SERPL-MCNC: 7.1 G/DL
RBC # BLD: 4.74 10*6/UL
RBC MORPH BLD: NORMAL
SODIUM SERPL-SCNC: 140 MMOL/L
WBC # BLD: 7.7 10*3/UL
WBC MORPH BLD: NORMAL

## 2019-11-25 PROCEDURE — 80053 COMPREHEN METABOLIC PANEL: CPT

## 2019-11-25 PROCEDURE — 99214 OFFICE O/P EST MOD 30 MIN: CPT | Performed by: NURSE PRACTITIONER

## 2019-11-25 PROCEDURE — 85025 COMPLETE CBC W/AUTO DIFF WBC: CPT

## 2019-11-25 NOTE — PROGRESS NOTES
Les Pendleton is a 80year old female. Patient presents with:  Sinus Problem: RG rm 10 Sensitivity to odor or strong glue used in apartment      HPI:   She is here for follow up.  She lives at [facility] and they are Rehabing the apartme compression fracture (HCC)     S/P lumbar laminectomy     Osteoarthritis of spine without myelopathy or radiculopathy, lumbar region     Osteoarthritis of one hip, left     Allergy to nonsteroidal anti-inflammatory drug (NSAID)     Acquired pes planus of r one time. • loratadine (CLARITIN) 10 MG Oral Tab Take 10 mg by mouth daily. • vitamin E 400 UNITS Oral Cap Take 400 Units by mouth daily.         Past Medical History:   Diagnosis Date   • Anesthesia complication     allergic reaction to \"Hayden\" m Disease Paternal Grandfather    • Arthritis Paternal Grandmother    • Allergies Paternal Grandmother    • Cancer Maternal Grandfather    • Heart Attack Maternal Grandfather    • Heart Attack Maternal Grandmother    • Heart Attack Brother    • Other (Autoim Coughing  Risperdal [Risperid*    SHORTNESS OF BREATH  Shellfish-Derived P*        Comment:Sob  Tramadol                    Comment:Arms turned red like a sunburn  Vancomycin              RASH    Comment:Rash to the face and neck  Warfarin                O for this visit.

## 2019-11-25 NOTE — PROGRESS NOTES
Returned patient's call no answer left voicemail to call back.     Time Spent This Encounter Total: 2 minutes

## 2019-11-30 PROCEDURE — 99490 CHRNC CARE MGMT STAFF 1ST 20: CPT

## 2019-12-05 ENCOUNTER — PATIENT OUTREACH (OUTPATIENT)
Dept: CASE MANAGEMENT | Age: 84
End: 2019-12-05

## 2019-12-05 DIAGNOSIS — Z91.81 AT RISK FOR FALLING: ICD-10-CM

## 2019-12-05 DIAGNOSIS — R29.898 WEAKNESS OF BOTH LEGS: ICD-10-CM

## 2019-12-05 DIAGNOSIS — J45.40 MODERATE PERSISTENT ASTHMA WITHOUT COMPLICATION: ICD-10-CM

## 2019-12-05 DIAGNOSIS — Z85.828 HISTORY OF SKIN CANCER: ICD-10-CM

## 2019-12-05 DIAGNOSIS — E78.00 PURE HYPERCHOLESTEROLEMIA: ICD-10-CM

## 2019-12-05 DIAGNOSIS — E78.5 DYSLIPIDEMIA: ICD-10-CM

## 2019-12-05 DIAGNOSIS — M17.11 PRIMARY OSTEOARTHRITIS OF RIGHT KNEE: ICD-10-CM

## 2019-12-05 DIAGNOSIS — I10 HYPERTENSION, BENIGN: ICD-10-CM

## 2019-12-05 NOTE — PROGRESS NOTES
12/5/2019  Spoke to Eliud Angeles for CCM.       Updates to patient care team/ comments: Added MENDOZA Hyatt  Patient reported changes in medications: None  Med Adherence  Comment: Taking as prescribed    Health Maintenance:   Pneumococcal PPSV23/PCV13 65+ previous goal    Self Management Goals/Action Plan:    • Active goal from previous outreach: Pt wants to feel better. • Patient reported progress toward goal: Partially progressing. Pt states cough and diarrhea is better.       • Update to previous barr

## 2019-12-06 ENCOUNTER — OFFICE VISIT (OUTPATIENT)
Dept: INTERNAL MEDICINE CLINIC | Facility: CLINIC | Age: 84
End: 2019-12-06
Payer: MEDICARE

## 2019-12-06 VITALS
HEART RATE: 88 BPM | TEMPERATURE: 98 F | WEIGHT: 173 LBS | RESPIRATION RATE: 16 BRPM | DIASTOLIC BLOOD PRESSURE: 72 MMHG | HEIGHT: 61 IN | SYSTOLIC BLOOD PRESSURE: 136 MMHG | BODY MASS INDEX: 32.66 KG/M2

## 2019-12-06 DIAGNOSIS — M47.816 OSTEOARTHRITIS OF SPINE WITHOUT MYELOPATHY OR RADICULOPATHY, LUMBAR REGION: ICD-10-CM

## 2019-12-06 DIAGNOSIS — R05.9 COUGH: ICD-10-CM

## 2019-12-06 DIAGNOSIS — J45.41 MODERATE PERSISTENT ASTHMA WITH ACUTE EXACERBATION: Primary | ICD-10-CM

## 2019-12-06 DIAGNOSIS — M17.0 BILATERAL PRIMARY OSTEOARTHRITIS OF KNEE: ICD-10-CM

## 2019-12-06 DIAGNOSIS — Z87.898 HX OF CHEMICAL EXPOSURE: ICD-10-CM

## 2019-12-06 PROCEDURE — 99214 OFFICE O/P EST MOD 30 MIN: CPT | Performed by: INTERNAL MEDICINE

## 2019-12-06 RX ORDER — GABAPENTIN 300 MG/1
300 CAPSULE ORAL PRN
COMMUNITY
Start: 2019-11-14

## 2019-12-06 RX ORDER — IPRATROPIUM BROMIDE AND ALBUTEROL SULFATE 2.5; .5 MG/3ML; MG/3ML
3 SOLUTION RESPIRATORY (INHALATION) PRN
COMMUNITY

## 2019-12-06 RX ORDER — ALPRAZOLAM 1 MG/1
TABLET ORAL
COMMUNITY
Start: 2019-11-04 | End: 2019-12-01 | Stop reason: SDUPTHER

## 2019-12-06 NOTE — PROGRESS NOTES
Gomez Cheng  [birth year]    Patient presents with:  Shortness Of Breath: used Neb 12/5 in PM and 12/6 in AM, odor in apartment building, ongoing cough/congestion      SUBJECTIVE   Gomez Cheng is a 80year old female who presents with mul Take 1 tablet (5 mg total) by mouth daily. (Patient taking differently: Take 2.5 mg by mouth daily.  ) 90 tablet 1   • gabapentin 300 MG Oral Cap Take 1 capsule (300 mg total) by mouth 3 (three) times daily as needed.  60 capsule 1   • ipratropium-albuterol ANAPHYLAXIS  Xylocaine [Lidocain*    ANAPHYLAXIS    Comment:\"Breathing issues\"-- per patient okay with             marcaine  Bactroban [Mupiroci*    OTHER (SEE COMMENTS)    Comment:Felt short of breath  Advair Diskus [Flut*    Coughing, SHORTNESS OF ROSALINA AIDS   • Heart palpitations    • HIGH BLOOD PRESSURE    • Hip pain    • History of blood transfusion AT AGE 44    AFTER HYST   • HTN (hypertension)    • Loss of appetite    • Osteoarthrosis, unspecified whether generalized or localized, unspecified site EPIDURAL N/A 9/23/2013    Performed by Mady Kebede MD at [address] [place] [address]     • COLONOSCOPY  10/21/2013    Performed by Glenn Bacon MD at [facility] ENDOSCOPY   • COLONOSCOPY/ GASTROSCOPY N/A 5/28/2013    Performed by Kenneth Montgomery rhonchi. Effort normal and breath sounds normal. No respiratory distress. Abdominal: Soft. Bowel sounds are normal. Non tender, no masses, no organomegaly or hernias. Lymphadenopathy: No cervical adenopathy. Skin: Skin is warm and dry. No rash.  No pall

## 2019-12-09 ENCOUNTER — OFFICE VISIT (OUTPATIENT)
Dept: CARDIOLOGY | Age: 84
End: 2019-12-09

## 2019-12-09 ENCOUNTER — PATIENT OUTREACH (OUTPATIENT)
Dept: CASE MANAGEMENT | Age: 84
End: 2019-12-09

## 2019-12-09 ENCOUNTER — TELEPHONE (OUTPATIENT)
Dept: CARDIOLOGY | Age: 84
End: 2019-12-09

## 2019-12-09 VITALS
BODY MASS INDEX: 32.98 KG/M2 | WEIGHT: 168 LBS | HEART RATE: 92 BPM | SYSTOLIC BLOOD PRESSURE: 136 MMHG | DIASTOLIC BLOOD PRESSURE: 76 MMHG | HEIGHT: 60 IN

## 2019-12-09 DIAGNOSIS — R06.02 SHORTNESS OF BREATH: ICD-10-CM

## 2019-12-09 DIAGNOSIS — I10 HYPERTENSION, BENIGN: ICD-10-CM

## 2019-12-09 DIAGNOSIS — K58.0 IRRITABLE BOWEL SYNDROME WITH DIARRHEA: ICD-10-CM

## 2019-12-09 DIAGNOSIS — E78.5 DYSLIPIDEMIA: ICD-10-CM

## 2019-12-09 DIAGNOSIS — Z86.69 HX OF MIGRAINES: ICD-10-CM

## 2019-12-09 DIAGNOSIS — I25.10 CORONARY ARTERY DISEASE INVOLVING NATIVE CORONARY ARTERY OF NATIVE HEART WITHOUT ANGINA PECTORIS: ICD-10-CM

## 2019-12-09 DIAGNOSIS — Z95.5 HISTORY OF HEART ARTERY STENT: ICD-10-CM

## 2019-12-09 DIAGNOSIS — E78.00 PURE HYPERCHOLESTEROLEMIA: ICD-10-CM

## 2019-12-09 DIAGNOSIS — M54.12 CERVICAL RADICULOPATHY AT C5: Primary | ICD-10-CM

## 2019-12-09 PROCEDURE — 93000 ELECTROCARDIOGRAM COMPLETE: CPT | Performed by: INTERNAL MEDICINE

## 2019-12-09 PROCEDURE — 99214 OFFICE O/P EST MOD 30 MIN: CPT | Performed by: INTERNAL MEDICINE

## 2019-12-09 SDOH — HEALTH STABILITY: PHYSICAL HEALTH: ON AVERAGE, HOW MANY DAYS PER WEEK DO YOU ENGAGE IN MODERATE TO STRENUOUS EXERCISE (LIKE A BRISK WALK)?: 0 DAYS

## 2019-12-09 SDOH — HEALTH STABILITY: PHYSICAL HEALTH: ON AVERAGE, HOW MANY MINUTES DO YOU ENGAGE IN EXERCISE AT THIS LEVEL?: 0 MIN

## 2019-12-09 ASSESSMENT — PATIENT HEALTH QUESTIONNAIRE - PHQ9
2. FEELING DOWN, DEPRESSED OR HOPELESS: NOT AT ALL
1. LITTLE INTEREST OR PLEASURE IN DOING THINGS: NOT AT ALL
SUM OF ALL RESPONSES TO PHQ9 QUESTIONS 1 AND 2: 0
SUM OF ALL RESPONSES TO PHQ9 QUESTIONS 1 AND 2: 0

## 2019-12-09 NOTE — PROGRESS NOTES
Pt called states she woke up feeling nauseous yesterday and again this morning. Pt relates it is due odor in her apartment. Pt saw Dr. Dannie Anderson on 12/6/19 which she made him aware of her feeling nauseous.  Pt relates Dr. Dannie Anderson wrote a letter which she has give

## 2019-12-10 ENCOUNTER — MED REC SCAN ONLY (OUTPATIENT)
Dept: INTERNAL MEDICINE CLINIC | Facility: CLINIC | Age: 84
End: 2019-12-10

## 2019-12-14 ENCOUNTER — HOSPITAL ENCOUNTER (EMERGENCY)
Facility: HOSPITAL | Age: 84
Discharge: HOME | End: 2019-12-14
Attending: EMERGENCY MEDICINE
Payer: MEDICARE

## 2019-12-14 ENCOUNTER — APPOINTMENT (OUTPATIENT)
Dept: CT IMAGING | Facility: HOSPITAL | Age: 84
End: 2019-12-14
Attending: EMERGENCY MEDICINE
Payer: MEDICARE

## 2019-12-14 VITALS
OXYGEN SATURATION: 98 % | RESPIRATION RATE: 24 BRPM | HEART RATE: 79 BPM | BODY MASS INDEX: 32 KG/M2 | SYSTOLIC BLOOD PRESSURE: 153 MMHG | DIASTOLIC BLOOD PRESSURE: 83 MMHG | WEIGHT: 168 LBS

## 2019-12-14 DIAGNOSIS — S09.90XA INJURY OF HEAD, INITIAL ENCOUNTER: Primary | ICD-10-CM

## 2019-12-14 DIAGNOSIS — R10.9 ABDOMINAL PAIN OF UNKNOWN ETIOLOGY: ICD-10-CM

## 2019-12-14 DIAGNOSIS — S16.1XXA STRAIN OF NECK MUSCLE, INITIAL ENCOUNTER: ICD-10-CM

## 2019-12-14 PROCEDURE — 93010 ELECTROCARDIOGRAM REPORT: CPT

## 2019-12-14 PROCEDURE — 99285 EMERGENCY DEPT VISIT HI MDM: CPT

## 2019-12-14 PROCEDURE — 70450 CT HEAD/BRAIN W/O DYE: CPT | Performed by: EMERGENCY MEDICINE

## 2019-12-14 PROCEDURE — 72125 CT NECK SPINE W/O DYE: CPT | Performed by: EMERGENCY MEDICINE

## 2019-12-14 PROCEDURE — 83690 ASSAY OF LIPASE: CPT | Performed by: EMERGENCY MEDICINE

## 2019-12-14 PROCEDURE — 36415 COLL VENOUS BLD VENIPUNCTURE: CPT

## 2019-12-14 PROCEDURE — 93005 ELECTROCARDIOGRAM TRACING: CPT

## 2019-12-14 PROCEDURE — 74176 CT ABD & PELVIS W/O CONTRAST: CPT | Performed by: EMERGENCY MEDICINE

## 2019-12-14 PROCEDURE — 80053 COMPREHEN METABOLIC PANEL: CPT | Performed by: EMERGENCY MEDICINE

## 2019-12-14 PROCEDURE — 85025 COMPLETE CBC W/AUTO DIFF WBC: CPT | Performed by: EMERGENCY MEDICINE

## 2019-12-14 RX ORDER — SODIUM CHLORIDE 9 MG/ML
125 INJECTION, SOLUTION INTRAVENOUS CONTINUOUS
Status: DISCONTINUED | OUTPATIENT
Start: 2019-12-14 | End: 2019-12-14

## 2019-12-14 RX ORDER — ONDANSETRON 2 MG/ML
4 INJECTION INTRAMUSCULAR; INTRAVENOUS ONCE
Status: DISCONTINUED | OUTPATIENT
Start: 2019-12-14 | End: 2019-12-14

## 2019-12-14 RX ORDER — ONDANSETRON 4 MG/1
4 TABLET, ORALLY DISINTEGRATING ORAL ONCE
Status: COMPLETED | OUTPATIENT
Start: 2019-12-14 | End: 2019-12-14

## 2019-12-14 RX ORDER — ONDANSETRON 4 MG/1
4 TABLET, ORALLY DISINTEGRATING ORAL EVERY 4 HOURS PRN
Qty: 10 TABLET | Refills: 0 | Status: SHIPPED | OUTPATIENT
Start: 2019-12-14 | End: 2020-01-17

## 2019-12-14 NOTE — ED PROVIDER NOTES
Patient Seen in: [facility] Emergency Department      History   Patient presents with:  Head Neck Injury  Nausea/Vomiting/Diarrhea    Stated Complaint: hit head monday and is light headed and nauseated.  no blood thinners    HPI    This is a 84-year- Unspecified essential hypertension    • Visual impairment     glasses   • Wears glasses               Past Surgical History:   Procedure Laterality Date   • ANGIOPLASTY (CORONARY)      stent   • APPENDECTOMY     • BACK SURGERY      lumbar x 4   • BENIGN BI HPI.  Constitutional and vital signs reviewed. All other systems reviewed and negative except as noted above.     Physical Exam     ED Triage Vitals   BP 12/14/19 1300 (!) 172/83   Pulse 12/14/19 1107 97   Resp 12/14/19 1107 24   Temp --    Temp src 12 CULTURE REFLEX - Normal   CBC WITH DIFFERENTIAL WITH PLATELET    Narrative: The following orders were created for panel order CBC WITH DIFFERENTIAL WITH PLATELET.   Procedure                               Abnormality         Status                     - ischemic changes. There is carotid and vertebral artery calcification  SINUSES:           No sign of acute sinusitis. MASTOIDS:          No sign of acute inflammation.  SKULL:             No depressed calvarial fracture      CONCLUSION:  No acute intracra Ct Abdomen+pelvis(cpt=74176)    Result Date: 12/14/2019  PROCEDURE:  CT ABDOMEN+PELVIS (CPT=74176)  COMPARISON:  [facility], CT ABDOMEN+PELVIS KIDNEYSTONE 2D RNDR(NO IV,NO ORAL)(CPT=74176), 1/17/2019, 17:00.   INDICATIONS:  hit head monday and patient's urinalysis is negative comprehensive was normal lipase was normal.  CBC was within normal limits. .    I reexamined her abdomen soft nontender there is no rebound she is neurologically intact no other focal findings she was given a short course of

## 2019-12-14 NOTE — ED NOTES
Pt states her nausea is intermittent and she currently does not have it but it does return for a short time.

## 2019-12-14 NOTE — ED INITIAL ASSESSMENT (HPI)
Pt c/o headache and nausea. Pt states on Monday she was on her way to the cardiologies and getting into the car she hit the back of her head. Pt denies LOC at that time.

## 2019-12-16 ENCOUNTER — PATIENT OUTREACH (OUTPATIENT)
Dept: CASE MANAGEMENT | Age: 84
End: 2019-12-16

## 2019-12-16 PROBLEM — R11.0 NAUSEA: Status: ACTIVE | Noted: 2019-12-16

## 2019-12-16 PROBLEM — F07.81 POST CONCUSSION SYNDROME: Status: ACTIVE | Noted: 2019-12-16

## 2019-12-16 NOTE — PROGRESS NOTES
Meera Fan is a 80year old female. Patient presents with:  Er F/u: cn room 4: er folllow up for hitting head       HPI:     Patient here for ER f/u. She hit her head getting into a car last Monday (12/9).  The impact was so hard that she had L (ZENPEP) 50971-019122 units Oral Cap DR Particles Take 1 capsule by mouth 3 (three) times daily before meals. Take 1 PO prior to snacks 450 capsule 3   • predniSONE 5 MG Oral Tab Take 1 tablet (5 mg total) by mouth daily.  (Patient taking differently: Take • [facility]    skin cancer in left arm   • Constipation    • Coronary atherosclerosis of unspecified type of vessel, native or graft    • Diarrhea, unspecified    • Dyslipidemia    • Extrinsic asthma, unspecified    • Fatigue    • Glucose intoler Comment:Pneumonia             Pneumonia  Celebrex [Celecoxib]    SHORTNESS OF BREATH  Fish Oil                ANAPHYLAXIS  Iodine Solution [Po*    ANAPHYLAXIS    Comment:IVP Dye  Levofloxacin            SHORTNESS OF BREATH    Comment:Nausea, leg pain  Macr denies chest pain  GI: denies abdominal pain, +nausea  : no dysuria  NEURO: +headaches  PSYCH: No reported depression   HEME: No adenopathy      EXAM:   /84 (BP Location: Right arm, Patient Position: Sitting, Cuff Size: adult)   Pulse 92   Resp 18

## 2019-12-16 NOTE — PROGRESS NOTES
Spoke with patient. Pt states she was in the ED on 12/14/19 because she injured her head and had abdomina pain. Pt is requesting hospital follow up appointment with PCP. I scheduled pt for 12/16/19 at 4 pm with Dr. Vesna Tony.     Time Spent This Encounter To

## 2019-12-18 ENCOUNTER — CLINICAL ABSTRACT (OUTPATIENT)
Dept: CARDIOLOGY | Age: 84
End: 2019-12-18

## 2019-12-27 ENCOUNTER — TELEPHONE (OUTPATIENT)
Dept: INTERNAL MEDICINE CLINIC | Facility: CLINIC | Age: 84
End: 2019-12-27

## 2019-12-27 NOTE — TELEPHONE ENCOUNTER
Spoke with pt and went over post concussive treatment: brain rest, no extended periods of reading, TV, bright lights, loud noises.  She is aware sxs can last for several weeks and will monitor and call if they are improving and she still has memory issues o

## 2019-12-27 NOTE — TELEPHONE ENCOUNTER
Concerned after having concussion 12/14/19. And some memory confusion. Was unable to remember what she had to eat for Marjorie. Pt is concerned. Usually has a good memory.

## 2019-12-30 NOTE — TELEPHONE ENCOUNTER
Pt would like a call back again, does not remember being talked to by the nurse. Pt still has questions regarding her concussion.

## 2019-12-30 NOTE — TELEPHONE ENCOUNTER
Called pt stating does recall conversation with RN on 12/27/19. Pt stating neck pain improving. No N/V. No HA. No dizziness. Memory slowing improving. No worsening sx. Pt will continue to rest and avoid too much stimuli.  Scheduled f/u OV on 1/7/20 as reque

## 2019-12-31 PROCEDURE — 99490 CHRNC CARE MGMT STAFF 1ST 20: CPT

## 2020-01-07 NOTE — PROGRESS NOTES
Chelly Mg is a 80year old female. Patient presents with:   Follow - Up: cn room 3: follow up from concussion      HPI:     H/o head injury 12/9 (see my last OV), suffering post concussion syndrome with HA, neck pain, sensitivity to different s daily before meals. Take 1 PO prior to snacks 450 capsule 3   • predniSONE 5 MG Oral Tab Take 1 tablet (5 mg total) by mouth daily.  (Patient taking differently: Take 2.5 mg by mouth daily.  ) 90 tablet 1   • gabapentin 300 MG Oral Cap Take 1 capsule (300 m atherosclerosis of unspecified type of vessel, native or graft    • Diarrhea, unspecified    • Dyslipidemia    • Extrinsic asthma, unspecified    • Fatigue    • Glucose intolerance (pre-diabetes)    • Hearing impairment     HEARING LOSS BUT NO AIDS   • Hea BREATH  Fish Oil                ANAPHYLAXIS  Iodine Solution [Po*    ANAPHYLAXIS    Comment:IVP Dye  Levofloxacin            SHORTNESS OF BREATH    Comment:Nausea, leg pain  Macrobid [Nitrofura*    SHORTNESS OF BREATH  Nsaids                  SHORTNESS OF Right arm, Patient Position: Sitting, Cuff Size: adult)   Pulse 104   Resp 20   Ht 61\"   Wt 172 lb 12.8 oz (78.4 kg)   LMP  (LMP Unknown)   SpO2 97%   BMI 32.65 kg/m²   GENERAL: well developed, well nourished,in no apparent distress  SKIN: no rashes,no camacho

## 2020-01-08 ENCOUNTER — PATIENT OUTREACH (OUTPATIENT)
Dept: CASE MANAGEMENT | Age: 85
End: 2020-01-08

## 2020-01-08 DIAGNOSIS — J45.40 MODERATE PERSISTENT ASTHMA WITHOUT COMPLICATION: ICD-10-CM

## 2020-01-08 DIAGNOSIS — Z91.81 AT RISK FOR FALLING: ICD-10-CM

## 2020-01-08 DIAGNOSIS — E78.00 PURE HYPERCHOLESTEROLEMIA: ICD-10-CM

## 2020-01-08 DIAGNOSIS — M17.11 PRIMARY OSTEOARTHRITIS OF RIGHT KNEE: ICD-10-CM

## 2020-01-08 DIAGNOSIS — E78.5 DYSLIPIDEMIA: ICD-10-CM

## 2020-01-08 DIAGNOSIS — Z85.828 HISTORY OF SKIN CANCER: ICD-10-CM

## 2020-01-08 DIAGNOSIS — K58.0 IRRITABLE BOWEL SYNDROME WITH DIARRHEA: ICD-10-CM

## 2020-01-08 DIAGNOSIS — I10 HYPERTENSION, BENIGN: ICD-10-CM

## 2020-01-08 DIAGNOSIS — M47.816 OSTEOARTHRITIS OF SPINE WITHOUT MYELOPATHY OR RADICULOPATHY, LUMBAR REGION: ICD-10-CM

## 2020-01-08 NOTE — PROGRESS NOTES
Called patient for monthly CCM outreach. Pt currently waiting for a ride and is unable to talk. Pt asked to give her a call back at a later time.     Time Spent This Encounter Total: 5 minutes medical record review, telephone communication, care plan update

## 2020-01-08 NOTE — PROGRESS NOTES
1/8/2020  Spoke to Eliud Angeles for CCM.       Updates to patient care team/ comments: Added Dr. Lance Green  Patient reported changes in medications: None  Med Adherence  Comment: Taking as prescribed    Health Maintenance:  Pneumococcal PPSV23/PCV13 65+ Years / Low and Plan:    New Goal (if previous met)  • My goal for next month is: Patient stated she would like follow up with ophthalmologist regarding growth on left lower eyelid.     • Patient Reported Barriers And Concerns: Pt had to reschedule appointment with Dr. Katina Wiseman

## 2020-01-13 ENCOUNTER — TELEPHONE (OUTPATIENT)
Dept: INTERNAL MEDICINE CLINIC | Facility: CLINIC | Age: 85
End: 2020-01-13

## 2020-01-13 ENCOUNTER — PATIENT OUTREACH (OUTPATIENT)
Dept: CASE MANAGEMENT | Age: 85
End: 2020-01-13

## 2020-01-13 ENCOUNTER — TELEPHONE (OUTPATIENT)
Dept: CARDIOLOGY | Age: 85
End: 2020-01-13

## 2020-01-13 DIAGNOSIS — Z91.81 AT RISK FOR FALLING: ICD-10-CM

## 2020-01-13 DIAGNOSIS — I10 HYPERTENSION, BENIGN: ICD-10-CM

## 2020-01-13 DIAGNOSIS — E78.5 DYSLIPIDEMIA: ICD-10-CM

## 2020-01-13 DIAGNOSIS — R11.0 NAUSEA: ICD-10-CM

## 2020-01-13 DIAGNOSIS — E78.00 PURE HYPERCHOLESTEROLEMIA: ICD-10-CM

## 2020-01-13 DIAGNOSIS — Z86.69 HX OF MIGRAINES: ICD-10-CM

## 2020-01-13 DIAGNOSIS — J45.40 MODERATE PERSISTENT ASTHMA WITHOUT COMPLICATION: ICD-10-CM

## 2020-01-13 DIAGNOSIS — M47.816 OSTEOARTHRITIS OF SPINE WITHOUT MYELOPATHY OR RADICULOPATHY, LUMBAR REGION: ICD-10-CM

## 2020-01-13 DIAGNOSIS — M17.11 PRIMARY OSTEOARTHRITIS OF RIGHT KNEE: ICD-10-CM

## 2020-01-13 DIAGNOSIS — Z85.828 HISTORY OF SKIN CANCER: ICD-10-CM

## 2020-01-13 DIAGNOSIS — K58.0 IRRITABLE BOWEL SYNDROME WITH DIARRHEA: ICD-10-CM

## 2020-01-13 DIAGNOSIS — R29.898 WEAKNESS OF BOTH LEGS: ICD-10-CM

## 2020-01-13 NOTE — PROGRESS NOTES
Patient called me stating she forgot to mention a couple of things to Dr. Jannette Pennington at recent office visit on 1/7/20. Pt states she had notified PCP about bad odor in apartment and she had woken up on 12/29 feeling nauseous and sick.  Pt went to [employer] and s

## 2020-01-13 NOTE — TELEPHONE ENCOUNTER
Patient called stating she forgot to mention to Dr. Zahida Mathew at most recent [address] on 1/7/20 that she woke up on 12/29 feeling nauseous and sick from bad odor in apartment. Pt went to [employer] and to eat that morning and symptoms got better.  Pt states she woke u

## 2020-01-13 NOTE — TELEPHONE ENCOUNTER
Called pt stating last week over weekend smelled bad burning odor coming from kitchen causing HA and nausea. Pt resumed daily activities as usual and sx subsided.  Pt noted again yesterday (1/12/20) upon waking up nausea, slightly weak from poor oral intake

## 2020-01-17 ENCOUNTER — PATIENT OUTREACH (OUTPATIENT)
Dept: CASE MANAGEMENT | Age: 85
End: 2020-01-17

## 2020-01-17 RX ORDER — ONDANSETRON 4 MG/1
4 TABLET, ORALLY DISINTEGRATING ORAL EVERY 4 HOURS PRN
Qty: 10 TABLET | Refills: 0 | Status: SHIPPED | OUTPATIENT
Start: 2020-01-17 | End: 2020-01-24

## 2020-01-17 NOTE — PROGRESS NOTES
Spoke with patient. Pt states she would like to cancel appointment with Dr. Nubia Johnson for today due to weather and transportation. Pt relates nausea is on and off. Pt is requesting refill on Zofran just in case she feels nauseous this weekend.  Pt would like to

## 2020-01-17 NOTE — TELEPHONE ENCOUNTER
S/w patient she canceled appointment scheduled for today with Dr. Inderjit Vazquez due to weather and transportation. Pt states she was advised by daughter to also cancel appointment and to monitor symptoms (nausea & HA). Pt relates nausea is on and off.  Pt is reques

## 2020-01-24 ENCOUNTER — PATIENT OUTREACH (OUTPATIENT)
Dept: CASE MANAGEMENT | Age: 85
End: 2020-01-24

## 2020-01-24 ENCOUNTER — OFFICE VISIT (OUTPATIENT)
Dept: INTERNAL MEDICINE CLINIC | Facility: CLINIC | Age: 85
End: 2020-01-24
Payer: MEDICARE

## 2020-01-24 VITALS
DIASTOLIC BLOOD PRESSURE: 82 MMHG | HEIGHT: 61 IN | RESPIRATION RATE: 18 BRPM | SYSTOLIC BLOOD PRESSURE: 148 MMHG | TEMPERATURE: 98 F | OXYGEN SATURATION: 97 % | WEIGHT: 173.19 LBS | BODY MASS INDEX: 32.7 KG/M2 | HEART RATE: 92 BPM

## 2020-01-24 DIAGNOSIS — I10 HYPERTENSION, BENIGN: Primary | ICD-10-CM

## 2020-01-24 DIAGNOSIS — R11.0 NAUSEA: ICD-10-CM

## 2020-01-24 DIAGNOSIS — M54.50 CHRONIC RIGHT-SIDED LOW BACK PAIN WITHOUT SCIATICA: ICD-10-CM

## 2020-01-24 DIAGNOSIS — G89.29 CHRONIC RIGHT-SIDED LOW BACK PAIN WITHOUT SCIATICA: ICD-10-CM

## 2020-01-24 PROCEDURE — 99214 OFFICE O/P EST MOD 30 MIN: CPT | Performed by: INTERNAL MEDICINE

## 2020-01-24 RX ORDER — TIZANIDINE 2 MG/1
2 TABLET ORAL NIGHTLY PRN
Qty: 30 TABLET | Refills: 2 | Status: ON HOLD | OUTPATIENT
Start: 2020-01-24 | End: 2020-02-17

## 2020-01-24 RX ORDER — LISINOPRIL 10 MG/1
10 TABLET ORAL DAILY
Qty: 30 TABLET | Refills: 2 | Status: SHIPPED | OUTPATIENT
Start: 2020-01-24 | End: 2020-02-06 | Stop reason: ALTCHOICE

## 2020-01-24 NOTE — PROGRESS NOTES
Returned patient's call. Pt states she will be seeing Dr. Christy Silverio today and is awaiting call for transportation.     Time Spent This Encounter Total: 3 minutes medical record review, telephone communication, care plan updates where needed, education, goals

## 2020-01-24 NOTE — PROGRESS NOTES
Destini Cancer is a 80year old female. Patient presents with: Follow - Up: cn room 3: still not feeling well      HPI:     Patient not feeling well. Elevated BP noted, taking verapamil daily.  +HA and neck aches  No CP/SOB  C/o right sided LBP, ac tablet (2 mg total) by mouth nightly as needed. 30 tablet 2   • ondansetron 4 MG Oral Tablet Dispersible Take 1 tablet (4 mg total) by mouth every 4 (four) hours as needed for Nausea.  10 tablet 0   • Pancrelipase, Lip-Prot-Amyl, (ZENPEP) [id number] units • Arthritis    • Asthma    • Atherosclerosis of coronary artery    • Back pain    • Back problem     cervical pain   • Black stools    • Blood in the stool    • Blood transfusion reaction    • CAD (coronary artery disease)    • Cancer ([facility].) 2010    skin Disorder Sister    • Asthma Sister    • Cancer Brother    • Allergies Daughter    • Fibromyalgia Daughter    • Melanoma Daughter    • Other (Hepatitis C) Daughter         Allergies    Breo Ellipta            OTHER (SEE COMMENTS)    Comment:Pneumonia attack  Wellbutrin [Bupropi*        Comment:constipation  Flulaval                RASH      REVIEW OF SYSTEMS:   GENERAL HEALTH:  no fevers or chills  SKIN: denies any unusual skin lesions or rashes  RESPIRATORY: no SOB  CARDIOVASCULAR: denies chest pain Instructions on file for this visit. The patient indicates understanding of these issues and agrees to the plan.

## 2020-01-29 ENCOUNTER — PATIENT OUTREACH (OUTPATIENT)
Dept: CASE MANAGEMENT | Age: 85
End: 2020-01-29

## 2020-01-29 ENCOUNTER — TELEPHONE (OUTPATIENT)
Dept: INTERNAL MEDICINE CLINIC | Facility: CLINIC | Age: 85
End: 2020-01-29

## 2020-01-29 DIAGNOSIS — Z85.828 HISTORY OF SKIN CANCER: ICD-10-CM

## 2020-01-29 DIAGNOSIS — M47.816 OSTEOARTHRITIS OF SPINE WITHOUT MYELOPATHY OR RADICULOPATHY, LUMBAR REGION: ICD-10-CM

## 2020-01-29 DIAGNOSIS — I10 HYPERTENSION, BENIGN: ICD-10-CM

## 2020-01-29 DIAGNOSIS — K58.0 IRRITABLE BOWEL SYNDROME WITH DIARRHEA: ICD-10-CM

## 2020-01-29 DIAGNOSIS — M54.12 CERVICAL RADICULOPATHY AT C5: ICD-10-CM

## 2020-01-29 DIAGNOSIS — E78.00 PURE HYPERCHOLESTEROLEMIA: ICD-10-CM

## 2020-01-29 DIAGNOSIS — M16.12 OSTEOARTHRITIS OF ONE HIP, LEFT: ICD-10-CM

## 2020-01-29 DIAGNOSIS — R29.898 WEAKNESS OF BOTH LEGS: ICD-10-CM

## 2020-01-29 DIAGNOSIS — E78.5 DYSLIPIDEMIA: ICD-10-CM

## 2020-01-29 DIAGNOSIS — M17.11 PRIMARY OSTEOARTHRITIS OF RIGHT KNEE: ICD-10-CM

## 2020-01-29 DIAGNOSIS — M81.0 AGE-RELATED OSTEOPOROSIS WITHOUT CURRENT PATHOLOGICAL FRACTURE: ICD-10-CM

## 2020-01-29 DIAGNOSIS — R06.00 DYSPNEA, UNSPECIFIED TYPE: ICD-10-CM

## 2020-01-29 DIAGNOSIS — Z91.81 AT RISK FOR FALLING: ICD-10-CM

## 2020-01-29 DIAGNOSIS — J45.40 MODERATE PERSISTENT ASTHMA WITHOUT COMPLICATION: ICD-10-CM

## 2020-01-29 NOTE — TELEPHONE ENCOUNTER
S/w pt states she was given Lisinopril and Tizanidine at most recent OV on 1/24/20. Pt relates she does not want to take these medications at this time due to possible side effects.  Pt states she has tried multiple meds in the past and developed side effec

## 2020-01-29 NOTE — PROGRESS NOTES
Returned patient's call. Pt states she followed up with Dr. Cal Coronado on 1/24/20. Pt relates she was given Lisinopril and Tizanidine. Pt states she does not want to take these medications at this time due to possible side effects.  Pt indicates she has tried

## 2020-01-30 ENCOUNTER — PATIENT OUTREACH (OUTPATIENT)
Dept: CASE MANAGEMENT | Age: 85
End: 2020-01-30

## 2020-01-31 ENCOUNTER — TELEPHONE (OUTPATIENT)
Dept: INTERNAL MEDICINE CLINIC | Facility: CLINIC | Age: 85
End: 2020-01-31

## 2020-01-31 ENCOUNTER — HOSPITAL ENCOUNTER (EMERGENCY)
Facility: HOSPITAL | Age: 85
Discharge: HOME | End: 2020-01-31
Attending: EMERGENCY MEDICINE
Payer: MEDICARE

## 2020-01-31 ENCOUNTER — PATIENT OUTREACH (OUTPATIENT)
Dept: CASE MANAGEMENT | Age: 85
End: 2020-01-31

## 2020-01-31 VITALS
SYSTOLIC BLOOD PRESSURE: 162 MMHG | WEIGHT: 170 LBS | OXYGEN SATURATION: 94 % | DIASTOLIC BLOOD PRESSURE: 97 MMHG | RESPIRATION RATE: 18 BRPM | BODY MASS INDEX: 32.1 KG/M2 | HEIGHT: 61 IN | HEART RATE: 92 BPM | TEMPERATURE: 98 F

## 2020-01-31 DIAGNOSIS — R32 URINARY INCONTINENCE, UNSPECIFIED TYPE: Primary | ICD-10-CM

## 2020-01-31 LAB
BILIRUB UR QL STRIP.AUTO: NEGATIVE
CLARITY UR REFRACT.AUTO: CLEAR
GLUCOSE BLD-MCNC: 90 MG/DL (ref 70–99)
GLUCOSE UR STRIP.AUTO-MCNC: NEGATIVE MG/DL
KETONES UR STRIP.AUTO-MCNC: NEGATIVE MG/DL
LEUKOCYTE ESTERASE UR QL STRIP.AUTO: NEGATIVE
NITRITE UR QL STRIP.AUTO: NEGATIVE
PH UR STRIP.AUTO: 6 [PH] (ref 4.5–8)
PROT UR STRIP.AUTO-MCNC: NEGATIVE MG/DL
RBC UR QL AUTO: NEGATIVE
SP GR UR STRIP.AUTO: <1.005 (ref 1–1.03)
UROBILINOGEN UR STRIP.AUTO-MCNC: <2 MG/DL

## 2020-01-31 PROCEDURE — 81003 URINALYSIS AUTO W/O SCOPE: CPT | Performed by: EMERGENCY MEDICINE

## 2020-01-31 PROCEDURE — 82962 GLUCOSE BLOOD TEST: CPT

## 2020-01-31 PROCEDURE — G2058 CCM ADD 20MIN: HCPCS

## 2020-01-31 PROCEDURE — 99490 CHRNC CARE MGMT STAFF 1ST 20: CPT

## 2020-01-31 PROCEDURE — 99283 EMERGENCY DEPT VISIT LOW MDM: CPT

## 2020-01-31 RX ORDER — OXYBUTYNIN CHLORIDE 5 MG/1
5 TABLET ORAL DAILY
Qty: 30 TABLET | Refills: 0 | Status: ON HOLD | OUTPATIENT
Start: 2020-01-31 | End: 2020-02-17

## 2020-01-31 NOTE — ED INITIAL ASSESSMENT (HPI)
Patient to ED for urinary frequency and incontinence since yesterday. States she has experienced this before and it was related to a back injury. States \"something was pressing on a nerve\".  Patient states has had 4 back surgeries and always wears her bra

## 2020-01-31 NOTE — ED PROVIDER NOTES
Patient Seen in: [facility] Emergency Department      History   Patient presents with: Incontinence    Stated Complaint: inc or urine    HPI    Patient is an 49-year-old female comes emergency room for evaluation of incontinence.   Patient states sh organism unspecified(486)    • PONV (postoperative nausea and vomiting)    • Unspecified essential hypertension    • Visual impairment     glasses   • Wears glasses               Past Surgical History:   Procedure Laterality Date   • ANGIOPLASTY (CORONARY) noted in HPI. Constitutional and vital signs reviewed. All other systems reviewed and negative except as noted above.     Physical Exam     ED Triage Vitals [01/31/20 1208]   BP (!) 172/94   Pulse 97   Resp 18   Temp 98.2 °F (36.8 °C)   Temp src    Sp No clinical evidence for cauda equina. Normal perirectal sensation with good rectal tone. MRIs not indicated. Likely stress incontinence. Will start on Ditropan. I believe she can be discharged home. No evidence for urinary tract infection.   Patient

## 2020-01-31 NOTE — PROGRESS NOTES
Spoke with patient. Pt states yesterday morning she had an episode of urinary incontinence. Pt relates she has been wearing a pad since then. Pt denies any other urinary symptoms. Pt relates she has not had this in the past. Pt unsure of what to do.  I trie

## 2020-01-31 NOTE — TELEPHONE ENCOUNTER
S/w pt states she had an episode of urinary incontinence yesterday morning. Pt states she has been wearing a pad since. Pt denies any other urinary symptoms. I offered to schedule appointment w/ TB at 9:20 am pt unable to make it that early.  Please advise

## 2020-02-06 ENCOUNTER — OFFICE VISIT (OUTPATIENT)
Dept: INTERNAL MEDICINE CLINIC | Facility: CLINIC | Age: 85
End: 2020-02-06
Payer: MEDICARE

## 2020-02-06 VITALS
SYSTOLIC BLOOD PRESSURE: 144 MMHG | HEIGHT: 61 IN | RESPIRATION RATE: 18 BRPM | DIASTOLIC BLOOD PRESSURE: 84 MMHG | BODY MASS INDEX: 32.51 KG/M2 | HEART RATE: 96 BPM | WEIGHT: 172.19 LBS

## 2020-02-06 DIAGNOSIS — I10 BENIGN ESSENTIAL HTN: ICD-10-CM

## 2020-02-06 DIAGNOSIS — T78.40XA ALLERGIC REACTION TO DRUG, INITIAL ENCOUNTER: Primary | ICD-10-CM

## 2020-02-06 DIAGNOSIS — R35.0 URINARY FREQUENCY: ICD-10-CM

## 2020-02-06 PROCEDURE — 99213 OFFICE O/P EST LOW 20 MIN: CPT | Performed by: INTERNAL MEDICINE

## 2020-02-06 RX ORDER — SOLIFENACIN SUCCINATE 5 MG/1
5 TABLET, FILM COATED ORAL DAILY
Qty: 30 TABLET | Refills: 1 | Status: CANCELLED | OUTPATIENT
Start: 2020-02-06

## 2020-02-06 RX ORDER — VERAPAMIL HYDROCHLORIDE 240 MG/1
240 TABLET, FILM COATED, EXTENDED RELEASE ORAL DAILY
Qty: 90 TABLET | Refills: 0 | COMMUNITY
Start: 2020-02-06 | End: 2020-03-09

## 2020-02-06 NOTE — PROGRESS NOTES
Chelly Mg is a 80year old female. Patient presents with:  Er F/u: cn room 4: er follow up for frequent urinanation      HPI:     Patient here for ER f/u-  Went to ER 1/31 for frequency and incontinence. Urine culture was negative.  She was giv mg total) by mouth daily. 90 tablet 0   • Pancrelipase, Lip-Prot-Amyl, (ZENPEP) 92323-158316 units Oral Cap DR Particles Take 1 capsule by mouth 3 (three) times daily before meals.  Take 1 PO prior to snacks 450 capsule 3   • predniSONE 5 MG Oral Tab Take 1 \"Hayden\" meds   • Anxiety state, unspecified    • Arthritis    • Asthma    • Atherosclerosis of coronary artery    • Back pain    • Back problem     cervical pain   • Black stools    • Blood in the stool    • Blood transfusion reaction    • CAD (coronary (Autoimmune disease) Brother    • Thyroid Disorder Sister    • Asthma Sister    • Cancer Brother    • Allergies Daughter    • Fibromyalgia Daughter    • Melanoma Daughter    • Other (Hepatitis C) Daughter         Allergies    Breo Ellipta            OTHER the face and neck  Warfarin                OTHER (SEE COMMENTS)    Comment:Asthma attack  Wellbutrin [Bupropi*        Comment:constipation  Flulaval                RASH      REVIEW OF SYSTEMS:   GENERAL HEALTH:  no fevers or chills  RESPIRATORY: no cough

## 2020-02-07 ENCOUNTER — PATIENT OUTREACH (OUTPATIENT)
Dept: CASE MANAGEMENT | Age: 85
End: 2020-02-07

## 2020-02-07 DIAGNOSIS — M81.0 AGE-RELATED OSTEOPOROSIS WITHOUT CURRENT PATHOLOGICAL FRACTURE: ICD-10-CM

## 2020-02-07 DIAGNOSIS — E78.5 DYSLIPIDEMIA: ICD-10-CM

## 2020-02-07 DIAGNOSIS — R29.898 WEAKNESS OF BOTH LEGS: ICD-10-CM

## 2020-02-07 DIAGNOSIS — I10 HYPERTENSION, BENIGN: ICD-10-CM

## 2020-02-07 DIAGNOSIS — E78.00 PURE HYPERCHOLESTEROLEMIA: ICD-10-CM

## 2020-02-07 DIAGNOSIS — Z85.828 HISTORY OF SKIN CANCER: ICD-10-CM

## 2020-02-07 DIAGNOSIS — J45.40 MODERATE PERSISTENT ASTHMA WITHOUT COMPLICATION: ICD-10-CM

## 2020-02-07 DIAGNOSIS — K58.0 IRRITABLE BOWEL SYNDROME WITH DIARRHEA: ICD-10-CM

## 2020-02-07 DIAGNOSIS — Z91.81 AT RISK FOR FALLING: ICD-10-CM

## 2020-02-07 DIAGNOSIS — M47.816 OSTEOARTHRITIS OF SPINE WITHOUT MYELOPATHY OR RADICULOPATHY, LUMBAR REGION: ICD-10-CM

## 2020-02-07 DIAGNOSIS — M16.12 OSTEOARTHRITIS OF ONE HIP, LEFT: ICD-10-CM

## 2020-02-07 NOTE — PROGRESS NOTES
2/7/2020  Spoke to Laith Mary for CCM. Updates to patient care team/ comments: None  Patient reported changes in medications: None  Med Adherence  Comment: Taking as prescribed.      Health Maintenance:  Pneumococcal PPSV23/PCV13 65+ Years / Low and Medium time.    • Patient Reported New Barriers And Concerns: None at this time. - Plan for overcoming all barriers: N/A            Patient agrees to goal action plan.   Self-Management Abilities (patient reported)             1= least confident

## 2020-02-10 ENCOUNTER — PATIENT OUTREACH (OUTPATIENT)
Dept: CASE MANAGEMENT | Age: 85
End: 2020-02-10

## 2020-02-10 ENCOUNTER — TELEPHONE (OUTPATIENT)
Dept: INTERNAL MEDICINE CLINIC | Facility: CLINIC | Age: 85
End: 2020-02-10

## 2020-02-10 DIAGNOSIS — J45.40 MODERATE PERSISTENT ASTHMA WITHOUT COMPLICATION: ICD-10-CM

## 2020-02-10 DIAGNOSIS — Z85.828 HISTORY OF SKIN CANCER: ICD-10-CM

## 2020-02-10 DIAGNOSIS — M16.12 OSTEOARTHRITIS OF ONE HIP, LEFT: ICD-10-CM

## 2020-02-10 DIAGNOSIS — Z91.81 AT RISK FOR FALLING: ICD-10-CM

## 2020-02-10 DIAGNOSIS — E78.5 DYSLIPIDEMIA: ICD-10-CM

## 2020-02-10 DIAGNOSIS — K58.0 IRRITABLE BOWEL SYNDROME WITH DIARRHEA: ICD-10-CM

## 2020-02-10 DIAGNOSIS — I10 HYPERTENSION, BENIGN: ICD-10-CM

## 2020-02-10 DIAGNOSIS — M47.816 OSTEOARTHRITIS OF SPINE WITHOUT MYELOPATHY OR RADICULOPATHY, LUMBAR REGION: ICD-10-CM

## 2020-02-10 DIAGNOSIS — Z95.5 HISTORY OF HEART ARTERY STENT: ICD-10-CM

## 2020-02-10 DIAGNOSIS — M17.11 PRIMARY OSTEOARTHRITIS OF RIGHT KNEE: ICD-10-CM

## 2020-02-10 DIAGNOSIS — E78.00 PURE HYPERCHOLESTEROLEMIA: ICD-10-CM

## 2020-02-10 DIAGNOSIS — R29.898 WEAKNESS OF BOTH LEGS: ICD-10-CM

## 2020-02-10 DIAGNOSIS — R11.0 NAUSEA: ICD-10-CM

## 2020-02-10 NOTE — TELEPHONE ENCOUNTER
Called pt stating had felt tired, slightly weak and nauseas on 2/7/20. Saturday same sx, noted pulse @111. Pt did not want to go to  over weekend. Today feeling much better. No weakness. Nausea has subsided. Ok oral intake, keeping hydrated.  No fever o

## 2020-02-10 NOTE — PROGRESS NOTES
Spoke with patient states she was feeling sick over the weekend. Pt is feeling tired, weak and nauseous since Friday. Pt states she would feel this way in the past due to bad odor in her apartment. Pt relates odor is not present this time.  Pt states she to

## 2020-02-10 NOTE — TELEPHONE ENCOUNTER
S/w pt states she is feeling tired, weak, and nauseous since 2/7/20. Pt states she would feel this way in the past due to bad odor in her apartment. Pt relates odor is not present this time. Pt states she checked her BP and pulse was elevated to 111.  Pt ha

## 2020-02-11 ENCOUNTER — PATIENT OUTREACH (OUTPATIENT)
Dept: CASE MANAGEMENT | Age: 85
End: 2020-02-11

## 2020-02-11 NOTE — PROGRESS NOTES
[facility] Provider Huddle  Date: 2/11/2020      Provider: Dr. Jay Hand: Dwain Constantino      Status changes/needs:     Concerns to be addressed:  · Patient c/o nausea, weakness, and fatigue due to odor in apartment going on for several months.

## 2020-02-13 ENCOUNTER — APPOINTMENT (OUTPATIENT)
Dept: CT IMAGING | Facility: HOSPITAL | Age: 85
End: 2020-02-13
Attending: PHYSICIAN ASSISTANT
Payer: MEDICARE

## 2020-02-13 ENCOUNTER — HOSPITAL ENCOUNTER (EMERGENCY)
Facility: HOSPITAL | Age: 85
Discharge: HOME | End: 2020-02-13
Attending: EMERGENCY MEDICINE
Payer: MEDICARE

## 2020-02-13 ENCOUNTER — TELEPHONE (OUTPATIENT)
Dept: INTERNAL MEDICINE CLINIC | Facility: CLINIC | Age: 85
End: 2020-02-13

## 2020-02-13 ENCOUNTER — PATIENT OUTREACH (OUTPATIENT)
Dept: CASE MANAGEMENT | Age: 85
End: 2020-02-13

## 2020-02-13 VITALS
HEART RATE: 80 BPM | RESPIRATION RATE: 13 BRPM | OXYGEN SATURATION: 95 % | SYSTOLIC BLOOD PRESSURE: 145 MMHG | WEIGHT: 170 LBS | TEMPERATURE: 99 F | HEIGHT: 60 IN | DIASTOLIC BLOOD PRESSURE: 79 MMHG | BODY MASS INDEX: 33.38 KG/M2

## 2020-02-13 DIAGNOSIS — R19.7 DIARRHEA, UNSPECIFIED TYPE: Primary | ICD-10-CM

## 2020-02-13 LAB
ALBUMIN SERPL-MCNC: 3.3 G/DL (ref 3.4–5)
ALBUMIN/GLOB SERPL: 0.9 {RATIO} (ref 1–2)
ALP LIVER SERPL-CCNC: 72 U/L (ref 55–142)
ALT SERPL-CCNC: 17 U/L (ref 13–56)
ANION GAP SERPL CALC-SCNC: 7 MMOL/L (ref 0–18)
AST SERPL-CCNC: 21 U/L (ref 15–37)
BASOPHILS # BLD AUTO: 0.04 X10(3) UL (ref 0–0.2)
BASOPHILS NFR BLD AUTO: 0.7 %
BILIRUB SERPL-MCNC: 0.5 MG/DL (ref 0.1–2)
BILIRUB UR QL STRIP.AUTO: NEGATIVE
BUN BLD-MCNC: 17 MG/DL (ref 7–18)
BUN/CREAT SERPL: 18.1 (ref 10–20)
CALCIUM BLD-MCNC: 8.8 MG/DL (ref 8.5–10.1)
CHLORIDE SERPL-SCNC: 109 MMOL/L (ref 98–112)
CLARITY UR REFRACT.AUTO: CLEAR
CO2 SERPL-SCNC: 22 MMOL/L (ref 21–32)
CREAT BLD-MCNC: 0.94 MG/DL (ref 0.55–1.02)
DEPRECATED RDW RBC AUTO: 46.3 FL (ref 35.1–46.3)
EOSINOPHIL # BLD AUTO: 0.08 X10(3) UL (ref 0–0.7)
EOSINOPHIL NFR BLD AUTO: 1.4 %
ERYTHROCYTE [DISTWIDTH] IN BLOOD BY AUTOMATED COUNT: 14.6 % (ref 11–15)
GLOBULIN PLAS-MCNC: 3.7 G/DL (ref 2.8–4.4)
GLUCOSE BLD-MCNC: 111 MG/DL (ref 70–99)
GLUCOSE UR STRIP.AUTO-MCNC: NEGATIVE MG/DL
HCT VFR BLD AUTO: 45.2 % (ref 35–48)
HGB BLD-MCNC: 14.6 G/DL (ref 12–16)
IMM GRANULOCYTES # BLD AUTO: 0.02 X10(3) UL (ref 0–1)
IMM GRANULOCYTES NFR BLD: 0.3 %
KETONES UR STRIP.AUTO-MCNC: NEGATIVE MG/DL
LYMPHOCYTES # BLD AUTO: 2.59 X10(3) UL (ref 1–4)
LYMPHOCYTES NFR BLD AUTO: 45 %
M PROTEIN MFR SERPL ELPH: 7 G/DL (ref 6.4–8.2)
MCH RBC QN AUTO: 28 PG (ref 26–34)
MCHC RBC AUTO-ENTMCNC: 32.3 G/DL (ref 31–37)
MCV RBC AUTO: 86.8 FL (ref 80–100)
MONOCYTES # BLD AUTO: 0.83 X10(3) UL (ref 0.1–1)
MONOCYTES NFR BLD AUTO: 14.4 %
NEUTROPHILS # BLD AUTO: 2.19 X10 (3) UL (ref 1.5–7.7)
NEUTROPHILS # BLD AUTO: 2.19 X10(3) UL (ref 1.5–7.7)
NEUTROPHILS NFR BLD AUTO: 38.2 %
NITRITE UR QL STRIP.AUTO: NEGATIVE
OSMOLALITY SERPL CALC.SUM OF ELEC: 288 MOSM/KG (ref 275–295)
PH UR STRIP.AUTO: 6 [PH] (ref 4.5–8)
PLATELET # BLD AUTO: 237 10(3)UL (ref 150–450)
POTASSIUM SERPL-SCNC: 3.4 MMOL/L (ref 3.5–5.1)
PROT UR STRIP.AUTO-MCNC: NEGATIVE MG/DL
RBC # BLD AUTO: 5.21 X10(6)UL (ref 3.8–5.3)
RBC UR QL AUTO: NEGATIVE
SODIUM SERPL-SCNC: 138 MMOL/L (ref 136–145)
SP GR UR STRIP.AUTO: 1 (ref 1–1.03)
UROBILINOGEN UR STRIP.AUTO-MCNC: <2 MG/DL
WBC # BLD AUTO: 5.8 X10(3) UL (ref 4–11)

## 2020-02-13 PROCEDURE — 96361 HYDRATE IV INFUSION ADD-ON: CPT

## 2020-02-13 PROCEDURE — 99284 EMERGENCY DEPT VISIT MOD MDM: CPT

## 2020-02-13 PROCEDURE — 74176 CT ABD & PELVIS W/O CONTRAST: CPT | Performed by: PHYSICIAN ASSISTANT

## 2020-02-13 PROCEDURE — 96360 HYDRATION IV INFUSION INIT: CPT

## 2020-02-13 PROCEDURE — 80053 COMPREHEN METABOLIC PANEL: CPT | Performed by: PHYSICIAN ASSISTANT

## 2020-02-13 PROCEDURE — 85025 COMPLETE CBC W/AUTO DIFF WBC: CPT | Performed by: PHYSICIAN ASSISTANT

## 2020-02-13 PROCEDURE — 81001 URINALYSIS AUTO W/SCOPE: CPT | Performed by: PHYSICIAN ASSISTANT

## 2020-02-13 RX ORDER — POTASSIUM CHLORIDE 20 MEQ/1
40 TABLET, EXTENDED RELEASE ORAL ONCE
Status: COMPLETED | OUTPATIENT
Start: 2020-02-13 | End: 2020-02-13

## 2020-02-13 NOTE — PROGRESS NOTES
Pt relates she had an episode of diarrhea and vomiting on 2/10/20. Pt is having diarrhea since Monday. Pt unable to eat anything. Pt had soup today and had diarrhea right after. Pt is pushing fluids. Pt taking Imodium and Nova Dyke which is not helping.  Sent

## 2020-02-13 NOTE — TELEPHONE ENCOUNTER
S/w pt states she was having diarrhea and vomiting on 2/10/20. Pt continues to have diarrhea since and she is unable to eat. Pt had soup today and had diarrhea right after. Pt is pushing fluids. Pt is taking Imodium and Jazmine Blazer which is not helping.  Please

## 2020-02-13 NOTE — TELEPHONE ENCOUNTER
Called pt stating several episodes of vomiting Monday (2/10/20) night, then subsided. Tuesday started with diarrhea. At times uncontrollable, wears kotex pads. No improvements with imodium. C/o weakness. No blood. No abd pain or swelling. No dizziness.  No

## 2020-02-14 NOTE — ED PROVIDER NOTES
Patient Seen in: [facility] Emergency Department      History   Patient presents with:  Nausea/Vomiting/Diarrhea    Stated Complaint: Diarrhea.      HPI    CHIEF COMPLAINT: Diarrhea    HISTORY OF PRESENT ILLNESS:  Hung Samuel is a pleasant 51-year-old fem History:   Diagnosis Date   • Anesthesia complication     allergic reaction to \"Hayden\" meds   • Anxiety state, unspecified    • Arthritis    • Asthma    • Atherosclerosis of coronary artery    • Back pain    • Back problem     cervical pain   • Black sto (EUS) N/A 7/10/2014    Performed by Eron Barton MD at [facility] ENDOSCOPY   • ESOPHAGOGASTRODUODENOSCOPY (EGD) N/A 4/24/2018    Performed by Narcisa Daley MD at [facility] ENDOSCOPY   • [address]     • GASTROSCOPY  10/21/2013    Performed by A regular rate and rhythm, normal S1, S2  Gastrointestinal:  abdomen is soft with tenderness noted along the right abdomen along her old incisional line. No hernia noted. No masses, bowel sounds normal. No rebound, guarding or rigidity noted.  No abdominal intra-abdominal or pelvic process.    Dictated by: Yajaira Muñoz MD on 2/13/2020 at 21:25     Approved by: Yajaira Muñoz MD on 2/13/2020 at 21:30    2143: Patient is alert and oriented, we discussed her laboratory work-up and she is gone try to [facility] (primary encounter diagnosis)    Disposition:  Discharge  2/13/2020 10:20 pm    Follow-up:  Vidal Pickens MD  [address] [place] [address]  [phone]    Schedule an appointment as soon as possible for a visit in 1 day  For reeval

## 2020-02-14 NOTE — ED INITIAL ASSESSMENT (HPI)
Nausea, vomiting, and diarrhea since Monday. Vomiting has since subsided but patient continues to have diarrhea and decreased po intake.

## 2020-02-15 ENCOUNTER — LAB ENCOUNTER (OUTPATIENT)
Dept: LAB | Facility: HOSPITAL | Age: 85
End: 2020-02-15
Attending: PHYSICIAN ASSISTANT
Payer: MEDICARE

## 2020-02-15 DIAGNOSIS — R19.7 DIARRHEA, UNSPECIFIED TYPE: ICD-10-CM

## 2020-02-15 PROCEDURE — 82272 OCCULT BLD FECES 1-3 TESTS: CPT

## 2020-02-15 PROCEDURE — 87077 CULTURE AEROBIC IDENTIFY: CPT

## 2020-02-15 PROCEDURE — 87493 C DIFF AMPLIFIED PROBE: CPT

## 2020-02-15 PROCEDURE — 87045 FECES CULTURE AEROBIC BACT: CPT

## 2020-02-15 PROCEDURE — 87427 SHIGA-LIKE TOXIN AG IA: CPT

## 2020-02-15 PROCEDURE — 87046 STOOL CULTR AEROBIC BACT EA: CPT

## 2020-02-16 ENCOUNTER — HOSPITAL ENCOUNTER (EMERGENCY)
Facility: HOSPITAL | Age: 85
Discharge: HOME | End: 2020-02-16
Attending: EMERGENCY MEDICINE
Payer: MEDICARE

## 2020-02-16 ENCOUNTER — APPOINTMENT (OUTPATIENT)
Dept: CT IMAGING | Facility: HOSPITAL | Age: 85
End: 2020-02-16
Attending: EMERGENCY MEDICINE
Payer: MEDICARE

## 2020-02-16 VITALS
BODY MASS INDEX: 33.79 KG/M2 | DIASTOLIC BLOOD PRESSURE: 90 MMHG | TEMPERATURE: 98 F | OXYGEN SATURATION: 96 % | WEIGHT: 179 LBS | HEIGHT: 61 IN | RESPIRATION RATE: 16 BRPM | SYSTOLIC BLOOD PRESSURE: 172 MMHG | HEART RATE: 69 BPM

## 2020-02-16 DIAGNOSIS — A04.72 INTESTINAL INFECTION DUE TO CLOSTRIDIUM DIFFICILE: Primary | ICD-10-CM

## 2020-02-16 LAB
ALBUMIN SERPL-MCNC: 3.4 G/DL (ref 3.4–5)
ALBUMIN/GLOB SERPL: 0.9 {RATIO} (ref 1–2)
ALP LIVER SERPL-CCNC: 80 U/L (ref 55–142)
ALT SERPL-CCNC: 29 U/L (ref 13–56)
ANION GAP SERPL CALC-SCNC: 5 MMOL/L (ref 0–18)
AST SERPL-CCNC: 26 U/L (ref 15–37)
BASOPHILS # BLD AUTO: 0.04 X10(3) UL (ref 0–0.2)
BASOPHILS NFR BLD AUTO: 0.7 %
BILIRUB SERPL-MCNC: 0.4 MG/DL (ref 0.1–2)
BUN BLD-MCNC: 12 MG/DL (ref 7–18)
BUN/CREAT SERPL: 14.1 (ref 10–20)
C DIFF TOX B STL QL: POSITIVE
CALCIUM BLD-MCNC: 9 MG/DL (ref 8.5–10.1)
CHLORIDE SERPL-SCNC: 108 MMOL/L (ref 98–112)
CO2 SERPL-SCNC: 28 MMOL/L (ref 21–32)
CREAT BLD-MCNC: 0.85 MG/DL (ref 0.55–1.02)
DEPRECATED RDW RBC AUTO: 46.1 FL (ref 35.1–46.3)
EOSINOPHIL # BLD AUTO: 0.03 X10(3) UL (ref 0–0.7)
EOSINOPHIL NFR BLD AUTO: 0.5 %
ERYTHROCYTE [DISTWIDTH] IN BLOOD BY AUTOMATED COUNT: 14.4 % (ref 11–15)
GLOBULIN PLAS-MCNC: 3.6 G/DL (ref 2.8–4.4)
GLUCOSE BLD-MCNC: 124 MG/DL (ref 70–99)
HCT VFR BLD AUTO: 42.2 % (ref 35–48)
HGB BLD-MCNC: 13.4 G/DL (ref 12–16)
IMM GRANULOCYTES # BLD AUTO: 0.02 X10(3) UL (ref 0–1)
IMM GRANULOCYTES NFR BLD: 0.3 %
LYMPHOCYTES # BLD AUTO: 1.72 X10(3) UL (ref 1–4)
LYMPHOCYTES NFR BLD AUTO: 29 %
M PROTEIN MFR SERPL ELPH: 7 G/DL (ref 6.4–8.2)
MCH RBC QN AUTO: 27.6 PG (ref 26–34)
MCHC RBC AUTO-ENTMCNC: 31.8 G/DL (ref 31–37)
MCV RBC AUTO: 87 FL (ref 80–100)
MONOCYTES # BLD AUTO: 0.36 X10(3) UL (ref 0.1–1)
MONOCYTES NFR BLD AUTO: 6.1 %
NEUTROPHILS # BLD AUTO: 3.76 X10 (3) UL (ref 1.5–7.7)
NEUTROPHILS # BLD AUTO: 3.76 X10(3) UL (ref 1.5–7.7)
NEUTROPHILS NFR BLD AUTO: 63.4 %
OSMOLALITY SERPL CALC.SUM OF ELEC: 293 MOSM/KG (ref 275–295)
PLATELET # BLD AUTO: 264 10(3)UL (ref 150–450)
POTASSIUM SERPL-SCNC: 3.9 MMOL/L (ref 3.5–5.1)
RBC # BLD AUTO: 4.85 X10(6)UL (ref 3.8–5.3)
SODIUM SERPL-SCNC: 141 MMOL/L (ref 136–145)
WBC # BLD AUTO: 5.9 X10(3) UL (ref 4–11)

## 2020-02-16 PROCEDURE — 99284 EMERGENCY DEPT VISIT MOD MDM: CPT

## 2020-02-16 PROCEDURE — 85025 COMPLETE CBC W/AUTO DIFF WBC: CPT | Performed by: EMERGENCY MEDICINE

## 2020-02-16 PROCEDURE — 80053 COMPREHEN METABOLIC PANEL: CPT | Performed by: EMERGENCY MEDICINE

## 2020-02-16 PROCEDURE — 36415 COLL VENOUS BLD VENIPUNCTURE: CPT

## 2020-02-16 PROCEDURE — 74176 CT ABD & PELVIS W/O CONTRAST: CPT | Performed by: EMERGENCY MEDICINE

## 2020-02-16 RX ORDER — VANCOMYCIN HYDROCHLORIDE 125 MG/1
125 CAPSULE ORAL 4 TIMES DAILY
Qty: 40 CAPSULE | Refills: 0 | Status: SHIPPED | OUTPATIENT
Start: 2020-02-16 | End: 2020-02-26

## 2020-02-16 NOTE — ED INITIAL ASSESSMENT (HPI)
Pt to ed from home with + lab results of c-diff. Per family PT is \"allergic to all ABX to treat infections. \"+ diarrhea since Monday

## 2020-02-17 ENCOUNTER — HOSPITAL ENCOUNTER (OUTPATIENT)
Facility: HOSPITAL | Age: 85
Setting detail: OBSERVATION
LOS: 1 days | Discharge: HOME | End: 2020-02-18
Attending: EMERGENCY MEDICINE | Admitting: HOSPITALIST
Payer: MEDICARE

## 2020-02-17 DIAGNOSIS — E86.0 DEHYDRATION: Primary | ICD-10-CM

## 2020-02-17 DIAGNOSIS — A04.72 CLOSTRIDIUM DIFFICILE COLITIS: ICD-10-CM

## 2020-02-17 DIAGNOSIS — R19.7 DIARRHEA, UNSPECIFIED TYPE: ICD-10-CM

## 2020-02-17 LAB
ANION GAP SERPL CALC-SCNC: 5 MMOL/L (ref 0–18)
BASOPHILS # BLD AUTO: 0.04 X10(3) UL (ref 0–0.2)
BASOPHILS NFR BLD AUTO: 0.5 %
BUN BLD-MCNC: 10 MG/DL (ref 7–18)
BUN/CREAT SERPL: 12.3 (ref 10–20)
CALCIUM BLD-MCNC: 8.9 MG/DL (ref 8.5–10.1)
CHLORIDE SERPL-SCNC: 111 MMOL/L (ref 98–112)
CO2 SERPL-SCNC: 25 MMOL/L (ref 21–32)
CREAT BLD-MCNC: 0.81 MG/DL (ref 0.55–1.02)
DEPRECATED RDW RBC AUTO: 45 FL (ref 35.1–46.3)
EOSINOPHIL # BLD AUTO: 0.02 X10(3) UL (ref 0–0.7)
EOSINOPHIL NFR BLD AUTO: 0.3 %
ERYTHROCYTE [DISTWIDTH] IN BLOOD BY AUTOMATED COUNT: 14.5 % (ref 11–15)
GLUCOSE BLD-MCNC: 100 MG/DL (ref 70–99)
HCT VFR BLD AUTO: 43.6 % (ref 35–48)
HGB BLD-MCNC: 14.4 G/DL (ref 12–16)
IMM GRANULOCYTES # BLD AUTO: 0.04 X10(3) UL (ref 0–1)
IMM GRANULOCYTES NFR BLD: 0.5 %
LYMPHOCYTES # BLD AUTO: 2.01 X10(3) UL (ref 1–4)
LYMPHOCYTES NFR BLD AUTO: 26.2 %
MCH RBC QN AUTO: 28.3 PG (ref 26–34)
MCHC RBC AUTO-ENTMCNC: 33 G/DL (ref 31–37)
MCV RBC AUTO: 85.7 FL (ref 80–100)
MONOCYTES # BLD AUTO: 0.35 X10(3) UL (ref 0.1–1)
MONOCYTES NFR BLD AUTO: 4.6 %
NEUTROPHILS # BLD AUTO: 5.22 X10 (3) UL (ref 1.5–7.7)
NEUTROPHILS # BLD AUTO: 5.22 X10(3) UL (ref 1.5–7.7)
NEUTROPHILS NFR BLD AUTO: 67.9 %
OSMOLALITY SERPL CALC.SUM OF ELEC: 291 MOSM/KG (ref 275–295)
PLATELET # BLD AUTO: 265 10(3)UL (ref 150–450)
POTASSIUM SERPL-SCNC: 3.4 MMOL/L (ref 3.5–5.1)
RBC # BLD AUTO: 5.09 X10(6)UL (ref 3.8–5.3)
SODIUM SERPL-SCNC: 141 MMOL/L (ref 136–145)
WBC # BLD AUTO: 7.7 X10(3) UL (ref 4–11)

## 2020-02-17 PROCEDURE — 99220 INITIAL OBSERVATION CARE,LEVL III: CPT | Performed by: HOSPITALIST

## 2020-02-17 RX ORDER — ALPRAZOLAM 0.25 MG/1
0.25 TABLET ORAL
Status: DISCONTINUED | OUTPATIENT
Start: 2020-02-17 | End: 2020-02-17

## 2020-02-17 RX ORDER — SODIUM CHLORIDE 9 MG/ML
INJECTION, SOLUTION INTRAVENOUS CONTINUOUS
Status: DISCONTINUED | OUTPATIENT
Start: 2020-02-17 | End: 2020-02-18

## 2020-02-17 RX ORDER — ACETAMINOPHEN 325 MG/1
650 TABLET ORAL EVERY 6 HOURS PRN
Status: DISCONTINUED | OUTPATIENT
Start: 2020-02-17 | End: 2020-02-18

## 2020-02-17 RX ORDER — HEPARIN SODIUM 5000 [USP'U]/ML
5000 INJECTION, SOLUTION INTRAVENOUS; SUBCUTANEOUS EVERY 8 HOURS SCHEDULED
Status: DISCONTINUED | OUTPATIENT
Start: 2020-02-17 | End: 2020-02-18

## 2020-02-17 RX ORDER — BISACODYL 10 MG
10 SUPPOSITORY, RECTAL RECTAL
Status: DISCONTINUED | OUTPATIENT
Start: 2020-02-17 | End: 2020-02-18

## 2020-02-17 RX ORDER — ONDANSETRON 2 MG/ML
4 INJECTION INTRAMUSCULAR; INTRAVENOUS EVERY 6 HOURS PRN
Status: DISCONTINUED | OUTPATIENT
Start: 2020-02-17 | End: 2020-02-18

## 2020-02-17 RX ORDER — PREDNISONE 1 MG/1
5 TABLET ORAL DAILY
Status: DISCONTINUED | OUTPATIENT
Start: 2020-02-18 | End: 2020-02-18

## 2020-02-17 RX ORDER — GABAPENTIN 300 MG/1
300 CAPSULE ORAL NIGHTLY
Status: DISCONTINUED | OUTPATIENT
Start: 2020-02-17 | End: 2020-02-18

## 2020-02-17 RX ORDER — SODIUM CHLORIDE 9 MG/ML
INJECTION, SOLUTION INTRAVENOUS CONTINUOUS
Status: CANCELLED | OUTPATIENT
Start: 2020-02-17 | End: 2020-02-17

## 2020-02-17 RX ORDER — CETIRIZINE HYDROCHLORIDE 10 MG/1
10 TABLET ORAL DAILY
Status: DISCONTINUED | OUTPATIENT
Start: 2020-02-18 | End: 2020-02-18

## 2020-02-17 RX ORDER — VERAPAMIL HYDROCHLORIDE 240 MG/1
240 TABLET, FILM COATED, EXTENDED RELEASE ORAL DAILY
Status: DISCONTINUED | OUTPATIENT
Start: 2020-02-18 | End: 2020-02-17

## 2020-02-17 RX ORDER — POLYETHYLENE GLYCOL 3350 17 G/17G
17 POWDER, FOR SOLUTION ORAL DAILY PRN
Status: DISCONTINUED | OUTPATIENT
Start: 2020-02-17 | End: 2020-02-18

## 2020-02-17 RX ORDER — SODIUM PHOSPHATE, DIBASIC AND SODIUM PHOSPHATE, MONOBASIC 7; 19 G/133ML; G/133ML
1 ENEMA RECTAL ONCE AS NEEDED
Status: DISCONTINUED | OUTPATIENT
Start: 2020-02-17 | End: 2020-02-18

## 2020-02-17 RX ORDER — DOCUSATE SODIUM 100 MG/1
100 CAPSULE, LIQUID FILLED ORAL 2 TIMES DAILY
Status: DISCONTINUED | OUTPATIENT
Start: 2020-02-17 | End: 2020-02-18

## 2020-02-17 RX ORDER — POTASSIUM CHLORIDE 20 MEQ/1
40 TABLET, EXTENDED RELEASE ORAL EVERY 4 HOURS
Status: COMPLETED | OUTPATIENT
Start: 2020-02-17 | End: 2020-02-18

## 2020-02-17 RX ORDER — METOCLOPRAMIDE HYDROCHLORIDE 5 MG/ML
5 INJECTION INTRAMUSCULAR; INTRAVENOUS EVERY 8 HOURS PRN
Status: DISCONTINUED | OUTPATIENT
Start: 2020-02-17 | End: 2020-02-18

## 2020-02-17 RX ORDER — ALPRAZOLAM 0.25 MG/1
0.25 TABLET ORAL NIGHTLY PRN
Status: DISCONTINUED | OUTPATIENT
Start: 2020-02-17 | End: 2020-02-18

## 2020-02-17 NOTE — ED NOTES
Pt's daughter in room states she does not feel comfortable with pt going home, requests an admission. MD updated.

## 2020-02-17 NOTE — ED INITIAL ASSESSMENT (HPI)
Pt states diagnosed with c-diff yesterday here in the ER, states unable to take the antibiotics because they make her nauseous.   Sent back to ER by PMD.

## 2020-02-17 NOTE — ED NOTES
At this time pt states she is having an allergic reaction to the vancomycin that was prescribed to her for c-diff, spoke to  from ID who prescribed Dificid 200mg BID 10 days.  Pt was notified that if she is still not feeling well she needs to come

## 2020-02-17 NOTE — ED NOTES
Bedside camode brought into patient's room for her use. Patient was helped in and out of bed without incident.

## 2020-02-17 NOTE — TELEPHONE ENCOUNTER
Pt called, call transferred. Spoke with pt stating was seen in ER twice this past week for diarrhea. Cdiff positive. Multiple drug allergies including to metronidazole and vancomycin.  ER spoke with inpatient pharmacy, infectious disease- Dr. Yadi Tiwari, as we

## 2020-02-17 NOTE — ED PROVIDER NOTES
Patient Seen in: [facility] Emergency Department      History   Patient presents with:  Nausea/Vomiting/Diarrhea    Stated Complaint: pt states she was daignosed with c diff and given abx but abx make her nauseate*    HPI    30-year-old female lisseth Hearing impairment     HEARING LOSS BUT NO AIDS   • Heart palpitations    • HIGH BLOOD PRESSURE    • Hip pain    • History of blood transfusion AT AGE 44    AFTER HYST   • HTN (hypertension)    • Loss of appetite    • Osteoarthrosis, unspecified whether ge Tobacco Use      Smoking status: Former Smoker        Packs/day: 0.30        Years: 3.00        Pack years: .5        Quit date: 1972        Years since quittin.0      Smokeless tobacco: Never Used    Alcohol use: No      Alcohol/week: 0.0 stand Abnormality         Status                     ---------                               -----------         ------                     CBC W/ DIFFERENTIAL[[id number]]                              Final result                 Please view results

## 2020-02-17 NOTE — TELEPHONE ENCOUNTER
Pt went to the ER and was dx with c-diff she was given a medication (vancomycin HCl (VANCOCIN HCL) and she is feeling short of breath believes she is having an allergic reaction to it.  Call given to triage

## 2020-02-18 VITALS
DIASTOLIC BLOOD PRESSURE: 58 MMHG | HEIGHT: 61 IN | BODY MASS INDEX: 33.71 KG/M2 | HEART RATE: 67 BPM | OXYGEN SATURATION: 96 % | SYSTOLIC BLOOD PRESSURE: 136 MMHG | WEIGHT: 178.56 LBS | RESPIRATION RATE: 18 BRPM | TEMPERATURE: 98 F

## 2020-02-18 LAB — POTASSIUM SERPL-SCNC: 4.4 MMOL/L (ref 3.5–5.1)

## 2020-02-18 PROCEDURE — 99217 OBSERVATION CARE DISCHARGE: CPT | Performed by: HOSPITALIST

## 2020-02-18 RX ORDER — VANCOMYCIN HYDROCHLORIDE 125 MG/1
125 CAPSULE ORAL EVERY 6 HOURS
Qty: 40 CAPSULE | Refills: 0 | Status: SHIPPED | OUTPATIENT
Start: 2020-02-18 | End: 2020-02-18

## 2020-02-18 RX ORDER — ALPRAZOLAM 0.25 MG/1
0.25 TABLET ORAL
Status: DISCONTINUED | OUTPATIENT
Start: 2020-02-18 | End: 2020-02-18

## 2020-02-18 RX ORDER — VANCOMYCIN HYDROCHLORIDE 125 MG/1
125 CAPSULE ORAL EVERY 6 HOURS SCHEDULED
Status: DISCONTINUED | OUTPATIENT
Start: 2020-02-18 | End: 2020-02-18

## 2020-02-18 NOTE — PLAN OF CARE
Problem: Patient/Family Goals  Goal: Patient/Family Long Term Goal  Description  Patient's Long Term Goal:   Discharge to home     Interventions:  - follow plan of care  - See additional Care Plan goals for specific interventions   Outcome: Progressing social support system  Outcome: Progressing   Pt is admitted for C- diff colitis. Contact plus isolation precaution in place. Pt is AOx4. VSS, afebrile and denies any pain. SpO2 maintained well at RA. EP. Refused Heparin. Up with SBA. Voids. Cardiac diet.

## 2020-02-18 NOTE — CM/SW NOTE
02/18/20 1500   CM/SW Referral Data   Referral Source Social Work (self-referral)   Reason for Referral Discharge planning   Informant Patient   Social History   Recreational Drug/Alcohol Use no   Major Changes Last 6 Months no   Domestic/Partner Violen

## 2020-02-18 NOTE — H&P
[facility] HOSPITALIST  History and Physical     Chelly Games Patient Status:  Inpatient    1934 MRN [id number]   [facility] 5NW-A Attending Quenten Lesches [address] Day # 0 PCP Francisca Coles MD     Chief Complaint:  Sh whether generalized or localized, unspecified site    • Other and unspecified hyperlipidemia    • Pinched nerve in neck    • Pneumonia, organism unspecified(486)    • PONV (postoperative nausea and vomiting)    • Unspecified essential hypertension    • Vis Relation Age of Onset   • Breast Cancer Mother 79   • High Blood Pressure Mother    • Allergies Father    • Asthma Father    • Colon Cancer Father    • Heart Disease Father    • Heart Attack Father    • Heart Attack Paternal Grandfather    • Heart Disease DIARRHEA  Metronidazole And R*    SHORTNESS OF BREATH  Mold                    SHORTNESS OF BREATH  Pcn [Penicillins]           Comment:Short of Breath  Penicillin G            RASH    Comment:sob  Plavix [Clopidogrel*        Comment:Redness/choking  Quest 1  acetaminophen 500 MG Oral Tab, Take 1,000 mg by mouth every 6 (six) hours as needed for Pain.  , Disp: , Rfl:   LOPERAMIDE HCL 2 MG Oral Cap, TAKE ONE CAPSULE BY MOUTH AS NEEDED, Disp: 90 capsule, Rfl: 0  Cholecalciferol (VITAMIN D3) 5000 UNITS Oral Cap 265.0       Recent Labs   Lab 02/13/20  1948 02/16/20  1846 02/17/20  1449   * 124* 100*   BUN 17 12 10   CREATSERUM 0.94 0.85 0.81   GFRAA 64 72 77   GFRNAA 55* 63 66   CA 8.8 9.0 8.9   ALB 3.3* 3.4  --     141 141   K 3.4* 3.9 3.4*

## 2020-02-18 NOTE — PROGRESS NOTES
[facility] HOSPITALIST  Progress Note     Mary Roman Patient Status:  Observation    1934 MRN [id number]   [facility] 5NW-A Attending Kori Yareli [address] Day # 1 PCP Valdez Patel MD     Chief Complaint: adverse d mL/min (based on SCr of 0.81 mg/dL). No results for input(s): PTP, INR in the last 168 hours. No results for input(s): TROP, CK in the last 168 hours. Imaging: Imaging data reviewed in Epic.     Medications:   • ALPRAZolam  0.25 mg Oral TID CC

## 2020-02-18 NOTE — PLAN OF CARE
Problem: Patient/Family Goals  Goal: Patient/Family Long Term Goal  Description  Patient's Long Term Goal:   Discharge to home     Interventions:  - follow plan of care  - See additional Care Plan goals for specific interventions   Outcome: Progressing a walker. Isolation precautions for CDiff. Patient given PO Vancomycin and tolerated well without reaction. Receiving IVF. Will continue to monitor.

## 2020-02-18 NOTE — PROGRESS NOTES
02/18/20 1544   Clinical Encounter Type   Visited With Patient; Health care provider  (MILA Flores)   Routine Visit Introduction   Continue Visiting No  (unless paged or requested)    responded to consult for advance directives.   RN stated that kira

## 2020-02-18 NOTE — PROGRESS NOTES
02/17/20 2000   Provider Notification   Reason for Communication Order clarification   Provider Name Other (comment)  (Olinda Barry)   Method of Communication Page   Response Waiting for response   Notification Time 2050      MD notified for BP medicin

## 2020-02-18 NOTE — CM/SW NOTE
COND 44: Patient failed Inpatient criteria. Second level of review completed and supports Observation. UR committee in agreement. Discussed with Dr Azucena Bates approves.

## 2020-02-18 NOTE — CM/SW NOTE
Received order to check coverage on fidaxomicin 200 MG. Call to patient's [facility] pharmacy, medication is currently out of stock and will need to be ordered, and copay is $1200 no prior auth required. Page to Dr Yovani Rushing to discuss.     7034: reviewed med

## 2020-02-18 NOTE — PROGRESS NOTES
[facility] Pharmacy Note:  Renal Dose Adjustment for Metoclopramide (REGLAN)    Kenneth Banks has been prescribed Metoclopramide (REGLAN) 10 mg every 8 hours as needed for nausea/vomiting. Estimated Creatinine Clearance: 38.3 mL/min (based on SCr of 0.

## 2020-02-18 NOTE — CONSULTS
INFECTIOUS DISEASE CONSULTATION    Caity Hunt Patient Status:  Inpatient    1934 MRN [id number]   [facility] 5NW-A Attending Yehuda Machado [facility] Day # 1 PCP T hypertension    • Visual impairment     glasses   • Wears glasses      Past Surgical History:   Procedure Laterality Date   • ANGIOPLASTY (CORONARY)      stent   • APPENDECTOMY     • BACK SURGERY      lumbar x 4   • BENIGN BIOPSY LEFT  a long time ago    x (Autoimmune disease) Brother    • Thyroid Disorder Sister    • Asthma Sister    • Cancer Brother    • Allergies Daughter    • Fibromyalgia Daughter    • Melanoma Daughter    • Other (Hepatitis C) Daughter       reports that she quit smoking about 48 years [Risperid*    SHORTNESS OF BREATH  Shellfish-Derived P*        Comment:Sob  Tramadol                    Comment:Arms turned red like a sunburn  Vancomycin              RASH    Comment:Rash to the face and neck  Warfarin                OTHER (SEE COMMENTS) 240 MG Oral Tab CR, Take 1 tablet (240 mg total) by mouth daily. , Disp: 90 tablet, Rfl: 0  Pancrelipase, Lip-Prot-Amyl, (ZENPEP) 39621-613330 units Oral Cap DR Particles, Take 1 capsule by mouth 3 (three) times daily before meals.  Take 1 PO prior to snacks Temp:  [97.8 °F (36.6 °C)-98 °F (36.7 °C)] 97.8 °F (36.6 °C)  Pulse:  [70-92] 72  Resp:  [14-20] 16  BP: (133-168)/(64-90) 133/64  HEENT: Moist mucous membranes. Extraocular muscles are intact. Neck: No lymphadenopathy. supple, no masses  Respiratory: Rosalina region     Osteoarthritis of one hip, left     Allergy to nonsteroidal anti-inflammatory drug (NSAID)     Acquired pes planus of right foot     Esophageal dysphagia     History of heart artery stent     Status post lumbar spinal fusion     Pancreatic insuf

## 2020-02-19 ENCOUNTER — PATIENT OUTREACH (OUTPATIENT)
Dept: CASE MANAGEMENT | Age: 85
End: 2020-02-19

## 2020-02-19 DIAGNOSIS — Z02.9 ENCOUNTERS FOR UNSPECIFIED ADMINISTRATIVE PURPOSE: ICD-10-CM

## 2020-02-19 DIAGNOSIS — A04.72 CLOSTRIDIUM DIFFICILE COLITIS: ICD-10-CM

## 2020-02-19 PROCEDURE — 1111F DSCHRG MED/CURRENT MED MERGE: CPT

## 2020-02-19 NOTE — PROGRESS NOTES
Tried to call the pt for TCM, no answer after multiple rings. Call did not go to a , NCM to try again at a later time.

## 2020-02-20 ENCOUNTER — PATIENT OUTREACH (OUTPATIENT)
Dept: CASE MANAGEMENT | Age: 85
End: 2020-02-20

## 2020-02-20 RX ORDER — DIPHENHYDRAMINE HCL 25 MG
25 TABLET ORAL EVERY 6 HOURS PRN
COMMUNITY
End: 2021-02-04

## 2020-02-20 NOTE — PROGRESS NOTES
Initial Post Discharge Follow Up   Discharge Date: 2/18/20  Contact Date: 2/19/2020    Consent Verification:  Assessment Completed With: Patient  HIPAA Verified?   Yes    Discharge Dx:   C. difficile colitis-patient had allergic reaction to the oral vanc total) by mouth daily. 90 tablet 0   • Pancrelipase, Lip-Prot-Amyl, (ZENPEP) 61453-564404 units Oral Cap DR Particles Take 1 capsule by mouth 3 (three) times daily before meals.  Take 1 PO prior to snacks 450 capsule 3   • predniSONE 5 MG Oral Tab Take 1 ta keep you from taking your medication as prescribed? No  Are you having any concerns with constipation? No    Referrals/orders at D/C:  Home Health/Services ordered at D/C? No    DME ordered at D/C? No      Needs post D/C:   Now that you are home, are there well-hydrated and continue taking probiotic even after abx are completed. Educated pt on the importance of taking all meds as prescribed and completing entire course of abx, as well as close f/u with PCP/specialists.   Pt verbalized understanding and will

## 2020-02-20 NOTE — PROGRESS NOTES
Spoke with patient states she was in the ED for having c diff. Pt states she was placed on vancomycin but she is allergic to this medication because she developed redness on her face.  Pt states she continues to take vancomycin along with 2 tabs of benadryl

## 2020-02-24 ENCOUNTER — OFFICE VISIT (OUTPATIENT)
Dept: INTERNAL MEDICINE CLINIC | Facility: CLINIC | Age: 85
End: 2020-02-24
Payer: MEDICARE

## 2020-02-24 VITALS
TEMPERATURE: 98 F | BODY MASS INDEX: 26.47 KG/M2 | HEIGHT: 67 IN | WEIGHT: 168.63 LBS | RESPIRATION RATE: 18 BRPM | HEART RATE: 88 BPM | SYSTOLIC BLOOD PRESSURE: 136 MMHG | DIASTOLIC BLOOD PRESSURE: 74 MMHG

## 2020-02-24 DIAGNOSIS — Z88.9 MULTIPLE DRUG ALLERGIES: ICD-10-CM

## 2020-02-24 DIAGNOSIS — A49.8 CLOSTRIDIOIDES DIFFICILE INFECTION: Primary | ICD-10-CM

## 2020-02-24 DIAGNOSIS — I10 HYPERTENSION, BENIGN: ICD-10-CM

## 2020-02-24 PROCEDURE — 1111F DSCHRG MED/CURRENT MED MERGE: CPT | Performed by: INTERNAL MEDICINE

## 2020-02-24 PROCEDURE — 99214 OFFICE O/P EST MOD 30 MIN: CPT | Performed by: INTERNAL MEDICINE

## 2020-02-24 NOTE — DISCHARGE SUMMARY
[facility] HOSPITAL[facility]  DISCHARGE SUMMARY     Duane Danes Patient Status:  Observation    1934 MRN [id number]   [facility] 5NW-A Attending No att. providers found   Hosp Day # 1 PCP Katie Chester MD     Date of Admission:  Recommendation:  LACE 29-58:  Moderate Risk of readmission after discharge from the hospital.    Procedures during hospitalization:   • None    Incidental or significant findings and recommendations (brief descriptions):  • None    Lab/Test results pending mouth 3 (three) times daily before meals. Take 1 PO prior to snacks   Quantity:  450 capsule  Refills:  3     predniSONE 5 MG Tabs  Commonly known as:  DELTASONE      Take 1 tablet (5 mg total) by mouth daily.    Quantity:  90 tablet  Refills:  1     PROBIO Instructions:                          Vital signs:  Temp:  [98.4 °F (36.9 °C)] 98.4 °F (36.9 °C)  Pulse:  [88] 88  Resp:  [18] 18  BP: (136)/(74) 136/74    Physical Exam:    General: No acute distress. Respiratory: Clear to auscultation bilaterally.  No

## 2020-02-24 NOTE — PROGRESS NOTES
Carolyn Coleman is a 80year old female. Patient presents with:  Hospital F/U: cn room 3: hospital follow up for c-diff  Blood Pressure      HPI:     Patient admitted 2/17-2/18 for c dif colitis and possible adverse reaction to vancomycin.  Patient f Medication Sig Dispense Refill   • diphenhydrAMINE HCl 25 MG Oral Tab Take 25 mg by mouth every 6 (six) hours as needed for Itching.      • vancomycin HCl (VANCOCIN HCL) 125 MG Oral Cap Take 1 capsule (125 mg total) by mouth 4 (four) times daily for 10 da coronary artery    • Back pain    • Back problem     cervical pain   • Black stools    • Blood in the stool    • Blood transfusion reaction    • CAD (coronary artery disease)    • Cancer ([facility] [address] 2010    skin cancer in left arm   • Constipation    • Coronary • Allergies Daughter    • Fibromyalgia Daughter    • Melanoma Daughter    • Other (Hepatitis C) Daughter         Allergies    Breo Ellipta            OTHER (SEE COMMENTS)    Comment:Pneumonia             Pneumonia  Celebrex [Celecoxib]    SHORTNESS OF BR [Bupropi*        Comment:constipation  Flulaval                RASH      REVIEW OF SYSTEMS:   GENERAL HEALTH:  no fevers or chills  SKIN: rash few days ago on cheeks  RESPIRATORY: no cough  CARDIOVASCULAR: denies chest pain  GI:diarrhea  : no dysuria  NE

## 2020-02-28 ENCOUNTER — PATIENT OUTREACH (OUTPATIENT)
Dept: CASE MANAGEMENT | Age: 85
End: 2020-02-28

## 2020-02-28 DIAGNOSIS — Z91.81 AT RISK FOR FALLING: ICD-10-CM

## 2020-02-28 DIAGNOSIS — K58.0 IRRITABLE BOWEL SYNDROME WITH DIARRHEA: ICD-10-CM

## 2020-02-28 DIAGNOSIS — M16.12 OSTEOARTHRITIS OF ONE HIP, LEFT: ICD-10-CM

## 2020-02-28 DIAGNOSIS — A04.72 CLOSTRIDIUM DIFFICILE COLITIS: ICD-10-CM

## 2020-02-28 DIAGNOSIS — E78.5 DYSLIPIDEMIA: ICD-10-CM

## 2020-02-28 DIAGNOSIS — J45.40 MODERATE PERSISTENT ASTHMA WITHOUT COMPLICATION: ICD-10-CM

## 2020-02-28 DIAGNOSIS — M17.11 PRIMARY OSTEOARTHRITIS OF RIGHT KNEE: ICD-10-CM

## 2020-02-28 DIAGNOSIS — Z85.828 HISTORY OF SKIN CANCER: ICD-10-CM

## 2020-02-28 DIAGNOSIS — R19.7 DIARRHEA, UNSPECIFIED TYPE: ICD-10-CM

## 2020-02-28 DIAGNOSIS — E78.00 PURE HYPERCHOLESTEROLEMIA: ICD-10-CM

## 2020-02-28 DIAGNOSIS — M81.0 AGE-RELATED OSTEOPOROSIS WITHOUT CURRENT PATHOLOGICAL FRACTURE: ICD-10-CM

## 2020-02-28 DIAGNOSIS — R29.898 WEAKNESS OF BOTH LEGS: ICD-10-CM

## 2020-02-28 DIAGNOSIS — I10 HYPERTENSION, BENIGN: ICD-10-CM

## 2020-02-28 NOTE — PROGRESS NOTES
Spoke with patient states she was told to use bleach to disinfect but does not recall who told her and wanted to know when she could start doing that. I checked patient's chart and told her myself and Ninoska Nova made this suggestion.  Patient wanted jessica

## 2020-02-29 PROCEDURE — 99490 CHRNC CARE MGMT STAFF 1ST 20: CPT

## 2020-02-29 PROCEDURE — G2058 CCM ADD 20MIN: HCPCS

## 2020-03-02 ENCOUNTER — TELEPHONE (OUTPATIENT)
Dept: INTERNAL MEDICINE CLINIC | Facility: CLINIC | Age: 85
End: 2020-03-02

## 2020-03-02 ENCOUNTER — PATIENT OUTREACH (OUTPATIENT)
Dept: CASE MANAGEMENT | Age: 85
End: 2020-03-02

## 2020-03-02 DIAGNOSIS — A09 DIARRHEA OF INFECTIOUS ORIGIN: Primary | ICD-10-CM

## 2020-03-02 NOTE — TELEPHONE ENCOUNTER
Per TB OV notes 2/24/20: Clostridioides difficile infection - vanco 125mg po QID for 10 days; if diarrhea not completely resolved at end of ABX course, we will test stool again. If symptoms resolved, discussed we will not check stool studies again.     Call

## 2020-03-02 NOTE — PROGRESS NOTES
Patient called me wanting to know if she is still contagious for c diff. Pt states she finished antibiotics on 2/28/20. Pt states she is unsure if diarrhea has improved since she has IBS w/ diarrhea.  Pt states she was told not to take Imodium while on anti

## 2020-03-02 NOTE — TELEPHONE ENCOUNTER
Called pt to inform, per TB, will need to repeat stool for c dif if still with watery stools. No further questions or concerns. Pt verbalized understanding and agreed with POC.

## 2020-03-06 NOTE — CM/SW NOTE
Patient failed inpatient criteria. Second level of review completed and supports observation. UR committee in agreement. Discussed with Dr. Ruth Rodriguez  who approves observation status. MOON given to the patient and order written.

## 2020-03-09 ENCOUNTER — TELEPHONE (OUTPATIENT)
Dept: INTERNAL MEDICINE CLINIC | Facility: CLINIC | Age: 85
End: 2020-03-09

## 2020-03-09 RX ORDER — VERAPAMIL HYDROCHLORIDE 240 MG/1
240 TABLET, FILM COATED, EXTENDED RELEASE ORAL DAILY
Qty: 90 TABLET | Refills: 0 | Status: SHIPPED | OUTPATIENT
Start: 2020-03-09 | End: 2020-06-04

## 2020-03-09 NOTE — TELEPHONE ENCOUNTER
Pt was taking 2 120mg now needs new rx for Verapamil HCl  MG Oral Tab CR-send to local [facility]

## 2020-03-11 ENCOUNTER — PATIENT OUTREACH (OUTPATIENT)
Dept: CASE MANAGEMENT | Age: 85
End: 2020-03-11

## 2020-03-11 DIAGNOSIS — J45.40 MODERATE PERSISTENT ASTHMA WITHOUT COMPLICATION: ICD-10-CM

## 2020-03-11 DIAGNOSIS — R19.7 DIARRHEA, UNSPECIFIED TYPE: ICD-10-CM

## 2020-03-11 DIAGNOSIS — E78.5 DYSLIPIDEMIA: ICD-10-CM

## 2020-03-11 DIAGNOSIS — M47.816 OSTEOARTHRITIS OF SPINE WITHOUT MYELOPATHY OR RADICULOPATHY, LUMBAR REGION: ICD-10-CM

## 2020-03-11 DIAGNOSIS — M16.12 OSTEOARTHRITIS OF ONE HIP, LEFT: ICD-10-CM

## 2020-03-11 DIAGNOSIS — I10 HYPERTENSION, BENIGN: ICD-10-CM

## 2020-03-11 DIAGNOSIS — M81.0 AGE-RELATED OSTEOPOROSIS WITHOUT CURRENT PATHOLOGICAL FRACTURE: ICD-10-CM

## 2020-03-11 DIAGNOSIS — M17.11 PRIMARY OSTEOARTHRITIS OF RIGHT KNEE: ICD-10-CM

## 2020-03-11 DIAGNOSIS — Z85.828 HISTORY OF SKIN CANCER: ICD-10-CM

## 2020-03-11 DIAGNOSIS — K58.0 IRRITABLE BOWEL SYNDROME WITH DIARRHEA: ICD-10-CM

## 2020-03-11 DIAGNOSIS — Z91.81 AT RISK FOR FALLING: ICD-10-CM

## 2020-03-11 DIAGNOSIS — E78.00 PURE HYPERCHOLESTEROLEMIA: ICD-10-CM

## 2020-03-11 NOTE — PROGRESS NOTES
Spoke with patient to clarify Dr. Supa Marshall response from message sent on 3/2/20. Patient states she is not currently taking imodium since she is not having diarrhea. Patient relates she will take imodium prn.     Time Spent This Encounter Total: 29 minute

## 2020-03-11 NOTE — PROGRESS NOTES
3/11/2020  Spoke to Nadir Bennett for CCM. Updates to patient care team/ comments: None  Patient reported changes in medications: None  Med Adherence  Comment: Taking as prescribed.      Health Maintenance:  Pneumococcal PPSV23/PCV13 65+ Years / Low and Mediu goal, 5= very confident               - confidence: 5    Care Manager Follow Up: 1 month  Reason For Follow Up: review progress and or barriers towards patients goals. Care managers interventions: None needed at this time per patient.   Time Spent This

## 2020-03-17 ENCOUNTER — PATIENT OUTREACH (OUTPATIENT)
Dept: CASE MANAGEMENT | Age: 85
End: 2020-03-17

## 2020-03-17 NOTE — PROGRESS NOTES
[facility] Provider Huddle  Date: 3/17/2020        Provider: Dr. Carmen Oliveira: Ye Body Date: 2/17/20  Reason for admission:   1. Drug reaction  2.  C. difficile colitis  3. Hypertension  4. Asthma  5.   Rod

## 2020-03-19 ENCOUNTER — TELEPHONE (OUTPATIENT)
Dept: INTERNAL MEDICINE CLINIC | Facility: CLINIC | Age: 85
End: 2020-03-19

## 2020-03-19 NOTE — TELEPHONE ENCOUNTER
Patient states she had C Dif one month ago, states she is not having diarrhea or loose stools, no watery stools, takes Zenpep and a probiotic, states if she has 2 BM's in a day she will take Imodium then next BM is the next day or the day after that.   Pt w

## 2020-03-19 NOTE — TELEPHONE ENCOUNTER
Future Appointments   Date Time Provider Jeremiah Gonzalez   4/21/2020  3:00 PM Terry Thornton MD [facility]     Patient has been re-scheduled, however she does have somewhat of a concern; because she had C-Dif, she is not having diarrhea like s

## 2020-03-19 NOTE — TELEPHONE ENCOUNTER
Patient notified to call if anything changes or she has any questions or concerns. Pt verbalizes understanding.

## 2020-03-31 PROCEDURE — 99490 CHRNC CARE MGMT STAFF 1ST 20: CPT

## 2020-04-16 ENCOUNTER — TELEPHONE (OUTPATIENT)
Dept: RHEUMATOLOGY | Facility: CLINIC | Age: 85
End: 2020-04-16

## 2020-04-16 RX ORDER — GABAPENTIN 300 MG/1
300 CAPSULE ORAL 3 TIMES DAILY PRN
Qty: 60 CAPSULE | Refills: 1 | Status: SHIPPED | OUTPATIENT
Start: 2020-04-16 | End: 2020-06-29

## 2020-04-16 NOTE — TELEPHONE ENCOUNTER
Patient phoned office for refill of gabapentin.    Future Appointments   Date Time Provider Jeremiah Gonzalez   4/21/2020  3:00 PM Wendie Fuentes MD [facility]   3/61/8461 87:32 AM Bita Franklin MD [facility]

## 2020-04-21 ENCOUNTER — VIRTUAL PHONE E/M (OUTPATIENT)
Dept: INTERNAL MEDICINE CLINIC | Facility: CLINIC | Age: 85
End: 2020-04-21
Payer: MEDICARE

## 2020-04-21 DIAGNOSIS — K58.2 IRRITABLE BOWEL SYNDROME WITH BOTH CONSTIPATION AND DIARRHEA: ICD-10-CM

## 2020-04-21 DIAGNOSIS — Z86.19 HISTORY OF CLOSTRIDIOIDES DIFFICILE COLITIS: Primary | ICD-10-CM

## 2020-04-21 DIAGNOSIS — J45.40 MODERATE PERSISTENT ASTHMA WITHOUT COMPLICATION: ICD-10-CM

## 2020-04-21 PROCEDURE — 99442 PHONE E/M BY PHYS 11-20 MIN: CPT | Performed by: INTERNAL MEDICINE

## 2020-04-21 NOTE — PROGRESS NOTES
Due to COVID-19 ACTION PLAN, the patient's office visit was converted to a phone or video visit. Patient understands and accepts financial responsibility for any deductible, co-insurance and/or co-pays associated with this service.   Time Spent:15 min    S crisis/national emergency and because of restrictions of visitation. There are limitations of this visit as no physical exam could be performed. Every conscious effort was taken to allow for sufficient and adequate time.   This billing visit was spent on

## 2020-05-15 ENCOUNTER — TELEPHONE (OUTPATIENT)
Dept: INTERNAL MEDICINE CLINIC | Facility: CLINIC | Age: 85
End: 2020-05-15

## 2020-05-15 NOTE — TELEPHONE ENCOUNTER
Orders to Jewels Bowles. Pt aware to get labs done no sooner than 2 weeks prior to the appt. Pt aware to fast.  No call back required.     Future Appointments   Date Time Provider Jeremiah Gonzalez   6/2/2020  4:00 PM Neo Fitzgerald MD [facility]

## 2020-05-18 ENCOUNTER — MED REC SCAN ONLY (OUTPATIENT)
Dept: INTERNAL MEDICINE CLINIC | Facility: CLINIC | Age: 85
End: 2020-05-18

## 2020-05-20 ENCOUNTER — VIRTUAL PHONE E/M (OUTPATIENT)
Dept: INTERNAL MEDICINE CLINIC | Facility: CLINIC | Age: 85
End: 2020-05-20
Payer: MEDICARE

## 2020-05-20 DIAGNOSIS — L98.9 SKIN LESION OF RIGHT ARM: ICD-10-CM

## 2020-05-20 DIAGNOSIS — R06.83 SNORING: ICD-10-CM

## 2020-05-20 DIAGNOSIS — H57.89 IRRITATION OF RIGHT EYE: ICD-10-CM

## 2020-05-20 PROCEDURE — 99443 PHONE E/M BY PHYS 21-30 MIN: CPT | Performed by: INTERNAL MEDICINE

## 2020-05-20 NOTE — PROGRESS NOTES
Due to COVID-19 ACTION PLAN, the patient's office visit was converted to a phone or video visit. Patient understands and accepts financial responsibility for any deductible, co-insurance and/or co-pays associated with this service.   Time Spent:21 minutes Snoring- agree with sleep study once covid 19 situation improves, she already has the order from her pulmonologist         Return in about 8 weeks (around 7/15/2020) for wellness and follow up.     MD Bandar Richardson understands

## 2020-05-29 ENCOUNTER — TELEMEDICINE (OUTPATIENT)
Dept: RHEUMATOLOGY | Facility: CLINIC | Age: 85
End: 2020-05-29
Payer: MEDICARE

## 2020-05-29 VITALS — WEIGHT: 163 LBS | BODY MASS INDEX: 30.78 KG/M2 | HEIGHT: 61 IN

## 2020-05-29 DIAGNOSIS — M47.816 OSTEOARTHRITIS OF SPINE WITHOUT MYELOPATHY OR RADICULOPATHY, LUMBAR REGION: ICD-10-CM

## 2020-05-29 DIAGNOSIS — M17.11 PRIMARY OSTEOARTHRITIS OF RIGHT KNEE: Primary | ICD-10-CM

## 2020-05-29 DIAGNOSIS — Z98.1 STATUS POST LUMBAR SPINAL FUSION: ICD-10-CM

## 2020-05-29 DIAGNOSIS — M17.12 PRIMARY OSTEOARTHRITIS OF LEFT KNEE: ICD-10-CM

## 2020-05-29 DIAGNOSIS — Z91.81 AT RISK FOR FALLING: ICD-10-CM

## 2020-05-29 DIAGNOSIS — Z98.890 S/P LUMBAR LAMINECTOMY: ICD-10-CM

## 2020-05-29 PROCEDURE — 99442 PHONE E/M BY PHYS 11-20 MIN: CPT | Performed by: INTERNAL MEDICINE

## 2020-05-29 NOTE — PROGRESS NOTES
Virtual Telephone Check-In    Marissa Romeroing verbally consent to a Virtual/Telephone Check-In visit on 05/29/20. Patient has been referred to the [facility] website at [email or web address] to review the yearly Consent to Treat document.     Patient un

## 2020-06-04 RX ORDER — VERAPAMIL HYDROCHLORIDE 240 MG/1
TABLET, FILM COATED, EXTENDED RELEASE ORAL
Qty: 90 TABLET | Refills: 0 | Status: SHIPPED | OUTPATIENT
Start: 2020-06-04 | End: 2020-09-03

## 2020-06-19 ENCOUNTER — PATIENT OUTREACH (OUTPATIENT)
Dept: CASE MANAGEMENT | Age: 85
End: 2020-06-19

## 2020-06-19 NOTE — PROGRESS NOTES
Called patient for monthly CCM outreach. No answer left voicemail to call back.     Time Spent This Encounter Total: 8 minutes medical record review, telephone communication, care plan updates where needed, education, goals and action plan recreation/update

## 2020-06-19 NOTE — PROGRESS NOTES
Called patient for monthly CCM outreach.  Patient states she is unable to talk at this time and asked to call her back around 1 pm.    Time spent with patient and chart review: 7 min

## 2020-06-22 ENCOUNTER — OFFICE VISIT (OUTPATIENT)
Dept: CARDIOLOGY | Age: 85
End: 2020-06-22

## 2020-06-22 VITALS
SYSTOLIC BLOOD PRESSURE: 150 MMHG | HEART RATE: 78 BPM | DIASTOLIC BLOOD PRESSURE: 76 MMHG | WEIGHT: 166 LBS | HEIGHT: 60 IN | BODY MASS INDEX: 32.59 KG/M2

## 2020-06-22 DIAGNOSIS — I10 HYPERTENSION, BENIGN: ICD-10-CM

## 2020-06-22 DIAGNOSIS — R06.02 SHORTNESS OF BREATH: ICD-10-CM

## 2020-06-22 DIAGNOSIS — R53.83 FATIGUE, UNSPECIFIED TYPE: ICD-10-CM

## 2020-06-22 DIAGNOSIS — Z95.5 HISTORY OF HEART ARTERY STENT: ICD-10-CM

## 2020-06-22 DIAGNOSIS — M54.12 CERVICAL RADICULOPATHY AT C5: Primary | ICD-10-CM

## 2020-06-22 DIAGNOSIS — E78.00 PURE HYPERCHOLESTEROLEMIA: ICD-10-CM

## 2020-06-22 DIAGNOSIS — I25.10 CORONARY ARTERY DISEASE INVOLVING NATIVE CORONARY ARTERY OF NATIVE HEART WITHOUT ANGINA PECTORIS: ICD-10-CM

## 2020-06-22 DIAGNOSIS — Z86.69 HX OF MIGRAINES: ICD-10-CM

## 2020-06-22 DIAGNOSIS — E78.5 DYSLIPIDEMIA: ICD-10-CM

## 2020-06-22 DIAGNOSIS — K58.0 IRRITABLE BOWEL SYNDROME WITH DIARRHEA: ICD-10-CM

## 2020-06-22 DIAGNOSIS — I65.29 STENOSIS OF CAROTID ARTERY, UNSPECIFIED LATERALITY: ICD-10-CM

## 2020-06-22 PROCEDURE — 99215 OFFICE O/P EST HI 40 MIN: CPT | Performed by: INTERNAL MEDICINE

## 2020-06-22 RX ORDER — VERAPAMIL HYDROCHLORIDE 240 MG/1
240 CAPSULE, EXTENDED RELEASE ORAL DAILY
COMMUNITY

## 2020-06-22 SDOH — HEALTH STABILITY: PHYSICAL HEALTH: ON AVERAGE, HOW MANY MINUTES DO YOU ENGAGE IN EXERCISE AT THIS LEVEL?: 20 MIN

## 2020-06-22 SDOH — HEALTH STABILITY: PHYSICAL HEALTH: ON AVERAGE, HOW MANY DAYS PER WEEK DO YOU ENGAGE IN MODERATE TO STRENUOUS EXERCISE (LIKE A BRISK WALK)?: 7 DAYS

## 2020-06-24 ENCOUNTER — PATIENT OUTREACH (OUTPATIENT)
Dept: CASE MANAGEMENT | Age: 85
End: 2020-06-24

## 2020-06-24 DIAGNOSIS — M17.11 PRIMARY OSTEOARTHRITIS OF RIGHT KNEE: ICD-10-CM

## 2020-06-24 DIAGNOSIS — E78.5 DYSLIPIDEMIA: ICD-10-CM

## 2020-06-24 DIAGNOSIS — K58.0 IRRITABLE BOWEL SYNDROME WITH DIARRHEA: ICD-10-CM

## 2020-06-24 DIAGNOSIS — Z86.69 HX OF MIGRAINES: ICD-10-CM

## 2020-06-24 DIAGNOSIS — M54.12 CERVICAL RADICULOPATHY AT C5: ICD-10-CM

## 2020-06-24 DIAGNOSIS — M16.12 OSTEOARTHRITIS OF ONE HIP, LEFT: ICD-10-CM

## 2020-06-24 DIAGNOSIS — E78.00 PURE HYPERCHOLESTEROLEMIA: ICD-10-CM

## 2020-06-24 DIAGNOSIS — Z91.81 AT RISK FOR FALLING: ICD-10-CM

## 2020-06-24 DIAGNOSIS — M81.0 AGE-RELATED OSTEOPOROSIS WITHOUT CURRENT PATHOLOGICAL FRACTURE: ICD-10-CM

## 2020-06-24 DIAGNOSIS — R29.898 WEAKNESS OF BOTH LEGS: ICD-10-CM

## 2020-06-24 DIAGNOSIS — G72.9 MYOPATHY, UNSPECIFIED: ICD-10-CM

## 2020-06-24 DIAGNOSIS — Z85.828 HISTORY OF SKIN CANCER: ICD-10-CM

## 2020-06-24 DIAGNOSIS — J45.40 MODERATE PERSISTENT ASTHMA WITHOUT COMPLICATION: ICD-10-CM

## 2020-06-24 DIAGNOSIS — M47.816 OSTEOARTHRITIS OF SPINE WITHOUT MYELOPATHY OR RADICULOPATHY, LUMBAR REGION: ICD-10-CM

## 2020-06-24 DIAGNOSIS — I10 HYPERTENSION, BENIGN: ICD-10-CM

## 2020-06-24 DIAGNOSIS — M17.12 PRIMARY OSTEOARTHRITIS OF LEFT KNEE: ICD-10-CM

## 2020-06-24 NOTE — PROGRESS NOTES
6/24/2020  Spoke to Eliud Angeles for CCM. Updates to patient care team/ comments: None  Patient reported changes in medications: None  Med Adherence  Comment: Reviewed medications with patient taking as prescribed.     Health Maintenance:  Pneumococcal PPSV2 progress toward goal: Patient reports no ED visits. Patient relates she is doing better. • Update to previous barriers: N/A    • Patient Reported New Barriers And Concerns: Patient reports no new barriers and concerns.                    - Plan for ove

## 2020-06-26 ENCOUNTER — LAB ENCOUNTER (OUTPATIENT)
Dept: LAB | Facility: HOSPITAL | Age: 85
End: 2020-06-26
Attending: INTERNAL MEDICINE
Payer: MEDICARE

## 2020-06-26 DIAGNOSIS — I25.10 CORONARY ATHEROSCLEROSIS OF NATIVE CORONARY ARTERY: ICD-10-CM

## 2020-06-26 DIAGNOSIS — E78.00 PURE HYPERCHOLESTEROLEMIA: Primary | ICD-10-CM

## 2020-06-26 DIAGNOSIS — I10 ESSENTIAL HYPERTENSION, MALIGNANT: ICD-10-CM

## 2020-06-26 DIAGNOSIS — R53.83 FATIGUE: ICD-10-CM

## 2020-06-26 PROCEDURE — 80053 COMPREHEN METABOLIC PANEL: CPT

## 2020-06-26 PROCEDURE — 85025 COMPLETE CBC W/AUTO DIFF WBC: CPT

## 2020-06-26 PROCEDURE — 84443 ASSAY THYROID STIM HORMONE: CPT

## 2020-06-26 PROCEDURE — 80061 LIPID PANEL: CPT

## 2020-06-26 PROCEDURE — 36415 COLL VENOUS BLD VENIPUNCTURE: CPT

## 2020-06-29 ENCOUNTER — OFFICE VISIT (OUTPATIENT)
Dept: INTERNAL MEDICINE CLINIC | Facility: CLINIC | Age: 85
End: 2020-06-29
Payer: MEDICARE

## 2020-06-29 VITALS
TEMPERATURE: 98 F | HEIGHT: 61 IN | SYSTOLIC BLOOD PRESSURE: 134 MMHG | BODY MASS INDEX: 30.96 KG/M2 | WEIGHT: 164 LBS | RESPIRATION RATE: 16 BRPM | DIASTOLIC BLOOD PRESSURE: 68 MMHG | HEART RATE: 96 BPM

## 2020-06-29 DIAGNOSIS — R35.0 URINARY FREQUENCY: Primary | ICD-10-CM

## 2020-06-29 PROCEDURE — 87086 URINE CULTURE/COLONY COUNT: CPT | Performed by: INTERNAL MEDICINE

## 2020-06-29 PROCEDURE — 99213 OFFICE O/P EST LOW 20 MIN: CPT | Performed by: INTERNAL MEDICINE

## 2020-06-29 PROCEDURE — 81003 URINALYSIS AUTO W/O SCOPE: CPT | Performed by: INTERNAL MEDICINE

## 2020-06-29 NOTE — PROGRESS NOTES
Mera Vickers  [birth year]    Patient presents with:  Urinary Symptoms: SN Rm 6; 6/27, urinary frequency, denies lower abd pain/pressure, denies pain w/ urination      SUBJECTIVE   Mera Vickers is a 80year old female who presents with isol Oral Tab Take 0.25 mg by mouth TID CC and HS. Pt takes 0.5mg at night      • EPINEPHrine 0.3 MG/0.3ML Injection Solution Auto-injector Inject 0.3 mL as directed one time. • loratadine (CLARITIN) 10 MG Oral Tab Take 10 mg by mouth daily.      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      Thank you,  Lalit Asher MD

## 2020-06-30 ENCOUNTER — TELEPHONE (OUTPATIENT)
Dept: CARDIOLOGY | Age: 85
End: 2020-06-30

## 2020-06-30 PROCEDURE — 99490 CHRNC CARE MGMT STAFF 1ST 20: CPT

## 2020-07-06 NOTE — TELEPHONE ENCOUNTER
Pt had TSH, CBC, Lipid, and CMP done 6/26/20  FWD to TB, please advise if any additional labs to be done prior to upcoming OV

## 2020-07-16 ENCOUNTER — HOSPITAL ENCOUNTER (EMERGENCY)
Facility: HOSPITAL | Age: 85
Discharge: HOME | End: 2020-07-16
Attending: EMERGENCY MEDICINE
Payer: MEDICARE

## 2020-07-16 ENCOUNTER — TELEPHONE (OUTPATIENT)
Dept: INTERNAL MEDICINE CLINIC | Facility: CLINIC | Age: 85
End: 2020-07-16

## 2020-07-16 VITALS
OXYGEN SATURATION: 95 % | DIASTOLIC BLOOD PRESSURE: 91 MMHG | RESPIRATION RATE: 15 BRPM | WEIGHT: 163 LBS | HEIGHT: 60 IN | TEMPERATURE: 98 F | BODY MASS INDEX: 32 KG/M2 | HEART RATE: 92 BPM | SYSTOLIC BLOOD PRESSURE: 167 MMHG

## 2020-07-16 DIAGNOSIS — K92.2 LOWER GI BLEED: Primary | ICD-10-CM

## 2020-07-16 LAB
ALBUMIN SERPL-MCNC: 3.6 G/DL (ref 3.4–5)
ALBUMIN/GLOB SERPL: 1 {RATIO} (ref 1–2)
ALP LIVER SERPL-CCNC: 73 U/L (ref 55–142)
ALT SERPL-CCNC: 25 U/L (ref 13–56)
ANION GAP SERPL CALC-SCNC: 3 MMOL/L (ref 0–18)
ANTIBODY SCREEN: NEGATIVE
APTT PPP: 26.3 SECONDS (ref 25.4–36.1)
AST SERPL-CCNC: 18 U/L (ref 15–37)
BASOPHILS # BLD AUTO: 0.09 X10(3) UL (ref 0–0.2)
BASOPHILS NFR BLD AUTO: 0.8 %
BILIRUB SERPL-MCNC: 0.7 MG/DL (ref 0.1–2)
BUN BLD-MCNC: 15 MG/DL (ref 7–18)
BUN/CREAT SERPL: 16.3 (ref 10–20)
CALCIUM BLD-MCNC: 9.5 MG/DL (ref 8.5–10.1)
CHLORIDE SERPL-SCNC: 109 MMOL/L (ref 98–112)
CO2 SERPL-SCNC: 25 MMOL/L (ref 21–32)
CREAT BLD-MCNC: 0.92 MG/DL (ref 0.55–1.02)
DEPRECATED RDW RBC AUTO: 50.8 FL (ref 35.1–46.3)
EOSINOPHIL # BLD AUTO: 0.19 X10(3) UL (ref 0–0.7)
EOSINOPHIL NFR BLD AUTO: 1.8 %
ERYTHROCYTE [DISTWIDTH] IN BLOOD BY AUTOMATED COUNT: 15.6 % (ref 11–15)
GLOBULIN PLAS-MCNC: 3.7 G/DL (ref 2.8–4.4)
GLUCOSE BLD-MCNC: 105 MG/DL (ref 70–99)
HCT VFR BLD AUTO: 45.5 % (ref 35–48)
HGB BLD-MCNC: 14.5 G/DL (ref 12–16)
IMM GRANULOCYTES # BLD AUTO: 0.13 X10(3) UL (ref 0–1)
IMM GRANULOCYTES NFR BLD: 1.2 %
INR BLD: 0.95 (ref 0.89–1.11)
LYMPHOCYTES # BLD AUTO: 3.39 X10(3) UL (ref 1–4)
LYMPHOCYTES NFR BLD AUTO: 31.3 %
M PROTEIN MFR SERPL ELPH: 7.3 G/DL (ref 6.4–8.2)
MCH RBC QN AUTO: 28.3 PG (ref 26–34)
MCHC RBC AUTO-ENTMCNC: 31.9 G/DL (ref 31–37)
MCV RBC AUTO: 88.9 FL (ref 80–100)
MONOCYTES # BLD AUTO: 0.93 X10(3) UL (ref 0.1–1)
MONOCYTES NFR BLD AUTO: 8.6 %
NEUTROPHILS # BLD AUTO: 6.1 X10 (3) UL (ref 1.5–7.7)
NEUTROPHILS # BLD AUTO: 6.1 X10(3) UL (ref 1.5–7.7)
NEUTROPHILS NFR BLD AUTO: 56.3 %
OSMOLALITY SERPL CALC.SUM OF ELEC: 285 MOSM/KG (ref 275–295)
PLATELET # BLD AUTO: 275 10(3)UL (ref 150–450)
POTASSIUM SERPL-SCNC: 3.7 MMOL/L (ref 3.5–5.1)
PSA SERPL DL<=0.01 NG/ML-MCNC: 13 SECONDS (ref 12.4–14.6)
RBC # BLD AUTO: 5.12 X10(6)UL (ref 3.8–5.3)
RH BLOOD TYPE: POSITIVE
SODIUM SERPL-SCNC: 137 MMOL/L (ref 136–145)
WBC # BLD AUTO: 10.8 X10(3) UL (ref 4–11)

## 2020-07-16 PROCEDURE — 86900 BLOOD TYPING SEROLOGIC ABO: CPT | Performed by: EMERGENCY MEDICINE

## 2020-07-16 PROCEDURE — 86901 BLOOD TYPING SEROLOGIC RH(D): CPT | Performed by: EMERGENCY MEDICINE

## 2020-07-16 PROCEDURE — 96360 HYDRATION IV INFUSION INIT: CPT

## 2020-07-16 PROCEDURE — 85025 COMPLETE CBC W/AUTO DIFF WBC: CPT | Performed by: EMERGENCY MEDICINE

## 2020-07-16 PROCEDURE — 86850 RBC ANTIBODY SCREEN: CPT | Performed by: EMERGENCY MEDICINE

## 2020-07-16 PROCEDURE — 99284 EMERGENCY DEPT VISIT MOD MDM: CPT

## 2020-07-16 PROCEDURE — 85610 PROTHROMBIN TIME: CPT | Performed by: EMERGENCY MEDICINE

## 2020-07-16 PROCEDURE — 85730 THROMBOPLASTIN TIME PARTIAL: CPT | Performed by: EMERGENCY MEDICINE

## 2020-07-16 PROCEDURE — 80053 COMPREHEN METABOLIC PANEL: CPT | Performed by: EMERGENCY MEDICINE

## 2020-07-16 PROCEDURE — 96361 HYDRATE IV INFUSION ADD-ON: CPT

## 2020-07-16 RX ORDER — SODIUM CHLORIDE 9 MG/ML
INJECTION, SOLUTION INTRAVENOUS CONTINUOUS
Status: DISCONTINUED | OUTPATIENT
Start: 2020-07-16 | End: 2020-07-16

## 2020-07-16 NOTE — ED PROVIDER NOTES
Patient Seen in: [facility] Emergency Department      History   Patient presents with:  GI Bleeding    Stated Complaint: Bright red blood in stool this morning, denies dizziness or feeling lightheaded*    HPI    27-year-old woman who tells me she ha hypertension    • Visual impairment     glasses   • Wears glasses               Past Surgical History:   Procedure Laterality Date   • ANGIOPLASTY (CORONARY)      stent   • APPENDECTOMY     • BACK SURGERY      lumbar x 4   • BENIGN BIOPSY LEFT  a long time Current:BP (!) 170/80   Pulse 83   Temp 97.9 °F (36.6 °C) (Temporal)   Resp (!) 0   Ht 152.4 cm (5')   Wt 73.9 kg   LMP  (LMP Unknown)   SpO2 94%   BMI 31.83 kg/m²         Physical Exam    General:  Vitals as listed.   No acute distress   HEENT: Scler SCREEN[[id number]]                                  Final result                 Please view results for these tests on the individual orders.    ABORH (BLOOD TYPE)   ANTIBODY SCREEN   RAINBOW DRAW BLUE   RAINBOW DRAW LAVENDER   RAINBOW DRAW LIGHT GREEN   RA

## 2020-07-16 NOTE — ED INITIAL ASSESSMENT (HPI)
Pt arrives with episode of bloody stool this morning after having some constipation yesterday. Had a dr espinosa with her primary care scheduled today but called and was told to come to ER for evaluation.

## 2020-07-17 ENCOUNTER — PATIENT OUTREACH (OUTPATIENT)
Dept: CASE MANAGEMENT | Age: 85
End: 2020-07-17

## 2020-07-17 DIAGNOSIS — I10 HYPERTENSION, BENIGN: ICD-10-CM

## 2020-07-17 DIAGNOSIS — R13.19 ESOPHAGEAL DYSPHAGIA: ICD-10-CM

## 2020-07-17 DIAGNOSIS — R29.898 WEAKNESS OF BOTH LEGS: ICD-10-CM

## 2020-07-17 DIAGNOSIS — M17.11 PRIMARY OSTEOARTHRITIS OF RIGHT KNEE: ICD-10-CM

## 2020-07-17 DIAGNOSIS — E78.00 PURE HYPERCHOLESTEROLEMIA: ICD-10-CM

## 2020-07-17 DIAGNOSIS — G72.9 MYOPATHY, UNSPECIFIED: ICD-10-CM

## 2020-07-17 DIAGNOSIS — J45.40 MODERATE PERSISTENT ASTHMA WITHOUT COMPLICATION: ICD-10-CM

## 2020-07-17 DIAGNOSIS — Z95.5 HISTORY OF HEART ARTERY STENT: ICD-10-CM

## 2020-07-17 DIAGNOSIS — R19.7 DIARRHEA, UNSPECIFIED TYPE: ICD-10-CM

## 2020-07-17 DIAGNOSIS — M81.0 AGE-RELATED OSTEOPOROSIS WITHOUT CURRENT PATHOLOGICAL FRACTURE: ICD-10-CM

## 2020-07-17 DIAGNOSIS — M17.12 PRIMARY OSTEOARTHRITIS OF LEFT KNEE: ICD-10-CM

## 2020-07-17 DIAGNOSIS — Z85.828 HISTORY OF SKIN CANCER: ICD-10-CM

## 2020-07-17 DIAGNOSIS — M47.816 OSTEOARTHRITIS OF SPINE WITHOUT MYELOPATHY OR RADICULOPATHY, LUMBAR REGION: ICD-10-CM

## 2020-07-17 DIAGNOSIS — R06.00 DYSPNEA, UNSPECIFIED TYPE: ICD-10-CM

## 2020-07-17 DIAGNOSIS — E78.5 DYSLIPIDEMIA: ICD-10-CM

## 2020-07-17 DIAGNOSIS — M54.12 CERVICAL RADICULOPATHY AT C5: ICD-10-CM

## 2020-07-17 DIAGNOSIS — Z91.81 AT RISK FOR FALLING: ICD-10-CM

## 2020-07-17 DIAGNOSIS — K58.0 IRRITABLE BOWEL SYNDROME WITH DIARRHEA: ICD-10-CM

## 2020-07-17 NOTE — PROGRESS NOTES
7/17/2020  Spoke to Eliud Angeles for CCM. Updates to patient care team/ comments: None  Patient reported changes in medications: None  Med Adherence  Comment: Taking medications as prescribed per patient.     Health Maintenance:   Pneumococcal PPSV23/PCV13 6 Manager Follow Up: 1 month  Reason For Follow Up: review progress and or barriers towards patients goals. Care managers interventions: None needed at this time per patient.   Time Spent This Encounter Total: 26 min medical record review, telephone commu

## 2020-07-25 PROBLEM — K92.1 HEMATOCHEZIA: Status: ACTIVE | Noted: 2020-07-25

## 2020-07-30 ENCOUNTER — TELEPHONE (OUTPATIENT)
Dept: CARDIOLOGY | Age: 85
End: 2020-07-30

## 2020-07-31 PROCEDURE — 99490 CHRNC CARE MGMT STAFF 1ST 20: CPT

## 2020-08-06 ENCOUNTER — PATIENT OUTREACH (OUTPATIENT)
Dept: CASE MANAGEMENT | Age: 85
End: 2020-08-06

## 2020-08-06 DIAGNOSIS — M81.0 AGE-RELATED OSTEOPOROSIS WITHOUT CURRENT PATHOLOGICAL FRACTURE: ICD-10-CM

## 2020-08-06 DIAGNOSIS — Z91.81 AT RISK FOR FALLING: ICD-10-CM

## 2020-08-06 DIAGNOSIS — E78.5 DYSLIPIDEMIA: ICD-10-CM

## 2020-08-06 DIAGNOSIS — M47.816 OSTEOARTHRITIS OF SPINE WITHOUT MYELOPATHY OR RADICULOPATHY, LUMBAR REGION: ICD-10-CM

## 2020-08-06 DIAGNOSIS — J45.40 MODERATE PERSISTENT ASTHMA WITHOUT COMPLICATION: ICD-10-CM

## 2020-08-06 DIAGNOSIS — K58.0 IRRITABLE BOWEL SYNDROME WITH DIARRHEA: ICD-10-CM

## 2020-08-06 DIAGNOSIS — Z85.828 HISTORY OF SKIN CANCER: ICD-10-CM

## 2020-08-06 DIAGNOSIS — I10 HYPERTENSION, BENIGN: ICD-10-CM

## 2020-08-06 DIAGNOSIS — Z95.5 HISTORY OF HEART ARTERY STENT: ICD-10-CM

## 2020-08-06 DIAGNOSIS — E78.00 PURE HYPERCHOLESTEROLEMIA: ICD-10-CM

## 2020-08-06 DIAGNOSIS — M17.12 PRIMARY OSTEOARTHRITIS OF LEFT KNEE: ICD-10-CM

## 2020-08-06 DIAGNOSIS — R19.7 DIARRHEA, UNSPECIFIED TYPE: ICD-10-CM

## 2020-08-06 NOTE — PROGRESS NOTES
8/6/2020  Spoke to Eliud Angeles for CCM. Updates to patient care team/ comments: None  Patient reported changes in medications: None  Med Adherence  Comment: Patient reports taking as prescribed.     Health Maintenance:   Pneumococcal PPSV23/PCV13 65+ Years Goals/Action Plan:    · Active goal from previous outreach: Patient stated she would like to stay healthy and decrease ED visits.     · Patient reported progress toward goal: Patient states she will complete cardiac testing this week.  Patient states she is

## 2020-08-07 ENCOUNTER — HOSPITAL ENCOUNTER (OUTPATIENT)
Dept: CV DIAGNOSTICS | Facility: HOSPITAL | Age: 85
Discharge: HOME | End: 2020-08-07
Attending: INTERNAL MEDICINE
Payer: MEDICARE

## 2020-08-07 ENCOUNTER — HOSPITAL ENCOUNTER (OUTPATIENT)
Dept: CARDIOLOGY CLINIC | Facility: HOSPITAL | Age: 85
Discharge: HOME | End: 2020-08-07
Attending: INTERNAL MEDICINE
Payer: MEDICARE

## 2020-08-07 DIAGNOSIS — I65.29 STENOSIS OF CAROTID ARTERY, UNSPECIFIED LATERALITY: ICD-10-CM

## 2020-08-07 DIAGNOSIS — Z95.5 HISTORY OF HEART ARTERY STENT: ICD-10-CM

## 2020-08-07 DIAGNOSIS — R53.83 FATIGUE, UNSPECIFIED TYPE: ICD-10-CM

## 2020-08-07 DIAGNOSIS — I25.10 CORONARY ARTERY DISEASE INVOLVING NATIVE CORONARY ARTERY OF NATIVE HEART WITHOUT ANGINA PECTORIS: ICD-10-CM

## 2020-08-07 DIAGNOSIS — R06.02 SHORTNESS OF BREATH: ICD-10-CM

## 2020-08-07 DIAGNOSIS — I10 BENIGN HYPERTENSION: ICD-10-CM

## 2020-08-07 PROCEDURE — 93306 TTE W/DOPPLER COMPLETE: CPT | Performed by: INTERNAL MEDICINE

## 2020-08-07 PROCEDURE — 93880 EXTRACRANIAL BILAT STUDY: CPT | Performed by: INTERNAL MEDICINE

## 2020-08-15 ENCOUNTER — HOSPITAL ENCOUNTER (OUTPATIENT)
Dept: CV DIAGNOSTICS | Facility: HOSPITAL | Age: 85
Discharge: HOME | End: 2020-08-15
Attending: INTERNAL MEDICINE
Payer: MEDICARE

## 2020-08-15 DIAGNOSIS — R06.02 SHORTNESS OF BREATH: ICD-10-CM

## 2020-08-15 DIAGNOSIS — I10 HYPERTENSION, BENIGN: ICD-10-CM

## 2020-08-15 DIAGNOSIS — R53.83 FATIGUE: ICD-10-CM

## 2020-08-15 DIAGNOSIS — I25.10 CORONARY ARTERY DISEASE INVOLVING NATIVE CORONARY ARTERY WITHOUT ANGINA PECTORIS: ICD-10-CM

## 2020-08-15 DIAGNOSIS — I65.29 STENOSIS OF CAROTID ARTERY, UNSPECIFIED LATERALITY: ICD-10-CM

## 2020-08-15 PROCEDURE — 78452 HT MUSCLE IMAGE SPECT MULT: CPT | Performed by: INTERNAL MEDICINE

## 2020-08-15 PROCEDURE — 93017 CV STRESS TEST TRACING ONLY: CPT | Performed by: INTERNAL MEDICINE

## 2020-08-15 PROCEDURE — 93018 CV STRESS TEST I&R ONLY: CPT | Performed by: INTERNAL MEDICINE

## 2020-08-17 ENCOUNTER — TELEPHONE (OUTPATIENT)
Dept: CARDIOLOGY | Age: 85
End: 2020-08-17

## 2020-08-18 ENCOUNTER — TELEPHONE (OUTPATIENT)
Dept: CARDIOLOGY | Age: 85
End: 2020-08-18

## 2020-08-31 ENCOUNTER — OFFICE VISIT (OUTPATIENT)
Dept: RHEUMATOLOGY | Facility: CLINIC | Age: 85
End: 2020-08-31
Payer: MEDICARE

## 2020-08-31 VITALS
HEIGHT: 67 IN | TEMPERATURE: 97 F | DIASTOLIC BLOOD PRESSURE: 70 MMHG | HEART RATE: 74 BPM | WEIGHT: 166 LBS | BODY MASS INDEX: 26.06 KG/M2 | RESPIRATION RATE: 18 BRPM | SYSTOLIC BLOOD PRESSURE: 110 MMHG

## 2020-08-31 DIAGNOSIS — R19.5 DARK STOOLS: ICD-10-CM

## 2020-08-31 DIAGNOSIS — M47.816 OSTEOARTHRITIS OF SPINE WITHOUT MYELOPATHY OR RADICULOPATHY, LUMBAR REGION: Primary | ICD-10-CM

## 2020-08-31 PROCEDURE — 99213 OFFICE O/P EST LOW 20 MIN: CPT | Performed by: INTERNAL MEDICINE

## 2020-08-31 PROCEDURE — 99490 CHRNC CARE MGMT STAFF 1ST 20: CPT

## 2020-08-31 RX ORDER — PREDNISONE 1 MG/1
5 TABLET ORAL DAILY
Qty: 90 TABLET | Refills: 1 | Status: SHIPPED | OUTPATIENT
Start: 2020-08-31 | End: 2021-01-06

## 2020-08-31 RX ORDER — GABAPENTIN 300 MG/1
300 CAPSULE ORAL NIGHTLY PRN
Qty: 90 CAPSULE | Refills: 1 | Status: SHIPPED | OUTPATIENT
Start: 2020-08-31 | End: 2020-11-30

## 2020-08-31 NOTE — PROGRESS NOTES
[facility]  Dr. Lewis Baldwin Progress Note     Subjective:   Makayla Alonzo is a(n) 80year old female. Current complaints: Patient presents with:  Osteoarthritis: est pt, fu 3mo-Pt feeling about the same.  Has bruising noted to right foot, 2nd and

## 2020-08-31 NOTE — PATIENT INSTRUCTIONS
Use Gabapentin 300 mg at night to help with sleep and arthritis pain. Use Prednisone  2.5 mg in the morning. Check CBC since you had dark stools. Ice the right foot if needed for pain. Elevate the right foot. Xanax as needed for stress.   Return to off

## 2020-09-01 ENCOUNTER — TELEPHONE (OUTPATIENT)
Dept: INTERNAL MEDICINE CLINIC | Facility: CLINIC | Age: 85
End: 2020-09-01

## 2020-09-01 ENCOUNTER — LAB ENCOUNTER (OUTPATIENT)
Dept: LAB | Facility: HOSPITAL | Age: 85
End: 2020-09-01
Attending: INTERNAL MEDICINE
Payer: MEDICARE

## 2020-09-01 ENCOUNTER — PATIENT OUTREACH (OUTPATIENT)
Dept: CASE MANAGEMENT | Age: 85
End: 2020-09-01

## 2020-09-01 DIAGNOSIS — Z95.5 HISTORY OF HEART ARTERY STENT: ICD-10-CM

## 2020-09-01 DIAGNOSIS — Z85.828 HISTORY OF SKIN CANCER: ICD-10-CM

## 2020-09-01 DIAGNOSIS — M81.0 AGE-RELATED OSTEOPOROSIS WITHOUT CURRENT PATHOLOGICAL FRACTURE: ICD-10-CM

## 2020-09-01 DIAGNOSIS — J45.40 MODERATE PERSISTENT ASTHMA WITHOUT COMPLICATION: ICD-10-CM

## 2020-09-01 DIAGNOSIS — R29.898 WEAKNESS OF BOTH LEGS: ICD-10-CM

## 2020-09-01 DIAGNOSIS — K58.0 IRRITABLE BOWEL SYNDROME WITH DIARRHEA: ICD-10-CM

## 2020-09-01 DIAGNOSIS — M47.816 OSTEOARTHRITIS OF SPINE WITHOUT MYELOPATHY OR RADICULOPATHY, LUMBAR REGION: ICD-10-CM

## 2020-09-01 DIAGNOSIS — R19.5 DARK STOOLS: ICD-10-CM

## 2020-09-01 DIAGNOSIS — M17.11 PRIMARY OSTEOARTHRITIS OF RIGHT KNEE: ICD-10-CM

## 2020-09-01 DIAGNOSIS — E78.5 DYSLIPIDEMIA: ICD-10-CM

## 2020-09-01 DIAGNOSIS — Z91.81 AT RISK FOR FALLING: ICD-10-CM

## 2020-09-01 DIAGNOSIS — I10 HYPERTENSION, BENIGN: ICD-10-CM

## 2020-09-01 DIAGNOSIS — M16.12 OSTEOARTHRITIS OF ONE HIP, LEFT: ICD-10-CM

## 2020-09-01 DIAGNOSIS — E78.00 PURE HYPERCHOLESTEROLEMIA: ICD-10-CM

## 2020-09-01 LAB
BASOPHILS # BLD AUTO: 0.09 X10(3) UL (ref 0–0.2)
BASOPHILS NFR BLD AUTO: 1 %
DEPRECATED RDW RBC AUTO: 50.4 FL (ref 35.1–46.3)
EOSINOPHIL # BLD AUTO: 0.11 X10(3) UL (ref 0–0.7)
EOSINOPHIL NFR BLD AUTO: 1.2 %
ERYTHROCYTE [DISTWIDTH] IN BLOOD BY AUTOMATED COUNT: 15.3 % (ref 11–15)
HCT VFR BLD AUTO: 42.3 % (ref 35–48)
HGB BLD-MCNC: 13.6 G/DL (ref 12–16)
IMM GRANULOCYTES # BLD AUTO: 0.05 X10(3) UL (ref 0–1)
IMM GRANULOCYTES NFR BLD: 0.5 %
LYMPHOCYTES # BLD AUTO: 2.2 X10(3) UL (ref 1–4)
LYMPHOCYTES NFR BLD AUTO: 23.9 %
MCH RBC QN AUTO: 28.8 PG (ref 26–34)
MCHC RBC AUTO-ENTMCNC: 32.2 G/DL (ref 31–37)
MCV RBC AUTO: 89.4 FL (ref 80–100)
MONOCYTES # BLD AUTO: 0.58 X10(3) UL (ref 0.1–1)
MONOCYTES NFR BLD AUTO: 6.3 %
NEUTROPHILS # BLD AUTO: 6.16 X10 (3) UL (ref 1.5–7.7)
NEUTROPHILS # BLD AUTO: 6.16 X10(3) UL (ref 1.5–7.7)
NEUTROPHILS NFR BLD AUTO: 67.1 %
PLATELET # BLD AUTO: 260 10(3)UL (ref 150–450)
RBC # BLD AUTO: 4.73 X10(6)UL (ref 3.8–5.3)
WBC # BLD AUTO: 9.2 X10(3) UL (ref 4–11)

## 2020-09-01 PROCEDURE — 85025 COMPLETE CBC W/AUTO DIFF WBC: CPT

## 2020-09-01 PROCEDURE — 36415 COLL VENOUS BLD VENIPUNCTURE: CPT

## 2020-09-01 NOTE — PROGRESS NOTES
9/1/2020  Spoke to Eliud Angeles for CCM. Updates to patient care team/ comments: None  Patient reported changes in medications: None  Med Adherence  Comment: Taking as prescribed per patient.     Health Maintenance:   Pneumococcal PPSV23/PCV13 65+ Years / Geri Najera decrease ED visits.     · Patient reported progress toward goal: Patient reports no ED visits.  Patient is being compliant with her medications and upcoming appointments.     · Update to previous barriers: N/A     · Patient Reported New Barriers And Concern

## 2020-09-01 NOTE — TELEPHONE ENCOUNTER
S/w pt she is requesting derm referral for left leg skin lesion follow up. Pt has seen Dr. Becka Caldera in the past and would prefer to see someone else. Please advise and thank you in advance.

## 2020-09-02 ENCOUNTER — PATIENT OUTREACH (OUTPATIENT)
Dept: CASE MANAGEMENT | Age: 85
End: 2020-09-02

## 2020-09-02 DIAGNOSIS — I10 HYPERTENSION, BENIGN: ICD-10-CM

## 2020-09-02 DIAGNOSIS — E78.5 DYSLIPIDEMIA: ICD-10-CM

## 2020-09-02 DIAGNOSIS — Z91.81 AT RISK FOR FALLING: ICD-10-CM

## 2020-09-02 DIAGNOSIS — M81.0 AGE-RELATED OSTEOPOROSIS WITHOUT CURRENT PATHOLOGICAL FRACTURE: ICD-10-CM

## 2020-09-02 DIAGNOSIS — K58.0 IRRITABLE BOWEL SYNDROME WITH DIARRHEA: ICD-10-CM

## 2020-09-02 DIAGNOSIS — M47.816 OSTEOARTHRITIS OF SPINE WITHOUT MYELOPATHY OR RADICULOPATHY, LUMBAR REGION: ICD-10-CM

## 2020-09-02 DIAGNOSIS — R19.7 DIARRHEA, UNSPECIFIED TYPE: ICD-10-CM

## 2020-09-02 DIAGNOSIS — M17.11 PRIMARY OSTEOARTHRITIS OF RIGHT KNEE: ICD-10-CM

## 2020-09-02 DIAGNOSIS — Z85.828 HISTORY OF SKIN CANCER: ICD-10-CM

## 2020-09-02 DIAGNOSIS — M16.12 OSTEOARTHRITIS OF ONE HIP, LEFT: ICD-10-CM

## 2020-09-02 DIAGNOSIS — Z95.5 HISTORY OF HEART ARTERY STENT: ICD-10-CM

## 2020-09-02 DIAGNOSIS — J45.40 MODERATE PERSISTENT ASTHMA WITHOUT COMPLICATION: ICD-10-CM

## 2020-09-02 DIAGNOSIS — E78.00 PURE HYPERCHOLESTEROLEMIA: ICD-10-CM

## 2020-09-02 NOTE — PROGRESS NOTES
Returned patients call states she called derm office and scheduled appointment for 10/22/20 with Dr. Juan Antonio Das. Patient relates she want to see derm and get her leg skin lesion checked out prior to scheduling colonoscopy.  Patient states their was an appointmen

## 2020-09-03 ENCOUNTER — TELEPHONE (OUTPATIENT)
Dept: INTERNAL MEDICINE CLINIC | Facility: CLINIC | Age: 85
End: 2020-09-03

## 2020-09-03 ENCOUNTER — OFFICE VISIT (OUTPATIENT)
Dept: INTERNAL MEDICINE CLINIC | Facility: CLINIC | Age: 85
End: 2020-09-03
Payer: MEDICARE

## 2020-09-03 VITALS
WEIGHT: 170 LBS | SYSTOLIC BLOOD PRESSURE: 118 MMHG | HEART RATE: 96 BPM | BODY MASS INDEX: 32.1 KG/M2 | RESPIRATION RATE: 16 BRPM | DIASTOLIC BLOOD PRESSURE: 74 MMHG | HEIGHT: 61 IN | TEMPERATURE: 98 F

## 2020-09-03 DIAGNOSIS — Z87.898 HISTORY OF DIARRHEA: Primary | ICD-10-CM

## 2020-09-03 DIAGNOSIS — R53.83 OTHER FATIGUE: ICD-10-CM

## 2020-09-03 DIAGNOSIS — E78.00 PURE HYPERCHOLESTEROLEMIA: ICD-10-CM

## 2020-09-03 DIAGNOSIS — M15.9 PRIMARY OSTEOARTHRITIS INVOLVING MULTIPLE JOINTS: ICD-10-CM

## 2020-09-03 DIAGNOSIS — Z98.890 S/P LUMBAR LAMINECTOMY: ICD-10-CM

## 2020-09-03 DIAGNOSIS — J45.40 MODERATE PERSISTENT ASTHMA WITHOUT COMPLICATION: ICD-10-CM

## 2020-09-03 DIAGNOSIS — I10 HYPERTENSION, BENIGN: ICD-10-CM

## 2020-09-03 DIAGNOSIS — Z00.00 ENCOUNTER FOR ANNUAL HEALTH EXAMINATION: Primary | ICD-10-CM

## 2020-09-03 DIAGNOSIS — K58.0 IRRITABLE BOWEL SYNDROME WITH DIARRHEA: ICD-10-CM

## 2020-09-03 PROCEDURE — G0439 PPPS, SUBSEQ VISIT: HCPCS | Performed by: INTERNAL MEDICINE

## 2020-09-03 RX ORDER — VERAPAMIL HYDROCHLORIDE 240 MG/1
240 TABLET, FILM COATED, EXTENDED RELEASE ORAL DAILY
Qty: 90 TABLET | Refills: 1 | Status: SHIPPED | OUTPATIENT
Start: 2020-09-03 | End: 2021-03-05

## 2020-09-03 NOTE — PATIENT INSTRUCTIONS
Basia Vallecillo's SCREENING SCHEDULE   Tests on this list are recommended by your physician but may not be covered, or covered at this frequency, by your insurer. Please check with your insurance carrier before scheduling to verify coverage.    PREV patient record, not all encounters were searched. Please check Results Review for a complete set of results.  Limited to patients who meet one of the following criteria:   • Men who are 73-68 years old and have smoked more than 100 cigarettes in their lifet display for this patient. Chlamydia  Annually if high risk No results found for: CHLAMYDIA No flowsheet data found.     Screening Mammogram      Mammogram    Recommend Annually to at least age 76, and as needed after 76 There are no preventive care arnav No results found. However, due to the size of the patient record, not all encounters were searched. Please check Results Review for a complete set of results.  This may be covered with your pharmacy  prescription benefits     Recommended Websites for Advanc

## 2020-09-03 NOTE — TELEPHONE ENCOUNTER
She wants covid AB test like we spoke of due to h/o fatigue and diarrhea?   Just want to confirm it is the AB test she wants

## 2020-09-03 NOTE — PROGRESS NOTES
HPI:   Colt German is a 80year old female who presents for a Medicare Subsequent Annual Wellness visit (Pt already had Initial Annual Wellness). Patient with CAD, asthma,  Diffuse OA , IBS, anxiety here for wellness.   Recent bout of black s or pleasure in doing things (over the last two weeks)?: Not at all  Feeling down, depressed, or hopeless (over the last two weeks)?: Not at all  PHQ-2 SCORE: 0     Advanced Directive:   She does have a Living Will but we do NOT have it on file in [address] [place] [address] Michael Peguero disease), native coronary artery     Hx of migraines     Moderate persistent asthma without complication     Osteoporosis     Hypertension, benign     Dyslipidemia     Myopathy, unspecified     Weakness of both legs     Osteoarthritis of left knee     Rupinder Saad erythromycin; flagyl [metronidazole]; linezolid; metronidazole and related; mold; pcn [penicillins]; penicillin g; plavix [clopidogrel bisulfate]; questran [cholestyramine]; risperdal [risperidone]; shellfish-derived products; tramadol; vancomycin; warfari artery, Back pain, Back problem, Black stools, Blood in the stool, Blood transfusion reaction, CAD (coronary artery disease), Calculus of kidney, Cancer ([address] (2010), Chest pain on exertion, Constipation, Coronary atherosclerosis of unspecified type of ves wire 1 site left (cpt=19281) (1980'S); appendectomy; total abdom hysterectomy; tonsillectomy; egd; stent, coated/cov w/del sys; angioplasty (coronary); back surgery; other surgical history (8/7/12); excis lumbar disk,one level; hemorrhoidectomy,int/ext,sim Normocephalic, without obvious abnormality, atraumatic   Eyes:  PERRL, conjunctiva/corneas clear, EOM's intact both eyes   Ears:  Normal TM's and external ear canals, both ears   Nose: deferred   Throat: deferred   Neck: Supple, symmetrical, trachea midlin hypercholesterolemia, CAD- she declined statin therapy, discussed heart healthy diet. Recent stress test WNL.  Follows with cardiology    Irritable bowel syndrome with diarrhea/episode of black stool- advised to see her GI as planned    Skin lesion LLE- ref reminders to display for this patient. Update Health Maintenance if applicable    Flex Sigmoidoscopy Screen every 10 years No results found. However, due to the size of the patient record, not all encounters were searched.  Please check Results Review for a with a cut with metal- TD and TDaP Not covered by Medicare Part B No vaccine history found This may be covered with your prescription benefits, but Medicare does not cover unless Medically needed    Zoster  Not covered by Medicare Part B No vaccine history

## 2020-09-03 NOTE — TELEPHONE ENCOUNTER
Patient states the fatigue was a couple of months ago where she was sleeping a lot and the loose stools come and go, will be calling Dr Hill Pepe to discuss that situation.   Pt wasn't sure which test TB wanted to order for her but thinks it is the COVID ab

## 2020-09-09 ENCOUNTER — TELEPHONE (OUTPATIENT)
Dept: INTERNAL MEDICINE CLINIC | Facility: CLINIC | Age: 85
End: 2020-09-09

## 2020-09-09 NOTE — TELEPHONE ENCOUNTER
Pt stated its complicated and she is calling to speak to TB. This has to do with when she examined her this last time she was seen.  I asked her if she is dizzy lightheaded nauseous and pt stated it has nothing to do with how she is feeling its more of what

## 2020-09-10 NOTE — TELEPHONE ENCOUNTER
Patient states when she saw TB on 9/3/20 she mentioned to pt that she should have a colonoscopy due to stomach tenderness and previous blood in her stools. Pt has normal color stools now.  Pt states when she called Dr Jodie Antunez office the staff told her D

## 2020-09-11 NOTE — TELEPHONE ENCOUNTER
Patient states she will follow up with GI at this time. Patient will call with any other questions or concerns.

## 2020-09-11 NOTE — TELEPHONE ENCOUNTER
Pt called and would like a call back from the nurse to discuss this again.  She states she thought of something else

## 2020-09-11 NOTE — TELEPHONE ENCOUNTER
No I do not think that is why she had problems with her colon. She can check with her GI doctor but I have not seen the medication adverse rxn as being GI related.  She has IBS with diarrhea so I am aware she takes imodium a lot

## 2020-09-11 NOTE — TELEPHONE ENCOUNTER
Patient states she was on Valsartan for 20 years, her medication was part of a recall, saved the letters from [employer] and phone call from [facility] told her to stop the medication as this was from the bad batch of Valsartan.   Pt would like to know if [facility]

## 2020-09-29 ENCOUNTER — OFFICE VISIT (OUTPATIENT)
Dept: INTERNAL MEDICINE CLINIC | Facility: CLINIC | Age: 85
End: 2020-09-29
Payer: MEDICARE

## 2020-09-29 VITALS
DIASTOLIC BLOOD PRESSURE: 66 MMHG | WEIGHT: 169 LBS | HEART RATE: 76 BPM | RESPIRATION RATE: 16 BRPM | SYSTOLIC BLOOD PRESSURE: 116 MMHG | BODY MASS INDEX: 32 KG/M2 | TEMPERATURE: 97 F

## 2020-09-29 DIAGNOSIS — R41.3 MEMORY CHANGES: ICD-10-CM

## 2020-09-29 DIAGNOSIS — K92.1 BLACK STOOL: ICD-10-CM

## 2020-09-29 DIAGNOSIS — L98.9 SKIN LESION OF LEFT LEG: Primary | ICD-10-CM

## 2020-09-29 PROCEDURE — 99214 OFFICE O/P EST MOD 30 MIN: CPT | Performed by: INTERNAL MEDICINE

## 2020-09-29 NOTE — PROGRESS NOTES
Tierney Bond is a 80year old female.   Patient presents with:  Leg Pain: SN Rm 3; L outter shin, notes \"pain while sleeping\", \"derm pulled lesion off\"  Change of Bowel Habits: black stool last week  Memory Loss: memory changes per family spinal fusion     Pancreatic insufficiency     History of skin cancer     History of breast biopsy     Pure hypercholesterolemia     Post concussion syndrome     Nausea     Dehydration     Clostridium difficile colitis     Diarrhea, unspecified type     He Diagnosis Date   • Anesthesia complication     allergic reaction to \"Hayden\" meds   • Anxiety state, unspecified    • Arthritis    • Asthma    • Atherosclerosis of coronary artery    • Back pain    • Back problem     cervical pain   • Black stools    • Mother    • Allergies Father    • Asthma Father    • Colon Cancer Father    • Heart Disease Father    • Heart Attack Father    • Heart Attack Paternal Grandfather    • Heart Disease Paternal Grandfather    • Arthritis Paternal Grandmother    • Allergies Pa SHORTNESS OF BREATH  Pcn [Penicillins]           Comment:Short of Breath  Penicillin G            RASH    Comment:sob  Plavix [Clopidogrel*        Comment:Redness/choking  Questran [Cholestyr*    HIVES, Coughing  Risperdal [Risperid*    SHORTNESS OF B Dr. Milan Pain This Encounter      CBC W Differential W Platelet [E]      Basic Metabolic Panel (8) [E]      TSH and Free T4 [E]      Vitamin B12 [E]      Meds & Refills for this Visit:  Requested Prescriptions      No prescriptions r

## 2020-09-30 PROCEDURE — G2058 CCM ADD 20MIN: HCPCS

## 2020-09-30 PROCEDURE — 99490 CHRNC CARE MGMT STAFF 1ST 20: CPT

## 2020-10-01 ENCOUNTER — PATIENT OUTREACH (OUTPATIENT)
Dept: CASE MANAGEMENT | Age: 85
End: 2020-10-01

## 2020-10-01 ENCOUNTER — LAB ENCOUNTER (OUTPATIENT)
Dept: LAB | Age: 85
End: 2020-10-01
Attending: INTERNAL MEDICINE
Payer: MEDICARE

## 2020-10-01 DIAGNOSIS — R41.3 MEMORY CHANGES: ICD-10-CM

## 2020-10-01 DIAGNOSIS — E78.00 PURE HYPERCHOLESTEROLEMIA: ICD-10-CM

## 2020-10-01 DIAGNOSIS — Z85.828 HISTORY OF SKIN CANCER: ICD-10-CM

## 2020-10-01 DIAGNOSIS — K58.0 IRRITABLE BOWEL SYNDROME WITH DIARRHEA: ICD-10-CM

## 2020-10-01 DIAGNOSIS — M17.12 PRIMARY OSTEOARTHRITIS OF LEFT KNEE: ICD-10-CM

## 2020-10-01 DIAGNOSIS — I10 HYPERTENSION, BENIGN: ICD-10-CM

## 2020-10-01 DIAGNOSIS — M81.0 AGE-RELATED OSTEOPOROSIS WITHOUT CURRENT PATHOLOGICAL FRACTURE: ICD-10-CM

## 2020-10-01 DIAGNOSIS — J45.40 MODERATE PERSISTENT ASTHMA WITHOUT COMPLICATION: ICD-10-CM

## 2020-10-01 DIAGNOSIS — M47.816 OSTEOARTHRITIS OF SPINE WITHOUT MYELOPATHY OR RADICULOPATHY, LUMBAR REGION: ICD-10-CM

## 2020-10-01 DIAGNOSIS — E78.5 DYSLIPIDEMIA: ICD-10-CM

## 2020-10-01 DIAGNOSIS — R53.83 OTHER FATIGUE: Primary | ICD-10-CM

## 2020-10-01 DIAGNOSIS — K92.1 BLACK STOOL: ICD-10-CM

## 2020-10-01 DIAGNOSIS — Z91.81 AT RISK FOR FALLING: ICD-10-CM

## 2020-10-01 DIAGNOSIS — M16.12 OSTEOARTHRITIS OF ONE HIP, LEFT: ICD-10-CM

## 2020-10-01 DIAGNOSIS — Z95.5 HISTORY OF HEART ARTERY STENT: ICD-10-CM

## 2020-10-01 PROCEDURE — 85025 COMPLETE CBC W/AUTO DIFF WBC: CPT

## 2020-10-01 PROCEDURE — 80048 BASIC METABOLIC PNL TOTAL CA: CPT

## 2020-10-01 PROCEDURE — 82607 VITAMIN B-12: CPT

## 2020-10-01 PROCEDURE — 36415 COLL VENOUS BLD VENIPUNCTURE: CPT

## 2020-10-01 PROCEDURE — 84439 ASSAY OF FREE THYROXINE: CPT

## 2020-10-01 PROCEDURE — 84443 ASSAY THYROID STIM HORMONE: CPT

## 2020-10-01 PROCEDURE — 86769 SARS-COV-2 COVID-19 ANTIBODY: CPT

## 2020-10-01 NOTE — PROGRESS NOTES
10/1/2020  Spoke to Eliud Angeles for CCM. Updates to patient care team/ comments: None  Patient reported changes in medications: None  Med Adherence  Comment: Taking as prescribed per patient.     Health Maintenance:  Pneumococcal Vaccine: 65+ Years(1 of 1 - Concerns: Patient reports no new barriers and concerns.                   - Plan for overcoming all barriers: N/A             Patient agrees to goal action plan.   Self-Management Abilities (patient reported)             1= least confident in achieving goal

## 2020-10-06 ENCOUNTER — TELEPHONE (OUTPATIENT)
Dept: INTERNAL MEDICINE CLINIC | Facility: CLINIC | Age: 85
End: 2020-10-06

## 2020-10-06 NOTE — TELEPHONE ENCOUNTER
Sukh Sams RN   10/6/2020  3:14 PM      Pt notified of results and states understanding. Pt not able to schedule b-12 injections now but she will call us back. She is on a b complex vitamin.  She is going to see how much b-12 is in that and let us k

## 2020-10-07 ENCOUNTER — PATIENT OUTREACH (OUTPATIENT)
Dept: CASE MANAGEMENT | Age: 85
End: 2020-10-07

## 2020-10-07 DIAGNOSIS — Z91.81 AT RISK FOR FALLING: ICD-10-CM

## 2020-10-07 DIAGNOSIS — E78.00 PURE HYPERCHOLESTEROLEMIA: ICD-10-CM

## 2020-10-07 DIAGNOSIS — M47.816 OSTEOARTHRITIS OF SPINE WITHOUT MYELOPATHY OR RADICULOPATHY, LUMBAR REGION: ICD-10-CM

## 2020-10-07 DIAGNOSIS — M17.12 PRIMARY OSTEOARTHRITIS OF LEFT KNEE: ICD-10-CM

## 2020-10-07 DIAGNOSIS — Z95.5 HISTORY OF HEART ARTERY STENT: ICD-10-CM

## 2020-10-07 DIAGNOSIS — K58.0 IRRITABLE BOWEL SYNDROME WITH DIARRHEA: ICD-10-CM

## 2020-10-07 DIAGNOSIS — Z85.828 HISTORY OF SKIN CANCER: ICD-10-CM

## 2020-10-07 DIAGNOSIS — J45.40 MODERATE PERSISTENT ASTHMA WITHOUT COMPLICATION: ICD-10-CM

## 2020-10-07 DIAGNOSIS — E78.5 DYSLIPIDEMIA: ICD-10-CM

## 2020-10-07 DIAGNOSIS — R19.7 DIARRHEA, UNSPECIFIED TYPE: ICD-10-CM

## 2020-10-07 DIAGNOSIS — M81.0 AGE-RELATED OSTEOPOROSIS WITHOUT CURRENT PATHOLOGICAL FRACTURE: ICD-10-CM

## 2020-10-07 DIAGNOSIS — I10 HYPERTENSION, BENIGN: ICD-10-CM

## 2020-10-07 DIAGNOSIS — M17.11 PRIMARY OSTEOARTHRITIS OF RIGHT KNEE: ICD-10-CM

## 2020-10-07 NOTE — PROGRESS NOTES
Spoke with patient states Dr. Katina Riggs had recommended she receives the flu and pneumonia vaccine. Patient states she remembers her allergist provider in [place] had suggested she does not receive any vaccines due to all the allergies she has.  Patient rela

## 2020-10-31 PROCEDURE — G2058 CCM ADD 20MIN: HCPCS

## 2020-10-31 PROCEDURE — 99490 CHRNC CARE MGMT STAFF 1ST 20: CPT

## 2020-11-02 ENCOUNTER — NURSE ONLY (OUTPATIENT)
Dept: INTERNAL MEDICINE CLINIC | Facility: CLINIC | Age: 85
End: 2020-11-02
Payer: MEDICARE

## 2020-11-02 DIAGNOSIS — E53.8 B12 DEFICIENCY: Primary | ICD-10-CM

## 2020-11-02 DIAGNOSIS — Z23 NEED FOR VACCINATION: ICD-10-CM

## 2020-11-02 PROCEDURE — 96372 THER/PROPH/DIAG INJ SC/IM: CPT | Performed by: INTERNAL MEDICINE

## 2020-11-02 RX ORDER — CYANOCOBALAMIN 1000 UG/ML
1000 INJECTION INTRAMUSCULAR; SUBCUTANEOUS ONCE
Status: COMPLETED | OUTPATIENT
Start: 2020-11-02 | End: 2020-11-02

## 2020-11-02 RX ADMIN — CYANOCOBALAMIN 1000 MCG: 1000 INJECTION INTRAMUSCULAR; SUBCUTANEOUS at 13:09:00

## 2020-11-09 ENCOUNTER — APPOINTMENT (OUTPATIENT)
Dept: LAB | Facility: HOSPITAL | Age: 85
End: 2020-11-09
Attending: INTERNAL MEDICINE
Payer: MEDICARE

## 2020-11-09 DIAGNOSIS — K92.1 BLACK STOOL: ICD-10-CM

## 2020-11-11 ENCOUNTER — MED REC SCAN ONLY (OUTPATIENT)
Dept: INTERNAL MEDICINE CLINIC | Facility: CLINIC | Age: 85
End: 2020-11-11

## 2020-11-11 ENCOUNTER — PATIENT OUTREACH (OUTPATIENT)
Dept: CASE MANAGEMENT | Age: 85
End: 2020-11-11

## 2020-11-11 DIAGNOSIS — Z91.81 AT RISK FOR FALLING: ICD-10-CM

## 2020-11-11 DIAGNOSIS — E78.00 PURE HYPERCHOLESTEROLEMIA: ICD-10-CM

## 2020-11-11 DIAGNOSIS — I10 HYPERTENSION, BENIGN: ICD-10-CM

## 2020-11-11 DIAGNOSIS — I25.118 CORONARY ARTERY DISEASE OF NATIVE ARTERY OF NATIVE HEART WITH STABLE ANGINA PECTORIS (HCC): ICD-10-CM

## 2020-11-11 DIAGNOSIS — M51.26 LUMBAR DISC HERNIATION: ICD-10-CM

## 2020-11-11 DIAGNOSIS — E78.5 DYSLIPIDEMIA: ICD-10-CM

## 2020-11-11 RX ORDER — ALPRAZOLAM 0.25 MG/1
0.25 TABLET ORAL EVERY 6 HOURS PRN
COMMUNITY
Start: 2020-11-02 | End: 2021-04-22

## 2020-11-11 NOTE — PROGRESS NOTES
11/11/2020  Spoke to Eliud Angeles for CCM.       Updates to patient care team/ comments: UTD  Patient reported changes in medications: reports no changes   Med Adherence  Comment: reports adherence     Health Maintenance:   Pneumococcal Vaccine: 65+ Years(1 of 1 achieving goal, 5= very confident               - confidence: : 4       Care Manager Follow Up: 1 month   Reason For Follow Up: review progress and or barriers towards patients goals.      Care managers interventions: Continue to provide encouragement, supp

## 2020-11-12 ENCOUNTER — HOSPITAL ENCOUNTER (OUTPATIENT)
Facility: HOSPITAL | Age: 85
Setting detail: HOSPITAL OUTPATIENT SURGERY
Discharge: HOME | End: 2020-11-12
Attending: INTERNAL MEDICINE | Admitting: INTERNAL MEDICINE
Payer: MEDICARE

## 2020-11-12 ENCOUNTER — ANESTHESIA EVENT (OUTPATIENT)
Dept: ENDOSCOPY | Facility: HOSPITAL | Age: 85
End: 2020-11-12
Payer: MEDICARE

## 2020-11-12 ENCOUNTER — ANESTHESIA (OUTPATIENT)
Dept: ENDOSCOPY | Facility: HOSPITAL | Age: 85
End: 2020-11-12
Payer: MEDICARE

## 2020-11-12 VITALS
TEMPERATURE: 98 F | OXYGEN SATURATION: 95 % | HEIGHT: 61 IN | DIASTOLIC BLOOD PRESSURE: 70 MMHG | RESPIRATION RATE: 18 BRPM | SYSTOLIC BLOOD PRESSURE: 142 MMHG | BODY MASS INDEX: 31.15 KG/M2 | HEART RATE: 71 BPM | WEIGHT: 165 LBS

## 2020-11-12 DIAGNOSIS — R19.7 DIARRHEA, UNSPECIFIED TYPE: ICD-10-CM

## 2020-11-12 DIAGNOSIS — K92.1 BLACK STOOL: Primary | ICD-10-CM

## 2020-11-12 PROCEDURE — 0DJ08ZZ INSPECTION OF UPPER INTESTINAL TRACT, VIA NATURAL OR ARTIFICIAL OPENING ENDOSCOPIC: ICD-10-PCS | Performed by: INTERNAL MEDICINE

## 2020-11-12 RX ORDER — SODIUM CHLORIDE, SODIUM LACTATE, POTASSIUM CHLORIDE, CALCIUM CHLORIDE 600; 310; 30; 20 MG/100ML; MG/100ML; MG/100ML; MG/100ML
INJECTION, SOLUTION INTRAVENOUS CONTINUOUS
Status: DISCONTINUED | OUTPATIENT
Start: 2020-11-12 | End: 2020-11-12

## 2020-11-12 NOTE — OPERATIVE REPORT
Bandar Krishnamurthy Patient Status:  Hospital Outpatient Surgery    1934 MRN [id number]   [facility] ENDOSCOPY Attending Allan Franco MD   Date 2020 PCP Rosita Rojas MD     PREOPERATIVE DIAGNOSIS/INDICATION: MElena

## 2020-11-12 NOTE — ANESTHESIA POSTPROCEDURE EVALUATION
[id number] [facility] Patient Status:  Hospital Outpatient Surgery   Age/Gender 80year old female MRN [id number]   Location [address] [facility]. Attending Eliazar Fajardo MD   [facility] Day # 0 PCP Meta Opitz, MD       Anesthesia P

## 2020-11-12 NOTE — ANESTHESIA PREPROCEDURE EVALUATION
PRE-OP EVALUATION    Patient Name: Shelby Chapman    Pre-op Diagnosis: Black stool [K92.1]  Diarrhea, unspecified type [R19.7]    Procedure(s):  ESOPHAGOGASTRODUODENOSCOPY (EGD)    Surgeon(s) and Role:     Zia Smith MD - Primary    Pre-op and well controlled  (+) hyperlipidemia    (-) past MI                              Endo/Other                                  Pulmonary      (+) asthma         (+) shortness of breath     (+) sleep apnea       Neuro/Psych        (+) anxiety         (+) n Performed by Lennox Riggers, MD at [facility] ENDOSCOPY   • ESOPHAGOGASTRODUODENOSCOPY (EGD) N/A 4/24/2018    Performed by Anai Garcia MD at [facility] ENDOSCOPY   • [address]     • GASTROSCOPY  10/21/2013    Performed by Lennox Riggers, MD at  memory of procedure is unlikely although intraop recall, if it occurs, may be a reasonable and comfortable experience with this anesthetic.  Aware that general anesthesia is not intended though deep sedation may include brief moments of general anesthesia.

## 2020-11-12 NOTE — H&P
[place] Patient Status:  Hospital Outpatient Surgery    1934 MRN [id number]   [facility] ENDOSCOPY Attending Amy Nunn MD   Date 2020 PCP Francisca Coles MD     CC:   Ran Clayton Weight loss      Past Surgical History:   Procedure Laterality Date   • ANGIOPLASTY (CORONARY)      stent   • APPENDECTOMY     • BACK SURGERY      lumbar x 4   • BENIGN BIOPSY LEFT  a long time ago    x 2   • BREAST BIOPSY Left 8/6/2018    Performed by Milena Dobbs Attack Brother    • Other (Autoimmune disease) Brother    • Thyroid Disorder Sister    • Asthma Sister    • Cancer Brother    • Allergies Daughter    • Fibromyalgia Daughter    • Melanoma Daughter    • Other (Hepatitis C) Daughter       reports that she qu Comment:Redness/choking  Questran [Cholestyr*    HIVES, Coughing  Risperdal [Risperid*    SHORTNESS OF BREATH  Shellfish-Derived P*        Comment:Sob  Tramadol                    Comment:Arms turned red like a sunburn  Vancomycin              RASH    Comm Post concussion syndrome     Nausea     Dehydration     Clostridium difficile colitis     Diarrhea, unspecified type     Hematochezia      Melena    Plan;  EGD    Shankar Ramos  11/12/2020  12:52 PM

## 2020-11-30 ENCOUNTER — VIRTUAL PHONE E/M (OUTPATIENT)
Dept: RHEUMATOLOGY | Facility: CLINIC | Age: 85
End: 2020-11-30

## 2020-11-30 DIAGNOSIS — Z95.5 HISTORY OF HEART ARTERY STENT: ICD-10-CM

## 2020-11-30 DIAGNOSIS — M47.816 OSTEOARTHRITIS OF SPINE WITHOUT MYELOPATHY OR RADICULOPATHY, LUMBAR REGION: Primary | ICD-10-CM

## 2020-11-30 DIAGNOSIS — M17.12 PRIMARY OSTEOARTHRITIS OF LEFT KNEE: ICD-10-CM

## 2020-11-30 DIAGNOSIS — Z98.1 STATUS POST LUMBAR SPINAL FUSION: ICD-10-CM

## 2020-11-30 PROCEDURE — 99490 CHRNC CARE MGMT STAFF 1ST 20: CPT

## 2020-11-30 PROCEDURE — 99442 PHONE E/M BY PHYS 11-20 MIN: CPT | Performed by: INTERNAL MEDICINE

## 2020-11-30 RX ORDER — GABAPENTIN 300 MG/1
300 CAPSULE ORAL NIGHTLY PRN
Qty: 90 CAPSULE | Refills: 1 | Status: SHIPPED | OUTPATIENT
Start: 2020-11-30 | End: 2021-04-02

## 2020-11-30 NOTE — PROGRESS NOTES
Telephone appointment - audio only  Office visit canceled due to coronavirus pandemic    Stan Caraballo consents to a virtual/telephone check in service on 1/30/20.   Patient understands and accepts financial responsibility for any deductible, coinsurance an

## 2020-12-04 ENCOUNTER — VIRTUAL PHONE E/M (OUTPATIENT)
Dept: INTERNAL MEDICINE CLINIC | Facility: CLINIC | Age: 85
End: 2020-12-04
Payer: MEDICARE

## 2020-12-04 DIAGNOSIS — R09.81 NASAL CONGESTION: Primary | ICD-10-CM

## 2020-12-04 DIAGNOSIS — Z91.09 ENVIRONMENTAL ALLERGIES: ICD-10-CM

## 2020-12-04 PROCEDURE — 99442 PHONE E/M BY PHYS 11-20 MIN: CPT | Performed by: INTERNAL MEDICINE

## 2020-12-04 NOTE — PROGRESS NOTES
Due to COVID-19 ACTION PLAN, the patient's office visit was converted to a phone visit with informed patient consent. Time Spent:15 min    Subjective     HPI:   Mary Roman is a 80year old female who presents for possible sinus infection.   C/o restrictions of visitation. There are limitations of this visit as no physical exam could be performed. Every conscious effort was taken to allow for sufficient and adequate time.   This billing visit was spent on reviewing labs, medications, radiology te

## 2020-12-07 ENCOUNTER — APPOINTMENT (OUTPATIENT)
Dept: CARDIOLOGY | Age: 85
End: 2020-12-07

## 2020-12-07 ENCOUNTER — PATIENT OUTREACH (OUTPATIENT)
Dept: CASE MANAGEMENT | Age: 85
End: 2020-12-07

## 2020-12-07 DIAGNOSIS — M17.11 PRIMARY OSTEOARTHRITIS OF RIGHT KNEE: ICD-10-CM

## 2020-12-07 DIAGNOSIS — E78.00 PURE HYPERCHOLESTEROLEMIA: ICD-10-CM

## 2020-12-07 DIAGNOSIS — M81.0 AGE-RELATED OSTEOPOROSIS WITHOUT CURRENT PATHOLOGICAL FRACTURE: ICD-10-CM

## 2020-12-07 DIAGNOSIS — Z86.69 HX OF MIGRAINES: ICD-10-CM

## 2020-12-07 DIAGNOSIS — K58.0 IRRITABLE BOWEL SYNDROME WITH DIARRHEA: ICD-10-CM

## 2020-12-07 DIAGNOSIS — I10 HYPERTENSION, BENIGN: ICD-10-CM

## 2020-12-07 DIAGNOSIS — E78.5 DYSLIPIDEMIA: ICD-10-CM

## 2020-12-07 DIAGNOSIS — J45.40 MODERATE PERSISTENT ASTHMA WITHOUT COMPLICATION: ICD-10-CM

## 2020-12-07 DIAGNOSIS — M17.12 PRIMARY OSTEOARTHRITIS OF LEFT KNEE: ICD-10-CM

## 2020-12-07 NOTE — PROGRESS NOTES
12/7/2020  Spoke to Eliud Angeles for CCM.       Updates to patient care team/ comments: UTD  Patient reported changes in medications: Pt taking prednisone every other day  Med Adherence  Comment: Taking medications as prescribed  Health Maintenance:     Zoster Va Care managers interventions:    Researched and mailed pt resources on what foods to eat and avoid when dealing with diarrhea    Continue to provide encouragement and education for healthy coping and dx.      Time Spent This Encounter Total: 22 min medic

## 2020-12-07 NOTE — PROGRESS NOTES
Contacting patient to follow up on CCM for the month.  LMCB       Time with pt - 2  min  Chart review -3  min  Total time - 5 min  Total Monthly time-5  min

## 2020-12-14 ENCOUNTER — VIRTUAL PHONE E/M (OUTPATIENT)
Dept: INTERNAL MEDICINE CLINIC | Facility: CLINIC | Age: 85
End: 2020-12-14
Payer: MEDICARE

## 2020-12-14 DIAGNOSIS — J06.9 VIRAL UPPER RESPIRATORY INFECTION: Primary | ICD-10-CM

## 2020-12-14 PROCEDURE — 99442 PHONE E/M BY PHYS 11-20 MIN: CPT | Performed by: FAMILY MEDICINE

## 2020-12-14 NOTE — PROGRESS NOTES
Due to COVID-19 ACTION PLAN, the patient's office visit was converted to a audio visit with informed patient consent.    Time Spent: 11 min    Subjective     HPI:   Farhan Veras is a 80year old female who presents for evaluation of sinus congestio conscious effort was taken to allow for sufficient and adequate time. This billing visit was spent on reviewing labs, medications, radiology tests and decision making.   Appropriate medical decision-making and tests are ordered as detailed in the plan of c

## 2020-12-21 ENCOUNTER — LAB ENCOUNTER (OUTPATIENT)
Dept: LAB | Age: 85
End: 2020-12-21
Attending: INTERNAL MEDICINE
Payer: MEDICARE

## 2020-12-21 DIAGNOSIS — K92.1 MELENA: ICD-10-CM

## 2020-12-21 PROCEDURE — 83550 IRON BINDING TEST: CPT

## 2020-12-21 PROCEDURE — 83540 ASSAY OF IRON: CPT

## 2020-12-21 PROCEDURE — 85025 COMPLETE CBC W/AUTO DIFF WBC: CPT

## 2020-12-21 PROCEDURE — 36415 COLL VENOUS BLD VENIPUNCTURE: CPT

## 2020-12-24 ENCOUNTER — LAB ENCOUNTER (OUTPATIENT)
Dept: LAB | Age: 85
End: 2020-12-24
Attending: INTERNAL MEDICINE
Payer: MEDICARE

## 2020-12-24 DIAGNOSIS — K92.1 MELENA: ICD-10-CM

## 2020-12-24 PROCEDURE — 82274 ASSAY TEST FOR BLOOD FECAL: CPT

## 2020-12-31 PROCEDURE — 99490 CHRNC CARE MGMT STAFF 1ST 20: CPT

## 2021-01-05 ENCOUNTER — TELEPHONIC VISIT (OUTPATIENT)
Dept: CARDIOLOGY | Age: 86
End: 2021-01-05

## 2021-01-05 VITALS — WEIGHT: 164 LBS | HEIGHT: 60 IN | BODY MASS INDEX: 32.2 KG/M2

## 2021-01-05 DIAGNOSIS — R06.02 SHORTNESS OF BREATH: ICD-10-CM

## 2021-01-05 DIAGNOSIS — E78.00 PURE HYPERCHOLESTEROLEMIA: ICD-10-CM

## 2021-01-05 DIAGNOSIS — I25.10 CORONARY ARTERY DISEASE INVOLVING NATIVE CORONARY ARTERY OF NATIVE HEART WITHOUT ANGINA PECTORIS: ICD-10-CM

## 2021-01-05 DIAGNOSIS — K58.0 IRRITABLE BOWEL SYNDROME WITH DIARRHEA: ICD-10-CM

## 2021-01-05 DIAGNOSIS — Z86.69 HX OF MIGRAINES: Primary | ICD-10-CM

## 2021-01-05 DIAGNOSIS — R53.83 FATIGUE, UNSPECIFIED TYPE: ICD-10-CM

## 2021-01-05 DIAGNOSIS — R60.0 LOCALIZED EDEMA: ICD-10-CM

## 2021-01-05 DIAGNOSIS — E78.5 DYSLIPIDEMIA: ICD-10-CM

## 2021-01-05 DIAGNOSIS — Z95.5 HISTORY OF HEART ARTERY STENT: ICD-10-CM

## 2021-01-05 DIAGNOSIS — I10 HYPERTENSION, BENIGN: ICD-10-CM

## 2021-01-05 DIAGNOSIS — M54.12 CERVICAL RADICULOPATHY AT C5: ICD-10-CM

## 2021-01-05 PROCEDURE — 99443 TELEPHONE E&M BY PHYSICIAN EST PT NOT ORIG PREV 7 DAYS 21-30 MIN: CPT | Performed by: INTERNAL MEDICINE

## 2021-01-05 SDOH — HEALTH STABILITY: PHYSICAL HEALTH: ON AVERAGE, HOW MANY DAYS PER WEEK DO YOU ENGAGE IN MODERATE TO STRENUOUS EXERCISE (LIKE A BRISK WALK)?: 0 DAYS

## 2021-01-05 SDOH — HEALTH STABILITY: PHYSICAL HEALTH: ON AVERAGE, HOW MANY MINUTES DO YOU ENGAGE IN EXERCISE AT THIS LEVEL?: 0 MIN

## 2021-01-05 ASSESSMENT — ENCOUNTER SYMPTOMS
FEVER: 0
BRUISES/BLEEDS EASILY: 0
HEMOPTYSIS: 0
SUSPICIOUS LESIONS: 0
COUGH: 0
ALLERGIC/IMMUNOLOGIC COMMENTS: NO NEW FOOD ALLERGIES
CHILLS: 0
HEMATOCHEZIA: 0
WEIGHT LOSS: 0
WEIGHT GAIN: 0

## 2021-01-05 ASSESSMENT — PATIENT HEALTH QUESTIONNAIRE - PHQ9
SUM OF ALL RESPONSES TO PHQ9 QUESTIONS 1 AND 2: 0
1. LITTLE INTEREST OR PLEASURE IN DOING THINGS: NOT AT ALL
2. FEELING DOWN, DEPRESSED OR HOPELESS: NOT AT ALL
CLINICAL INTERPRETATION OF PHQ9 SCORE: NO FURTHER SCREENING NEEDED
SUM OF ALL RESPONSES TO PHQ9 QUESTIONS 1 AND 2: 0
CLINICAL INTERPRETATION OF PHQ2 SCORE: NO FURTHER SCREENING NEEDED

## 2021-01-06 ENCOUNTER — PATIENT OUTREACH (OUTPATIENT)
Dept: CASE MANAGEMENT | Age: 86
End: 2021-01-06

## 2021-01-06 DIAGNOSIS — Z86.69 HX OF MIGRAINES: ICD-10-CM

## 2021-01-06 DIAGNOSIS — E78.00 PURE HYPERCHOLESTEROLEMIA: ICD-10-CM

## 2021-01-06 DIAGNOSIS — R19.7 DIARRHEA, UNSPECIFIED TYPE: ICD-10-CM

## 2021-01-06 DIAGNOSIS — M16.12 OSTEOARTHRITIS OF ONE HIP, LEFT: ICD-10-CM

## 2021-01-06 DIAGNOSIS — I25.118 CORONARY ARTERY DISEASE OF NATIVE ARTERY OF NATIVE HEART WITH STABLE ANGINA PECTORIS (HCC): ICD-10-CM

## 2021-01-06 DIAGNOSIS — M47.816 OSTEOARTHRITIS OF SPINE WITHOUT MYELOPATHY OR RADICULOPATHY, LUMBAR REGION: ICD-10-CM

## 2021-01-06 DIAGNOSIS — E78.5 DYSLIPIDEMIA: ICD-10-CM

## 2021-01-06 DIAGNOSIS — I10 HYPERTENSION, BENIGN: ICD-10-CM

## 2021-01-06 DIAGNOSIS — M17.12 PRIMARY OSTEOARTHRITIS OF LEFT KNEE: ICD-10-CM

## 2021-01-06 LAB — HEMOCCULT STL QL: NEGATIVE

## 2021-01-06 NOTE — PROGRESS NOTES
1/6/2021  Spoke to Eliud Angeles for CCM.       Updates to patient care team/ comments: UTD  Patient reported changes in medications: PT no longer taking prednisone  Med Adherence  Comment: pt taking medications as prescribed     Health Maintenance:     Zoster Vac (patient reported)             1= least confident in achieving goal, 5= very confident               - confidence: : 3    Care Manager Follow Up: 1 month  Reason For Follow Up: review progress and or barriers towards patients goals.      Care managers inter

## 2021-01-11 ENCOUNTER — OFFICE VISIT (OUTPATIENT)
Dept: INTERNAL MEDICINE CLINIC | Facility: CLINIC | Age: 86
End: 2021-01-11
Payer: MEDICARE

## 2021-01-11 ENCOUNTER — LAB ENCOUNTER (OUTPATIENT)
Dept: LAB | Age: 86
End: 2021-01-11
Attending: INTERNAL MEDICINE
Payer: MEDICARE

## 2021-01-11 VITALS
OXYGEN SATURATION: 98 % | TEMPERATURE: 98 F | SYSTOLIC BLOOD PRESSURE: 136 MMHG | DIASTOLIC BLOOD PRESSURE: 80 MMHG | RESPIRATION RATE: 18 BRPM | WEIGHT: 167 LBS | HEART RATE: 90 BPM | BODY MASS INDEX: 32.79 KG/M2 | HEIGHT: 60 IN

## 2021-01-11 DIAGNOSIS — E53.8 B12 DEFICIENCY: ICD-10-CM

## 2021-01-11 DIAGNOSIS — I10 HYPERTENSION, BENIGN: ICD-10-CM

## 2021-01-11 DIAGNOSIS — M25.472 LEFT ANKLE SWELLING: Primary | ICD-10-CM

## 2021-01-11 PROCEDURE — 96372 THER/PROPH/DIAG INJ SC/IM: CPT | Performed by: INTERNAL MEDICINE

## 2021-01-11 PROCEDURE — 99214 OFFICE O/P EST MOD 30 MIN: CPT | Performed by: INTERNAL MEDICINE

## 2021-01-11 RX ORDER — CYANOCOBALAMIN 1000 UG/ML
1000 INJECTION INTRAMUSCULAR; SUBCUTANEOUS ONCE
Status: COMPLETED | OUTPATIENT
Start: 2021-01-11 | End: 2021-01-11

## 2021-01-11 RX ADMIN — CYANOCOBALAMIN 1000 MCG: 1000 INJECTION INTRAMUSCULAR; SUBCUTANEOUS at 15:10:00

## 2021-01-11 NOTE — PROGRESS NOTES
Paul Bryant is a 80year old female. Patient presents with:  Swelling: ankles  Immunization/Injection: flu shot  Abnormal Labs: b12 deficiency      HPI:     Patient here for f/u on several issues-  C/o left > right ankle swelling for 1 week.  She (ZENPEP) [id number] units Oral Cap DR Particles Take 1 capsule by mouth 3 (three) times daily with meals. Take 1 PO prior to snacks 450 capsule 3   • gabapentin 300 MG Oral Cap Take 1 capsule (300 mg total) by mouth nightly as needed.  90 capsule 1   • AL disease)    • Calculus of kidney    • Cancer ([facility] [address] 2010    skin cancer in left arm   • Chest pain on exertion    • Constipation    • Coronary atherosclerosis of unspecified type of vessel, native or graft    • Diarrhea, unspecified    • Dyslipidemia    • E Maternal Grandfather    • Heart Attack Maternal Grandfather    • Heart Attack Maternal Grandmother    • Heart Attack Brother    • Other (Autoimmune disease) Brother    • Thyroid Disorder Sister    • Asthma Sister    • Cancer Brother    • Allergies Daughter Comment:Sob  Tramadol                    Comment:Arms turned red like a sunburn  Vancomycin              RASH    Comment:Rash to the face and neck  Warfarin                OTHER (SEE COMMENTS)    Comment:Asthma attack  Wellbutrin [Bupropi*        Comme

## 2021-01-12 ENCOUNTER — TELEPHONE (OUTPATIENT)
Dept: CARDIOLOGY | Age: 86
End: 2021-01-12

## 2021-01-13 ENCOUNTER — APPOINTMENT (OUTPATIENT)
Dept: GENERAL RADIOLOGY | Facility: HOSPITAL | Age: 86
End: 2021-01-13
Attending: EMERGENCY MEDICINE
Payer: MEDICARE

## 2021-01-13 ENCOUNTER — HOSPITAL ENCOUNTER (EMERGENCY)
Facility: HOSPITAL | Age: 86
Discharge: HOME | End: 2021-01-14
Attending: EMERGENCY MEDICINE
Payer: MEDICARE

## 2021-01-13 DIAGNOSIS — J45.21 MILD INTERMITTENT ASTHMA WITH EXACERBATION: Primary | ICD-10-CM

## 2021-01-13 LAB
ALBUMIN SERPL-MCNC: 3.7 G/DL (ref 3.4–5)
ALBUMIN/GLOB SERPL: 0.9 {RATIO} (ref 1–2)
ALP LIVER SERPL-CCNC: 82 U/L
ALT SERPL-CCNC: 24 U/L
ANION GAP SERPL CALC-SCNC: 5 MMOL/L (ref 0–18)
BASOPHILS # BLD AUTO: 0.02 X10(3) UL (ref 0–0.2)
BASOPHILS NFR BLD AUTO: 0.3 %
BILIRUB SERPL-MCNC: 0.8 MG/DL (ref 0.1–2)
BUN BLD-MCNC: 14 MG/DL (ref 7–18)
BUN/CREAT SERPL: 15.9 (ref 10–20)
CALCIUM BLD-MCNC: 9.5 MG/DL (ref 8.5–10.1)
CHLORIDE SERPL-SCNC: 110 MMOL/L (ref 98–112)
CO2 SERPL-SCNC: 24 MMOL/L (ref 21–32)
CREAT BLD-MCNC: 0.88 MG/DL
DEPRECATED RDW RBC AUTO: 47.8 FL (ref 35.1–46.3)
EOSINOPHIL # BLD AUTO: 0.03 X10(3) UL (ref 0–0.7)
EOSINOPHIL NFR BLD AUTO: 0.5 %
ERYTHROCYTE [DISTWIDTH] IN BLOOD BY AUTOMATED COUNT: 15.2 % (ref 11–15)
GLOBULIN PLAS-MCNC: 4.2 G/DL (ref 2.8–4.4)
GLUCOSE BLD-MCNC: 130 MG/DL (ref 70–99)
HCT VFR BLD AUTO: 43 %
HGB BLD-MCNC: 14.3 G/DL
IMM GRANULOCYTES # BLD AUTO: 0.03 X10(3) UL (ref 0–1)
IMM GRANULOCYTES NFR BLD: 0.5 %
LYMPHOCYTES # BLD AUTO: 0.85 X10(3) UL (ref 1–4)
LYMPHOCYTES NFR BLD AUTO: 12.8 %
M PROTEIN MFR SERPL ELPH: 7.9 G/DL (ref 6.4–8.2)
MCH RBC QN AUTO: 28.8 PG (ref 26–34)
MCHC RBC AUTO-ENTMCNC: 33.3 G/DL (ref 31–37)
MCV RBC AUTO: 86.7 FL
MONOCYTES # BLD AUTO: 0.14 X10(3) UL (ref 0.1–1)
MONOCYTES NFR BLD AUTO: 2.1 %
MORPHOLOGY: NORMAL
NEUTROPHILS # BLD AUTO: 5.56 X10 (3) UL (ref 1.5–7.7)
NEUTROPHILS # BLD AUTO: 5.56 X10(3) UL (ref 1.5–7.7)
NEUTROPHILS NFR BLD AUTO: 83.8 %
NT-PROBNP SERPL-MCNC: 279 PG/ML (ref ?–450)
OSMOLALITY SERPL CALC.SUM OF ELEC: 290 MOSM/KG (ref 275–295)
PLATELET # BLD AUTO: 159 10(3)UL (ref 150–450)
RBC # BLD AUTO: 4.96 X10(6)UL
SODIUM SERPL-SCNC: 139 MMOL/L (ref 136–145)
TROPONIN I SERPL-MCNC: <0.045 NG/ML (ref ?–0.04)
WBC # BLD AUTO: 6.6 X10(3) UL (ref 4–11)

## 2021-01-13 PROCEDURE — 71045 X-RAY EXAM CHEST 1 VIEW: CPT | Performed by: EMERGENCY MEDICINE

## 2021-01-13 PROCEDURE — 84484 ASSAY OF TROPONIN QUANT: CPT | Performed by: EMERGENCY MEDICINE

## 2021-01-13 PROCEDURE — 96374 THER/PROPH/DIAG INJ IV PUSH: CPT

## 2021-01-13 PROCEDURE — 93005 ELECTROCARDIOGRAM TRACING: CPT

## 2021-01-13 PROCEDURE — 93010 ELECTROCARDIOGRAM REPORT: CPT

## 2021-01-13 PROCEDURE — 83880 ASSAY OF NATRIURETIC PEPTIDE: CPT | Performed by: EMERGENCY MEDICINE

## 2021-01-13 PROCEDURE — 99285 EMERGENCY DEPT VISIT HI MDM: CPT

## 2021-01-13 PROCEDURE — 85025 COMPLETE CBC W/AUTO DIFF WBC: CPT | Performed by: EMERGENCY MEDICINE

## 2021-01-13 PROCEDURE — 80053 COMPREHEN METABOLIC PANEL: CPT | Performed by: EMERGENCY MEDICINE

## 2021-01-14 ENCOUNTER — TELEPHONE (OUTPATIENT)
Dept: INTERNAL MEDICINE CLINIC | Facility: CLINIC | Age: 86
End: 2021-01-14

## 2021-01-14 VITALS
BODY MASS INDEX: 32.81 KG/M2 | WEIGHT: 167.13 LBS | OXYGEN SATURATION: 96 % | TEMPERATURE: 99 F | HEART RATE: 91 BPM | HEIGHT: 60 IN | SYSTOLIC BLOOD PRESSURE: 175 MMHG | RESPIRATION RATE: 20 BRPM | DIASTOLIC BLOOD PRESSURE: 95 MMHG

## 2021-01-14 LAB
ATRIAL RATE: 75 BPM
P AXIS: 55 DEGREES
P-R INTERVAL: 156 MS
Q-T INTERVAL: 386 MS
QRS DURATION: 82 MS
QTC CALCULATION (BEZET): 431 MS
R AXIS: 2 DEGREES
SARS-COV-2 RNA RESP QL NAA+PROBE: NOT DETECTED
T AXIS: 41 DEGREES
VENTRICULAR RATE: 75 BPM

## 2021-01-14 RX ORDER — DEXAMETHASONE SODIUM PHOSPHATE 10 MG/ML
10 INJECTION, SOLUTION INTRAMUSCULAR; INTRAVENOUS ONCE
Status: COMPLETED | OUTPATIENT
Start: 2021-01-14 | End: 2021-01-14

## 2021-01-14 NOTE — TELEPHONE ENCOUNTER
Pt in ED last night. [place] she had a reaction to her flovent. Felt very SOB immediately after using it. Has not used since. Called her pulm today to ask if she needs f/u, change in meds, etc.     Speaking full, clear sentences.  Denies cp, sob has much i

## 2021-01-14 NOTE — TELEPHONE ENCOUNTER
Patient was in the ER yesterday and was told to call her PCP in one day. Patient states she went to ER because she was using her Flovent Inhaler and her rescue inhaler and was very SOB.     Please advise

## 2021-01-14 NOTE — ED NOTES
Dr Chaya Moraes made aware of latest VS, no further orders.  Per pt, might have have forget to take pills

## 2021-01-14 NOTE — ED PROVIDER NOTES
Patient Seen in: [facility] Emergency Department      History   Patient presents with:  Difficulty Breathing    Stated Complaint: shortness of breath after albuterol tx, pulse ox 96    HPI/Subjective:   HPI    80year-old female history of asthma he Osteoporosis    • Other and unspecified hyperlipidemia    • Pain in joints    • Pinched nerve in neck    • Pneumonia, organism unspecified(486)    • PONV (postoperative nausea and vomiting)    • Shortness of breath    • Sleep apnea    • Sleep disturbance HYSTERECTOMY     • UPPER GI ENDOSCOPY,EXAM                  Social History    Tobacco Use      Smoking status: Former Smoker        Packs/day: 0.30        Years: 3.00        Pack years: .9        Types: Cigarettes        Quit date: 2/13/1972        Years s and symmetric. Psychiatric:         Mood and Affect: Mood normal.         Behavior: Behavior normal.         Thought Content:  Thought content normal.         Judgment: Judgment normal.             ED Course     Labs Reviewed   COMP METABOLIC PANEL (14) - transcribed by Technologist)  Dyspnea    FINDINGS:  Cardiac size, when allowing for technique is upper normal.  Probable mild bibasilar atelectasis. Upper zones are clear.   Mild pulmonary venous distention but no evidence of overt congestive heart failure

## 2021-01-14 NOTE — ED INITIAL ASSESSMENT (HPI)
80year-old-female reports using her inhaler (Flovent HFA) this morning and has been short of breath ever since which she takes for asthma. A&Ox4, denies any N/V/D, ambulates with her walker.

## 2021-01-15 ENCOUNTER — LAB ENCOUNTER (OUTPATIENT)
Dept: LAB | Facility: HOSPITAL | Age: 86
End: 2021-01-15
Attending: INTERNAL MEDICINE
Payer: MEDICARE

## 2021-01-15 DIAGNOSIS — K92.1 MELENA: ICD-10-CM

## 2021-01-15 PROCEDURE — 82272 OCCULT BLD FECES 1-3 TESTS: CPT

## 2021-01-15 NOTE — TELEPHONE ENCOUNTER
Pt has not heard from pulm. Advised to call them back and ask to talk to the nurses before the weekend. Verbs understanding. Pt feeling okay. Denies needs from us at this time. Aware to let me know what pulm says and IC/ED if needed.

## 2021-01-19 ENCOUNTER — TELEPHONE (OUTPATIENT)
Dept: INTERNAL MEDICINE CLINIC | Facility: CLINIC | Age: 86
End: 2021-01-19

## 2021-01-19 NOTE — TELEPHONE ENCOUNTER
Pt has a lot of allergies and TB told her not to get the covid shot where she lives-she does not have a computer and no mychart so she wants to know how she will be informed when she can get the shot?

## 2021-01-19 NOTE — TELEPHONE ENCOUNTER
She can check back with us in 2 weeks  Hopefully we have it by then and she can be monitored post vaccination for any allergic reaction

## 2021-01-21 NOTE — TELEPHONE ENCOUNTER
Patient states she spoke with Dr Vivienne Miller who indicates not to use the Flovent inhaler for now, continue with tapering dose of Prednisone and was told Dr Marycruz Moctezuma would call her back in a few days.   Pt states she is not sob any longer, will continu

## 2021-01-31 PROCEDURE — 99490 CHRNC CARE MGMT STAFF 1ST 20: CPT

## 2021-02-01 ENCOUNTER — OFFICE VISIT (OUTPATIENT)
Dept: INTERNAL MEDICINE CLINIC | Facility: CLINIC | Age: 86
End: 2021-02-01
Payer: MEDICARE

## 2021-02-01 VITALS
BODY MASS INDEX: 33.18 KG/M2 | OXYGEN SATURATION: 96 % | SYSTOLIC BLOOD PRESSURE: 130 MMHG | HEART RATE: 84 BPM | HEIGHT: 60 IN | DIASTOLIC BLOOD PRESSURE: 70 MMHG | WEIGHT: 169 LBS | TEMPERATURE: 98 F

## 2021-02-01 DIAGNOSIS — R19.7 DIARRHEA, UNSPECIFIED TYPE: ICD-10-CM

## 2021-02-01 DIAGNOSIS — M54.16 LUMBAR RADICULOPATHY: ICD-10-CM

## 2021-02-01 DIAGNOSIS — Z23 NEED FOR VACCINATION: ICD-10-CM

## 2021-02-01 DIAGNOSIS — J45.30 MILD PERSISTENT ASTHMA WITHOUT COMPLICATION: Primary | ICD-10-CM

## 2021-02-01 PROCEDURE — 99214 OFFICE O/P EST MOD 30 MIN: CPT | Performed by: INTERNAL MEDICINE

## 2021-02-01 RX ORDER — GABAPENTIN 100 MG/1
100 CAPSULE ORAL
Qty: 90 CAPSULE | Refills: 1 | Status: SHIPPED | OUTPATIENT
Start: 2021-02-01 | End: 2021-04-02

## 2021-02-01 RX ORDER — LEVALBUTEROL TARTRATE 45 UG/1
AEROSOL, METERED ORAL
Qty: 1 INHALER | Refills: 3 | Status: SHIPPED | OUTPATIENT
Start: 2021-02-01 | End: 2021-02-04

## 2021-02-01 NOTE — PROGRESS NOTES
Ja Moyer is a 80year old female. Patient presents with:   Follow - Up: mn room 3 pt here for er follow up, was in emergency room for bad breathing, pt has been having diarrhea for the past couple of weeks       HPI:     Patient here for f/u- artery stent     Status post lumbar spinal fusion     Pancreatic insufficiency     History of skin cancer     History of breast biopsy     Pure hypercholesterolemia     Post concussion syndrome     Nausea     Dehydration     Clostridium difficile colitis Take 10 mg by mouth daily. • vitamin E 400 UNITS Oral Cap Take 400 Units by mouth daily.         Past Medical History:   Diagnosis Date   • Anesthesia complication     pt stated had a reacion with a medication that sounded like \"memo\"   • Anxiety sta tobacco: Never Used    Alcohol use: No      Alcohol/week: 0.0 standard drinks    Drug use: No    Family History   Problem Relation Age of Onset   • Breast Cancer Mother 79   • High Blood Pressure Mother    • Allergies Father    • Asthma Father    • Colon C SHORTNESS OF BREATH  Erythromycin                Comment:Short of Breath  Flagyl [Metronidazo*    ASTHMA  Linezolid               DIARRHEA  Metronidazole And R*    SHORTNESS OF BREATH  Mold                    SHORTNESS OF BREATH  Pcn [Penicillins] will do stool studies including for c dif. She will call me if symptoms return  Lumbar radiculopathy- will add gabapentin 100mg qnoon to 300mg at bedtime, pt aware of side effects    No orders of the defined types were placed in this encounter.       Meds &

## 2021-02-04 ENCOUNTER — TELEPHONE (OUTPATIENT)
Dept: CASE MANAGEMENT | Age: 86
End: 2021-02-04

## 2021-02-04 ENCOUNTER — PATIENT OUTREACH (OUTPATIENT)
Dept: CASE MANAGEMENT | Age: 86
End: 2021-02-04

## 2021-02-04 ENCOUNTER — TELEPHONE (OUTPATIENT)
Dept: INTERNAL MEDICINE CLINIC | Facility: CLINIC | Age: 86
End: 2021-02-04

## 2021-02-04 DIAGNOSIS — M47.816 OSTEOARTHRITIS OF SPINE WITHOUT MYELOPATHY OR RADICULOPATHY, LUMBAR REGION: ICD-10-CM

## 2021-02-04 DIAGNOSIS — E78.00 PURE HYPERCHOLESTEROLEMIA: ICD-10-CM

## 2021-02-04 DIAGNOSIS — I25.118 CORONARY ARTERY DISEASE OF NATIVE ARTERY OF NATIVE HEART WITH STABLE ANGINA PECTORIS (HCC): ICD-10-CM

## 2021-02-04 DIAGNOSIS — Z86.69 HX OF MIGRAINES: ICD-10-CM

## 2021-02-04 DIAGNOSIS — J45.40 MODERATE PERSISTENT ASTHMA WITHOUT COMPLICATION: ICD-10-CM

## 2021-02-04 DIAGNOSIS — K58.0 IRRITABLE BOWEL SYNDROME WITH DIARRHEA: ICD-10-CM

## 2021-02-04 DIAGNOSIS — Z85.828 HISTORY OF SKIN CANCER: ICD-10-CM

## 2021-02-04 DIAGNOSIS — M17.11 PRIMARY OSTEOARTHRITIS OF RIGHT KNEE: ICD-10-CM

## 2021-02-04 DIAGNOSIS — M16.12 OSTEOARTHRITIS OF ONE HIP, LEFT: ICD-10-CM

## 2021-02-04 DIAGNOSIS — M17.12 PRIMARY OSTEOARTHRITIS OF LEFT KNEE: ICD-10-CM

## 2021-02-04 DIAGNOSIS — I10 HYPERTENSION, BENIGN: ICD-10-CM

## 2021-02-04 DIAGNOSIS — E78.5 DYSLIPIDEMIA: ICD-10-CM

## 2021-02-04 NOTE — PROGRESS NOTES
2/4/2021  Spoke to Eliud Angeles for CCM.       Updates to patient care team/ comments: UTD  Patient reported changes in medications: Pt not taking levoalbuterol or flovent  Med Adherence  Comment: pt not taking levoalbuterol or flovent    Health Maintenance: larry and spoke with [employer] stated that in order to insure pt gets a ride on time to vaccination that she recommends that pt schedule vaccine around 7 days in advance and then call back.  She stated that the only days rides were not available wer

## 2021-02-04 NOTE — TELEPHONE ENCOUNTER
I do not think she is allergic to these medications, she sees pulmonary and needs to f/u with Dr. Yumiko Jay if asthma symptoms acting up

## 2021-02-04 NOTE — TELEPHONE ENCOUNTER
Patient states she is concerned about her medication list and would like to discuss with a nurse.   Please advise

## 2021-02-04 NOTE — TELEPHONE ENCOUNTER
Spoke with pt during monthly outreach. Pt stated that she feels that breathing issues that lead to her ER visit were caused by the Flovent.     Pt was prescribed levoalbuterol and after one use pt states her breathing became labored again and she stopped

## 2021-02-05 NOTE — PROGRESS NOTES
Pt called in to [facility] to finalize covid vaccine scheduling. Per ride assist larry pt was to schedule vaccine 5-7 days in advance and preferably after 9am in the morning.  [facility] is unable to service pt to the [facility] myles

## 2021-02-05 NOTE — TELEPHONE ENCOUNTER
Patient called back, confirmed receipt of message. Pt states she tried Flovent again and had not problems and wanted TB to know. Pt is scheduled for #1 COVID vaccine on 2/19/21. FYI to TB.

## 2021-02-05 NOTE — TELEPHONE ENCOUNTER
LM for pt at [phone] (H)vm to see Pulmonary MD for issues regarding a flair up of asthma/medications. Asked pt to call back if any questions.

## 2021-02-17 ENCOUNTER — TELEPHONE (OUTPATIENT)
Dept: INTERNAL MEDICINE CLINIC | Facility: CLINIC | Age: 86
End: 2021-02-17

## 2021-02-17 ENCOUNTER — IMMUNIZATION (OUTPATIENT)
Dept: LAB | Age: 86
End: 2021-02-17
Attending: HOSPITALIST
Payer: MEDICARE

## 2021-02-17 DIAGNOSIS — Z23 NEED FOR VACCINATION: Primary | ICD-10-CM

## 2021-02-17 PROCEDURE — 0001A SARSCOV2 VAC 30MCG/0.3ML IM: CPT

## 2021-02-17 NOTE — TELEPHONE ENCOUNTER
Pt stated she's at facility getting covid vaccine and remembered that 40 years ago she had an allergy to a Tetanus shot that she had, she was sob afterwards. Pt wants to know if it is save to covid vaccine. [place] - pt there now. please advise.

## 2021-02-17 NOTE — TELEPHONE ENCOUNTER
Discussed with TB, notified pt TB recommends getting the vaccine and that it would benefit her. Advised we cannot determine for certain if she will or will not have a reaction. Advised the type of vaccine is likely different than it was 40 years ago. Advised to stay for at minimum 30 minutes to be monitored by clinical staff.

## 2021-02-22 ENCOUNTER — MED REC SCAN ONLY (OUTPATIENT)
Dept: INTERNAL MEDICINE CLINIC | Facility: CLINIC | Age: 86
End: 2021-02-22

## 2021-02-26 ENCOUNTER — TELEPHONE (OUTPATIENT)
Dept: INTERNAL MEDICINE CLINIC | Facility: CLINIC | Age: 86
End: 2021-02-26

## 2021-02-26 DIAGNOSIS — R19.7 DIARRHEA, UNSPECIFIED TYPE: Primary | ICD-10-CM

## 2021-02-26 NOTE — TELEPHONE ENCOUNTER
Pt called and stated that she thinks she has CDiff again.  She was given the kit to test for it and was told to call us and ask for an order to be placed     Please advise

## 2021-02-26 NOTE — TELEPHONE ENCOUNTER
LOV 2/1/2021; TB please advise if ok for cdiff testing       C/o diarrhea since last Tuesday (about a week ago), happened after she ate meatloaf from meals on wheels. Stomach was upset. No n/v. Last episode of diarrhea 2 days ago.  +h/o c dif but this feels

## 2021-02-26 NOTE — TELEPHONE ENCOUNTER
Patient states she has the stool sampling kit, and aware the order was placed to [facility]. Patient will call with any changes in symptoms.

## 2021-02-28 PROCEDURE — 99490 CHRNC CARE MGMT STAFF 1ST 20: CPT

## 2021-02-28 PROCEDURE — 99439 CHRNC CARE MGMT STAF EA ADDL: CPT

## 2021-03-01 ENCOUNTER — MED REC SCAN ONLY (OUTPATIENT)
Dept: INTERNAL MEDICINE CLINIC | Facility: CLINIC | Age: 86
End: 2021-03-01

## 2021-03-02 ENCOUNTER — PATIENT OUTREACH (OUTPATIENT)
Dept: CASE MANAGEMENT | Age: 86
End: 2021-03-02

## 2021-03-02 DIAGNOSIS — Z85.828 HISTORY OF SKIN CANCER: ICD-10-CM

## 2021-03-02 DIAGNOSIS — I25.118 CORONARY ARTERY DISEASE OF NATIVE ARTERY OF NATIVE HEART WITH STABLE ANGINA PECTORIS (HCC): ICD-10-CM

## 2021-03-02 DIAGNOSIS — M16.12 OSTEOARTHRITIS OF ONE HIP, LEFT: ICD-10-CM

## 2021-03-02 DIAGNOSIS — M81.0 AGE-RELATED OSTEOPOROSIS WITHOUT CURRENT PATHOLOGICAL FRACTURE: ICD-10-CM

## 2021-03-02 DIAGNOSIS — E78.5 DYSLIPIDEMIA: ICD-10-CM

## 2021-03-02 DIAGNOSIS — M47.816 OSTEOARTHRITIS OF SPINE WITHOUT MYELOPATHY OR RADICULOPATHY, LUMBAR REGION: ICD-10-CM

## 2021-03-02 DIAGNOSIS — E78.00 PURE HYPERCHOLESTEROLEMIA: ICD-10-CM

## 2021-03-02 DIAGNOSIS — M17.11 PRIMARY OSTEOARTHRITIS OF RIGHT KNEE: ICD-10-CM

## 2021-03-02 DIAGNOSIS — Z86.69 HX OF MIGRAINES: ICD-10-CM

## 2021-03-02 DIAGNOSIS — M17.12 PRIMARY OSTEOARTHRITIS OF LEFT KNEE: ICD-10-CM

## 2021-03-02 DIAGNOSIS — I10 HYPERTENSION, BENIGN: ICD-10-CM

## 2021-03-02 DIAGNOSIS — R06.00 DYSPNEA, UNSPECIFIED TYPE: ICD-10-CM

## 2021-03-02 DIAGNOSIS — J45.40 MODERATE PERSISTENT ASTHMA WITHOUT COMPLICATION: ICD-10-CM

## 2021-03-02 NOTE — PROGRESS NOTES
3/2/2021  Spoke to Eliud Angeles for CCM.       Updates to patient care team/ comments: UTD  Patient reported changes in medications: UTD  Med Adherence  Comment: pt taking medication as prescribed     Health Maintenance:     Zoster Vaccines(1 of 2) due on 12/29/1 telephone communication, care plan updates where needed, education, goals and action plan recreation/update. Provided acknowledgment and validation to patient's concerns.    Monthly Minute Total including today: 21    Physical assessment, complete health hi

## 2021-03-04 DIAGNOSIS — I10 HYPERTENSION, BENIGN: ICD-10-CM

## 2021-03-05 RX ORDER — VERAPAMIL HYDROCHLORIDE 240 MG/1
TABLET, FILM COATED, EXTENDED RELEASE ORAL
Qty: 90 TABLET | Refills: 1 | Status: SHIPPED | OUTPATIENT
Start: 2021-03-05 | End: 2021-09-02

## 2021-03-10 ENCOUNTER — TELEPHONE (OUTPATIENT)
Dept: INTERNAL MEDICINE CLINIC | Facility: CLINIC | Age: 86
End: 2021-03-10

## 2021-03-10 NOTE — TELEPHONE ENCOUNTER
FYI- Pt had Pfizer covid vaccine, states didn't feel well for 2 weeks after so she cancelled her second dose. Feeling better now. Denies needing OV. Scheduled pt for her B12 injection while on phone.

## 2021-03-15 ENCOUNTER — NURSE ONLY (OUTPATIENT)
Dept: INTERNAL MEDICINE CLINIC | Facility: CLINIC | Age: 86
End: 2021-03-15
Payer: MEDICARE

## 2021-03-15 DIAGNOSIS — E53.8 B12 DEFICIENCY: Primary | ICD-10-CM

## 2021-03-15 PROCEDURE — 96372 THER/PROPH/DIAG INJ SC/IM: CPT | Performed by: INTERNAL MEDICINE

## 2021-03-15 RX ORDER — CYANOCOBALAMIN 1000 UG/ML
1000 INJECTION INTRAMUSCULAR; SUBCUTANEOUS ONCE
Status: COMPLETED | OUTPATIENT
Start: 2021-03-15 | End: 2021-03-15

## 2021-03-15 RX ADMIN — CYANOCOBALAMIN 1000 MCG: 1000 INJECTION INTRAMUSCULAR; SUBCUTANEOUS at 11:34:00

## 2021-03-17 ENCOUNTER — TELEPHONE (OUTPATIENT)
Dept: INTERNAL MEDICINE CLINIC | Facility: CLINIC | Age: 86
End: 2021-03-17

## 2021-03-17 NOTE — TELEPHONE ENCOUNTER
Pt reports had B12 Monday, is worried is allergic to it or possibly all injections. Developed SOB 24 hours after the injection (yesterday), has since resolved. Speaking full, clear sentences. Reports some nausea today and had a loose stool.      Denies abd

## 2021-03-18 NOTE — TELEPHONE ENCOUNTER
Due to her reaction, no more of the injections advised.  She can try otc b12 oral supplement of 1000 mcg daily

## 2021-03-31 PROCEDURE — 99490 CHRNC CARE MGMT STAFF 1ST 20: CPT

## 2021-04-02 ENCOUNTER — PATIENT OUTREACH (OUTPATIENT)
Dept: CASE MANAGEMENT | Age: 86
End: 2021-04-02

## 2021-04-02 DIAGNOSIS — E78.00 PURE HYPERCHOLESTEROLEMIA: ICD-10-CM

## 2021-04-02 DIAGNOSIS — Z86.69 HX OF MIGRAINES: ICD-10-CM

## 2021-04-02 DIAGNOSIS — M81.0 AGE-RELATED OSTEOPOROSIS WITHOUT CURRENT PATHOLOGICAL FRACTURE: ICD-10-CM

## 2021-04-02 DIAGNOSIS — M16.12 OSTEOARTHRITIS OF ONE HIP, LEFT: ICD-10-CM

## 2021-04-02 DIAGNOSIS — M17.11 PRIMARY OSTEOARTHRITIS OF RIGHT KNEE: ICD-10-CM

## 2021-04-02 DIAGNOSIS — M17.12 PRIMARY OSTEOARTHRITIS OF LEFT KNEE: ICD-10-CM

## 2021-04-02 DIAGNOSIS — K58.0 IRRITABLE BOWEL SYNDROME WITH DIARRHEA: ICD-10-CM

## 2021-04-02 DIAGNOSIS — M47.816 OSTEOARTHRITIS OF SPINE WITHOUT MYELOPATHY OR RADICULOPATHY, LUMBAR REGION: ICD-10-CM

## 2021-04-02 DIAGNOSIS — Z85.828 HISTORY OF SKIN CANCER: ICD-10-CM

## 2021-04-02 DIAGNOSIS — I25.118 CORONARY ARTERY DISEASE OF NATIVE ARTERY OF NATIVE HEART WITH STABLE ANGINA PECTORIS (HCC): ICD-10-CM

## 2021-04-02 DIAGNOSIS — I10 HYPERTENSION, BENIGN: ICD-10-CM

## 2021-04-02 DIAGNOSIS — E78.5 DYSLIPIDEMIA: ICD-10-CM

## 2021-04-02 RX ORDER — PREDNISONE 1 MG/1
TABLET ORAL
COMMUNITY
Start: 2021-03-18 | End: 2021-06-07 | Stop reason: ALTCHOICE

## 2021-04-02 NOTE — PROGRESS NOTES
4/2/2021  Spoke to Eliud Angeles for CCM.       Updates to patient care team/ comments: UTD  Patient reported changes in medications: UTD  Med Adherence  Comment: PT stopped taking gabapentin     Health Maintenance: PT has had reactions to recent injections (b12, • Update to previous barriers: n/a    • Patient Reported New Barriers And Concerns: none                               Patient agrees to goal action plan.   Self-Management Abilities (patient reported)             1= least confident in achieving goal, 5=

## 2021-04-08 ENCOUNTER — TELEPHONE (OUTPATIENT)
Dept: INTERNAL MEDICINE CLINIC | Facility: CLINIC | Age: 86
End: 2021-04-08

## 2021-04-08 NOTE — TELEPHONE ENCOUNTER
Patient states 2 weeks ago she reported a tenant upstairs from her for something, talked to the landlord about him and so did another neighbor.  Pt states early this am at 5am she heard a very loud noise which woke her up, was lying on her bed on her back,

## 2021-04-08 NOTE — TELEPHONE ENCOUNTER
Patient states that the night before last she heard a loud noise(pounding) at about 5am and after that she was laying in bed on her back and a pain started by her L ear and then the temple and then to her R ear and then the R temple.     Patient states that

## 2021-04-08 NOTE — TELEPHONE ENCOUNTER
Monitor symptoms. Come in for OV if symptoms persist, within 2-3 weeks is fine.  She is always extended OV

## 2021-04-08 NOTE — TELEPHONE ENCOUNTER
Patient notified to monitor s/s,come in for ov if s/s persist, scheduled appt with TB for 4/22/21 at 11:40am 40 minutes. Pt notified to call if any change in s/s. Pt verbalizes understanding.

## 2021-04-12 ENCOUNTER — TELEPHONE (OUTPATIENT)
Dept: CARDIOLOGY | Age: 86
End: 2021-04-12

## 2021-04-20 NOTE — TELEPHONE ENCOUNTER
#3 VM for pt at [phone] (H)vm to call our office to discuss injections. Advised 3rd attempt and will be sending a certified letter if no return call is received.

## 2021-04-22 ENCOUNTER — TELEPHONE (OUTPATIENT)
Dept: INTERNAL MEDICINE CLINIC | Facility: CLINIC | Age: 86
End: 2021-04-22

## 2021-04-22 ENCOUNTER — OFFICE VISIT (OUTPATIENT)
Dept: INTERNAL MEDICINE CLINIC | Facility: CLINIC | Age: 86
End: 2021-04-22
Payer: MEDICARE

## 2021-04-22 VITALS
HEART RATE: 88 BPM | HEIGHT: 60 IN | DIASTOLIC BLOOD PRESSURE: 88 MMHG | TEMPERATURE: 97 F | SYSTOLIC BLOOD PRESSURE: 130 MMHG | RESPIRATION RATE: 16 BRPM | WEIGHT: 163 LBS | BODY MASS INDEX: 32 KG/M2

## 2021-04-22 DIAGNOSIS — J45.40 MODERATE PERSISTENT ASTHMA WITHOUT COMPLICATION: ICD-10-CM

## 2021-04-22 DIAGNOSIS — K58.0 IRRITABLE BOWEL SYNDROME WITH DIARRHEA: Primary | ICD-10-CM

## 2021-04-22 DIAGNOSIS — R51.9 RIGHT-SIDED HEADACHE: ICD-10-CM

## 2021-04-22 PROCEDURE — 99214 OFFICE O/P EST MOD 30 MIN: CPT | Performed by: INTERNAL MEDICINE

## 2021-04-22 RX ORDER — LEVALBUTEROL TARTRATE 45 UG/1
AEROSOL, METERED ORAL
Qty: 1 INHALER | Refills: 3 | COMMUNITY
Start: 2021-04-22 | End: 2021-09-02

## 2021-04-22 NOTE — PROGRESS NOTES
Cooper Piña is a 80year old female.   Patient presents with:  Pain: SN Rm 4; R side head aching, on/off x 2wks,  \"three bangs from ceilings\"  Follow - Up: discuss chronic ailments, med review      HPI:     C/o right sided headache for 2 weeks, Osteoarthritis of one hip, left     Allergy to nonsteroidal anti-inflammatory drug (NSAID)     Acquired pes planus of right foot     Esophageal dysphagia     History of heart artery stent     Status post lumbar spinal fusion     Pancreatic insufficiency • Asthma    • Atherosclerosis of coronary artery    • Back pain    • Back problem     cervical pain   • Black stools    • Blood in the stool    • Blood transfusion reaction    • CAD (coronary artery disease)    • Calculus of kidney    • Cancer ([facility] 2010 • Colon Cancer Father    • Heart Disease Father    • Heart Attack Father    • Heart Attack Paternal Grandfather    • Heart Disease Paternal Grandfather    • Arthritis Paternal Grandmother    • Allergies Paternal Grandmother    • Cancer Maternal Solange Amos Comment:Short of Breath  Penicillin G            RASH    Comment:sob  Plavix [Clopidogrel*        Comment:Redness/choking  Questran [Cholestyr*    HIVES, Coughing  Risperdal [Risperid*    SHORTNESS OF BREATH  Shellfish-Derived P*        Comment:Sob  T system for c dif       No orders of the defined types were placed in this encounter.       Meds & Refills for this Visit:  Requested Prescriptions     Signed Prescriptions Disp Refills   • Fluticasone Propionate  MCG/ACT Inhalation Aerosol 12 g 2

## 2021-04-22 NOTE — TELEPHONE ENCOUNTER
Pt has appt today with TB at 11:40. She had asthma attack last night and has cold symptoms today, would like to know if it's ok to still come to appt.     Future Appointments   Date Time Provider Jeremiah Lisa   4/22/2021 11:40 AM Kaur Grimaldo MD [facility]

## 2021-04-22 NOTE — TELEPHONE ENCOUNTER
Patient calling today and indicates she went outside this and felt she had a asthma attack, felt SOB for roughly 30 minutes, and subsided. Patient doesn't have inhaler but symptoms subsided. Patient speaking in full sentences with clear speech.  Patient de

## 2021-04-28 ENCOUNTER — PATIENT OUTREACH (OUTPATIENT)
Dept: CASE MANAGEMENT | Age: 86
End: 2021-04-28

## 2021-04-28 DIAGNOSIS — I25.118 CORONARY ARTERY DISEASE OF NATIVE ARTERY OF NATIVE HEART WITH STABLE ANGINA PECTORIS (HCC): ICD-10-CM

## 2021-04-28 DIAGNOSIS — M16.12 OSTEOARTHRITIS OF ONE HIP, LEFT: ICD-10-CM

## 2021-04-28 DIAGNOSIS — Z86.69 HX OF MIGRAINES: ICD-10-CM

## 2021-04-28 DIAGNOSIS — K58.0 IRRITABLE BOWEL SYNDROME WITH DIARRHEA: ICD-10-CM

## 2021-04-28 DIAGNOSIS — E78.00 PURE HYPERCHOLESTEROLEMIA: ICD-10-CM

## 2021-04-28 DIAGNOSIS — M17.11 PRIMARY OSTEOARTHRITIS OF RIGHT KNEE: ICD-10-CM

## 2021-04-28 DIAGNOSIS — M17.12 PRIMARY OSTEOARTHRITIS OF LEFT KNEE: ICD-10-CM

## 2021-04-28 DIAGNOSIS — E78.5 DYSLIPIDEMIA: ICD-10-CM

## 2021-04-28 DIAGNOSIS — I10 HYPERTENSION, BENIGN: ICD-10-CM

## 2021-04-28 DIAGNOSIS — M47.816 OSTEOARTHRITIS OF SPINE WITHOUT MYELOPATHY OR RADICULOPATHY, LUMBAR REGION: ICD-10-CM

## 2021-04-28 DIAGNOSIS — Z85.828 HISTORY OF SKIN CANCER: ICD-10-CM

## 2021-04-28 NOTE — PROGRESS NOTES
Pt called in to [facility]    Pt was requesting assistance in scheduling second covid vaccine. Pt decided to reconsider earlier decision to not get vaccine on account of the nausea that was present after the first one.     CCM was unable to complete her request

## 2021-04-29 ENCOUNTER — MED REC SCAN ONLY (OUTPATIENT)
Dept: INTERNAL MEDICINE CLINIC | Facility: CLINIC | Age: 86
End: 2021-04-29

## 2021-04-29 NOTE — PROGRESS NOTES
Pt called [facility]    Pt was called by [employer] and informed that the date wich the vaccine was scheduled 5/10/20, there are no longer any drivers available. Pt requested [facility] assistance in determining a new date and time for vaccine.     [facility] anthony

## 2021-04-30 ENCOUNTER — TELEPHONE (OUTPATIENT)
Dept: INTERNAL MEDICINE CLINIC | Facility: CLINIC | Age: 86
End: 2021-04-30

## 2021-04-30 PROCEDURE — 99490 CHRNC CARE MGMT STAFF 1ST 20: CPT

## 2021-04-30 PROCEDURE — 99439 CHRNC CARE MGMT STAF EA ADDL: CPT

## 2021-04-30 NOTE — TELEPHONE ENCOUNTER
I would advise her to get the second dose.  She has asthma and I think higher risk of NOT getting the vaccine to protect from covid 19

## 2021-04-30 NOTE — TELEPHONE ENCOUNTER
Patient had the 1st Covid Vaccine(Pfiser) about a month ago. Patint was nauseous for 2 weeks after that. Patient is scheduled for 2nd Vaccine on 5/10. Patient is wondering if she should get the 2nd shot because she is highly allergic.   Please advise

## 2021-05-03 ENCOUNTER — TELEPHONE (OUTPATIENT)
Dept: INTERNAL MEDICINE CLINIC | Facility: CLINIC | Age: 86
End: 2021-05-03

## 2021-05-03 NOTE — TELEPHONE ENCOUNTER
Patient states she is concerned about her BP. Pt denies lightheadedness, dizziness or any other symptoms except feeling tired. Pt states she only took 1/2 of her Verapamil ER 240mg tablet because she was concerned. Pt notified to continue her medication as prescribed, notified we will send a message to TB for her recommendations. Please advise.  LOV 4/22/21 with TB.

## 2021-05-03 NOTE — TELEPHONE ENCOUNTER
Her BP is in normal range. So has she been taking half a pill of the verapamil for a while? If so, I can send in a new script for 120mg or she can continue to cut in half the 240mg dose.

## 2021-05-03 NOTE — TELEPHONE ENCOUNTER
patient states only took half of verapamil this AM and ended up taking the other half shortly after. Patient notified BP in normal ranges. Patient will continue to monitor BP and call with any questions, concerns, or changes. Patient denies needing script for verapamil, she will continue with current dose.

## 2021-05-12 ENCOUNTER — PATIENT OUTREACH (OUTPATIENT)
Dept: CASE MANAGEMENT | Age: 86
End: 2021-05-12

## 2021-05-12 DIAGNOSIS — M17.11 PRIMARY OSTEOARTHRITIS OF RIGHT KNEE: ICD-10-CM

## 2021-05-12 DIAGNOSIS — R19.7 DIARRHEA, UNSPECIFIED TYPE: ICD-10-CM

## 2021-05-12 DIAGNOSIS — Z86.69 HX OF MIGRAINES: ICD-10-CM

## 2021-05-12 DIAGNOSIS — M47.816 OSTEOARTHRITIS OF SPINE WITHOUT MYELOPATHY OR RADICULOPATHY, LUMBAR REGION: ICD-10-CM

## 2021-05-12 DIAGNOSIS — E78.5 DYSLIPIDEMIA: ICD-10-CM

## 2021-05-12 DIAGNOSIS — M17.12 PRIMARY OSTEOARTHRITIS OF LEFT KNEE: ICD-10-CM

## 2021-05-12 DIAGNOSIS — J45.40 MODERATE PERSISTENT ASTHMA WITHOUT COMPLICATION: ICD-10-CM

## 2021-05-12 DIAGNOSIS — R13.19 ESOPHAGEAL DYSPHAGIA: ICD-10-CM

## 2021-05-12 DIAGNOSIS — I25.118 CORONARY ARTERY DISEASE OF NATIVE ARTERY OF NATIVE HEART WITH STABLE ANGINA PECTORIS (HCC): ICD-10-CM

## 2021-05-12 DIAGNOSIS — M16.12 OSTEOARTHRITIS OF ONE HIP, LEFT: ICD-10-CM

## 2021-05-12 DIAGNOSIS — K58.0 IRRITABLE BOWEL SYNDROME WITH DIARRHEA: ICD-10-CM

## 2021-05-12 DIAGNOSIS — I10 HYPERTENSION, BENIGN: ICD-10-CM

## 2021-05-12 DIAGNOSIS — E78.00 PURE HYPERCHOLESTEROLEMIA: ICD-10-CM

## 2021-05-12 RX ORDER — ALPRAZOLAM 0.25 MG/1
0.25 TABLET ORAL EVERY 6 HOURS PRN
COMMUNITY
Start: 2021-04-28

## 2021-05-12 NOTE — PROGRESS NOTES
5/12/2021  Spoke to Eliud Angeles for CCM.       Updates to patient care team/ comments: UTD  Patient reported changes in medications: UTD  Med Adherence  Comment: Pt taking medications as prescribed     Health Maintenance:     Zoster Vaccines(1 of 2) Never done reported progress toward goal: pt has remained compliant with medications and provider directives. Pt has not been in the ER this month.  Pt has follow up with dr Melquiades Alegria to discuss recurring diarrhea with occasional black stools      • Update to previous

## 2021-05-13 ENCOUNTER — LAB ENCOUNTER (OUTPATIENT)
Dept: LAB | Facility: HOSPITAL | Age: 86
End: 2021-05-13
Attending: INTERNAL MEDICINE
Payer: MEDICARE

## 2021-05-13 DIAGNOSIS — K92.1 MELENA: ICD-10-CM

## 2021-05-13 DIAGNOSIS — R19.7 DIARRHEA OF PRESUMED INFECTIOUS ORIGIN: ICD-10-CM

## 2021-05-13 PROCEDURE — 87493 C DIFF AMPLIFIED PROBE: CPT

## 2021-05-13 PROCEDURE — 85025 COMPLETE CBC W/AUTO DIFF WBC: CPT

## 2021-05-13 PROCEDURE — 82272 OCCULT BLD FECES 1-3 TESTS: CPT

## 2021-05-13 PROCEDURE — 36415 COLL VENOUS BLD VENIPUNCTURE: CPT

## 2021-05-25 ENCOUNTER — TELEPHONE (OUTPATIENT)
Dept: CARDIOLOGY | Age: 86
End: 2021-05-25

## 2021-05-29 ENCOUNTER — HOSPITAL ENCOUNTER (EMERGENCY)
Facility: HOSPITAL | Age: 86
Discharge: HOME | End: 2021-05-29
Attending: EMERGENCY MEDICINE
Payer: MEDICARE

## 2021-05-29 VITALS
HEART RATE: 98 BPM | TEMPERATURE: 98 F | OXYGEN SATURATION: 98 % | SYSTOLIC BLOOD PRESSURE: 133 MMHG | DIASTOLIC BLOOD PRESSURE: 74 MMHG | RESPIRATION RATE: 18 BRPM

## 2021-05-29 DIAGNOSIS — A04.72 CLOSTRIDIUM DIFFICILE COLITIS: Primary | ICD-10-CM

## 2021-05-29 PROCEDURE — 99283 EMERGENCY DEPT VISIT LOW MDM: CPT

## 2021-05-29 RX ORDER — VANCOMYCIN HYDROCHLORIDE 250 MG/1
250 CAPSULE ORAL 4 TIMES DAILY
Qty: 56 CAPSULE | Refills: 0 | Status: SHIPPED | OUTPATIENT
Start: 2021-05-29 | End: 2021-06-07 | Stop reason: ALTCHOICE

## 2021-05-29 RX ORDER — FAMOTIDINE 20 MG/1
20 TABLET ORAL 2 TIMES DAILY PRN
Qty: 30 TABLET | Refills: 0 | Status: SHIPPED | OUTPATIENT
Start: 2021-05-29 | End: 2021-06-07 | Stop reason: ALTCHOICE

## 2021-05-29 RX ORDER — CETIRIZINE HYDROCHLORIDE 10 MG/1
10 TABLET ORAL DAILY
Qty: 30 TABLET | Refills: 0 | Status: SHIPPED | OUTPATIENT
Start: 2021-05-29 | End: 2021-06-07 | Stop reason: ALTCHOICE

## 2021-05-29 NOTE — ED PROVIDER NOTES
Patient Seen in: [facility] Emergency Department      History   Patient presents with:  Nausea/Vomiting/Diarrhea    Stated Complaint: Cdiff    HPI/Subjective:   HPI    Has c diff  Stopped her vancomycin because concerned she may be allergic to it sh • Calculus of kidney    • [facility]    skin cancer in left arm   • Chest pain on exertion    • Constipation    • Coronary atherosclerosis of unspecified type of vessel, native or graft    • Diarrhea, unspecified    • Dyslipidemia    • Easy bruisi 9/23/2013    Procedure: CERVICAL EPIDURAL;  Surgeon: Eliza Salvador MD;  Location: [facility]IN [facility]NAG[facility]   • INJECTION, W/WO CONTRAST, DX/THERAPEUTIC SUBSTANCE, EPIDURAL/SUBARACHNOID; CERVICAL/THORACIC N/A 8/17/2015    Procedure: CERVICAL EPIDURA HPI.  Constitutional and vital signs reviewed. All other systems reviewed and negative except as noted above.     Physical Exam     ED Triage Vitals   BP 05/29/21 1315 142/76   Pulse 05/29/21 1315 89   Resp 05/29/21 1415 18   Temp 05/29/21 1510 97.8 °F Disposition:  Discharge  5/29/2021  2:23 pm    Follow-up:  No follow-up provider specified.         Medications Prescribed:  Discharge Medication List as of 5/29/2021  2:29 PM    START taking these medications    vancomycin HCl 250 MG Oral Cap  Take 1 c

## 2021-05-29 NOTE — ED INITIAL ASSESSMENT (HPI)
Pt to the emergency room for continuous diarrhea. Pt currently has Cdiff and has been treated with vanco but had too stop due to concerns of allergic rxn.  Dr Coral Romberg aware and had suggested benadryl with no improvement of symptoms

## 2021-05-31 PROCEDURE — 99490 CHRNC CARE MGMT STAFF 1ST 20: CPT

## 2021-06-07 RX ORDER — VANCOMYCIN HYDROCHLORIDE 250 MG/1
250 CAPSULE ORAL 4 TIMES DAILY
COMMUNITY
End: 2021-06-15

## 2021-06-15 ENCOUNTER — PATIENT OUTREACH (OUTPATIENT)
Dept: CASE MANAGEMENT | Age: 86
End: 2021-06-15

## 2021-06-15 DIAGNOSIS — Z85.828 HISTORY OF SKIN CANCER: ICD-10-CM

## 2021-06-15 DIAGNOSIS — A04.72 CLOSTRIDIUM DIFFICILE COLITIS: ICD-10-CM

## 2021-06-15 DIAGNOSIS — I25.118 CORONARY ARTERY DISEASE OF NATIVE ARTERY OF NATIVE HEART WITH STABLE ANGINA PECTORIS (HCC): ICD-10-CM

## 2021-06-15 DIAGNOSIS — M17.11 PRIMARY OSTEOARTHRITIS OF RIGHT KNEE: ICD-10-CM

## 2021-06-15 DIAGNOSIS — I10 HYPERTENSION, BENIGN: ICD-10-CM

## 2021-06-15 DIAGNOSIS — E78.00 PURE HYPERCHOLESTEROLEMIA: ICD-10-CM

## 2021-06-15 DIAGNOSIS — Z86.69 HX OF MIGRAINES: ICD-10-CM

## 2021-06-15 DIAGNOSIS — R13.19 ESOPHAGEAL DYSPHAGIA: ICD-10-CM

## 2021-06-15 DIAGNOSIS — M47.816 OSTEOARTHRITIS OF SPINE WITHOUT MYELOPATHY OR RADICULOPATHY, LUMBAR REGION: ICD-10-CM

## 2021-06-15 DIAGNOSIS — M17.12 PRIMARY OSTEOARTHRITIS OF LEFT KNEE: ICD-10-CM

## 2021-06-15 DIAGNOSIS — E78.5 DYSLIPIDEMIA: ICD-10-CM

## 2021-06-15 DIAGNOSIS — J45.40 MODERATE PERSISTENT ASTHMA WITHOUT COMPLICATION: ICD-10-CM

## 2021-06-15 DIAGNOSIS — K58.0 IRRITABLE BOWEL SYNDROME WITH DIARRHEA: ICD-10-CM

## 2021-06-15 DIAGNOSIS — M16.12 OSTEOARTHRITIS OF ONE HIP, LEFT: ICD-10-CM

## 2021-06-15 NOTE — PROGRESS NOTES
6/15/2021  Spoke to Eliud Angeles for CCM.       Updates to patient care team/ comments: UTD  Patient reported changes in medications: UTD  Med Adherence  Comment: Pt taking medications as prescribed     Health Maintenance:    Zoster Vaccines(1 of 2) Never done  P Manager Follow Up: 1 month  Reason For Follow Up: review progress and or barriers towards patients goals.      Care managers interventions:   Continue to provide encouragement and education for healthy coping and dx  Time Spent This Encounter Total: 20 min

## 2021-06-18 ENCOUNTER — TELEPHONE (OUTPATIENT)
Dept: INTERNAL MEDICINE CLINIC | Facility: CLINIC | Age: 86
End: 2021-06-18

## 2021-06-18 NOTE — TELEPHONE ENCOUNTER
Pt was in the ER 5/29 and they put her on meds for cdiff and ever since she stopped the meds she has had diarrhea x5 days now-call to advise

## 2021-06-18 NOTE — TELEPHONE ENCOUNTER
Patient notified to call Dr Carmelita Franks to determine if he still wanted to perform the colonoscopy on 6/22/21 due to recent C Diff, 2 loose stools daily, no abdominal pain and she was supposed to start the colonoscopy prep on Wed but didn't.   Pt verbalizes un

## 2021-06-19 ENCOUNTER — LAB ENCOUNTER (OUTPATIENT)
Dept: LAB | Facility: HOSPITAL | Age: 86
End: 2021-06-19
Attending: INTERNAL MEDICINE
Payer: MEDICARE

## 2021-06-19 DIAGNOSIS — K58.0 IRRITABLE BOWEL SYNDROME WITH DIARRHEA: ICD-10-CM

## 2021-06-22 ENCOUNTER — HOSPITAL ENCOUNTER (OUTPATIENT)
Facility: HOSPITAL | Age: 86
Setting detail: HOSPITAL OUTPATIENT SURGERY
Discharge: HOME | End: 2021-06-22
Attending: INTERNAL MEDICINE | Admitting: INTERNAL MEDICINE
Payer: MEDICARE

## 2021-06-22 ENCOUNTER — ANESTHESIA (OUTPATIENT)
Dept: ENDOSCOPY | Facility: HOSPITAL | Age: 86
End: 2021-06-22
Payer: MEDICARE

## 2021-06-22 ENCOUNTER — ANESTHESIA EVENT (OUTPATIENT)
Dept: ENDOSCOPY | Facility: HOSPITAL | Age: 86
End: 2021-06-22
Payer: MEDICARE

## 2021-06-22 VITALS
WEIGHT: 160 LBS | HEIGHT: 61 IN | OXYGEN SATURATION: 95 % | BODY MASS INDEX: 30.21 KG/M2 | SYSTOLIC BLOOD PRESSURE: 119 MMHG | DIASTOLIC BLOOD PRESSURE: 55 MMHG | RESPIRATION RATE: 16 BRPM | TEMPERATURE: 98 F | HEART RATE: 72 BPM

## 2021-06-22 DIAGNOSIS — K58.0 IRRITABLE BOWEL SYNDROME WITH DIARRHEA: Primary | ICD-10-CM

## 2021-06-22 DIAGNOSIS — R19.7 DIARRHEA, UNSPECIFIED TYPE: ICD-10-CM

## 2021-06-22 DIAGNOSIS — A04.72 CLOSTRIDIUM DIFFICILE COLITIS: ICD-10-CM

## 2021-06-22 PROCEDURE — 0DBL8ZX EXCISION OF TRANSVERSE COLON, VIA NATURAL OR ARTIFICIAL OPENING ENDOSCOPIC, DIAGNOSTIC: ICD-10-PCS | Performed by: INTERNAL MEDICINE

## 2021-06-22 PROCEDURE — 0DBH8ZX EXCISION OF CECUM, VIA NATURAL OR ARTIFICIAL OPENING ENDOSCOPIC, DIAGNOSTIC: ICD-10-PCS | Performed by: INTERNAL MEDICINE

## 2021-06-22 PROCEDURE — 0DBE8ZX EXCISION OF LARGE INTESTINE, VIA NATURAL OR ARTIFICIAL OPENING ENDOSCOPIC, DIAGNOSTIC: ICD-10-PCS | Performed by: INTERNAL MEDICINE

## 2021-06-22 PROCEDURE — 88305 TISSUE EXAM BY PATHOLOGIST: CPT | Performed by: INTERNAL MEDICINE

## 2021-06-22 PROCEDURE — 0DBK8ZX EXCISION OF ASCENDING COLON, VIA NATURAL OR ARTIFICIAL OPENING ENDOSCOPIC, DIAGNOSTIC: ICD-10-PCS | Performed by: INTERNAL MEDICINE

## 2021-06-22 RX ORDER — NALOXONE HYDROCHLORIDE 0.4 MG/ML
80 INJECTION, SOLUTION INTRAMUSCULAR; INTRAVENOUS; SUBCUTANEOUS AS NEEDED
Status: DISCONTINUED | OUTPATIENT
Start: 2021-06-22 | End: 2021-06-22

## 2021-06-22 RX ORDER — SODIUM CHLORIDE, SODIUM LACTATE, POTASSIUM CHLORIDE, CALCIUM CHLORIDE 600; 310; 30; 20 MG/100ML; MG/100ML; MG/100ML; MG/100ML
INJECTION, SOLUTION INTRAVENOUS CONTINUOUS
Status: DISCONTINUED | OUTPATIENT
Start: 2021-06-22 | End: 2021-06-22

## 2021-06-22 RX ORDER — LIDOCAINE HYDROCHLORIDE 10 MG/ML
INJECTION, SOLUTION EPIDURAL; INFILTRATION; INTRACAUDAL; PERINEURAL AS NEEDED
Status: DISCONTINUED | OUTPATIENT
Start: 2021-06-22 | End: 2021-06-22 | Stop reason: SURG

## 2021-06-22 RX ORDER — ONDANSETRON 2 MG/ML
INJECTION INTRAMUSCULAR; INTRAVENOUS
Status: COMPLETED
Start: 2021-06-22 | End: 2021-06-22

## 2021-06-22 RX ORDER — ONDANSETRON 2 MG/ML
4 INJECTION INTRAMUSCULAR; INTRAVENOUS EVERY 6 HOURS PRN
Status: DISCONTINUED | OUTPATIENT
Start: 2021-06-22 | End: 2021-06-22

## 2021-06-22 RX ADMIN — SODIUM CHLORIDE, SODIUM LACTATE, POTASSIUM CHLORIDE, CALCIUM CHLORIDE: 600; 310; 30; 20 INJECTION, SOLUTION INTRAVENOUS at 08:32:00

## 2021-06-22 NOTE — OPERATIVE REPORT
Beni Solis Patient Status:  Hospital Outpatient Surgery    1934 MRN [id number]   Location [id number] [place] [address] Attending Francisca Hester MD   Date 2021 PCP Carlos A Schrader MD     PREOPERATIVE DIAGNOSIS/INDICATIO polyops s/p hot snare polypectomy, 15 mm sessile transverse colon polyp s/p saline submucosal injection assisted hot snare endoscopic mucosal resection, the post resection m,ucosal defect was examined and no perforation or bleeding noted, the site was clos

## 2021-06-22 NOTE — ANESTHESIA PREPROCEDURE EVALUATION
PRE-OP EVALUATION    Patient Name: Az Hagen    Admit Diagnosis: Irritable bowel syndrome with diarrhea [K58.0]  Clostridium difficile colitis [A04.72]  Diarrhea, unspecified type [R19.7]    Pre-op Diagnosis: Irritable bowel syndrome with diarr by mouth daily. , Disp: , Rfl:   Na Sulfate-K Sulfate-Mg Sulf (SUPREP BOWEL PREP KIT) 17.5-3.13-1.6 GM/177ML Oral Solution, Take as directed by physician., Disp: 1 kit, Rfl: 0  EPINEPHrine 0.3 MG/0.3ML Injection Solution Auto-injector, Inject 0.3 mL as dire arteries: Systolic pressure was moderately increased, estimated      to be 48mm Hg. Impressions:  This study is compared with previous dated 01/11/2018: Lvef is   unchanged. The prior study did not determine pulmonary artery pressure.    There is now mi 9/23/2013    Procedure: CERVICAL EPIDURAL;  Surgeon: Ramone Moctezuma MD;  Location: [address] [place] [address]   • PATIENT DOCUMENTED NOT TO HAVE EXPERIENCED ANY OF THE FOLLOWING EVENTS N/A 8/17/2015    Procedure: CERVICAL EPIDURAL;  Surgeon: Darcy Mac pain management plan discussed with surgeon and patient.       Plan/risks discussed with: patient                Present on Admission:  **None**

## 2021-06-22 NOTE — ANESTHESIA POSTPROCEDURE EVALUATION
[id number] [facility] Patient Status:  Hospital Outpatient Surgery   Age/Gender 80year old female MRN [id number]   Location [id number] [place] [address] Attending Darrel Ahumada MD   Hosp Day # 0 PCP Josué Barillas MD

## 2021-06-22 NOTE — H&P
[facility]:  [facility]    1934 MRN [id number]   Location [id number] [place] [address] Attending Amy Nunn MD   Date 2021 PCP Francisca Coles MD glasses   • Wears glasses    • Weight loss      Past Surgical History:   Procedure Laterality Date   • ANGIOPLASTY (CORONARY)      stent   • APPENDECTOMY     • BACK SURGERY      lumbar x 4   • BENIGN BIOPSY LEFT  a long time ago    x 2   • CATH BARE MET INFECTION PROPHYLAXIS.  N/A 8/17/2015    Procedure: CERVICAL EPIDURAL;  Surgeon: Jaziel Dasilva MD;  Location: [facility]   • SPINE SURGERY PROCEDURE UNLISTED     • STENT, COATED/COV W/DEL SYS     • TONSILLECTOMY     • TOTAL ABDOM HYS testing.   Pulmicort               SHORTNESS OF BREATH    Comment:Coughing  Radiology Contrast *    ANAPHYLAXIS  Xylocaine [Lidocain*    ANAPHYLAXIS    Comment:\"Breathing issues\"-- per patient okay with             marcaine  Bactroban [Mupiroci*    OTHER with diarrhea     CAD (coronary artery disease), native coronary artery     Hx of migraines     Moderate persistent asthma without complication     Osteoporosis     Hypertension, benign     Dyslipidemia     Myopathy, unspecified     Weakness of both legs

## 2021-06-29 ENCOUNTER — APPOINTMENT (OUTPATIENT)
Dept: CARDIOLOGY | Age: 86
End: 2021-06-29

## 2021-06-30 PROCEDURE — 99490 CHRNC CARE MGMT STAFF 1ST 20: CPT

## 2021-07-08 ENCOUNTER — PATIENT OUTREACH (OUTPATIENT)
Dept: CASE MANAGEMENT | Age: 86
End: 2021-07-08

## 2021-07-08 ENCOUNTER — TELEPHONE (OUTPATIENT)
Dept: CASE MANAGEMENT | Age: 86
End: 2021-07-08

## 2021-07-08 DIAGNOSIS — Z91.81 AT RISK FOR FALLING: ICD-10-CM

## 2021-07-08 DIAGNOSIS — M17.12 PRIMARY OSTEOARTHRITIS OF LEFT KNEE: ICD-10-CM

## 2021-07-08 DIAGNOSIS — Z85.828 HISTORY OF SKIN CANCER: ICD-10-CM

## 2021-07-08 DIAGNOSIS — M81.0 AGE-RELATED OSTEOPOROSIS WITHOUT CURRENT PATHOLOGICAL FRACTURE: ICD-10-CM

## 2021-07-08 DIAGNOSIS — E78.00 PURE HYPERCHOLESTEROLEMIA: ICD-10-CM

## 2021-07-08 DIAGNOSIS — I10 HYPERTENSION, BENIGN: ICD-10-CM

## 2021-07-08 DIAGNOSIS — Z95.5 HISTORY OF HEART ARTERY STENT: ICD-10-CM

## 2021-07-08 DIAGNOSIS — E78.5 DYSLIPIDEMIA: ICD-10-CM

## 2021-07-08 DIAGNOSIS — I25.118 CORONARY ARTERY DISEASE OF NATIVE ARTERY OF NATIVE HEART WITH STABLE ANGINA PECTORIS (HCC): ICD-10-CM

## 2021-07-08 DIAGNOSIS — M17.11 PRIMARY OSTEOARTHRITIS OF RIGHT KNEE: ICD-10-CM

## 2021-07-08 DIAGNOSIS — J45.40 MODERATE PERSISTENT ASTHMA WITHOUT COMPLICATION: ICD-10-CM

## 2021-07-08 NOTE — TELEPHONE ENCOUNTER
Triage: Good afternoon :)     Pt would like to discuss updates from Dr. Melchor James and states she has some questions as well. Notified I would contact Dr. Kruse Peer office and a nurse will call her back for further assessment.   Understanding verbalized by

## 2021-07-08 NOTE — PROGRESS NOTES
Pt states she wanted to update Dr. Mundo Tesfaye on her visits with Dr. Melissa Kendrick states she has also had some dark stools and needed to discuss this as well.     Telephone encounter sent to [facility]G 35    Pt goes on to state she hasn't been able to follow up with

## 2021-07-09 NOTE — TELEPHONE ENCOUNTER
Patient states she had her colonoscopy with Dr Kamini Mcintyre, wanted TB updated.     Pt states she dropped something on her toe, went to see Podiatrist who said she had another toenail growing under her existing toenail, trimmed the nail, now the skin is red and

## 2021-07-27 ENCOUNTER — TELEPHONE (OUTPATIENT)
Dept: INTERNAL MEDICINE CLINIC | Facility: CLINIC | Age: 86
End: 2021-07-27

## 2021-07-27 ENCOUNTER — OFFICE VISIT (OUTPATIENT)
Dept: INTERNAL MEDICINE CLINIC | Facility: CLINIC | Age: 86
End: 2021-07-27
Payer: MEDICARE

## 2021-07-27 VITALS
RESPIRATION RATE: 18 BRPM | OXYGEN SATURATION: 97 % | BODY MASS INDEX: 32.12 KG/M2 | WEIGHT: 163.63 LBS | TEMPERATURE: 96 F | HEART RATE: 78 BPM | DIASTOLIC BLOOD PRESSURE: 64 MMHG | HEIGHT: 60 IN | SYSTOLIC BLOOD PRESSURE: 124 MMHG

## 2021-07-27 DIAGNOSIS — L03.031 ACUTE PARONYCHIA OF TOE, RIGHT: Primary | ICD-10-CM

## 2021-07-27 DIAGNOSIS — M50.30 DDD (DEGENERATIVE DISC DISEASE), CERVICAL: ICD-10-CM

## 2021-07-27 PROCEDURE — 99213 OFFICE O/P EST LOW 20 MIN: CPT | Performed by: FAMILY MEDICINE

## 2021-07-27 RX ORDER — DOXYCYCLINE HYCLATE 100 MG/1
100 CAPSULE ORAL 2 TIMES DAILY
Qty: 14 CAPSULE | Refills: 0 | Status: SHIPPED | OUTPATIENT
Start: 2021-07-27 | End: 2021-08-03

## 2021-07-27 NOTE — PROGRESS NOTES
Farhan Veras  [birth year]    Patient presents with: Toe Pain: RG rm 9 Right big Infected toe - c/o Slight swelling, clear discharge.       HPI:   Farhan Veras is a 80year old female who presents for evaluation of her right great toe swell Sleep.       • EPINEPHrine 0.3 MG/0.3ML Injection Solution Auto-injector Inject 0.3 mL as directed one time. • loratadine (CLARITIN) 10 MG Oral Tab Take 10 mg by mouth daily. • vitamin E 400 UNITS Oral Cap Take 400 Units by mouth daily.           Br BREATH  Shellfish-Derived P*        Comment:Sob  Tramadol                    Comment:Arms turned red like a sunburn  Vancomycin              RASH    Comment:Rash to the face and neck  Warfarin                OTHER (SEE COMMENTS)    Comment:Asthma attack  W Patient Active Problem List:     Dyspnea     Rotator cuff tendonitis     Cervical radiculopathy at C5     Primary osteoarthritis of right knee     Lumbar disc herniation     At risk for falling     Irritable bowel syndrome with diarrhea     CAD (coronary HEMORRHOIDECTOMY,INT/EXT,SIMPLE     • HYSTERECTOMY      complete   • INJECTION, W/WO CONTRAST, DX/THERAPEUTIC SUBSTANCE, EPIDURAL/SUBARACHNOID; CERVICAL/THORACIC  9/23/2013    Procedure: CERVICAL EPIDURAL;  Surgeon: Harvey Anton MD;  Location: [facility] Paternal Grandmother    • Allergies Paternal Grandmother    • Cancer Maternal Grandfather    • Heart Attack Maternal Grandfather    • Heart Attack Maternal Grandmother    • Heart Attack Brother    • Other (Autoimmune disease) Brother    • Thyroid Disorder acute paronychia. She has had mild improvement with topical antibiotic. Given persistence in symptoms we will begin oral antibiotic therapy. She has multiple allergies but has tolerated doxycycline in the past which we will begin.   We also discussed add

## 2021-07-27 NOTE — TELEPHONE ENCOUNTER
Pt received referral to day to see Dr. Elliott ott and he isn't taking new patients, she needs a new referral placed.

## 2021-07-27 NOTE — TELEPHONE ENCOUNTER
Patient referred to Dr Genevieve Chaudhry or Dr Rebollar Members at [facility]. Pt states she made an appt with Stefany Ramírez for 8/6/21. Pt verbalizes understanding.

## 2021-07-31 PROCEDURE — 99490 CHRNC CARE MGMT STAFF 1ST 20: CPT

## 2021-08-03 ENCOUNTER — PATIENT OUTREACH (OUTPATIENT)
Dept: CASE MANAGEMENT | Age: 86
End: 2021-08-03

## 2021-08-03 DIAGNOSIS — M47.816 OSTEOARTHRITIS OF SPINE WITHOUT MYELOPATHY OR RADICULOPATHY, LUMBAR REGION: ICD-10-CM

## 2021-08-03 DIAGNOSIS — Z85.828 HISTORY OF SKIN CANCER: ICD-10-CM

## 2021-08-03 DIAGNOSIS — E78.00 PURE HYPERCHOLESTEROLEMIA: ICD-10-CM

## 2021-08-03 DIAGNOSIS — M16.12 OSTEOARTHRITIS OF ONE HIP, LEFT: ICD-10-CM

## 2021-08-03 DIAGNOSIS — I10 HYPERTENSION, BENIGN: ICD-10-CM

## 2021-08-03 DIAGNOSIS — E78.5 DYSLIPIDEMIA: ICD-10-CM

## 2021-08-03 DIAGNOSIS — M17.11 PRIMARY OSTEOARTHRITIS OF RIGHT KNEE: ICD-10-CM

## 2021-08-03 DIAGNOSIS — M17.12 PRIMARY OSTEOARTHRITIS OF LEFT KNEE: ICD-10-CM

## 2021-08-03 NOTE — PROGRESS NOTES
8/3/2021  Spoke to Emilio Bacon for CCM.       Updates to patient care team/ comments: UTD  Patient reported changes in medications: UTD  Med Adherence  Comment: PT taking medications as prescribed     Health Maintenance:     Zoster Vaccines(1 of 2) Never done  P plan.  Self-Management Abilities (patient reported)             1= least confident in achieving goal, 5= very confident               - confidence: : 4        Care Manager Follow Up: 1 month  Reason For Follow Up: review progress and or barriers towards pa

## 2021-08-06 ENCOUNTER — OFFICE VISIT (OUTPATIENT)
Dept: SURGERY | Facility: CLINIC | Age: 86
End: 2021-08-06
Payer: MEDICARE

## 2021-08-06 VITALS
SYSTOLIC BLOOD PRESSURE: 106 MMHG | BODY MASS INDEX: 32 KG/M2 | DIASTOLIC BLOOD PRESSURE: 70 MMHG | WEIGHT: 163 LBS | HEIGHT: 60 IN

## 2021-08-06 DIAGNOSIS — M48.02 CERVICAL STENOSIS OF SPINAL CANAL: ICD-10-CM

## 2021-08-06 DIAGNOSIS — R20.0 ARM NUMBNESS: ICD-10-CM

## 2021-08-06 DIAGNOSIS — M54.12 CERVICAL RADICULITIS: Primary | ICD-10-CM

## 2021-08-06 PROCEDURE — 99214 OFFICE O/P EST MOD 30 MIN: CPT | Performed by: PHYSICIAN ASSISTANT

## 2021-08-06 NOTE — PROGRESS NOTES
Pain wakes her up during the night  Not sure which way to sleep  If she lays on her back she has pain  If she lays on the right side, she has numbness    4 previous back sx, wears brace daily    No sx on neck  Told not to have PT on neck and then someone d

## 2021-08-06 NOTE — H&P
Neurosurgery Clinic Visit  2021    Gomez Cheng PCP:  Dorothy Medina MD    1934 MRN [id number]       CC:  Neck Pain, Arm Numbness    HPI:    Minor Fear is a very pleasant 80year old female with PMH of CAD s/p stent, osteoporosis, lumbar artery disease)    • Calculus of kidney    • Cancer ([facility] [address] 2010    skin cancer in left arm   • Chest pain on exertion    • Constipation    • Coronary atherosclerosis of unspecified type of vessel, native or graft    • Diarrhea, unspecified    • Dyslipidemia No    Family History   Problem Relation Age of Onset   • Breast Cancer Mother 79   • High Blood Pressure Mother    • Allergies Father    • Asthma Father    • Colon Cancer Father    • Heart Disease Father    • Heart Attack Father    • Heart Attack Paternal Cervical flex/ex. MRI Cervical.  EMG/NCV. If stenosis is not severe she would be interested in seeing the pain service for injections. Will call with results. Imaging was reviewed with the patient and explained in detail.   The diagnosis, treatment op

## 2021-08-11 ENCOUNTER — TELEPHONE (OUTPATIENT)
Dept: INTERNAL MEDICINE CLINIC | Facility: CLINIC | Age: 86
End: 2021-08-11

## 2021-08-11 NOTE — TELEPHONE ENCOUNTER
Pt still complaining of swollen foot from lov  7-27 with [address]. She would like to speak with a nurse, please advise?

## 2021-08-11 NOTE — TELEPHONE ENCOUNTER
FYI    LOV 7/27/21    Spoke with patient. Has been following MK's recs below and finished abx. Reports her toe has improved, but noticed some swelling to her right foot on Saturday; she's not sure if it was there when she last saw [address]  No redness, mild swel

## 2021-08-12 ENCOUNTER — HOSPITAL ENCOUNTER (OUTPATIENT)
Dept: ULTRASOUND IMAGING | Facility: HOSPITAL | Age: 86
Discharge: HOME | End: 2021-08-12
Attending: FAMILY MEDICINE
Payer: MEDICARE

## 2021-08-12 ENCOUNTER — OFFICE VISIT (OUTPATIENT)
Dept: INTERNAL MEDICINE CLINIC | Facility: CLINIC | Age: 86
End: 2021-08-12
Payer: MEDICARE

## 2021-08-12 VITALS
BODY MASS INDEX: 32.79 KG/M2 | WEIGHT: 167 LBS | DIASTOLIC BLOOD PRESSURE: 60 MMHG | TEMPERATURE: 98 F | SYSTOLIC BLOOD PRESSURE: 112 MMHG | HEART RATE: 86 BPM | HEIGHT: 60 IN | RESPIRATION RATE: 16 BRPM

## 2021-08-12 DIAGNOSIS — R22.41 LOCALIZED SWELLING OF RIGHT LOWER EXTREMITY: ICD-10-CM

## 2021-08-12 DIAGNOSIS — L03.031 ACUTE PARONYCHIA OF TOE, RIGHT: Primary | ICD-10-CM

## 2021-08-12 PROCEDURE — 93971 EXTREMITY STUDY: CPT | Performed by: FAMILY MEDICINE

## 2021-08-12 PROCEDURE — 99214 OFFICE O/P EST MOD 30 MIN: CPT | Performed by: FAMILY MEDICINE

## 2021-08-12 NOTE — PROGRESS NOTES
Beni Solis  [birth year]    Patient presents with:  Toenail: DD Rm 9, R Toenail, improved. RLE swelling.    Care Gap Management: 2nd vaccine 8/12/21; rescheduled      HPI:   Beni Solis is a 80year old female who presents for follow-up o • vitamin E 400 UNITS Oral Cap Take 400 Units by mouth daily.           Breo Ellipta            OTHER (SEE COMMENTS)    Comment:Pneumonia             Pneumonia  Celebrex [Celecoxib]    SHORTNESS OF BREATH  Fish Oil                ANAPHYLAXIS  Flonase [F OTHER (SEE COMMENTS)    Comment:Asthma attack  Wellbutrin [Bupropi*        Comment:constipation  Flulaval                RASH   Past Medical History:   Diagnosis Date   • Anesthesia complication     pt stated had a reacion with a medication falling     Irritable bowel syndrome with diarrhea     CAD (coronary artery disease), native coronary artery     Hx of migraines     Moderate persistent asthma without complication     Osteoporosis     Hypertension, benign     Dyslipidemia     Myopathy, un Surgeon: Shahbaz Rodriguez MD;  Location: [facility] FOR PAIN MANAG[facility]   • INJECTION, W/WO CONTRAST, DX/THERAPEUTIC SUBSTANCE, EPIDURAL/SUBARACHNOID; CERVICAL/THORACIC N/A 8/17/2015    Procedure: CERVICAL EPIDURAL;  Surgeon: Yelena Cummins MD;  Location (Autoimmune disease) Brother    • Thyroid Disorder Sister    • Asthma Sister    • Cancer Brother    • Allergies Daughter    • Fibromyalgia Daughter    • Melanoma Daughter    • Other (Hepatitis C) Daughter       Social History    Tobacco Use      Smoking st swelling have improved after antibiotic treatment. However, she still has some tenderness and evidence of an ingrown toenail.   I recommended continued use of warm water soaks and topical antibiotics and that she have follow-up with her podiatrist for cont

## 2021-08-13 ENCOUNTER — TELEPHONE (OUTPATIENT)
Dept: INTERNAL MEDICINE CLINIC | Facility: CLINIC | Age: 86
End: 2021-08-13

## 2021-08-13 ENCOUNTER — PATIENT OUTREACH (OUTPATIENT)
Dept: CASE MANAGEMENT | Age: 86
End: 2021-08-13

## 2021-08-13 DIAGNOSIS — E78.5 DYSLIPIDEMIA: ICD-10-CM

## 2021-08-13 DIAGNOSIS — K86.89 PANCREATIC INSUFFICIENCY: ICD-10-CM

## 2021-08-13 DIAGNOSIS — M16.12 OSTEOARTHRITIS OF ONE HIP, LEFT: ICD-10-CM

## 2021-08-13 DIAGNOSIS — E78.00 PURE HYPERCHOLESTEROLEMIA: ICD-10-CM

## 2021-08-13 DIAGNOSIS — M47.816 OSTEOARTHRITIS OF SPINE WITHOUT MYELOPATHY OR RADICULOPATHY, LUMBAR REGION: ICD-10-CM

## 2021-08-13 DIAGNOSIS — E86.0 DEHYDRATION: ICD-10-CM

## 2021-08-13 DIAGNOSIS — M17.11 PRIMARY OSTEOARTHRITIS OF RIGHT KNEE: ICD-10-CM

## 2021-08-13 DIAGNOSIS — M17.12 PRIMARY OSTEOARTHRITIS OF LEFT KNEE: ICD-10-CM

## 2021-08-13 DIAGNOSIS — Z85.828 HISTORY OF SKIN CANCER: ICD-10-CM

## 2021-08-13 DIAGNOSIS — J45.40 MODERATE PERSISTENT ASTHMA WITHOUT COMPLICATION: ICD-10-CM

## 2021-08-13 DIAGNOSIS — R22.41 LOCALIZED SWELLING OF RIGHT LOWER EXTREMITY: Primary | ICD-10-CM

## 2021-08-13 DIAGNOSIS — I25.118 CORONARY ARTERY DISEASE OF NATIVE ARTERY OF NATIVE HEART WITH STABLE ANGINA PECTORIS (HCC): ICD-10-CM

## 2021-08-13 DIAGNOSIS — I10 HYPERTENSION, BENIGN: ICD-10-CM

## 2021-08-13 NOTE — PROGRESS NOTES
Pt called into ccm. Pt called to discuss ongoing problem with swelling in her foot. Pt was given ultrasound and determined there was no clotting.      Pt has remained dissatisfied with the availability of appointments with her podiatrist and instead call

## 2021-08-13 NOTE — TELEPHONE ENCOUNTER
Discussed with patient. Was seen by podiatrist and had nail care done today. LE swelling persistent. Echocardiogram has already been scheduled for Monday and she will complete her metabolic panel. No cardiorespiratory symptoms.  Continue elevation, compress

## 2021-08-14 ENCOUNTER — LAB ENCOUNTER (OUTPATIENT)
Dept: LAB | Facility: HOSPITAL | Age: 86
End: 2021-08-14
Attending: FAMILY MEDICINE
Payer: MEDICARE

## 2021-08-14 DIAGNOSIS — R22.41 LOCALIZED SWELLING OF RIGHT LOWER EXTREMITY: ICD-10-CM

## 2021-08-14 LAB
ALBUMIN SERPL-MCNC: 3.2 G/DL (ref 3.4–5)
ALBUMIN/GLOB SERPL: 1 {RATIO} (ref 1–2)
ALP LIVER SERPL-CCNC: 97 U/L
ALT SERPL-CCNC: 36 U/L
ANION GAP SERPL CALC-SCNC: 3 MMOL/L (ref 0–18)
AST SERPL-CCNC: 26 U/L (ref 15–37)
BILIRUB SERPL-MCNC: 0.5 MG/DL (ref 0.1–2)
BUN BLD-MCNC: 12 MG/DL (ref 7–18)
CALCIUM BLD-MCNC: 9 MG/DL (ref 8.5–10.1)
CHLORIDE SERPL-SCNC: 113 MMOL/L (ref 98–112)
CO2 SERPL-SCNC: 27 MMOL/L (ref 21–32)
CREAT BLD-MCNC: 0.87 MG/DL
GLOBULIN PLAS-MCNC: 3.3 G/DL (ref 2.8–4.4)
GLUCOSE BLD-MCNC: 151 MG/DL (ref 70–99)
M PROTEIN MFR SERPL ELPH: 6.5 G/DL (ref 6.4–8.2)
OSMOLALITY SERPL CALC.SUM OF ELEC: 299 MOSM/KG (ref 275–295)
PATIENT FASTING Y/N/NP: NO
POTASSIUM SERPL-SCNC: 3.5 MMOL/L (ref 3.5–5.1)
SODIUM SERPL-SCNC: 143 MMOL/L (ref 136–145)

## 2021-08-14 PROCEDURE — 80053 COMPREHEN METABOLIC PANEL: CPT

## 2021-08-14 PROCEDURE — 36415 COLL VENOUS BLD VENIPUNCTURE: CPT

## 2021-08-16 NOTE — PROGRESS NOTES
Spoke to pt to follow up on visit with at home podiatrist Dr Jessica Beckman. Pt had a good experience with her.  Her toenails were trimmed and pt was told that the problem with her toe was the irritation was a result of pt doing too much scrubbing of

## 2021-08-19 NOTE — PROGRESS NOTES
NURSING ADMISSION NOTE      Patient admitted via Cart  Oriented to room. 510  Safety precautions initiated. Bed in low position. Call light in reach. Patients chart has been reviewed. Prescription refilled and sent to pt's preferred pharmacy.

## 2021-08-20 ENCOUNTER — TELEPHONE (OUTPATIENT)
Dept: INTERNAL MEDICINE CLINIC | Facility: CLINIC | Age: 86
End: 2021-08-20

## 2021-08-20 DIAGNOSIS — R19.7 DIARRHEA, UNSPECIFIED TYPE: Primary | ICD-10-CM

## 2021-08-20 NOTE — TELEPHONE ENCOUNTER
[address] had prescribed an antibiotic about 2 weeks ago and patient is experiencing diarrhea on and off this week. If patient takes Imodium it will stop it but then it comes right back.   Patient is concerned that she may have C-Diff which she has had in the pas

## 2021-08-20 NOTE — TELEPHONE ENCOUNTER
Patient verbalized understanding of testing orders, ER/ICC warnings, dietary changes, and hydration. Patient will be contacted with results once available.

## 2021-08-20 NOTE — TELEPHONE ENCOUNTER
Patient states diarrhea since abx. Patient states not all liquid- liquid with some formed stool. Patient denies blood in the stool, no fevers. Chills reported. Patient takes a probiotic daily- history of cdiff.  Patient asking for stool orders she will

## 2021-08-20 NOTE — TELEPHONE ENCOUNTER
Stool studies including C.diff, culure, and ova parasite ordered. Please inform patient. Encourage hydration, bland diet and discuss ER/IC precautions. Thank you.

## 2021-08-21 ENCOUNTER — HOSPITAL ENCOUNTER (OUTPATIENT)
Dept: GENERAL RADIOLOGY | Facility: HOSPITAL | Age: 86
Discharge: HOME | End: 2021-08-21
Attending: PHYSICIAN ASSISTANT
Payer: MEDICARE

## 2021-08-21 ENCOUNTER — HOSPITAL ENCOUNTER (OUTPATIENT)
Dept: MRI IMAGING | Facility: HOSPITAL | Age: 86
Discharge: HOME | End: 2021-08-21
Attending: PHYSICIAN ASSISTANT
Payer: MEDICARE

## 2021-08-21 ENCOUNTER — LAB ENCOUNTER (OUTPATIENT)
Dept: LAB | Facility: HOSPITAL | Age: 86
End: 2021-08-21
Attending: INTERNAL MEDICINE
Payer: MEDICARE

## 2021-08-21 DIAGNOSIS — M48.02 CERVICAL STENOSIS OF SPINAL CANAL: ICD-10-CM

## 2021-08-21 DIAGNOSIS — R19.7 DIARRHEA, UNSPECIFIED TYPE: ICD-10-CM

## 2021-08-21 DIAGNOSIS — M54.12 CERVICAL RADICULITIS: ICD-10-CM

## 2021-08-21 PROCEDURE — 87329 GIARDIA AG IA: CPT

## 2021-08-21 PROCEDURE — 87177 OVA AND PARASITES SMEARS: CPT

## 2021-08-21 PROCEDURE — 87427 SHIGA-LIKE TOXIN AG IA: CPT

## 2021-08-21 PROCEDURE — 72141 MRI NECK SPINE W/O DYE: CPT | Performed by: PHYSICIAN ASSISTANT

## 2021-08-21 PROCEDURE — 87046 STOOL CULTR AEROBIC BACT EA: CPT

## 2021-08-21 PROCEDURE — 87272 CRYPTOSPORIDIUM AG IF: CPT

## 2021-08-21 PROCEDURE — 87493 C DIFF AMPLIFIED PROBE: CPT

## 2021-08-21 PROCEDURE — 72052 X-RAY EXAM NECK SPINE 6/>VWS: CPT | Performed by: PHYSICIAN ASSISTANT

## 2021-08-21 PROCEDURE — 87209 SMEAR COMPLEX STAIN: CPT

## 2021-08-21 PROCEDURE — 87045 FECES CULTURE AEROBIC BACT: CPT

## 2021-08-22 LAB
C DIFF TOX B STL QL: NEGATIVE
CRYPTOSP AG STL QL IA: NEGATIVE
G LAMBLIA AG STL QL IA: NEGATIVE

## 2021-08-25 ENCOUNTER — TELEPHONE (OUTPATIENT)
Dept: SURGERY | Facility: CLINIC | Age: 86
End: 2021-08-25

## 2021-08-25 DIAGNOSIS — M54.2 CERVICALGIA: Primary | ICD-10-CM

## 2021-08-25 DIAGNOSIS — M54.12 CERVICAL RADICULITIS: ICD-10-CM

## 2021-08-25 NOTE — TELEPHONE ENCOUNTER
MRI reviewed. She does have some pressure on the nerves in her neck which can cause right arm symptoms. There is no severe pressure on the spinal cord so she does not need urgent surgery for this.   I will place an order for pain management if she would l

## 2021-08-26 LAB — OVA AND PARASITE, FECAL INTERPRETATION: NEGATIVE

## 2021-08-31 PROCEDURE — 99490 CHRNC CARE MGMT STAFF 1ST 20: CPT

## 2021-09-02 ENCOUNTER — APPOINTMENT (OUTPATIENT)
Dept: GENERAL RADIOLOGY | Facility: HOSPITAL | Age: 86
End: 2021-09-02
Attending: EMERGENCY MEDICINE
Payer: MEDICARE

## 2021-09-02 ENCOUNTER — HOSPITAL ENCOUNTER (OUTPATIENT)
Facility: HOSPITAL | Age: 86
Setting detail: OBSERVATION
Discharge: HOME | End: 2021-09-03
Attending: EMERGENCY MEDICINE | Admitting: HOSPITALIST
Payer: MEDICARE

## 2021-09-02 ENCOUNTER — APPOINTMENT (OUTPATIENT)
Dept: ULTRASOUND IMAGING | Facility: HOSPITAL | Age: 86
End: 2021-09-02
Attending: EMERGENCY MEDICINE
Payer: MEDICARE

## 2021-09-02 ENCOUNTER — APPOINTMENT (OUTPATIENT)
Dept: NUCLEAR MEDICINE | Facility: HOSPITAL | Age: 86
End: 2021-09-02
Attending: EMERGENCY MEDICINE
Payer: MEDICARE

## 2021-09-02 DIAGNOSIS — R07.9 CHEST PAIN OF UNCERTAIN ETIOLOGY: Primary | ICD-10-CM

## 2021-09-02 LAB
ALBUMIN SERPL-MCNC: 3.4 G/DL (ref 3.4–5)
ALBUMIN/GLOB SERPL: 1 {RATIO} (ref 1–2)
ALP LIVER SERPL-CCNC: 93 U/L
ALT SERPL-CCNC: 19 U/L
ANION GAP SERPL CALC-SCNC: 5 MMOL/L (ref 0–18)
AST SERPL-CCNC: 17 U/L (ref 15–37)
BASOPHILS # BLD AUTO: 0.07 X10(3) UL (ref 0–0.2)
BASOPHILS NFR BLD AUTO: 1 %
BILIRUB SERPL-MCNC: 0.5 MG/DL (ref 0.1–2)
BILIRUB UR QL STRIP.AUTO: NEGATIVE
BUN BLD-MCNC: 13 MG/DL (ref 7–18)
CALCIUM BLD-MCNC: 8.9 MG/DL (ref 8.5–10.1)
CHLORIDE SERPL-SCNC: 112 MMOL/L (ref 98–112)
CLARITY UR REFRACT.AUTO: CLEAR
CO2 SERPL-SCNC: 24 MMOL/L (ref 21–32)
CREAT BLD-MCNC: 0.74 MG/DL
D-DIMER: 1.04 UG/ML FEU (ref ?–0.86)
EOSINOPHIL # BLD AUTO: 0.22 X10(3) UL (ref 0–0.7)
EOSINOPHIL NFR BLD AUTO: 3.2 %
ERYTHROCYTE [DISTWIDTH] IN BLOOD BY AUTOMATED COUNT: 15.5 %
GLOBULIN PLAS-MCNC: 3.4 G/DL (ref 2.8–4.4)
GLUCOSE BLD-MCNC: 93 MG/DL (ref 70–99)
GLUCOSE UR STRIP.AUTO-MCNC: NEGATIVE MG/DL
HCT VFR BLD AUTO: 39.2 %
HGB BLD-MCNC: 13.1 G/DL
IMM GRANULOCYTES # BLD AUTO: 0.01 X10(3) UL (ref 0–1)
IMM GRANULOCYTES NFR BLD: 0.1 %
KETONES UR STRIP.AUTO-MCNC: NEGATIVE MG/DL
LEUKOCYTE ESTERASE UR QL STRIP.AUTO: NEGATIVE
LYMPHOCYTES # BLD AUTO: 3.12 X10(3) UL (ref 1–4)
LYMPHOCYTES NFR BLD AUTO: 45.9 %
M PROTEIN MFR SERPL ELPH: 6.8 G/DL (ref 6.4–8.2)
MCH RBC QN AUTO: 28.8 PG (ref 26–34)
MCHC RBC AUTO-ENTMCNC: 33.4 G/DL (ref 31–37)
MCV RBC AUTO: 86.2 FL
MONOCYTES # BLD AUTO: 0.65 X10(3) UL (ref 0.1–1)
MONOCYTES NFR BLD AUTO: 9.6 %
NEUTROPHILS # BLD AUTO: 2.73 X10 (3) UL (ref 1.5–7.7)
NEUTROPHILS # BLD AUTO: 2.73 X10(3) UL (ref 1.5–7.7)
NEUTROPHILS NFR BLD AUTO: 40.2 %
NITRITE UR QL STRIP.AUTO: NEGATIVE
NT-PROBNP SERPL-MCNC: 133 PG/ML (ref ?–450)
OSMOLALITY SERPL CALC.SUM OF ELEC: 292 MOSM/KG (ref 275–295)
PH UR STRIP.AUTO: 5 [PH] (ref 5–8)
PLATELET # BLD AUTO: 251 10(3)UL (ref 150–450)
POTASSIUM SERPL-SCNC: 3.6 MMOL/L (ref 3.5–5.1)
PROCALCITONIN SERPL-MCNC: <0.05 NG/ML (ref ?–0.16)
PROT UR STRIP.AUTO-MCNC: NEGATIVE MG/DL
RBC # BLD AUTO: 4.55 X10(6)UL
RBC UR QL AUTO: NEGATIVE
SARS-COV-2 RNA RESP QL NAA+PROBE: NOT DETECTED
SODIUM SERPL-SCNC: 141 MMOL/L (ref 136–145)
SP GR UR STRIP.AUTO: 1 (ref 1–1.03)
TROPONIN I SERPL-MCNC: <0.045 NG/ML (ref ?–0.04)
TROPONIN I SERPL-MCNC: <0.045 NG/ML (ref ?–0.04)
TSI SER-ACNC: 1.5 MIU/ML (ref 0.36–3.74)
UROBILINOGEN UR STRIP.AUTO-MCNC: <2 MG/DL
WBC # BLD AUTO: 6.8 X10(3) UL (ref 4–11)

## 2021-09-02 PROCEDURE — 99220 INITIAL OBSERVATION CARE,LEVL III: CPT | Performed by: INTERNAL MEDICINE

## 2021-09-02 PROCEDURE — 78582 LUNG VENTILAT&PERFUS IMAGING: CPT | Performed by: EMERGENCY MEDICINE

## 2021-09-02 PROCEDURE — 93970 EXTREMITY STUDY: CPT | Performed by: EMERGENCY MEDICINE

## 2021-09-02 PROCEDURE — 71045 X-RAY EXAM CHEST 1 VIEW: CPT | Performed by: EMERGENCY MEDICINE

## 2021-09-02 RX ORDER — LEVALBUTEROL TARTRATE 45 UG/1
1-2 AEROSOL, METERED ORAL EVERY 6 HOURS PRN
Status: DISCONTINUED | OUTPATIENT
Start: 2021-09-02 | End: 2021-09-03

## 2021-09-02 RX ORDER — SODIUM CHLORIDE 9 MG/ML
INJECTION, SOLUTION INTRAVENOUS CONTINUOUS
Status: ACTIVE | OUTPATIENT
Start: 2021-09-02 | End: 2021-09-03

## 2021-09-02 RX ORDER — LEVALBUTEROL TARTRATE 45 UG/1
2 AEROSOL, METERED ORAL EVERY 4 HOURS PRN
COMMUNITY

## 2021-09-02 RX ORDER — ENOXAPARIN SODIUM 100 MG/ML
40 INJECTION SUBCUTANEOUS NIGHTLY
Status: DISCONTINUED | OUTPATIENT
Start: 2021-09-02 | End: 2021-09-03

## 2021-09-02 RX ORDER — VERAPAMIL HYDROCHLORIDE 240 MG/1
240 CAPSULE, EXTENDED RELEASE ORAL NIGHTLY
COMMUNITY
End: 2021-09-09

## 2021-09-02 RX ORDER — METOCLOPRAMIDE HYDROCHLORIDE 5 MG/ML
5 INJECTION INTRAMUSCULAR; INTRAVENOUS EVERY 8 HOURS PRN
Status: DISCONTINUED | OUTPATIENT
Start: 2021-09-02 | End: 2021-09-03

## 2021-09-02 RX ORDER — VERAPAMIL HYDROCHLORIDE 240 MG/1
240 TABLET, FILM COATED, EXTENDED RELEASE ORAL NIGHTLY
Status: DISCONTINUED | OUTPATIENT
Start: 2021-09-02 | End: 2021-09-03

## 2021-09-02 RX ORDER — ALPRAZOLAM 1 MG/1
0.5 TABLET ORAL NIGHTLY PRN
COMMUNITY

## 2021-09-02 RX ORDER — ACETAMINOPHEN 500 MG
1000 TABLET ORAL EVERY 6 HOURS PRN
Status: DISCONTINUED | OUTPATIENT
Start: 2021-09-02 | End: 2021-09-03

## 2021-09-02 RX ORDER — CETIRIZINE HYDROCHLORIDE 10 MG/1
10 TABLET ORAL DAILY
Status: DISCONTINUED | OUTPATIENT
Start: 2021-09-03 | End: 2021-09-03

## 2021-09-02 RX ORDER — ONDANSETRON 2 MG/ML
4 INJECTION INTRAMUSCULAR; INTRAVENOUS EVERY 4 HOURS PRN
Status: DISCONTINUED | OUTPATIENT
Start: 2021-09-02 | End: 2021-09-02

## 2021-09-02 RX ORDER — ALPRAZOLAM 0.25 MG/1
0.25 TABLET ORAL EVERY 6 HOURS PRN
Status: DISCONTINUED | OUTPATIENT
Start: 2021-09-02 | End: 2021-09-03

## 2021-09-02 RX ORDER — ONDANSETRON 2 MG/ML
4 INJECTION INTRAMUSCULAR; INTRAVENOUS EVERY 6 HOURS PRN
Status: DISCONTINUED | OUTPATIENT
Start: 2021-09-02 | End: 2021-09-03

## 2021-09-02 NOTE — H&P
[facility] HOSPITAL[facility]  History and Physical     Lurdes Mack Patient Status:  Emergency    1934 MRN [id number]   Location [facility] Attending Tarqi Silva MD   Hosp Day # 0 PCP Cari Salas MD     Chief Com Diarrhea, unspecified    • Dyslipidemia    • Easy bruising    • Extrinsic asthma, unspecified    • Fatigue    • Glucose intolerance (pre-diabetes)    • Hearing impairment     HEARING LOSS BUT NO AIDS   • Hearing loss    • Heart palpitations    • HIGH BLOOD EPIDURAL/SUBARACHNOID; CERVICAL/THORACIC  9/23/2013    Procedure: CERVICAL EPIDURAL;  Surgeon: Monster Oliver MD;  Location: [facility] FOR PAIN MANAGEMENT   • INJECTION, W/WO CONTRAST, DX/THERAPEUTIC SUBSTANCE, EPIDURAL/SUBARACHNOID; CERVICAL/THORACIC N/ Father    • Heart Disease Father    • Heart Attack Father    • Heart Attack Paternal Grandfather    • Heart Disease Paternal Grandfather    • Arthritis Paternal Grandmother    • Allergies Paternal Grandmother    • Cancer Maternal Grandfather    • Heart Att DIARRHEA  Metronidazole And R*    SHORTNESS OF BREATH  Mold                    SHORTNESS OF BREATH  Pcn [Penicillins]           Comment:Short of Breath  Penicillin G            RASH    Comment:sob  Plavix [Clopidogrel*        Comment:Redness/choking  Quest loratadine (CLARITIN) 10 MG Oral Tab, Take 10 mg by mouth daily. , Disp: , Rfl:   vitamin E 400 UNITS Oral Cap, Take 400 Units by mouth daily. , Disp: , Rfl:         Review of Systems:   A comprehensive 14 point review of systems was completed.     Bassem Koo <0.045   PBNP 133  --        Creatinine Kinase  No results for input(s): CK in the last 168 hours. Inflammatory Markers  Recent Labs   Lab 09/02/21  1719   DDIMER 1.04*       Imaging: Imaging data reviewed in Epic. ASSESSMENT / PLAN:     1.  Stan

## 2021-09-02 NOTE — ED PROVIDER NOTES
The patient  Patient Seen in: [facility] Emergency Department      History   Patient presents with:  Cellulitis    Stated Complaint: redness to toe    HPI/Subjective:   HPI    This is a 59-year-old female who came here for 2 specific complaints.   The Hearing loss    • Heart palpitations    • HIGH BLOOD PRESSURE    • High cholesterol    • Hip pain    • History of blood transfusion AT AGE 39    AFTER HYST   • History of depression    • Hoarseness, chronic    • HTN (hypertension)    • Leg swelling    • Lo EPIDURAL/SUBARACHNOID; CERVICAL/THORACIC N/A 8/17/2015    Procedure: CERVICAL EPIDURAL;  Surgeon: Ileana Montiel MD;  Location: [facility] FOR PAIN MANAGEMENT   • ALCIRA LOCALIZATION WIRE 1 SITE LEFT (CPT=19281)  1980'S    Benign   • OTHER SURGICAL HISTORY BMI 31.25 kg/m²         Physical Exam  General: . Patient is in no respiratory distress at this present time. The patient is in no respiratory distress. The patient is not septic or toxic    HEENT: Atraumatic, conjunctiva are not pale.   There is no icter -----------         ------                     CBC W/ DIFFERENTIAL[[id number]]                              Final result                 Please view results for these tests on the individual orders.    SCAN SLIDE   CBC W/ DIFFERENTIAL               The from C3 through C6. DISC SPACES:  There is severe disc space narrowing at C3-C4, C4-C5 and C5-C6. PARASPINOUS:  Negative. No paraspinous abnormality is seen.  OTHER:  There is moderate encroachment on the foramina at above described levels of disc space na spinal canal or neural foraminal stenosis. C7-T1:  No significant disc/facet abnormality, spinal stenosis, or foraminal narrowing. CRANIOCERVICAL AREA:  Normal foramen magnum with no Chiari malformation. PARASPINAL AREA:  Normal with no visible mass.   Do VENOUS DOPPLER LEG RIGHT - DIAG IMG (CPT=93971)        XR CHEST AP PORTABLE  (CPT=71045)    Result Date: 9/2/2021  PROCEDURE:  XR CHEST AP PORTABLE  (CPT=71045)  TECHNIQUE:  AP chest radiograph was obtained.   COMPARISON:  [facility] , XR, XR CHEST AP PORTABLE chest pain or shortness of breath she said the shortness of breath was with activity she does have what seems to be some lower extremity edema but no obvious findings of DVT the patient feels at this present but she is not having any chest pain or shortnes

## 2021-09-03 ENCOUNTER — APPOINTMENT (OUTPATIENT)
Dept: CV DIAGNOSTICS | Facility: HOSPITAL | Age: 86
End: 2021-09-03
Attending: INTERNAL MEDICINE
Payer: MEDICARE

## 2021-09-03 VITALS
SYSTOLIC BLOOD PRESSURE: 121 MMHG | OXYGEN SATURATION: 100 % | DIASTOLIC BLOOD PRESSURE: 59 MMHG | BODY MASS INDEX: 31 KG/M2 | WEIGHT: 160 LBS | TEMPERATURE: 98 F | HEART RATE: 94 BPM | RESPIRATION RATE: 17 BRPM

## 2021-09-03 LAB
ANION GAP SERPL CALC-SCNC: 6 MMOL/L (ref 0–18)
ATRIAL RATE: 86 BPM
BASOPHILS # BLD AUTO: 0.08 X10(3) UL (ref 0–0.2)
BASOPHILS NFR BLD AUTO: 1.3 %
BUN BLD-MCNC: 11 MG/DL (ref 7–18)
CALCIUM BLD-MCNC: 9.2 MG/DL (ref 8.5–10.1)
CHLORIDE SERPL-SCNC: 113 MMOL/L (ref 98–112)
CHOLEST SMN-MCNC: 207 MG/DL (ref ?–200)
CO2 SERPL-SCNC: 24 MMOL/L (ref 21–32)
CREAT BLD-MCNC: 0.57 MG/DL
EOSINOPHIL # BLD AUTO: 0.25 X10(3) UL (ref 0–0.7)
EOSINOPHIL NFR BLD AUTO: 3.9 %
ERYTHROCYTE [DISTWIDTH] IN BLOOD BY AUTOMATED COUNT: 15.3 %
GLUCOSE BLD-MCNC: 88 MG/DL (ref 70–99)
HCT VFR BLD AUTO: 36.7 %
HDLC SERPL-MCNC: 44 MG/DL (ref 40–59)
HGB BLD-MCNC: 12.1 G/DL
IMM GRANULOCYTES # BLD AUTO: 0.02 X10(3) UL (ref 0–1)
IMM GRANULOCYTES NFR BLD: 0.3 %
LDLC SERPL CALC-MCNC: 136 MG/DL (ref ?–100)
LYMPHOCYTES # BLD AUTO: 3.1 X10(3) UL (ref 1–4)
LYMPHOCYTES NFR BLD AUTO: 48.6 %
MCH RBC QN AUTO: 28.3 PG (ref 26–34)
MCHC RBC AUTO-ENTMCNC: 33 G/DL (ref 31–37)
MCV RBC AUTO: 85.9 FL
MONOCYTES # BLD AUTO: 0.71 X10(3) UL (ref 0.1–1)
MONOCYTES NFR BLD AUTO: 11.1 %
NEUTROPHILS # BLD AUTO: 2.22 X10 (3) UL (ref 1.5–7.7)
NEUTROPHILS # BLD AUTO: 2.22 X10(3) UL (ref 1.5–7.7)
NEUTROPHILS NFR BLD AUTO: 34.8 %
NONHDLC SERPL-MCNC: 163 MG/DL (ref ?–130)
OSMOLALITY SERPL CALC.SUM OF ELEC: 295 MOSM/KG (ref 275–295)
P AXIS: 54 DEGREES
P-R INTERVAL: 164 MS
PLATELET # BLD AUTO: 235 10(3)UL (ref 150–450)
POTASSIUM SERPL-SCNC: 3.6 MMOL/L (ref 3.5–5.1)
Q-T INTERVAL: 410 MS
QRS DURATION: 126 MS
QTC CALCULATION (BEZET): 490 MS
R AXIS: -34 DEGREES
RBC # BLD AUTO: 4.27 X10(6)UL
SODIUM SERPL-SCNC: 143 MMOL/L (ref 136–145)
T AXIS: 111 DEGREES
TRIGL SERPL-MCNC: 152 MG/DL (ref 30–149)
VENTRICULAR RATE: 86 BPM
VLDLC SERPL CALC-MCNC: 28 MG/DL (ref 0–30)
WBC # BLD AUTO: 6.4 X10(3) UL (ref 4–11)

## 2021-09-03 PROCEDURE — 93306 TTE W/DOPPLER COMPLETE: CPT | Performed by: INTERNAL MEDICINE

## 2021-09-03 PROCEDURE — 99217 OBSERVATION CARE DISCHARGE: CPT | Performed by: HOSPITALIST

## 2021-09-03 RX ORDER — POTASSIUM CHLORIDE 20 MEQ/1
40 TABLET, EXTENDED RELEASE ORAL EVERY 4 HOURS
Status: DISCONTINUED | OUTPATIENT
Start: 2021-09-03 | End: 2021-09-03

## 2021-09-03 RX ORDER — FUROSEMIDE 20 MG/1
20 TABLET ORAL DAILY PRN
Qty: 30 TABLET | Refills: 0 | Status: SHIPPED | OUTPATIENT
Start: 2021-09-03 | End: 2021-09-17

## 2021-09-03 NOTE — ED QUICK NOTES
Orders for admission, patient is aware of plan and ready to go upstairs. Any questions, please call ED MILA Bledsoe  at extension [phone].      Vaccinated? no  Type of COVID test sent:rapid  COVID Suspicion level:low Low/High      Titratable drug(s) infusing:  Rat

## 2021-09-03 NOTE — PLAN OF CARE
NURSING DISCHARGE NOTE    Discharged Home via Wheelchair. Accompanied by Support staff  Belongings Taken by patient/family. Pt. given discharge instructions and she verbalizes understanding of the need to follow up with her doctors.  Also, discussed new

## 2021-09-03 NOTE — PLAN OF CARE
Pt. Is alert and oriented times four. Lungs clear on auscultation. Lower extremities with 1 to 2 plus edema noted. Pt. Is sinus rhythm with BBB on monitor. Pt. Has no c/o pain at present. Plan for echo today.  Left great toe is slightl red but seems to be a

## 2021-09-03 NOTE — RESPIRATORY THERAPY NOTE
Patient seen for a Respiratory Care Evaluation for RADHA. Patient states that she currently does not wear a cpap at home. She was suppose to have a sleep study done, but hasn't followed up with doing so. Patient on RADHA protocol.

## 2021-09-03 NOTE — PLAN OF CARE
Problem: Patient/Family Goals  Goal: Patient/Family Long Term Goal  Description: Patient's Long Term Goal: to be discharged    Interventions:  - prepare for follow up   - See additional Care Plan goals for specific interventions  Outcome: Adequate for Bernard Horne

## 2021-09-03 NOTE — PLAN OF CARE
Received pt from previous RN- pt alert x 3- vital signs stable- tele NSR- Lung sound diminished- sat room air 94%- pt denies any pain- non- pitting edema to bilateral lower ext-   - plan of care explained to pt  - labs / echo in am  -consult cardiology  -p

## 2021-09-03 NOTE — CONSULTS
[facility]    Report of Consultation    Germaine Ellington Patient Status:  Observation    1934 MRN [id number]   [facility] 8NE-A Attending Jadiel Bunch MD   Hosp Day # 0 PCP Leelee Mccarthy MD     Date of Admission:   cholesterol    • Hip pain    • History of blood transfusion AT AGE 39    AFTER HYST   • History of depression    • Hoarseness, chronic    • HTN (hypertension)    • Leg swelling    • Loss of appetite    • Osteoarthrosis, unspecified whether generalized or l Surgeon: Epi Amos MD;  Location: [facility] FOR PAIN MANAGEMENT   • ALCIRA LOCALIZATION WIRE 1 SITE LEFT (CPT=19281)  1980'S    Benign   • OTHER SURGICAL HISTORY  8/7/12    Diag cardiac stent, multi-link mini vision 2mmx 12mm.     • PATIENT DOCUMENTED that she quit smoking about 49 years ago. Her smoking use included cigarettes. She has a 0.90 pack-year smoking history. She has never used smokeless tobacco. She reports that she does not drink alcohol and does not use drugs.     Allergies:    Deangelo Kind BREATH  Shellfish-Derived P*        Comment:Sob  Tramadol                    Comment:Arms turned red like a sunburn  Vancomycin              RASH    Comment:Rash to the face and neck  Warfarin                OTHER (SEE COMMENTS)    Comment:Asthma attack  W sign of infection in her left great toe  Neurologic: Alert and oriented  Skin: Warm and dry.      Laboratories and Data:  Diagnostics:  EKG: New LBBB  Echo: pending  Stress Test:   Cath:   CTA Chest:   CXR: as above    Labs:   Lab Results   Component Value testing required at present    May use lasix 20 mg daily on prn basis for bothersome edema    Plan to follow up in office with Dr. Bella Brown    May discharge from our perspective.     Sagar Jennings MD  9/3/2021  11:16 AM

## 2021-09-03 NOTE — PROGRESS NOTES
Patient admitted from Er. Patient laying in bed, denies CP, sOB and dizziness. Patient AOx4; NSR with BBB on cardiac monitor; BLE edema noted. Call light with in reach, bed in low position.

## 2021-09-03 NOTE — PROGRESS NOTES
[facility]  Progress Note     Shelby Chapman Patient Status:  Observation    1934 MRN [id number]   [facility] 8NE-A Attending Guanako Rodriguez MD   Hosp Day # 0 PCP Nahid Ely MD     Chief Complaint: LAY   S:  Mild Labs   Lab 09/02/21  1516 09/02/21  1719   TROP <0.045 <0.045   PBNP 133  --        Creatinine Kinase  No results for input(s): CK in the last 168 hours.     Inflammatory Markers  Recent Labs   Lab 09/02/21  1719   DDIMER 1.04*      Imaging: Imaging data re

## 2021-09-04 DIAGNOSIS — I10 HYPERTENSION, BENIGN: ICD-10-CM

## 2021-09-04 NOTE — DISCHARGE SUMMARY
[facility]OSPITAL[facility]  DISCHARGE SUMMARY     Salomon Sainz Patient Status:  Observation    1934 MRN [id number]   [facility] 8NE-A Attending No att. providers found   Hosp Day # 0 PCP Lord Niya MD     Date of Admission:  artery disease and stent placed in 2012 in [place] but does not take any aspirin or antiplatelet due to allergy. She complains of a chest discomfort but cannot further elaborate and this also started today. No orthopnea or PND.     Brief Synopsis: Her toe known as: CLARITIN      Take 10 mg by mouth daily. Refills: 0     NON FORMULARY      Vitamin B complex with Vit B-12 one daily   Refills: 0     PROBIOTIC-10 OR      Take 1 tablet by mouth daily.    Refills: 0     Verapamil HCl  MG Cp24      Take 240 Detail Level: Zone 32M o/w healthy presents with SOB. Reports similar episodes ongoing intermittently for the last year. States every time he goes out in the cold he feels SOB, which usually improves when he goes inside. Seen by his PCP and was given medications for allergies without significant improvement. Over the last day with continued SOB even when inside and chest pressure. No leg pain or swelling, no cough or fever. On exam well appearing, nad, mmm, reg tachycardia, lungs clear, abd soft NT/ND, 2+ pulses, no edema, no rash, alert, speech clear. Plan for CXR, EKG, dimer given tachycardia.

## 2021-09-07 RX ORDER — VERAPAMIL HYDROCHLORIDE 240 MG/1
TABLET, FILM COATED, EXTENDED RELEASE ORAL
Qty: 90 TABLET | Refills: 0 | Status: SHIPPED | OUTPATIENT
Start: 2021-09-07 | End: 2021-12-06

## 2021-09-08 ENCOUNTER — PATIENT OUTREACH (OUTPATIENT)
Dept: CASE MANAGEMENT | Age: 86
End: 2021-09-08

## 2021-09-08 DIAGNOSIS — Z02.9 ENCOUNTERS FOR UNSPECIFIED ADMINISTRATIVE PURPOSE: ICD-10-CM

## 2021-09-08 PROCEDURE — 1111F DSCHRG MED/CURRENT MED MERGE: CPT

## 2021-09-08 NOTE — PROGRESS NOTES
Initial Post Discharge Follow Up   Discharge Date: 9/3/21  Contact Date: 9/8/2021    Consent Verification:  Assessment Completed With: patient  HIPAA Verified? yes    Discharge Dx:     1.  Shortness of breath, chest discomfort, new LBBB in patient from MG) BY MOUTH DAILY 90 tablet 0   • furosemide 20 MG Oral Tab Take 1 tablet (20 mg total) by mouth daily as needed (swelling). 30 tablet 0   • ALPRAZolam 1 MG Oral Tab Take 0.5 mg by mouth nightly as needed.      • Levalbuterol Tartrate 45 MCG/ACT Inhalation medication as prescribed? no  Are you having any concerns with constipation?  no    Referrals/orders at D/C:  Home Health/Services ordered at D/C? no  DME ordered at D/C? no    SDOH:  Transportation Needs: No Transportation Needs      Lack of Transportation IM/ [facility])            [address]  [address], [address], [address] [place] [address] [address] IM/FM [facility]

## 2021-09-09 ENCOUNTER — OFFICE VISIT (OUTPATIENT)
Dept: INTERNAL MEDICINE CLINIC | Facility: CLINIC | Age: 86
End: 2021-09-09
Payer: MEDICARE

## 2021-09-09 VITALS
OXYGEN SATURATION: 95 % | DIASTOLIC BLOOD PRESSURE: 64 MMHG | HEIGHT: 60 IN | HEART RATE: 95 BPM | RESPIRATION RATE: 16 BRPM | TEMPERATURE: 98 F | BODY MASS INDEX: 32 KG/M2 | SYSTOLIC BLOOD PRESSURE: 118 MMHG | WEIGHT: 163 LBS

## 2021-09-09 DIAGNOSIS — R06.00 DYSPNEA ON EXERTION: ICD-10-CM

## 2021-09-09 DIAGNOSIS — R07.89 CHEST DISCOMFORT: Primary | ICD-10-CM

## 2021-09-09 DIAGNOSIS — L60.9 NAIL ABNORMALITY: ICD-10-CM
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
## 2021-09-09 PROCEDURE — 99214 OFFICE O/P EST MOD 30 MIN: CPT | Performed by: INTERNAL MEDICINE

## 2021-09-09 NOTE — PROGRESS NOTES
Lei Humphrey is a 80year old female.   Patient presents with:  Hospital F/U: [facility] 4; THE [facility] 9/2-9/3, chest pain  Fall: 9/9 AM, slipped getting out of bed, fell on buttocks, able to lift off ground, denies head trauma/LOC      HPI:     Patient her post lumbar spinal fusion     Pancreatic insufficiency     History of skin cancer     History of breast biopsy     Pure hypercholesterolemia     Post concussion syndrome     Nausea     Dehydration     Clostridium difficile colitis     Diarrhea, unspecified • Back pain    • Back problem     cervical pain   • Black stools    • Blood in the stool    • Blood transfusion reaction    • CAD (coronary artery disease)    • Calculus of kidney    • Cancer ([facility] [address] 2010    skin cancer in left arm   • Chest pain on exerti Heart Disease Father    • Heart Attack Father    • Heart Attack Paternal Grandfather    • Heart Disease Paternal Grandfather    • Arthritis Paternal Grandmother    • Allergies Paternal Grandmother    • Cancer Maternal Grandfather    • Heart Attack Maternal And R*    SHORTNESS OF BREATH  Mold                    SHORTNESS OF BREATH  Pcn [Penicillins]           Comment:Short of Breath  Penicillin G            RASH    Comment:sob  Plavix [Clopidogrel*        Comment:Redness/choking  Questran [Cholestyr*    HIVES injuries. LE edema- she can use 3 days of lasix 20mg daily with daily banana for 3 days. DVT was ruled out. Advised to elevate legs several times a day    No orders of the defined types were placed in this encounter.       Meds & Refills for this Visit:

## 2021-09-17 ENCOUNTER — PATIENT OUTREACH (OUTPATIENT)
Dept: CASE MANAGEMENT | Age: 86
End: 2021-09-17

## 2021-09-17 DIAGNOSIS — M17.11 PRIMARY OSTEOARTHRITIS OF RIGHT KNEE: ICD-10-CM

## 2021-09-17 DIAGNOSIS — M16.12 OSTEOARTHRITIS OF ONE HIP, LEFT: ICD-10-CM

## 2021-09-17 DIAGNOSIS — J45.40 MODERATE PERSISTENT ASTHMA WITHOUT COMPLICATION: ICD-10-CM

## 2021-09-17 DIAGNOSIS — I10 PRIMARY HYPERTENSION: ICD-10-CM

## 2021-09-17 DIAGNOSIS — K86.89 PANCREATIC INSUFFICIENCY: ICD-10-CM

## 2021-09-17 DIAGNOSIS — E78.00 PURE HYPERCHOLESTEROLEMIA: ICD-10-CM

## 2021-09-17 DIAGNOSIS — Z85.828 HISTORY OF SKIN CANCER: ICD-10-CM

## 2021-09-17 DIAGNOSIS — Z86.69 HX OF MIGRAINES: ICD-10-CM

## 2021-09-17 DIAGNOSIS — M47.816 OSTEOARTHRITIS OF SPINE WITHOUT MYELOPATHY OR RADICULOPATHY, LUMBAR REGION: ICD-10-CM

## 2021-09-17 DIAGNOSIS — M17.12 PRIMARY OSTEOARTHRITIS OF LEFT KNEE: ICD-10-CM

## 2021-09-17 DIAGNOSIS — E78.5 DYSLIPIDEMIA: ICD-10-CM

## 2021-09-17 RX ORDER — PREDNISONE 1 MG/1
5 TABLET ORAL DAILY
COMMUNITY
End: 2021-12-01

## 2021-09-17 NOTE — PROGRESS NOTES
9/17/2021  Spoke to Eliud Angeles for CCM.       Updates to patient care team/ comments: UTD  Patient reported changes in medications: UTD  Med Adherence  Comment: Pt started taking prednisone again per dr Holland Go Maintenance:     Pneumococcal Vaccine: 6 once that issue is resolved. Pt still takes 2-3 .25 xanax daily along with 1 half gram xanax at night for sleep.     PT did not have any other questions or concerns      Previous goal met: ongoing    Self Management Goals/Action Plan:  Future Appointments

## 2021-09-29 ENCOUNTER — OFFICE VISIT (OUTPATIENT)
Dept: ELECTROPHYSIOLOGY | Facility: HOSPITAL | Age: 86
End: 2021-09-29
Attending: PHYSICIAN ASSISTANT
Payer: MEDICARE

## 2021-09-29 DIAGNOSIS — M54.12 CERVICAL RADICULITIS: ICD-10-CM

## 2021-09-29 DIAGNOSIS — R20.0 ARM NUMBNESS: ICD-10-CM

## 2021-09-29 DIAGNOSIS — G56.01 CARPAL TUNNEL SYNDROME OF RIGHT WRIST: Primary | ICD-10-CM

## 2021-09-29 DIAGNOSIS — M48.02 CERVICAL STENOSIS OF SPINAL CANAL: ICD-10-CM

## 2021-09-29 PROCEDURE — 95910 NRV CNDJ TEST 7-8 STUDIES: CPT | Performed by: OTHER

## 2021-09-29 PROCEDURE — 95886 MUSC TEST DONE W/N TEST COMP: CPT | Performed by: OTHER

## 2021-09-30 PROCEDURE — 99490 CHRNC CARE MGMT STAFF 1ST 20: CPT

## 2021-09-30 NOTE — PROCEDURES
[address]      PATIENT'S NAME: Preethideannmaribel Nani   REFERRING PHYSICIAN: MEREDITH Rosales    PATIENT ACCOUNT #: [de-identified] LOCATION: [facility]   MEDICAL RECORD #: [id number] DATE OF BIRTH: [birth year]

## 2021-10-01 ENCOUNTER — TELEPHONE (OUTPATIENT)
Dept: CARDIOLOGY | Age: 86
End: 2021-10-01

## 2021-10-01 ENCOUNTER — LAB ENCOUNTER (OUTPATIENT)
Dept: LAB | Facility: HOSPITAL | Age: 86
End: 2021-10-01
Attending: INTERNAL MEDICINE
Payer: MEDICARE

## 2021-10-01 DIAGNOSIS — R60.0 LOCALIZED EDEMA: Primary | ICD-10-CM

## 2021-10-01 PROCEDURE — 36415 COLL VENOUS BLD VENIPUNCTURE: CPT

## 2021-10-01 PROCEDURE — 83880 ASSAY OF NATRIURETIC PEPTIDE: CPT

## 2021-10-01 PROCEDURE — 80048 BASIC METABOLIC PNL TOTAL CA: CPT

## 2021-10-06 ENCOUNTER — TELEPHONE (OUTPATIENT)
Dept: SURGERY | Facility: CLINIC | Age: 86
End: 2021-10-06

## 2021-10-06 NOTE — TELEPHONE ENCOUNTER
Patient states she completed her EMG as recommended by Altagracia MENDOZA and would like to know next steps. Please contact to advise.

## 2021-10-08 NOTE — TELEPHONE ENCOUNTER
She has severe carpal tunnel syndrome of the right hand which is likely causing her numbness worse at night. She can try carpal tunnel wrist braces at night which can be bought at any pharmacy. She can also make an appointment with Dr. Celeste Coelho to discuss surgery for this if she is interested.

## 2021-10-08 NOTE — TELEPHONE ENCOUNTER
Called pt and informed of Anjelica Hansen below note     Pt states she feels that her pain maybe coming from her neck and states she had completed a cervical MRI in Aug. Pt has many questions. Advised pt make a follow up to discuss EMG and MRI further. Pt hoping to receive injections verses surgery.      Pt scheduled for 10/19/21 at 3:15pm with REJI Hansen

## 2021-10-15 ENCOUNTER — MED REC SCAN ONLY (OUTPATIENT)
Dept: INTERNAL MEDICINE CLINIC | Facility: CLINIC | Age: 86
End: 2021-10-15

## 2021-10-21 ENCOUNTER — PATIENT OUTREACH (OUTPATIENT)
Dept: CASE MANAGEMENT | Age: 86
End: 2021-10-21

## 2021-10-21 DIAGNOSIS — M17.12 PRIMARY OSTEOARTHRITIS OF LEFT KNEE: ICD-10-CM

## 2021-10-21 DIAGNOSIS — M17.11 PRIMARY OSTEOARTHRITIS OF RIGHT KNEE: ICD-10-CM

## 2021-10-21 DIAGNOSIS — Z85.828 HISTORY OF SKIN CANCER: ICD-10-CM

## 2021-10-21 DIAGNOSIS — E78.5 DYSLIPIDEMIA: ICD-10-CM

## 2021-10-21 DIAGNOSIS — I10 PRIMARY HYPERTENSION: ICD-10-CM

## 2021-10-21 DIAGNOSIS — R19.7 DIARRHEA, UNSPECIFIED TYPE: ICD-10-CM

## 2021-10-21 DIAGNOSIS — J45.40 MODERATE PERSISTENT ASTHMA WITHOUT COMPLICATION: ICD-10-CM

## 2021-10-21 DIAGNOSIS — M47.816 OSTEOARTHRITIS OF SPINE WITHOUT MYELOPATHY OR RADICULOPATHY, LUMBAR REGION: ICD-10-CM

## 2021-10-21 DIAGNOSIS — K86.89 PANCREATIC INSUFFICIENCY: ICD-10-CM

## 2021-10-21 DIAGNOSIS — M16.12 OSTEOARTHRITIS OF ONE HIP, LEFT: ICD-10-CM

## 2021-10-21 DIAGNOSIS — M81.0 AGE-RELATED OSTEOPOROSIS WITHOUT CURRENT PATHOLOGICAL FRACTURE: ICD-10-CM

## 2021-10-21 DIAGNOSIS — R13.19 ESOPHAGEAL DYSPHAGIA: ICD-10-CM

## 2021-10-21 DIAGNOSIS — E78.00 PURE HYPERCHOLESTEROLEMIA: ICD-10-CM

## 2021-10-21 NOTE — PROGRESS NOTES
10/21/2021  Spoke to Eliud Angeles for CCM.       Updates to patient care team/ comments: UTD   Patient reported changes in medications: UTD  Med Adherence  Comment: PT taking medications as prescribed     Health Maintenance:     Pneumococcal Vaccine: 65+ Ramon and she is not convinced that she can tolerate them. Pt will discuss with pcp if she recommends continuing with 2nd and 3rd covid vaccines. Pt had adverse reaction to the first vaccine. Pt does not get the flu shot. Pt had one years ago and got sick.

## 2021-10-22 ENCOUNTER — OFFICE VISIT (OUTPATIENT)
Dept: INTERNAL MEDICINE CLINIC | Facility: CLINIC | Age: 86
End: 2021-10-22
Payer: MEDICARE

## 2021-10-22 VITALS
HEIGHT: 60.04 IN | OXYGEN SATURATION: 98 % | DIASTOLIC BLOOD PRESSURE: 68 MMHG | SYSTOLIC BLOOD PRESSURE: 130 MMHG | TEMPERATURE: 97 F | HEART RATE: 82 BPM | BODY MASS INDEX: 31.39 KG/M2 | WEIGHT: 162 LBS | RESPIRATION RATE: 16 BRPM

## 2021-10-22 DIAGNOSIS — I10 PRIMARY HYPERTENSION: ICD-10-CM

## 2021-10-22 DIAGNOSIS — M51.26 LUMBAR DISC HERNIATION: ICD-10-CM

## 2021-10-22 DIAGNOSIS — Z00.00 ENCOUNTER FOR ANNUAL HEALTH EXAMINATION: Primary | ICD-10-CM

## 2021-10-22 DIAGNOSIS — M81.0 SENILE OSTEOPOROSIS: ICD-10-CM

## 2021-10-22 DIAGNOSIS — J45.40 MODERATE PERSISTENT ASTHMA WITHOUT COMPLICATION: ICD-10-CM

## 2021-10-22 DIAGNOSIS — G56.01 CARPAL TUNNEL SYNDROME OF RIGHT WRIST: ICD-10-CM

## 2021-10-22 DIAGNOSIS — J42 CHRONIC BRONCHITIS, UNSPECIFIED CHRONIC BRONCHITIS TYPE (HCC): ICD-10-CM

## 2021-10-22 DIAGNOSIS — K58.0 IRRITABLE BOWEL SYNDROME WITH DIARRHEA: ICD-10-CM

## 2021-10-22 DIAGNOSIS — I25.10 CORONARY ARTERY DISEASE INVOLVING NATIVE CORONARY ARTERY OF NATIVE HEART WITHOUT ANGINA PECTORIS: ICD-10-CM

## 2021-10-22 PROBLEM — R13.19 ESOPHAGEAL DYSPHAGIA: Status: RESOLVED | Noted: 2018-08-20 | Resolved: 2021-10-22

## 2021-10-22 PROBLEM — E86.0 DEHYDRATION: Status: RESOLVED | Noted: 2020-02-17 | Resolved: 2021-10-22

## 2021-10-22 PROBLEM — S22.000A THORACIC COMPRESSION FRACTURE (HCC): Status: RESOLVED | Noted: 2017-03-31 | Resolved: 2021-10-22

## 2021-10-22 PROBLEM — A04.72 CLOSTRIDIUM DIFFICILE COLITIS: Status: RESOLVED | Noted: 2020-02-17 | Resolved: 2021-10-22

## 2021-10-22 PROBLEM — R19.7 DIARRHEA, UNSPECIFIED TYPE: Status: RESOLVED | Noted: 2020-02-17 | Resolved: 2021-10-22

## 2021-10-22 PROBLEM — K92.1 HEMATOCHEZIA: Status: RESOLVED | Noted: 2020-07-25 | Resolved: 2021-10-22

## 2021-10-22 PROBLEM — F07.81 POST CONCUSSION SYNDROME: Status: RESOLVED | Noted: 2019-12-16 | Resolved: 2021-10-22

## 2021-10-22 PROBLEM — R07.9 CHEST PAIN OF UNCERTAIN ETIOLOGY: Status: RESOLVED | Noted: 2021-09-02 | Resolved: 2021-10-22

## 2021-10-22 PROBLEM — Z98.890 HISTORY OF BREAST BIOPSY: Status: RESOLVED | Noted: 2019-09-13 | Resolved: 2021-10-22

## 2021-10-22 PROCEDURE — G0439 PPPS, SUBSEQ VISIT: HCPCS | Performed by: INTERNAL MEDICINE

## 2021-10-22 NOTE — PATIENT INSTRUCTIONS
Santiago Vallecillo's SCREENING SCHEDULE   Tests on this list are recommended by your physician but may not be covered, or covered at this frequency, by your insurer. Please check with your insurance carrier before scheduling to verify coverage.    MN results found for this or any previous visit. No recommendations at this time   Pap and Pelvic    Pap   Covered every 2 years for women at normal risk;  Annually if at high risk -  No recommendations at this time    Chlamydia Annually if high risk 05/1 1201 [facility] regarding Advance Directives.

## 2021-10-22 NOTE — PROGRESS NOTES
HPI:   Les Pendleton is a 80year old female who presents for a Medicare Subsequent Annual Wellness visit (Pt already had Initial Annual Wellness).     Encounter for annual health examination- discussed covid 19 vaccine and encouraged to get the s listed below:  She has Dressing and/or Bathing issues based on screening of functional status. Difficulty dressing or bathing?: Y  No data recorded  No data recorded      She has Driving difficulties based on screening of functional status.        She has Alexander Marin ()  Umesh Ortizma as Psychiatrist/APN (Physician Assistant)  Lori Holliday MD (PULMONARY DISEASES)  Sher Gann, [address] ([facility] 8004)  Hema Anne Payor (OPHTHALMOLOGY)  Adryan Degroot as [facility] Care Manager    Patient Act flonase [fluticasone], iodine solution [povidone iodine], levofloxacin, lisinopril, macrobid [nitrofurantoin], nsaids, other, pulmicort, radiology contrast iodinated dyes, xylocaine [lidocaine], bactroban [mupirocin], advair diskus [fluticasone-salmeterol] state, unspecified, Arthritis, Asthma, Atherosclerosis of coronary artery, Back pain, Back problem, Black stools, Blood in the stool, Blood transfusion reaction, CAD (coronary artery disease), Calculus of kidney, [facility] ([facility]), Chest pain on exertion patient documented not to have experienced any of the following events (N/A, 8/17/2015); daniel localization wire 1 site left (cpt=19281) (1980'S); appendectomy; total abdom hysterectomy; tonsillectomy; egd; stent, coated/cov w/del sys; angioplasty (coronary) {Hearing finger rub wnl     Visual Acuity grossly wnl with glasses                           General Appearance:  Alert, cooperative, no distress, appears stated age   Head:  Normocephalic, without obvious abnormality, atraumatic   Eyes:  PERRL, conjunct doing better, follows with Dr. Rebecca Garcia.  On Flovent and albuterol  Primary hypertension- controlled, CPM  Senile osteoporosis, h/o compression fracture-  takes vit d supp and gets calcium thru food, declined rxn due to her allergies (35 allergies)  Coronary a Cardiovascular Disease Screening    Lipid Panel  Cholesterol  Lipoprotein (HDL)  Triglycerides Covered every 5 years for all Medicare beneficiaries without apparent signs or symptoms of cardiovascular disease Lab Results   Component Value Date    [facility] ([id number] & [id number]) covered once after 65 Prevnar 13: -    Oldccskmd76: -     Pneumococcal Vaccination(1 of 2 - PPSV23) Never done    Hepatitis B One screening covered for patients with certain risk factors   -  No recommendations at this time    Tet

## 2021-10-25 ENCOUNTER — TELEPHONE (OUTPATIENT)
Dept: INTERNAL MEDICINE CLINIC | Facility: CLINIC | Age: 86
End: 2021-10-25

## 2021-10-25 NOTE — TELEPHONE ENCOUNTER
Pt advised that she did not see TB Lumbar disherniation. Stated that is not what she talked about with TB. Pt added they had sent her to see Cem Bar and had an MRI on the neck. Pt stated she has to follow up with Dr Kiel Emerson for injections. Pt wanted to advise.  TAHIR

## 2021-10-29 ENCOUNTER — OFFICE VISIT (OUTPATIENT)
Dept: SURGERY | Facility: CLINIC | Age: 86
End: 2021-10-29
Payer: MEDICARE

## 2021-10-29 VITALS
SYSTOLIC BLOOD PRESSURE: 124 MMHG | WEIGHT: 162 LBS | BODY MASS INDEX: 31.8 KG/M2 | HEIGHT: 60 IN | DIASTOLIC BLOOD PRESSURE: 64 MMHG

## 2021-10-29 DIAGNOSIS — M54.12 CERVICAL RADICULITIS: ICD-10-CM

## 2021-10-29 DIAGNOSIS — M54.2 CERVICALGIA: Primary | ICD-10-CM

## 2021-10-29 DIAGNOSIS — G56.01 CARPAL TUNNEL SYNDROME OF RIGHT WRIST: ICD-10-CM

## 2021-10-29 PROCEDURE — 99213 OFFICE O/P EST LOW 20 MIN: CPT | Performed by: NURSE PRACTITIONER

## 2021-10-29 NOTE — PROGRESS NOTES
[facility] Outpatient Neurological Surgery Follow Up  Pat Mistry  :   10/29/2021  PCP: Danial Wiseman MD    REASON FOR VISIT:  Neck pain, arm numbness    HISTORY OF PRESENT ILLNESS:  Pat Mistry is \"memo\"   • Anxiety state, unspecified    • Arthritis    • Asthma    • Atherosclerosis of coronary artery    • Back pain    • Back problem     cervical pain   • Black stools    • Blood in the stool    • Blood transfusion reaction    • CAD (coronary arter cold and hot snare polypectomy with saline lift and clip placement x3;  Surgeon: Maria Elena Sherman MD;  Location: [address] [place] [address] ENDOSCOPY   • EGD     • EXCIS LUMBAR DISK,ONE LEVEL     • FLUOR GID & Jewelene Juancarlos NDL/CATH SPI DX/THER Kenji Torres 84  9/23/2013    Procedure: CERVICAL EP ENDOSCOPY,EXAM         SOCIAL HISTORY:   reports that she quit smoking about 49 years ago. Her smoking use included cigarettes. She has a 0.90 pack-year smoking history.  She has never used smokeless tobacco. She reports that she does not drink alcohol and [Risperid*    SHORTNESS OF BREATH  Shellfish-Derived P*        Comment:Sob  Tramadol                    Comment:Arms turned red like a sunburn  Vancomycin              RASH    Comment:Rash to the face and neck  Warfarin                OTHER (SEE COMMENTS) acute distress  HEENT:  Normocephalic, atraumatic. NEUROLOGICAL:  This patient is alert and orientated x 3. Speech fluent. Able to follow simple commands. CN II - XII intact. AGUERO x 4.   Gait non-antalgic with rolling walker       IMAGING:  MRI C spine r of education / counseling: Pathology, treatment options, and expected outcomes    JAZZMINE Vela  10/29/2021, 2:04 PM

## 2021-10-29 NOTE — PATIENT INSTRUCTIONS
Refill policies:    • Allow 2-3 business days for refills; controlled substances may take longer.   • Contact your pharmacy at least 5 days prior to running out of medication and have them send an electronic request or submit request through the [facility] Depending on your insurance carrier, approval may take 3-10 days. It is highly recommended patients contact their insurance carrier directly to determine coverage.   If test is done without insurance authorization, patient may be responsible for the entire

## 2021-10-29 NOTE — PROGRESS NOTES
Still having pain  Wearing back brace    No new pain and pain isn't any worse  Thinks neck might be a little better

## 2021-10-31 PROCEDURE — 99490 CHRNC CARE MGMT STAFF 1ST 20: CPT

## 2021-11-09 ENCOUNTER — LAB ENCOUNTER (OUTPATIENT)
Dept: LAB | Facility: HOSPITAL | Age: 86
End: 2021-11-09
Attending: INTERNAL MEDICINE
Payer: MEDICARE

## 2021-11-09 DIAGNOSIS — R19.7 DIARRHEA, UNSPECIFIED TYPE: ICD-10-CM

## 2021-11-09 PROCEDURE — 87493 C DIFF AMPLIFIED PROBE: CPT

## 2021-11-15 ENCOUNTER — OFFICE VISIT (OUTPATIENT)
Dept: RHEUMATOLOGY | Facility: CLINIC | Age: 86
End: 2021-11-15
Payer: MEDICARE

## 2021-11-15 ENCOUNTER — TELEPHONE (OUTPATIENT)
Dept: INTERNAL MEDICINE CLINIC | Facility: CLINIC | Age: 86
End: 2021-11-15

## 2021-11-15 VITALS
BODY MASS INDEX: 32 KG/M2 | HEIGHT: 60 IN | WEIGHT: 163 LBS | OXYGEN SATURATION: 94 % | DIASTOLIC BLOOD PRESSURE: 60 MMHG | TEMPERATURE: 97 F | HEART RATE: 71 BPM | SYSTOLIC BLOOD PRESSURE: 110 MMHG

## 2021-11-15 DIAGNOSIS — R19.7 DIARRHEA, UNSPECIFIED TYPE: Primary | ICD-10-CM

## 2021-11-15 DIAGNOSIS — Z12.31 ENCOUNTER FOR SCREENING MAMMOGRAM FOR MALIGNANT NEOPLASM OF BREAST: Primary | ICD-10-CM

## 2021-11-15 DIAGNOSIS — K58.0 IRRITABLE BOWEL SYNDROME WITH DIARRHEA: ICD-10-CM

## 2021-11-15 DIAGNOSIS — Z88.6 ALLERGY TO NONSTEROIDAL ANTI-INFLAMMATORY DRUG (NSAID): ICD-10-CM

## 2021-11-15 DIAGNOSIS — J45.40 MODERATE PERSISTENT ASTHMA WITHOUT COMPLICATION: ICD-10-CM

## 2021-11-15 DIAGNOSIS — Z98.1 STATUS POST LUMBAR SPINAL FUSION: ICD-10-CM

## 2021-11-15 PROCEDURE — 99214 OFFICE O/P EST MOD 30 MIN: CPT | Performed by: INTERNAL MEDICINE

## 2021-11-15 RX ORDER — PREDNISONE 2.5 MG
2.5 TABLET ORAL DAILY
Qty: 90 TABLET | Refills: 1 | Status: SHIPPED | OUTPATIENT
Start: 2021-11-15 | End: 2022-01-12 | Stop reason: DRUGHIGH

## 2021-11-15 NOTE — PROGRESS NOTES
[facility]  Dr. Shahid Coy Progress Note     Subjective:   Tierney Carolina is a(n) 80year old female. Current complaints: Patient presents with:   Follow - Up: LOV 8-; reports doing badly since LOV, \"one thing after another\"; dark stools day for breathing. Xanax as needed for stress. ES Tylenol 500 mg as needed up to 4 per day. Knees are arthritic. Return to office 6 months.         Tash Helms MD 10/97/8924 4:45 PM

## 2021-11-15 NOTE — PATIENT INSTRUCTIONS
History of C. Diff. Now loose stool after eating under cooked chicken. Stool culture. Stool for WBC. Recent Stool for C. Diff by PCR negative 11/9/21  Trial of high fiber diet - apples, oatmeal, broccoli, spinach.  Stawberries, whole grains, to cause t

## 2021-11-16 NOTE — TELEPHONE ENCOUNTER
Looks like last mammogram was completed on 6/7/2018. US left breast was also done on 6/7/2018. Order pended if agreeable.  LOV 10/22/21 with TB.
Mammogram ordered, thx
PSR:  Please call pt to let her know the mammogram has been ordered. Please explain that Dr Manjit Arango MD is not on the same software and can only be scanned into her chart and cannot be viewed in her chart. Thank you.
Pt called asking if she needs to have a mammogram? Also she stated she had colonoscopy with Dr. Stevan Leung in June but does not show in [place]
TC to patient, she is aware mammogram has been ordered. Pt understands Dr. Mick Brown is not on the same computer system as our office.
pain

## 2021-11-16 NOTE — PROGRESS NOTES
Date: 11/15/2021    To: Marichuy Lewis  :      I hope this letter finds you doing well. I am writing to inform you of the following:        The results of your recent stool test was normal.         Please call the office at [phone]

## 2021-11-20 ENCOUNTER — LAB ENCOUNTER (OUTPATIENT)
Dept: LAB | Facility: HOSPITAL | Age: 86
End: 2021-11-20
Attending: INTERNAL MEDICINE
Payer: MEDICARE

## 2021-11-20 DIAGNOSIS — R19.7 DIARRHEA, UNSPECIFIED TYPE: ICD-10-CM

## 2021-11-20 PROCEDURE — 87077 CULTURE AEROBIC IDENTIFY: CPT

## 2021-11-20 PROCEDURE — 87045 FECES CULTURE AEROBIC BACT: CPT

## 2021-11-20 PROCEDURE — 87427 SHIGA-LIKE TOXIN AG IA: CPT

## 2021-11-20 PROCEDURE — 87046 STOOL CULTR AEROBIC BACT EA: CPT

## 2021-11-20 PROCEDURE — 89055 LEUKOCYTE ASSESSMENT FECAL: CPT

## 2021-11-23 ENCOUNTER — PATIENT OUTREACH (OUTPATIENT)
Dept: CASE MANAGEMENT | Age: 86
End: 2021-11-23

## 2021-11-23 DIAGNOSIS — E78.00 PURE HYPERCHOLESTEROLEMIA: ICD-10-CM

## 2021-11-23 DIAGNOSIS — K86.89 PANCREATIC INSUFFICIENCY: ICD-10-CM

## 2021-11-23 DIAGNOSIS — M16.12 OSTEOARTHRITIS OF ONE HIP, LEFT: ICD-10-CM

## 2021-11-23 DIAGNOSIS — Z86.69 HX OF MIGRAINES: ICD-10-CM

## 2021-11-23 DIAGNOSIS — Z85.828 HISTORY OF SKIN CANCER: ICD-10-CM

## 2021-11-23 DIAGNOSIS — M17.11 PRIMARY OSTEOARTHRITIS OF RIGHT KNEE: ICD-10-CM

## 2021-11-23 DIAGNOSIS — J45.40 MODERATE PERSISTENT ASTHMA WITHOUT COMPLICATION: ICD-10-CM

## 2021-11-23 DIAGNOSIS — J42 CHRONIC BRONCHITIS, UNSPECIFIED CHRONIC BRONCHITIS TYPE (HCC): ICD-10-CM

## 2021-11-23 DIAGNOSIS — E78.5 DYSLIPIDEMIA: ICD-10-CM

## 2021-11-23 DIAGNOSIS — I10 PRIMARY HYPERTENSION: ICD-10-CM

## 2021-11-23 DIAGNOSIS — G56.01 CARPAL TUNNEL SYNDROME OF RIGHT WRIST: ICD-10-CM

## 2021-11-23 DIAGNOSIS — M17.12 PRIMARY OSTEOARTHRITIS OF LEFT KNEE: ICD-10-CM

## 2021-11-23 DIAGNOSIS — M47.816 OSTEOARTHRITIS OF SPINE WITHOUT MYELOPATHY OR RADICULOPATHY, LUMBAR REGION: ICD-10-CM

## 2021-11-23 DIAGNOSIS — I25.10 CORONARY ARTERY DISEASE INVOLVING NATIVE CORONARY ARTERY OF NATIVE HEART WITHOUT ANGINA PECTORIS: ICD-10-CM

## 2021-11-23 NOTE — PROGRESS NOTES
11/23/2021  Spoke to Eliud Angeles for CCM.       Updates to patient care team/ comments: UTD  Patient reported changes in medications: UTD  Med Adherence  Comment: PT taking medications as prescribed     Health Maintenance:     Zoster Vaccines(1 of 2) Never done been formed.        Previous goal met: ongoing    Self Management Goals/Action Plan:  Future Appointments   Date Time Provider Jeremiah Lisa   1/14/2022  8:40 AM Allan Franco MD [facility] SUB GI   4/46/0930  3:54 PM Harpal Gardner MD [facility]

## 2021-11-27 ENCOUNTER — HOSPITAL ENCOUNTER (EMERGENCY)
Facility: HOSPITAL | Age: 86
Discharge: HOME | End: 2021-11-27
Attending: EMERGENCY MEDICINE
Payer: MEDICARE

## 2021-11-27 ENCOUNTER — APPOINTMENT (OUTPATIENT)
Dept: CT IMAGING | Facility: HOSPITAL | Age: 86
End: 2021-11-27
Attending: EMERGENCY MEDICINE
Payer: MEDICARE

## 2021-11-27 VITALS
SYSTOLIC BLOOD PRESSURE: 138 MMHG | WEIGHT: 158 LBS | BODY MASS INDEX: 31.02 KG/M2 | HEART RATE: 78 BPM | DIASTOLIC BLOOD PRESSURE: 64 MMHG | RESPIRATION RATE: 25 BRPM | OXYGEN SATURATION: 99 % | TEMPERATURE: 98 F | HEIGHT: 60 IN

## 2021-11-27 DIAGNOSIS — K52.9 CHRONIC DIARRHEA: Primary | ICD-10-CM

## 2021-11-27 PROCEDURE — 99284 EMERGENCY DEPT VISIT MOD MDM: CPT

## 2021-11-27 PROCEDURE — 96361 HYDRATE IV INFUSION ADD-ON: CPT

## 2021-11-27 PROCEDURE — 83690 ASSAY OF LIPASE: CPT | Performed by: EMERGENCY MEDICINE

## 2021-11-27 PROCEDURE — 85025 COMPLETE CBC W/AUTO DIFF WBC: CPT | Performed by: EMERGENCY MEDICINE

## 2021-11-27 PROCEDURE — 74176 CT ABD & PELVIS W/O CONTRAST: CPT | Performed by: EMERGENCY MEDICINE

## 2021-11-27 PROCEDURE — 81003 URINALYSIS AUTO W/O SCOPE: CPT | Performed by: EMERGENCY MEDICINE

## 2021-11-27 PROCEDURE — 96360 HYDRATION IV INFUSION INIT: CPT

## 2021-11-27 PROCEDURE — 85025 COMPLETE CBC W/AUTO DIFF WBC: CPT

## 2021-11-27 PROCEDURE — 80053 COMPREHEN METABOLIC PANEL: CPT

## 2021-11-27 PROCEDURE — 80053 COMPREHEN METABOLIC PANEL: CPT | Performed by: EMERGENCY MEDICINE

## 2021-11-27 PROCEDURE — 84484 ASSAY OF TROPONIN QUANT: CPT | Performed by: EMERGENCY MEDICINE

## 2021-11-27 RX ORDER — SODIUM CHLORIDE 9 MG/ML
125 INJECTION, SOLUTION INTRAVENOUS CONTINUOUS
Status: DISCONTINUED | OUTPATIENT
Start: 2021-11-27 | End: 2021-11-27

## 2021-11-27 NOTE — ED PROVIDER NOTES
Patient Seen in: [facility] Emergency Department      History   Patient presents with:  Nausea/Vomiting/Diarrhea    Stated Complaint: diarrhea \"for months\"    Subjective:   HPI    Patient has multiple medical issues.   Patient was just discharged f stool    • Blood transfusion reaction    • CAD (coronary artery disease)    • Calculus of kidney    • Cancer ([facility] 2010    skin cancer in left arm   • Chest pain on exertion    • Constipation    • Coronary atherosclerosis of unspecified type of vessel, kathia DX/THER Lauryn Moreira  9/23/2013    Procedure: CERVICAL EPIDURAL;  Surgeon: Kamron Ortiz MD;  [facility]: [facility]   • HEMORRHOIDECTOMY,INT/EXT,SIMPLE     • HYSTERECTOMY      complete   • INJECTION, W/WO CONTRAST, DX/THERAPEUTIC SUBSTANCE, EPI 1972        Years since quittin.8      Smokeless tobacco: Never Used    Vaping Use      Vaping Use: Never used    Alcohol use: Never      Alcohol/week: 0.0 standard drinks    Drug use:  No             Review of Systems    Positive for stated compl limits   LIPASE - Abnormal; Notable for the following components:    Lipase 62 (*)     All other components within normal limits   TROPONIN I - Normal   RAPID SARS-COV-2 BY PCR - Normal   CBC WITH DIFFERENTIAL WITH PLATELET    Narrative:      The following Covid. Patient treated with IV fluid and closely monitored    CBC shows normal white count, hemoglobin, and platelets with no left shift on differential  Metabolic panel shows normal electrolytes and creatinine.   LFTs normal.  Lipase normal  Urinalysis

## 2021-11-27 NOTE — ED INITIAL ASSESSMENT (HPI)
Pt has had diarrhea for three months (daily average 3 bouts despite immodium). Last night when she went to bed, she had left shoulder pain while watching TV, now resolved. History of heart disease. +Nausea, poor appetite. +Fatigue. Denies fevers.

## 2021-11-30 PROCEDURE — 99490 CHRNC CARE MGMT STAFF 1ST 20: CPT

## 2021-12-02 ENCOUNTER — OFFICE VISIT (OUTPATIENT)
Dept: INTERNAL MEDICINE CLINIC | Facility: CLINIC | Age: 86
End: 2021-12-02
Payer: MEDICARE

## 2021-12-02 ENCOUNTER — LAB ENCOUNTER (OUTPATIENT)
Dept: LAB | Age: 86
End: 2021-12-02
Attending: INTERNAL MEDICINE
Payer: MEDICARE

## 2021-12-02 VITALS
WEIGHT: 162.38 LBS | BODY MASS INDEX: 31.88 KG/M2 | TEMPERATURE: 97 F | OXYGEN SATURATION: 95 % | SYSTOLIC BLOOD PRESSURE: 112 MMHG | DIASTOLIC BLOOD PRESSURE: 62 MMHG | HEART RATE: 76 BPM | HEIGHT: 59.84 IN | RESPIRATION RATE: 18 BRPM

## 2021-12-02 DIAGNOSIS — E55.9 VITAMIN D DEFICIENCY: ICD-10-CM

## 2021-12-02 DIAGNOSIS — R19.7 DIARRHEA, UNSPECIFIED TYPE: Primary | ICD-10-CM

## 2021-12-02 DIAGNOSIS — R53.81 MALAISE AND FATIGUE: ICD-10-CM

## 2021-12-02 DIAGNOSIS — I25.10 CORONARY ARTERY DISEASE INVOLVING NATIVE CORONARY ARTERY OF NATIVE HEART WITHOUT ANGINA PECTORIS: ICD-10-CM

## 2021-12-02 DIAGNOSIS — R53.83 MALAISE AND FATIGUE: ICD-10-CM

## 2021-12-02 DIAGNOSIS — R19.7 DIARRHEA, UNSPECIFIED TYPE: ICD-10-CM

## 2021-12-02 PROCEDURE — 80048 BASIC METABOLIC PNL TOTAL CA: CPT

## 2021-12-02 PROCEDURE — 36415 COLL VENOUS BLD VENIPUNCTURE: CPT

## 2021-12-02 PROCEDURE — 1111F DSCHRG MED/CURRENT MED MERGE: CPT | Performed by: INTERNAL MEDICINE

## 2021-12-02 PROCEDURE — 99214 OFFICE O/P EST MOD 30 MIN: CPT | Performed by: INTERNAL MEDICINE

## 2021-12-02 PROCEDURE — 85025 COMPLETE CBC W/AUTO DIFF WBC: CPT

## 2021-12-02 PROCEDURE — 82306 VITAMIN D 25 HYDROXY: CPT

## 2021-12-02 PROCEDURE — 82607 VITAMIN B-12: CPT

## 2021-12-02 NOTE — PROGRESS NOTES
Makayla Alonzo is a 80year old female. Patient presents with:  Hospital F/U: ES rm - 3 Chronic diarrhea, fatigue      HPI:     Patient with IBS, pancreatic insufficiency, asthma, CAD, anxiety, h/o c dif here for ER follow up.  Went on 11/27 to ER d Outpatient Medications   Medication Sig Dispense Refill   • Pancrelipase, Lip-Prot-Amyl, (ZENPEP) 07258-824162 units Oral Cap DR Particles Take 1 capsule by mouth 3 (three) times daily with meals. Take 1 PO prior to snacks (Patient taking differently:  Take Chest pain on exertion    • Coronary atherosclerosis of unspecified type of vessel, native or graft    • Diarrhea, unspecified    • Dyslipidemia    • Easy bruising    • Extrinsic asthma, unspecified    • Fatigue    • Glucose intolerance (pre-diabetes)    • Grandmother    • Heart Attack Brother    • Other (Autoimmune disease) Brother    • Thyroid Disorder Sister    • Asthma Sister    • Cancer Brother    • Allergies Daughter    • Fibromyalgia Daughter    • Melanoma Daughter    • Other (Hepatitis C) Daughter Coughing  Risperdal [Risperid*    SHORTNESS OF BREATH  Shellfish-Derived P*        Comment:Sob  Tramadol                    Comment:Arms turned red like a sunburn  Vancomycin              RASH    Comment:Rash to the face and neck  Warfarin                O Visit:  Requested Prescriptions      No prescriptions requested or ordered in this encounter       Imaging & Consults:  None    Return in about 6 weeks (around 1/13/2022) for f/u on chronic issues .   There are no Patient Instructions on file for this visit

## 2021-12-03 DIAGNOSIS — I10 HYPERTENSION, BENIGN: ICD-10-CM

## 2021-12-06 RX ORDER — VERAPAMIL HYDROCHLORIDE 240 MG/1
TABLET, FILM COATED, EXTENDED RELEASE ORAL
Qty: 90 TABLET | Refills: 0 | Status: SHIPPED | OUTPATIENT
Start: 2021-12-06

## 2021-12-08 ENCOUNTER — APPOINTMENT (OUTPATIENT)
Dept: CT IMAGING | Facility: HOSPITAL | Age: 86
End: 2021-12-08
Attending: EMERGENCY MEDICINE
Payer: MEDICARE

## 2021-12-08 ENCOUNTER — HOSPITAL ENCOUNTER (EMERGENCY)
Facility: HOSPITAL | Age: 86
Discharge: HOME | End: 2021-12-08
Attending: EMERGENCY MEDICINE
Payer: MEDICARE

## 2021-12-08 VITALS
WEIGHT: 160 LBS | RESPIRATION RATE: 16 BRPM | OXYGEN SATURATION: 95 % | TEMPERATURE: 98 F | DIASTOLIC BLOOD PRESSURE: 69 MMHG | SYSTOLIC BLOOD PRESSURE: 156 MMHG | HEIGHT: 60 IN | BODY MASS INDEX: 31.41 KG/M2 | HEART RATE: 80 BPM

## 2021-12-08 DIAGNOSIS — K57.90 DIVERTICULOSIS: ICD-10-CM

## 2021-12-08 DIAGNOSIS — R19.7 DIARRHEA, UNSPECIFIED TYPE: Primary | ICD-10-CM

## 2021-12-08 DIAGNOSIS — N28.1 BILATERAL RENAL CYSTS: ICD-10-CM

## 2021-12-08 PROCEDURE — 99284 EMERGENCY DEPT VISIT MOD MDM: CPT

## 2021-12-08 PROCEDURE — 80053 COMPREHEN METABOLIC PANEL: CPT | Performed by: EMERGENCY MEDICINE

## 2021-12-08 PROCEDURE — 83690 ASSAY OF LIPASE: CPT | Performed by: EMERGENCY MEDICINE

## 2021-12-08 PROCEDURE — 96374 THER/PROPH/DIAG INJ IV PUSH: CPT

## 2021-12-08 PROCEDURE — 74176 CT ABD & PELVIS W/O CONTRAST: CPT | Performed by: EMERGENCY MEDICINE

## 2021-12-08 PROCEDURE — 99285 EMERGENCY DEPT VISIT HI MDM: CPT

## 2021-12-08 PROCEDURE — 93005 ELECTROCARDIOGRAM TRACING: CPT

## 2021-12-08 PROCEDURE — 96361 HYDRATE IV INFUSION ADD-ON: CPT

## 2021-12-08 PROCEDURE — 93010 ELECTROCARDIOGRAM REPORT: CPT

## 2021-12-08 PROCEDURE — 85025 COMPLETE CBC W/AUTO DIFF WBC: CPT | Performed by: EMERGENCY MEDICINE

## 2021-12-08 RX ORDER — ONDANSETRON 2 MG/ML
4 INJECTION INTRAMUSCULAR; INTRAVENOUS ONCE
Status: COMPLETED | OUTPATIENT
Start: 2021-12-08 | End: 2021-12-08

## 2021-12-08 NOTE — ED PROVIDER NOTES
Patient Seen in: [facility] Emergency Department      History   Patient presents with:  Nausea/Vomiting/Diarrhea    Stated Complaint: diarrhea    Subjective:   HPI    The patient is an 77-year-old female presenting to the emergency department [facility] appetite    • Nausea    • Osteoarthrosis, unspecified whether generalized or localized, unspecified site     knees, back   • Osteoporosis    • Other and unspecified hyperlipidemia    • Pain in joints    • Pinched nerve in neck    • Pneumonia, organism unsp 2mmx 12mm.     • PATIENT DOCUMENTED NOT TO HAVE EXPERIENCED ANY OF THE FOLLOWING EVENTS  9/23/2013    Procedure: CERVICAL EPIDURAL;  Surgeon: Aguilar Ocampo MD;  Location: [facility] FOR PAIN MANAGEMENT   • PATIENT DOCUMENTED NOT TO HAVE EXPERIENCED ANY OF Comfortable and well appearing. Alert and oriented in no distress. Neuro: No focal neurologic deficits. No facial droop or slurred speech. CN II-XII intact.  Grossly normal and symmetric motor strength and sensation proximally and distally throughout all 4 Please view results for these tests on the individual orders. URINALYSIS WITH CULTURE REFLEX   RAINBOW DRAW LAVENDER   RAINBOW DRAW LIGHT GREEN   CBC W/ DIFFERENTIAL     EKG    Rate, intervals and axes as noted on EKG Report.   Rate: 72  Rhythm: Sinus infiltration. No evidence of pancreatitis or focal pancreatic lesions. SPLEEN:  No enlargement or focal lesion.       KIDNEYS:  There are again no to be multiple bilateral renal cysts, the     largest of which involves the right inferior pole measur on 12/08/2021 at 1:40 PM         Finalized by (CST): Soha Bhatia DO on 12/08/2021 at 1:49 PM              Patient's rapid Covid test is negative. Blood work is reassuring.   CT of her abdomen and pelvis shows diverticulosis but no diverticulitis, bilater

## 2021-12-08 NOTE — ED INITIAL ASSESSMENT (HPI)
Pt states she ate a burger from [employer] on Friday and it wasn't very good. Pt states Sunday she started with diarrhea and thinks she has food poisoning.

## 2021-12-09 ENCOUNTER — TELEPHONE (OUTPATIENT)
Dept: INTERNAL MEDICINE CLINIC | Facility: CLINIC | Age: 86
End: 2021-12-09

## 2021-12-09 NOTE — TELEPHONE ENCOUNTER
I just saw her and she does follow with GI so if anything, GI follow up would be indicated.  OK to skip me

## 2021-12-09 NOTE — TELEPHONE ENCOUNTER
Spoke with pt daughter Shaggy Costa (ok per hippa). Informed her of TB recommendations. Shaggy Costa said that she will call GI and make an appointment with them. Appt with TB cancelled. Routing to TB for FYI.

## 2021-12-09 NOTE — TELEPHONE ENCOUNTER
Pts daughter benita (ok per hipaa) called and stated that pt has an ER F/U scheduled for   Future Appointments   Date Time Provider Jeremiah Gonzalez   12/13/2021  9:40 AM Mer Snow MD [facility] [address] [facility]     Pt was just seen by TB on 12/02/21 for diarrhea which is also the reason she went to the ER on 12/08/21    Jonh Anne is asking if the pt really needs this appt on 12/13/21. She will bring her if TB thinks its necessary but its very hard for someone to get time off so they can bring her.      Please advise

## 2021-12-29 ENCOUNTER — PATIENT OUTREACH (OUTPATIENT)
Dept: CASE MANAGEMENT | Age: 86
End: 2021-12-29

## 2021-12-29 DIAGNOSIS — M17.12 PRIMARY OSTEOARTHRITIS OF LEFT KNEE: ICD-10-CM

## 2021-12-29 DIAGNOSIS — Z85.828 HISTORY OF SKIN CANCER: ICD-10-CM

## 2021-12-29 DIAGNOSIS — M16.12 OSTEOARTHRITIS OF ONE HIP, LEFT: ICD-10-CM

## 2021-12-29 DIAGNOSIS — J45.40 MODERATE PERSISTENT ASTHMA WITHOUT COMPLICATION: ICD-10-CM

## 2021-12-29 DIAGNOSIS — K86.89 PANCREATIC INSUFFICIENCY: ICD-10-CM

## 2021-12-29 DIAGNOSIS — J42 CHRONIC BRONCHITIS, UNSPECIFIED CHRONIC BRONCHITIS TYPE (HCC): ICD-10-CM

## 2021-12-29 DIAGNOSIS — E78.5 DYSLIPIDEMIA: ICD-10-CM

## 2021-12-29 DIAGNOSIS — M17.11 PRIMARY OSTEOARTHRITIS OF RIGHT KNEE: ICD-10-CM

## 2021-12-29 DIAGNOSIS — M47.816 OSTEOARTHRITIS OF SPINE WITHOUT MYELOPATHY OR RADICULOPATHY, LUMBAR REGION: ICD-10-CM

## 2021-12-29 NOTE — PROGRESS NOTES
12/29/2021  Spoke to Eliud Angeles for CCM.       Updates to patient care team/ comments: UTD  Patient reported changes in medications: UTD  Med Adherence  Comment: Pt taking medications as prescribed     Health Maintenance:     Zoster Vaccines(1 of 2) Never done Staying healthy    • Patient reported progress toward goal: pt continues see providers as needed and takes medications as directed. Pt has been struggling with loose stools/diarrhea and is eating bland brat diet in response.  Pt is in contact with GI to de

## 2021-12-30 ENCOUNTER — APPOINTMENT (OUTPATIENT)
Dept: LAB | Facility: HOSPITAL | Age: 86
End: 2021-12-30
Attending: NURSE PRACTITIONER
Payer: MEDICARE

## 2021-12-30 DIAGNOSIS — R15.2 FECAL URGENCY: ICD-10-CM

## 2021-12-30 DIAGNOSIS — R19.7 DIARRHEA, UNSPECIFIED TYPE: ICD-10-CM

## 2021-12-30 PROCEDURE — 83993 ASSAY FOR CALPROTECTIN FECAL: CPT

## 2021-12-30 PROCEDURE — 87507 IADNA-DNA/RNA PROBE TQ 12-25: CPT | Performed by: EMERGENCY MEDICINE

## 2021-12-31 PROCEDURE — 99490 CHRNC CARE MGMT STAFF 1ST 20: CPT

## 2022-01-03 LAB — CALPROTECTIN, FECAL: 51 UG/G

## 2022-01-12 ENCOUNTER — TELEPHONE (OUTPATIENT)
Dept: RHEUMATOLOGY | Facility: CLINIC | Age: 87
End: 2022-01-12

## 2022-01-12 DIAGNOSIS — Z98.1 STATUS POST LUMBAR SPINAL FUSION: Primary | ICD-10-CM

## 2022-01-12 DIAGNOSIS — R19.7 DIARRHEA, UNSPECIFIED TYPE: ICD-10-CM

## 2022-01-12 RX ORDER — PREDNISONE 1 MG/1
5 TABLET ORAL DAILY
Qty: 90 TABLET | Refills: 1 | Status: SHIPPED | OUTPATIENT
Start: 2022-01-12

## 2022-01-12 NOTE — TELEPHONE ENCOUNTER
Pt was instructed to reduce prednisone dose from 5mg to 2.5mg. She has an increase of loose stools and pain recently. She felt that 5mg of prednisone was helping her GI issues and was hoping to go back to that dosage.

## 2022-01-12 NOTE — TELEPHONE ENCOUNTER
Having loose stool, not tolerating Prednisone   2.5 mg, it seems that Prednisone 5 mg per day helps her feel better. Will order Prednisone 5 mg per day. Chase Canela instructed to take lowest dose of Prednisone that helps her.   High doses of Prednisone can caus

## 2022-01-27 ENCOUNTER — PATIENT OUTREACH (OUTPATIENT)
Dept: CASE MANAGEMENT | Age: 87
End: 2022-01-27

## 2022-01-27 DIAGNOSIS — E78.5 DYSLIPIDEMIA: ICD-10-CM

## 2022-01-27 DIAGNOSIS — M17.12 PRIMARY OSTEOARTHRITIS OF LEFT KNEE: ICD-10-CM

## 2022-01-27 DIAGNOSIS — M16.12 OSTEOARTHRITIS OF ONE HIP, LEFT: ICD-10-CM

## 2022-01-27 DIAGNOSIS — E78.00 PURE HYPERCHOLESTEROLEMIA: ICD-10-CM

## 2022-01-27 DIAGNOSIS — M17.11 PRIMARY OSTEOARTHRITIS OF RIGHT KNEE: ICD-10-CM

## 2022-01-27 DIAGNOSIS — J42 CHRONIC BRONCHITIS, UNSPECIFIED CHRONIC BRONCHITIS TYPE (HCC): ICD-10-CM

## 2022-01-27 DIAGNOSIS — J45.40 MODERATE PERSISTENT ASTHMA WITHOUT COMPLICATION: ICD-10-CM

## 2022-01-27 DIAGNOSIS — Z85.828 HISTORY OF SKIN CANCER: ICD-10-CM

## 2022-01-27 DIAGNOSIS — M47.816 OSTEOARTHRITIS OF SPINE WITHOUT MYELOPATHY OR RADICULOPATHY, LUMBAR REGION: ICD-10-CM

## 2022-01-27 DIAGNOSIS — I25.10 CORONARY ARTERY DISEASE INVOLVING NATIVE CORONARY ARTERY OF NATIVE HEART WITHOUT ANGINA PECTORIS: ICD-10-CM

## 2022-01-27 DIAGNOSIS — I10 PRIMARY HYPERTENSION: ICD-10-CM

## 2022-01-27 NOTE — PROGRESS NOTES
1/27/2022  Spoke to Eliud Angeles for CCM.       Updates to patient care team/ comments: UTD  Patient reported changes in medications: UTD  Med Adherence  Comment: Pt taking medications as prescribed     Health Maintenance:     Zoster Vaccines(1 of 2) Never done last visit. Pt has to get assistance through [address] [place] [address] office every 3 months, but has enough samples to last if there is a gap in processing order.     Pt continues with prednisone 5   Pt did not have any new questions or concerns       Previous goal met: on

## 2022-01-27 NOTE — PROGRESS NOTES
Contacting patient to follow up on CCM for the month.  LMCB    Total time -  min  Total Monthly time-3  min

## 2022-01-31 PROCEDURE — 99490 CHRNC CARE MGMT STAFF 1ST 20: CPT

## 2022-02-04 ENCOUNTER — TELEPHONE (OUTPATIENT)
Dept: INTERNAL MEDICINE CLINIC | Facility: CLINIC | Age: 87
End: 2022-02-04

## 2022-02-04 NOTE — TELEPHONE ENCOUNTER
Pt stated she's still having frequent urination - no pain. Pt wants an appt but doesn't have a way to come today but is hoping she can get a ride for Monday. No spots with TB and has seen [facility] but he doesn't have a double slot.

## 2022-02-04 NOTE — TELEPHONE ENCOUNTER
Spoke to pt. Informed her urine culture was ordered. Pt stated understanding and said that she will get that done.

## 2022-02-10 ENCOUNTER — LAB ENCOUNTER (OUTPATIENT)
Dept: LAB | Facility: HOSPITAL | Age: 87
End: 2022-02-10
Attending: INTERNAL MEDICINE
Payer: MEDICARE

## 2022-02-10 DIAGNOSIS — R35.0 URINARY FREQUENCY: ICD-10-CM

## 2022-02-10 PROCEDURE — 87086 URINE CULTURE/COLONY COUNT: CPT

## 2022-02-11 ENCOUNTER — OFFICE VISIT (OUTPATIENT)
Dept: INTERNAL MEDICINE CLINIC | Facility: CLINIC | Age: 87
End: 2022-02-11
Payer: MEDICARE

## 2022-02-11 VITALS
SYSTOLIC BLOOD PRESSURE: 132 MMHG | BODY MASS INDEX: 31.39 KG/M2 | WEIGHT: 162 LBS | RESPIRATION RATE: 16 BRPM | HEIGHT: 60.24 IN | HEART RATE: 92 BPM | OXYGEN SATURATION: 95 % | TEMPERATURE: 98 F | DIASTOLIC BLOOD PRESSURE: 62 MMHG

## 2022-02-11 DIAGNOSIS — K58.0 IRRITABLE BOWEL SYNDROME WITH DIARRHEA: Primary | ICD-10-CM

## 2022-02-11 DIAGNOSIS — I10 PRIMARY HYPERTENSION: ICD-10-CM

## 2022-02-11 DIAGNOSIS — L72.9 BENIGN CYST OF SKIN: ICD-10-CM

## 2022-02-11 DIAGNOSIS — R35.0 URINARY FREQUENCY: ICD-10-CM

## 2022-02-11 PROCEDURE — 99214 OFFICE O/P EST MOD 30 MIN: CPT | Performed by: INTERNAL MEDICINE

## 2022-02-25 ENCOUNTER — PATIENT OUTREACH (OUTPATIENT)
Dept: CASE MANAGEMENT | Age: 87
End: 2022-02-25

## 2022-02-25 RX ORDER — VERAPAMIL HYDROCHLORIDE 240 MG/1
240 TABLET, FILM COATED, EXTENDED RELEASE ORAL DAILY
Qty: 90 TABLET | Refills: 0 | Status: SHIPPED | OUTPATIENT
Start: 2022-02-25

## 2022-02-25 NOTE — TELEPHONE ENCOUNTER
PASSED per protocol, refill sent.   Last PE 10.22.21  Future Appointments   Date Time Provider Jeremiah Funesi   4/6/2022  8:40 AM Aleyda Shelton MD [facility] GI   5/40/5772  0:83 PM Sarbjit Yu MD [facility]

## 2022-02-26 ENCOUNTER — APPOINTMENT (OUTPATIENT)
Dept: GENERAL RADIOLOGY | Facility: HOSPITAL | Age: 87
End: 2022-02-26
Attending: EMERGENCY MEDICINE
Payer: MEDICARE

## 2022-02-26 ENCOUNTER — HOSPITAL ENCOUNTER (EMERGENCY)
Facility: HOSPITAL | Age: 87
Discharge: HOME | End: 2022-02-26
Attending: EMERGENCY MEDICINE
Payer: MEDICARE

## 2022-02-26 VITALS
OXYGEN SATURATION: 94 % | HEART RATE: 86 BPM | HEIGHT: 60 IN | BODY MASS INDEX: 31.41 KG/M2 | WEIGHT: 160 LBS | RESPIRATION RATE: 18 BRPM | SYSTOLIC BLOOD PRESSURE: 143 MMHG | DIASTOLIC BLOOD PRESSURE: 91 MMHG

## 2022-02-26 DIAGNOSIS — M16.10 HIP ARTHRITIS: ICD-10-CM

## 2022-02-26 DIAGNOSIS — K52.9 CHRONIC DIARRHEA: Primary | ICD-10-CM

## 2022-02-26 LAB
ALBUMIN SERPL-MCNC: 3.3 G/DL (ref 3.4–5)
ALBUMIN/GLOB SERPL: 0.8 {RATIO} (ref 1–2)
ALP LIVER SERPL-CCNC: 68 U/L
ALT SERPL-CCNC: 26 U/L
ANION GAP SERPL CALC-SCNC: 3 MMOL/L (ref 0–18)
AST SERPL-CCNC: 42 U/L (ref 15–37)
BASOPHILS # BLD AUTO: 0.07 X10(3) UL (ref 0–0.2)
BASOPHILS NFR BLD AUTO: 0.7 %
BILIRUB SERPL-MCNC: 0.6 MG/DL (ref 0.1–2)
BILIRUB UR QL STRIP.AUTO: NEGATIVE
BUN BLD-MCNC: 16 MG/DL (ref 7–18)
CALCIUM BLD-MCNC: 9.5 MG/DL (ref 8.5–10.1)
CHLORIDE SERPL-SCNC: 110 MMOL/L (ref 98–112)
CLARITY UR REFRACT.AUTO: CLEAR
CO2 SERPL-SCNC: 24 MMOL/L (ref 21–32)
CREAT BLD-MCNC: 0.83 MG/DL
EOSINOPHIL # BLD AUTO: 0.21 X10(3) UL (ref 0–0.7)
EOSINOPHIL NFR BLD AUTO: 2.2 %
ERYTHROCYTE [DISTWIDTH] IN BLOOD BY AUTOMATED COUNT: 14.6 %
GLOBULIN PLAS-MCNC: 4 G/DL (ref 2.8–4.4)
GLUCOSE BLD-MCNC: 94 MG/DL (ref 70–99)
GLUCOSE UR STRIP.AUTO-MCNC: NEGATIVE MG/DL
HCT VFR BLD AUTO: 40.9 %
HGB BLD-MCNC: 13.9 G/DL
IMM GRANULOCYTES # BLD AUTO: 0.04 X10(3) UL (ref 0–1)
IMM GRANULOCYTES NFR BLD: 0.4 %
KETONES UR STRIP.AUTO-MCNC: NEGATIVE MG/DL
LEUKOCYTE ESTERASE UR QL STRIP.AUTO: NEGATIVE
LIPASE SERPL-CCNC: 83 U/L (ref 73–393)
LYMPHOCYTES # BLD AUTO: 2.25 X10(3) UL (ref 1–4)
LYMPHOCYTES NFR BLD AUTO: 23.5 %
MCH RBC QN AUTO: 29.8 PG (ref 26–34)
MCHC RBC AUTO-ENTMCNC: 34 G/DL (ref 31–37)
MCV RBC AUTO: 87.8 FL
MONOCYTES # BLD AUTO: 0.69 X10(3) UL (ref 0.1–1)
MONOCYTES NFR BLD AUTO: 7.2 %
NEUTROPHILS # BLD AUTO: 6.33 X10 (3) UL (ref 1.5–7.7)
NEUTROPHILS # BLD AUTO: 6.33 X10(3) UL (ref 1.5–7.7)
NEUTROPHILS NFR BLD AUTO: 66 %
NITRITE UR QL STRIP.AUTO: NEGATIVE
OSMOLALITY SERPL CALC.SUM OF ELEC: 285 MOSM/KG (ref 275–295)
PH UR STRIP.AUTO: 6 [PH] (ref 5–8)
PLATELET # BLD AUTO: 269 10(3)UL (ref 150–450)
POTASSIUM SERPL-SCNC: 5.3 MMOL/L (ref 3.5–5.1)
PROT SERPL-MCNC: 7.3 G/DL (ref 6.4–8.2)
PROT UR STRIP.AUTO-MCNC: NEGATIVE MG/DL
RBC # BLD AUTO: 4.66 X10(6)UL
RBC UR QL AUTO: NEGATIVE
SARS-COV-2 RNA RESP QL NAA+PROBE: NOT DETECTED
SODIUM SERPL-SCNC: 137 MMOL/L (ref 136–145)
SP GR UR STRIP.AUTO: 1 (ref 1–1.03)
UROBILINOGEN UR STRIP.AUTO-MCNC: <2 MG/DL
WBC # BLD AUTO: 9.6 X10(3) UL (ref 4–11)

## 2022-02-26 PROCEDURE — 81003 URINALYSIS AUTO W/O SCOPE: CPT | Performed by: EMERGENCY MEDICINE

## 2022-02-26 PROCEDURE — 80053 COMPREHEN METABOLIC PANEL: CPT | Performed by: EMERGENCY MEDICINE

## 2022-02-26 PROCEDURE — 83690 ASSAY OF LIPASE: CPT | Performed by: EMERGENCY MEDICINE

## 2022-02-26 PROCEDURE — 85025 COMPLETE CBC W/AUTO DIFF WBC: CPT | Performed by: EMERGENCY MEDICINE

## 2022-02-26 PROCEDURE — 73502 X-RAY EXAM HIP UNI 2-3 VIEWS: CPT | Performed by: EMERGENCY MEDICINE

## 2022-02-26 PROCEDURE — 36415 COLL VENOUS BLD VENIPUNCTURE: CPT

## 2022-02-26 PROCEDURE — 99283 EMERGENCY DEPT VISIT LOW MDM: CPT

## 2022-02-26 NOTE — ED INITIAL ASSESSMENT (HPI)
Pt reports diarrhea since June after colonoscopy. Had out pt stool with reported negative results. Pt also reports R hip pain x2 weeks when getting out of a car. Denies fall.

## 2022-02-28 PROCEDURE — 99490 CHRNC CARE MGMT STAFF 1ST 20: CPT

## 2022-03-08 ENCOUNTER — LAB ENCOUNTER (OUTPATIENT)
Dept: LAB | Age: 87
End: 2022-03-08
Attending: INTERNAL MEDICINE
Payer: MEDICARE

## 2022-03-08 ENCOUNTER — OFFICE VISIT (OUTPATIENT)
Dept: INTERNAL MEDICINE CLINIC | Facility: CLINIC | Age: 87
End: 2022-03-08
Payer: MEDICARE

## 2022-03-08 VITALS
SYSTOLIC BLOOD PRESSURE: 132 MMHG | HEIGHT: 60 IN | RESPIRATION RATE: 18 BRPM | DIASTOLIC BLOOD PRESSURE: 80 MMHG | BODY MASS INDEX: 32.39 KG/M2 | WEIGHT: 165 LBS

## 2022-03-08 DIAGNOSIS — K58.0 IRRITABLE BOWEL SYNDROME WITH DIARRHEA: ICD-10-CM

## 2022-03-08 DIAGNOSIS — Z91.018 FOOD ALLERGY: ICD-10-CM

## 2022-03-08 DIAGNOSIS — I10 ESSENTIAL HYPERTENSION, MALIGNANT: ICD-10-CM

## 2022-03-08 DIAGNOSIS — M16.12 PRIMARY OSTEOARTHRITIS OF LEFT HIP: Primary | ICD-10-CM

## 2022-03-08 DIAGNOSIS — I25.10 CORONARY ATHEROSCLEROSIS OF NATIVE CORONARY ARTERY: Primary | ICD-10-CM

## 2022-03-08 DIAGNOSIS — R06.00 DYSPNEA, PAROXYSMAL NOCTURNAL: ICD-10-CM

## 2022-03-08 DIAGNOSIS — E78.5 HYPERLIPEMIA: ICD-10-CM

## 2022-03-08 DIAGNOSIS — R60.0 LOCALIZED EDEMA: ICD-10-CM

## 2022-03-08 LAB
ALBUMIN SERPL-MCNC: 3.8 G/DL (ref 3.4–5)
ALBUMIN/GLOB SERPL: 1.2 {RATIO} (ref 1–2)
ALP LIVER SERPL-CCNC: 71 U/L
ALT SERPL-CCNC: 25 U/L
ANION GAP SERPL CALC-SCNC: 6 MMOL/L (ref 0–18)
AST SERPL-CCNC: 16 U/L (ref 15–37)
BASOPHILS # BLD AUTO: 0.1 X10(3) UL (ref 0–0.2)
BASOPHILS NFR BLD AUTO: 0.8 %
BUN BLD-MCNC: 18 MG/DL (ref 7–18)
CALCIUM BLD-MCNC: 9.9 MG/DL (ref 8.5–10.1)
CHLORIDE SERPL-SCNC: 110 MMOL/L (ref 98–112)
CHOLEST SERPL-MCNC: 259 MG/DL (ref ?–200)
CO2 SERPL-SCNC: 25 MMOL/L (ref 21–32)
CREAT BLD-MCNC: 0.98 MG/DL
EOSINOPHIL # BLD AUTO: 0.11 X10(3) UL (ref 0–0.7)
EOSINOPHIL NFR BLD AUTO: 0.9 %
ERYTHROCYTE [DISTWIDTH] IN BLOOD BY AUTOMATED COUNT: 14.7 %
FASTING PATIENT LIPID ANSWER: YES
FASTING STATUS PATIENT QL REPORTED: YES
GLOBULIN PLAS-MCNC: 3.3 G/DL (ref 2.8–4.4)
GLUCOSE BLD-MCNC: 96 MG/DL (ref 70–99)
HCT VFR BLD AUTO: 42.3 %
HDLC SERPL-MCNC: 59 MG/DL (ref 40–59)
HGB BLD-MCNC: 13.7 G/DL
IMM GRANULOCYTES # BLD AUTO: 0.17 X10(3) UL (ref 0–1)
IMM GRANULOCYTES NFR BLD: 1.4 %
LDLC SERPL CALC-MCNC: 150 MG/DL (ref ?–100)
LYMPHOCYTES # BLD AUTO: 1.8 X10(3) UL (ref 1–4)
LYMPHOCYTES NFR BLD AUTO: 14.5 %
MAGNESIUM SERPL-MCNC: 2.3 MG/DL (ref 1.6–2.6)
MCH RBC QN AUTO: 29 PG (ref 26–34)
MCHC RBC AUTO-ENTMCNC: 32.4 G/DL (ref 31–37)
MCV RBC AUTO: 89.4 FL
MONOCYTES # BLD AUTO: 0.81 X10(3) UL (ref 0.1–1)
MONOCYTES NFR BLD AUTO: 6.5 %
NEUTROPHILS # BLD AUTO: 9.4 X10(3) UL (ref 1.5–7.7)
NEUTROPHILS NFR BLD AUTO: 75.9 %
NONHDLC SERPL-MCNC: 200 MG/DL (ref ?–130)
NT-PROBNP SERPL-MCNC: 122 PG/ML (ref ?–450)
OSMOLALITY SERPL CALC.SUM OF ELEC: 294 MOSM/KG (ref 275–295)
PLATELET # BLD AUTO: 315 10(3)UL (ref 150–450)
POTASSIUM SERPL-SCNC: 4.2 MMOL/L (ref 3.5–5.1)
RBC # BLD AUTO: 4.73 X10(6)UL
SODIUM SERPL-SCNC: 141 MMOL/L (ref 136–145)
TRIGL SERPL-MCNC: 275 MG/DL (ref 30–149)
TSI SER-ACNC: 1.48 MIU/ML (ref 0.36–3.74)
VLDLC SERPL CALC-MCNC: 53 MG/DL (ref 0–30)
WBC # BLD AUTO: 12.4 X10(3) UL (ref 4–11)

## 2022-03-08 PROCEDURE — 80053 COMPREHEN METABOLIC PANEL: CPT

## 2022-03-08 PROCEDURE — 84443 ASSAY THYROID STIM HORMONE: CPT

## 2022-03-08 PROCEDURE — 85025 COMPLETE CBC W/AUTO DIFF WBC: CPT

## 2022-03-08 PROCEDURE — 36415 COLL VENOUS BLD VENIPUNCTURE: CPT

## 2022-03-08 PROCEDURE — 80061 LIPID PANEL: CPT

## 2022-03-08 PROCEDURE — 99214 OFFICE O/P EST MOD 30 MIN: CPT | Performed by: INTERNAL MEDICINE

## 2022-03-08 PROCEDURE — 83735 ASSAY OF MAGNESIUM: CPT

## 2022-03-08 PROCEDURE — 1111F DSCHRG MED/CURRENT MED MERGE: CPT | Performed by: INTERNAL MEDICINE

## 2022-03-08 PROCEDURE — 83880 ASSAY OF NATRIURETIC PEPTIDE: CPT

## 2022-03-09 RX ORDER — FLUTICASONE PROPIONATE 220 UG/1
2 AEROSOL, METERED RESPIRATORY (INHALATION) 2 TIMES DAILY
Qty: 2 EACH | Refills: 0 | Status: SHIPPED | OUTPATIENT
Start: 2022-03-09

## 2022-03-10 ENCOUNTER — TELEPHONE (OUTPATIENT)
Dept: INTERNAL MEDICINE CLINIC | Facility: CLINIC | Age: 87
End: 2022-03-10

## 2022-03-10 DIAGNOSIS — R19.7 DIARRHEA, UNSPECIFIED TYPE: Primary | ICD-10-CM

## 2022-03-10 NOTE — TELEPHONE ENCOUNTER
Pt seen 3/8/22 by TB. Per TB OV note, \"Exam today is benign  Irritable bowel syndrome with diarrhea- possibly worsening of diarrhea related to above, discussed checking for c dif if not improving in the next 48-72 hours\". Pt calling to inform us that diarrhea has not improved. Tb, ok to order C Diff culture?

## 2022-03-10 NOTE — TELEPHONE ENCOUNTER
Last VISIT - 3/8/21 ER f/u left hip pain    Last CPE - 10/22/21    Last REFILL -     FLOVENT  MCG/ACT Inhalation Aerosol   12/27/2021      Last LABS - 3/8/22 CBC, CMP, LIPID    Per PROTOCOL? FAILED    Please Approve or Deny.

## 2022-03-10 NOTE — TELEPHONE ENCOUNTER
Patient seen on 3/8/22 by TB and mentioned she has been having diarrhea. TB was going to order some stool testing if issue does not resolve. As of today patient still having diarrhea. Please call patient when orders have been placed.

## 2022-03-12 ENCOUNTER — LAB ENCOUNTER (OUTPATIENT)
Dept: LAB | Facility: HOSPITAL | Age: 87
End: 2022-03-12
Attending: INTERNAL MEDICINE
Payer: MEDICARE

## 2022-03-12 DIAGNOSIS — R19.7 DIARRHEA, UNSPECIFIED TYPE: ICD-10-CM

## 2022-03-12 PROCEDURE — 87493 C DIFF AMPLIFIED PROBE: CPT

## 2022-03-15 NOTE — TELEPHONE ENCOUNTER
Pt called stating she is still sick-having diarrhea-Cdiff results negative-not sure what to do? She had labs done thru Dr. Sophie White cardio-per patient TB needs to review those labs as there is abnormal results.

## 2022-03-16 NOTE — TELEPHONE ENCOUNTER
She sees Dr. Beth Dalal regularly about her chronic diarrhea, needs to f/u with him or his partners  Labs with mild anemia likely from chronic disease, would have her see GI for this as well

## 2022-03-16 NOTE — TELEPHONE ENCOUNTER
Spoke to pt. Informed her that she needs to f/u with GI regarding chronic diarrhea and mild anemia. Pt stated understanding and said that she will give their office a call.

## 2022-03-26 ENCOUNTER — LAB ENCOUNTER (OUTPATIENT)
Dept: LAB | Facility: HOSPITAL | Age: 87
End: 2022-03-26
Payer: MEDICARE

## 2022-03-26 DIAGNOSIS — R19.7 DIARRHEA, UNSPECIFIED TYPE: ICD-10-CM

## 2022-03-26 LAB — CRYPTOSP AG STL QL IA: NEGATIVE

## 2022-03-26 PROCEDURE — 87329 GIARDIA AG IA: CPT

## 2022-03-26 PROCEDURE — 87427 SHIGA-LIKE TOXIN AG IA: CPT

## 2022-03-26 PROCEDURE — 87045 FECES CULTURE AEROBIC BACT: CPT

## 2022-03-26 PROCEDURE — 87046 STOOL CULTR AEROBIC BACT EA: CPT

## 2022-03-26 PROCEDURE — 87272 CRYPTOSPORIDIUM AG IF: CPT

## 2022-03-30 ENCOUNTER — PATIENT OUTREACH (OUTPATIENT)
Dept: CASE MANAGEMENT | Age: 87
End: 2022-03-30

## 2022-03-31 PROCEDURE — 99490 CHRNC CARE MGMT STAFF 1ST 20: CPT

## 2022-04-11 ENCOUNTER — OFFICE VISIT (OUTPATIENT)
Dept: ORTHOPEDICS CLINIC | Facility: CLINIC | Age: 87
End: 2022-04-11
Payer: MEDICARE

## 2022-04-11 VITALS — WEIGHT: 166 LBS | BODY MASS INDEX: 33.02 KG/M2 | OXYGEN SATURATION: 98 % | HEIGHT: 59.5 IN | HEART RATE: 90 BPM

## 2022-04-11 DIAGNOSIS — M16.12 PRIMARY OSTEOARTHRITIS OF LEFT HIP: Primary | ICD-10-CM

## 2022-04-11 DIAGNOSIS — G89.29 CHRONIC LOW BACK PAIN, UNSPECIFIED BACK PAIN LATERALITY, UNSPECIFIED WHETHER SCIATICA PRESENT: ICD-10-CM

## 2022-04-11 DIAGNOSIS — M54.50 CHRONIC LOW BACK PAIN, UNSPECIFIED BACK PAIN LATERALITY, UNSPECIFIED WHETHER SCIATICA PRESENT: ICD-10-CM

## 2022-04-11 PROCEDURE — 99203 OFFICE O/P NEW LOW 30 MIN: CPT | Performed by: ORTHOPAEDIC SURGERY

## 2022-04-16 ENCOUNTER — HOSPITAL ENCOUNTER (OUTPATIENT)
Dept: CV DIAGNOSTICS | Facility: HOSPITAL | Age: 87
Discharge: HOME | End: 2022-04-16
Attending: INTERNAL MEDICINE
Payer: MEDICARE

## 2022-04-16 DIAGNOSIS — I25.10 CORONARY ATHEROSCLEROSIS OF NATIVE CORONARY ARTERY: ICD-10-CM

## 2022-04-16 DIAGNOSIS — E78.5 HYPERLIPEMIA: ICD-10-CM

## 2022-04-16 DIAGNOSIS — I25.10 CAD (CORONARY ARTERY DISEASE): ICD-10-CM

## 2022-04-16 PROCEDURE — 93306 TTE W/DOPPLER COMPLETE: CPT | Performed by: INTERNAL MEDICINE

## 2022-04-20 ENCOUNTER — TELEPHONE (OUTPATIENT)
Dept: INTERNAL MEDICINE CLINIC | Facility: CLINIC | Age: 87
End: 2022-04-20

## 2022-04-20 NOTE — TELEPHONE ENCOUNTER
Dr. Gilberto Garcia  [address]  [address] [place], [address] [address]  [phone]

## 2022-04-20 NOTE — TELEPHONE ENCOUNTER
LOV 3/8/22    Pt reports having ongoing GI issues. Would like a different opinion outside of Dr. Xiomara Kim office. States symptoms continue and has not been given any answers. Would like a recommendation from Dr. Jacquelyn Plasencia.

## 2022-04-20 NOTE — TELEPHONE ENCOUNTER
Pt calling asking to speak with TB. She states she is having upper and lower GI issues. That's all the information she would give me.

## 2022-05-03 ENCOUNTER — PATIENT OUTREACH (OUTPATIENT)
Dept: CASE MANAGEMENT | Age: 87
End: 2022-05-03

## 2022-05-16 ENCOUNTER — OFFICE VISIT (OUTPATIENT)
Dept: RHEUMATOLOGY | Facility: CLINIC | Age: 87
End: 2022-05-16
Payer: MEDICARE

## 2022-05-16 VITALS
TEMPERATURE: 97 F | OXYGEN SATURATION: 98 % | WEIGHT: 165 LBS | BODY MASS INDEX: 32.39 KG/M2 | DIASTOLIC BLOOD PRESSURE: 82 MMHG | HEART RATE: 86 BPM | SYSTOLIC BLOOD PRESSURE: 136 MMHG | HEIGHT: 60 IN

## 2022-05-16 DIAGNOSIS — M47.816 OSTEOARTHRITIS OF SPINE WITHOUT MYELOPATHY OR RADICULOPATHY, LUMBAR REGION: ICD-10-CM

## 2022-05-16 DIAGNOSIS — M17.11 PRIMARY OSTEOARTHRITIS OF RIGHT KNEE: ICD-10-CM

## 2022-05-16 DIAGNOSIS — Z88.6 ALLERGY TO NONSTEROIDAL ANTI-INFLAMMATORY DRUG (NSAID): ICD-10-CM

## 2022-05-16 DIAGNOSIS — K58.0 IRRITABLE BOWEL SYNDROME WITH DIARRHEA: ICD-10-CM

## 2022-05-16 DIAGNOSIS — M17.12 PRIMARY OSTEOARTHRITIS OF LEFT KNEE: Primary | ICD-10-CM

## 2022-05-16 DIAGNOSIS — R19.7 DIARRHEA, UNSPECIFIED TYPE: ICD-10-CM

## 2022-05-16 DIAGNOSIS — Z98.890 S/P LUMBAR LAMINECTOMY: ICD-10-CM

## 2022-05-16 DIAGNOSIS — Z98.1 STATUS POST LUMBAR SPINAL FUSION: ICD-10-CM

## 2022-05-16 PROCEDURE — 99214 OFFICE O/P EST MOD 30 MIN: CPT | Performed by: INTERNAL MEDICINE

## 2022-05-16 RX ORDER — PREDNISONE 1 MG/1
5 TABLET ORAL DAILY
Qty: 90 TABLET | Refills: 1 | Status: CANCELLED | OUTPATIENT
Start: 2022-05-16

## 2022-05-16 RX ORDER — PREDNISONE 2.5 MG
2.5 TABLET ORAL DAILY
Qty: 90 TABLET | Refills: 3 | Status: SHIPPED | OUTPATIENT
Start: 2022-05-16

## 2022-05-16 NOTE — PATIENT INSTRUCTIONS
Take Prednisone 2.5 mg per day as needed for arthritis pain; Walk with your walker and take it easy. Avoid foods that cause loose stool. See GI PA about your diarrhea,  Extra strength Tylenol 500 mg can be taken once or twice a day as needed for breakthrough pain. If you need a second opinion see Dr. Yariel Parson of [facility]. Return to office 6 months.

## 2022-06-14 ENCOUNTER — PATIENT OUTREACH (OUTPATIENT)
Dept: CASE MANAGEMENT | Age: 87
End: 2022-06-14

## 2022-06-14 DIAGNOSIS — R19.7 DIARRHEA, UNSPECIFIED TYPE: ICD-10-CM

## 2022-06-14 DIAGNOSIS — M16.12 OSTEOARTHRITIS OF ONE HIP, LEFT: ICD-10-CM

## 2022-06-14 DIAGNOSIS — Z85.828 HISTORY OF SKIN CANCER: ICD-10-CM

## 2022-06-14 DIAGNOSIS — J45.40 MODERATE PERSISTENT ASTHMA WITHOUT COMPLICATION: ICD-10-CM

## 2022-06-14 DIAGNOSIS — M17.11 PRIMARY OSTEOARTHRITIS OF RIGHT KNEE: ICD-10-CM

## 2022-06-14 DIAGNOSIS — Z95.5 HISTORY OF HEART ARTERY STENT: ICD-10-CM

## 2022-06-14 DIAGNOSIS — K58.0 IRRITABLE BOWEL SYNDROME WITH DIARRHEA: ICD-10-CM

## 2022-06-14 DIAGNOSIS — M47.816 OSTEOARTHRITIS OF SPINE WITHOUT MYELOPATHY OR RADICULOPATHY, LUMBAR REGION: ICD-10-CM

## 2022-06-14 DIAGNOSIS — I10 PRIMARY HYPERTENSION: ICD-10-CM

## 2022-06-14 DIAGNOSIS — E78.00 PURE HYPERCHOLESTEROLEMIA: ICD-10-CM

## 2022-06-14 DIAGNOSIS — E78.5 DYSLIPIDEMIA: ICD-10-CM

## 2022-06-14 DIAGNOSIS — Z86.69 HX OF MIGRAINES: ICD-10-CM

## 2022-06-14 DIAGNOSIS — M17.12 PRIMARY OSTEOARTHRITIS OF LEFT KNEE: ICD-10-CM

## 2022-06-14 RX ORDER — PREDNISONE 2.5 MG
TABLET ORAL
Qty: 90 TABLET | Refills: 3 | OUTPATIENT
Start: 2022-06-14

## 2022-06-14 NOTE — TELEPHONE ENCOUNTER
LOV 5-16-22  Future Appointments   Date Time Provider Jeremiah Lisa   39/46/9584 23:02 PM Zachery Morales MD [facility]     Rf denied #[id number] + 3RF escribed on 5-16-22

## 2022-06-20 ENCOUNTER — TELEPHONE (OUTPATIENT)
Dept: INTERNAL MEDICINE CLINIC | Facility: CLINIC | Age: 87
End: 2022-06-20

## 2022-06-20 NOTE — TELEPHONE ENCOUNTER
I recommend that pt keep current EGD appointment scheduled on 7/12. This is already scheduled with Dr. Jose Alfredo Rene. Thank you.

## 2022-06-20 NOTE — TELEPHONE ENCOUNTER
Pt called in to clarify the urgency for upper GI scheduled in July. Now that pt stools are normal, is there an urgent need to perform this procedure. If so, can it be performed by Dr Rolf Carlton? Pt is apprehensive about participating in a procedure with a provider she has never met.       Please Advise    Thank You    Routed to [facility] Last APRN

## 2022-07-01 ENCOUNTER — PATIENT OUTREACH (OUTPATIENT)
Dept: CASE MANAGEMENT | Age: 87
End: 2022-07-01

## 2022-07-01 DIAGNOSIS — E78.5 DYSLIPIDEMIA: ICD-10-CM

## 2022-07-01 DIAGNOSIS — K86.89 PANCREATIC INSUFFICIENCY: ICD-10-CM

## 2022-07-01 DIAGNOSIS — M54.12 CERVICAL RADICULOPATHY AT C5: ICD-10-CM

## 2022-07-01 DIAGNOSIS — I10 PRIMARY HYPERTENSION: ICD-10-CM

## 2022-07-01 DIAGNOSIS — Z86.69 HX OF MIGRAINES: ICD-10-CM

## 2022-07-01 DIAGNOSIS — M17.11 PRIMARY OSTEOARTHRITIS OF RIGHT KNEE: ICD-10-CM

## 2022-07-01 DIAGNOSIS — M16.12 OSTEOARTHRITIS OF ONE HIP, LEFT: ICD-10-CM

## 2022-07-01 DIAGNOSIS — E78.00 PURE HYPERCHOLESTEROLEMIA: ICD-10-CM

## 2022-07-01 DIAGNOSIS — M17.12 PRIMARY OSTEOARTHRITIS OF LEFT KNEE: ICD-10-CM

## 2022-07-01 DIAGNOSIS — M47.816 OSTEOARTHRITIS OF SPINE WITHOUT MYELOPATHY OR RADICULOPATHY, LUMBAR REGION: ICD-10-CM

## 2022-07-01 DIAGNOSIS — J42 CHRONIC BRONCHITIS, UNSPECIFIED CHRONIC BRONCHITIS TYPE (HCC): ICD-10-CM

## 2022-07-01 DIAGNOSIS — Z88.6 ALLERGY TO NONSTEROIDAL ANTI-INFLAMMATORY DRUG (NSAID): ICD-10-CM

## 2022-07-01 DIAGNOSIS — J45.40 MODERATE PERSISTENT ASTHMA WITHOUT COMPLICATION: ICD-10-CM

## 2022-07-01 DIAGNOSIS — Z85.828 HISTORY OF SKIN CANCER: ICD-10-CM

## 2022-07-01 DIAGNOSIS — R19.7 DIARRHEA, UNSPECIFIED TYPE: ICD-10-CM

## 2022-07-01 DIAGNOSIS — G56.01 CARPAL TUNNEL SYNDROME OF RIGHT WRIST: ICD-10-CM

## 2022-07-09 ENCOUNTER — LAB ENCOUNTER (OUTPATIENT)
Dept: LAB | Facility: HOSPITAL | Age: 87
End: 2022-07-09
Attending: NURSE PRACTITIONER
Payer: MEDICARE

## 2022-07-09 DIAGNOSIS — R19.5 LOOSE STOOLS: ICD-10-CM

## 2022-07-09 PROCEDURE — 87427 SHIGA-LIKE TOXIN AG IA: CPT

## 2022-07-14 ENCOUNTER — HOSPITAL ENCOUNTER (OUTPATIENT)
Dept: GENERAL RADIOLOGY | Facility: HOSPITAL | Age: 87
Discharge: HOME | End: 2022-07-14
Attending: NURSE PRACTITIONER
Payer: MEDICARE

## 2022-07-14 DIAGNOSIS — R19.5 LOOSE STOOLS: ICD-10-CM

## 2022-07-14 PROCEDURE — 74018 RADEX ABDOMEN 1 VIEW: CPT | Performed by: NURSE PRACTITIONER

## 2022-08-06 ENCOUNTER — LAB ENCOUNTER (OUTPATIENT)
Dept: LAB | Facility: HOSPITAL | Age: 87
End: 2022-08-06
Payer: MEDICARE

## 2022-08-06 DIAGNOSIS — R19.7 DIARRHEA, UNSPECIFIED TYPE: ICD-10-CM

## 2022-08-06 DIAGNOSIS — B83.9 WORMS IN STOOL: ICD-10-CM

## 2022-08-06 LAB
CRYPTOSP AG STL QL IA: NEGATIVE
G LAMBLIA AG STL QL IA: NEGATIVE

## 2022-08-06 PROCEDURE — 87209 SMEAR COMPLEX STAIN: CPT

## 2022-08-06 PROCEDURE — 87272 CRYPTOSPORIDIUM AG IF: CPT

## 2022-08-06 PROCEDURE — 87177 OVA AND PARASITES SMEARS: CPT

## 2022-08-06 PROCEDURE — 87329 GIARDIA AG IA: CPT

## 2022-08-15 LAB — OVA AND PARASITE, FECAL INTERPRETATION: NEGATIVE

## 2022-08-26 ENCOUNTER — HOSPITAL ENCOUNTER (OUTPATIENT)
Dept: CT IMAGING | Facility: HOSPITAL | Age: 87
Discharge: HOME | End: 2022-08-26
Payer: MEDICARE

## 2022-08-26 ENCOUNTER — PATIENT OUTREACH (OUTPATIENT)
Dept: CASE MANAGEMENT | Age: 87
End: 2022-08-26

## 2022-08-26 DIAGNOSIS — R19.7 DIARRHEA, UNSPECIFIED TYPE: ICD-10-CM

## 2022-08-26 DIAGNOSIS — M17.12 PRIMARY OSTEOARTHRITIS OF LEFT KNEE: ICD-10-CM

## 2022-08-26 DIAGNOSIS — R11.0 NAUSEA: ICD-10-CM

## 2022-08-26 DIAGNOSIS — K86.89 PANCREATIC INSUFFICIENCY: ICD-10-CM

## 2022-08-26 DIAGNOSIS — R15.2 FECAL URGENCY: ICD-10-CM

## 2022-08-26 DIAGNOSIS — Z85.828 HISTORY OF SKIN CANCER: ICD-10-CM

## 2022-08-26 DIAGNOSIS — R53.1 GENERALIZED WEAKNESS: ICD-10-CM

## 2022-08-26 DIAGNOSIS — M47.816 OSTEOARTHRITIS OF SPINE WITHOUT MYELOPATHY OR RADICULOPATHY, LUMBAR REGION: ICD-10-CM

## 2022-08-26 DIAGNOSIS — K58.0 IRRITABLE BOWEL SYNDROME WITH DIARRHEA: ICD-10-CM

## 2022-08-26 DIAGNOSIS — Z86.69 HX OF MIGRAINES: ICD-10-CM

## 2022-08-26 DIAGNOSIS — M17.11 PRIMARY OSTEOARTHRITIS OF RIGHT KNEE: ICD-10-CM

## 2022-08-26 DIAGNOSIS — M16.12 OSTEOARTHRITIS OF ONE HIP, LEFT: ICD-10-CM

## 2022-08-26 DIAGNOSIS — E78.00 PURE HYPERCHOLESTEROLEMIA: ICD-10-CM

## 2022-08-26 DIAGNOSIS — R06.09 DYSPNEA ON EXERTION: ICD-10-CM

## 2022-08-26 DIAGNOSIS — R10.30 LOWER ABDOMINAL PAIN: ICD-10-CM

## 2022-08-26 DIAGNOSIS — I10 PRIMARY HYPERTENSION: ICD-10-CM

## 2022-08-26 DIAGNOSIS — E78.5 DYSLIPIDEMIA: ICD-10-CM

## 2022-08-26 DIAGNOSIS — J42 CHRONIC BRONCHITIS, UNSPECIFIED CHRONIC BRONCHITIS TYPE (HCC): ICD-10-CM

## 2022-08-26 PROCEDURE — 74176 CT ABD & PELVIS W/O CONTRAST: CPT

## 2022-08-27 ENCOUNTER — LAB ENCOUNTER (OUTPATIENT)
Dept: LAB | Facility: HOSPITAL | Age: 87
End: 2022-08-27
Payer: MEDICARE

## 2022-08-27 DIAGNOSIS — R15.2 FECAL URGENCY: ICD-10-CM

## 2022-08-27 DIAGNOSIS — R53.1 GENERALIZED WEAKNESS: ICD-10-CM

## 2022-08-27 DIAGNOSIS — R11.0 NAUSEA: ICD-10-CM

## 2022-08-27 DIAGNOSIS — R19.7 DIARRHEA, UNSPECIFIED TYPE: ICD-10-CM

## 2022-08-27 DIAGNOSIS — K58.0 IRRITABLE BOWEL SYNDROME WITH DIARRHEA: ICD-10-CM

## 2022-08-27 LAB
ALBUMIN SERPL-MCNC: 3.7 G/DL (ref 3.4–5)
ALBUMIN/GLOB SERPL: 1.1 {RATIO} (ref 1–2)
ALP LIVER SERPL-CCNC: 72 U/L
ALT SERPL-CCNC: 18 U/L
ANION GAP SERPL CALC-SCNC: 8 MMOL/L (ref 0–18)
AST SERPL-CCNC: 13 U/L (ref 15–37)
BASOPHILS # BLD AUTO: 0.06 X10(3) UL (ref 0–0.2)
BASOPHILS NFR BLD AUTO: 0.7 %
BILIRUB SERPL-MCNC: 0.9 MG/DL (ref 0.1–2)
BUN BLD-MCNC: 15 MG/DL (ref 7–18)
CALCIUM BLD-MCNC: 9.3 MG/DL (ref 8.5–10.1)
CHLORIDE SERPL-SCNC: 110 MMOL/L (ref 98–112)
CO2 SERPL-SCNC: 22 MMOL/L (ref 21–32)
CREAT BLD-MCNC: 0.76 MG/DL
EOSINOPHIL # BLD AUTO: 0.1 X10(3) UL (ref 0–0.7)
EOSINOPHIL NFR BLD AUTO: 1.2 %
ERYTHROCYTE [DISTWIDTH] IN BLOOD BY AUTOMATED COUNT: 14.6 %
FASTING STATUS PATIENT QL REPORTED: YES
GFR SERPLBLD BASED ON 1.73 SQ M-ARVRAT: 76 ML/MIN/1.73M2 (ref 60–?)
GLOBULIN PLAS-MCNC: 3.4 G/DL (ref 2.8–4.4)
GLUCOSE BLD-MCNC: 93 MG/DL (ref 70–99)
HCT VFR BLD AUTO: 42.1 %
HGB BLD-MCNC: 13.5 G/DL
IMM GRANULOCYTES # BLD AUTO: 0.05 X10(3) UL (ref 0–1)
IMM GRANULOCYTES NFR BLD: 0.6 %
LYMPHOCYTES # BLD AUTO: 1.85 X10(3) UL (ref 1–4)
LYMPHOCYTES NFR BLD AUTO: 22 %
MCH RBC QN AUTO: 28.8 PG (ref 26–34)
MCHC RBC AUTO-ENTMCNC: 32.1 G/DL (ref 31–37)
MCV RBC AUTO: 89.8 FL
MONOCYTES # BLD AUTO: 0.53 X10(3) UL (ref 0.1–1)
MONOCYTES NFR BLD AUTO: 6.3 %
NEUTROPHILS # BLD AUTO: 5.83 X10 (3) UL (ref 1.5–7.7)
NEUTROPHILS # BLD AUTO: 5.83 X10(3) UL (ref 1.5–7.7)
NEUTROPHILS NFR BLD AUTO: 69.2 %
OSMOLALITY SERPL CALC.SUM OF ELEC: 291 MOSM/KG (ref 275–295)
PLATELET # BLD AUTO: 254 10(3)UL (ref 150–450)
POTASSIUM SERPL-SCNC: 3.7 MMOL/L (ref 3.5–5.1)
PROT SERPL-MCNC: 7.1 G/DL (ref 6.4–8.2)
RBC # BLD AUTO: 4.69 X10(6)UL
SODIUM SERPL-SCNC: 140 MMOL/L (ref 136–145)
WBC # BLD AUTO: 8.4 X10(3) UL (ref 4–11)

## 2022-08-27 PROCEDURE — 80053 COMPREHEN METABOLIC PANEL: CPT

## 2022-08-27 PROCEDURE — 85025 COMPLETE CBC W/AUTO DIFF WBC: CPT

## 2022-08-27 PROCEDURE — 36415 COLL VENOUS BLD VENIPUNCTURE: CPT

## 2022-09-02 ENCOUNTER — TELEPHONE (OUTPATIENT)
Dept: INTERNAL MEDICINE CLINIC | Facility: CLINIC | Age: 87
End: 2022-09-02

## 2022-09-02 NOTE — TELEPHONE ENCOUNTER
Called and spoke to pt. Advised to await response from GI. Pt stated understanding and agreed to plan.

## 2022-09-02 NOTE — TELEPHONE ENCOUNTER
Spoke to pt. Pt said that months ago, she saw something in her stool that looked like a worm. She discussed this with GI who told her to save it if it happens again. The night before last, pt woke up in the middle of the night and stated, \"it felt like something was coming up my throat\". She went back to sleep and when she woke up, she was chewing on something. Pt spit it out and there was a 1\" white piece that looked like a worm. Pt has contacted Dr. Herson Fleming office (has been seeing Claudetta Puffer) and was told Venda Schwab is at the hospital today, but they will send her a message. They advised her to inform her PCP so they are aware. Pt waiting for a response from Venda Schwab. TB, any recs for pt or defer to Venda Schwab?

## 2022-09-02 NOTE — TELEPHONE ENCOUNTER
Patient states that in the past year patient has had diarrhea. Patient has had testing done with Dr. Angel Luis Wiggins and all testing has been negative. Patient states that a while ago she saw something in her stool that kind of looked like a worm but she did not save it. When patient woke up this morning she felt something in her mouth that was bigger than an inch and in pieces that looked white. Patient thinks it may have been a tape worm. Patient call Dr. Karina Lazo office and they have asked her to contact her PCP.

## 2022-09-29 ENCOUNTER — PATIENT OUTREACH (OUTPATIENT)
Dept: CASE MANAGEMENT | Age: 87
End: 2022-09-29

## 2022-09-29 DIAGNOSIS — M47.816 OSTEOARTHRITIS OF SPINE WITHOUT MYELOPATHY OR RADICULOPATHY, LUMBAR REGION: ICD-10-CM

## 2022-09-29 DIAGNOSIS — E78.5 DYSLIPIDEMIA: ICD-10-CM

## 2022-09-29 DIAGNOSIS — Z91.81 AT RISK FOR FALLING: ICD-10-CM

## 2022-09-29 DIAGNOSIS — M17.11 PRIMARY OSTEOARTHRITIS OF RIGHT KNEE: ICD-10-CM

## 2022-09-29 DIAGNOSIS — M17.12 PRIMARY OSTEOARTHRITIS OF LEFT KNEE: ICD-10-CM

## 2022-09-29 DIAGNOSIS — G56.01 CARPAL TUNNEL SYNDROME OF RIGHT WRIST: ICD-10-CM

## 2022-09-29 DIAGNOSIS — I10 PRIMARY HYPERTENSION: ICD-10-CM

## 2022-09-29 DIAGNOSIS — E78.00 PURE HYPERCHOLESTEROLEMIA: ICD-10-CM

## 2022-09-29 DIAGNOSIS — M16.12 OSTEOARTHRITIS OF ONE HIP, LEFT: ICD-10-CM

## 2022-09-29 DIAGNOSIS — K86.89 PANCREATIC INSUFFICIENCY: ICD-10-CM

## 2022-09-29 DIAGNOSIS — J42 CHRONIC BRONCHITIS, UNSPECIFIED CHRONIC BRONCHITIS TYPE (HCC): ICD-10-CM

## 2022-09-29 DIAGNOSIS — K58.0 IRRITABLE BOWEL SYNDROME WITH DIARRHEA: ICD-10-CM

## 2022-09-29 DIAGNOSIS — Z86.69 HX OF MIGRAINES: ICD-10-CM

## 2022-09-29 DIAGNOSIS — Z85.828 HISTORY OF SKIN CANCER: ICD-10-CM

## 2022-09-30 PROCEDURE — 99490 CHRNC CARE MGMT STAFF 1ST 20: CPT

## 2022-10-11 ENCOUNTER — LAB ENCOUNTER (OUTPATIENT)
Dept: LAB | Facility: HOSPITAL | Age: 87
End: 2022-10-11
Payer: MEDICARE

## 2022-10-11 DIAGNOSIS — R19.5 DARK STOOLS: ICD-10-CM

## 2022-10-11 LAB
BASOPHILS # BLD AUTO: 0.06 X10(3) UL (ref 0–0.2)
BASOPHILS NFR BLD AUTO: 0.7 %
EOSINOPHIL # BLD AUTO: 0.08 X10(3) UL (ref 0–0.7)
EOSINOPHIL NFR BLD AUTO: 1 %
ERYTHROCYTE [DISTWIDTH] IN BLOOD BY AUTOMATED COUNT: 14.8 %
HCT VFR BLD AUTO: 42.6 %
HGB BLD-MCNC: 13.7 G/DL
IMM GRANULOCYTES # BLD AUTO: 0.03 X10(3) UL (ref 0–1)
IMM GRANULOCYTES NFR BLD: 0.4 %
LYMPHOCYTES # BLD AUTO: 2.57 X10(3) UL (ref 1–4)
LYMPHOCYTES NFR BLD AUTO: 31.4 %
MCH RBC QN AUTO: 29.2 PG (ref 26–34)
MCHC RBC AUTO-ENTMCNC: 32.2 G/DL (ref 31–37)
MCV RBC AUTO: 90.8 FL
MONOCYTES # BLD AUTO: 0.54 X10(3) UL (ref 0.1–1)
MONOCYTES NFR BLD AUTO: 6.6 %
NEUTROPHILS # BLD AUTO: 4.9 X10 (3) UL (ref 1.5–7.7)
NEUTROPHILS # BLD AUTO: 4.9 X10(3) UL (ref 1.5–7.7)
NEUTROPHILS NFR BLD AUTO: 59.9 %
PLATELET # BLD AUTO: 281 10(3)UL (ref 150–450)
RBC # BLD AUTO: 4.69 X10(6)UL
WBC # BLD AUTO: 8.2 X10(3) UL (ref 4–11)

## 2022-10-11 PROCEDURE — 85025 COMPLETE CBC W/AUTO DIFF WBC: CPT

## 2022-10-11 PROCEDURE — 36415 COLL VENOUS BLD VENIPUNCTURE: CPT

## 2022-10-19 ENCOUNTER — LAB ENCOUNTER (OUTPATIENT)
Dept: LAB | Facility: HOSPITAL | Age: 87
End: 2022-10-19
Payer: MEDICARE

## 2022-10-19 DIAGNOSIS — K92.1 MELENA: ICD-10-CM

## 2022-10-19 LAB
BASOPHILS # BLD AUTO: 0.07 X10(3) UL (ref 0–0.2)
BASOPHILS NFR BLD AUTO: 0.9 %
EOSINOPHIL # BLD AUTO: 0.11 X10(3) UL (ref 0–0.7)
EOSINOPHIL NFR BLD AUTO: 1.4 %
ERYTHROCYTE [DISTWIDTH] IN BLOOD BY AUTOMATED COUNT: 14.7 %
HCT VFR BLD AUTO: 42.7 %
HGB BLD-MCNC: 13.8 G/DL
IMM GRANULOCYTES # BLD AUTO: 0.03 X10(3) UL (ref 0–1)
IMM GRANULOCYTES NFR BLD: 0.4 %
LYMPHOCYTES # BLD AUTO: 2.86 X10(3) UL (ref 1–4)
LYMPHOCYTES NFR BLD AUTO: 36.1 %
MCH RBC QN AUTO: 29.2 PG (ref 26–34)
MCHC RBC AUTO-ENTMCNC: 32.3 G/DL (ref 31–37)
MCV RBC AUTO: 90.5 FL
MONOCYTES # BLD AUTO: 0.76 X10(3) UL (ref 0.1–1)
MONOCYTES NFR BLD AUTO: 9.6 %
NEUTROPHILS # BLD AUTO: 4.09 X10 (3) UL (ref 1.5–7.7)
NEUTROPHILS # BLD AUTO: 4.09 X10(3) UL (ref 1.5–7.7)
NEUTROPHILS NFR BLD AUTO: 51.6 %
PLATELET # BLD AUTO: 245 10(3)UL (ref 150–450)
RBC # BLD AUTO: 4.72 X10(6)UL
WBC # BLD AUTO: 7.9 X10(3) UL (ref 4–11)

## 2022-10-19 PROCEDURE — 36415 COLL VENOUS BLD VENIPUNCTURE: CPT

## 2022-10-19 PROCEDURE — 85025 COMPLETE CBC W/AUTO DIFF WBC: CPT

## 2022-10-29 ENCOUNTER — APPOINTMENT (OUTPATIENT)
Dept: ULTRASOUND IMAGING | Facility: HOSPITAL | Age: 87
End: 2022-10-29
Attending: EMERGENCY MEDICINE
Payer: MEDICARE

## 2022-10-29 ENCOUNTER — HOSPITAL ENCOUNTER (EMERGENCY)
Facility: HOSPITAL | Age: 87
Discharge: HOME | End: 2022-10-29
Attending: EMERGENCY MEDICINE
Payer: MEDICARE

## 2022-10-29 VITALS
TEMPERATURE: 98 F | HEART RATE: 89 BPM | RESPIRATION RATE: 18 BRPM | OXYGEN SATURATION: 95 % | SYSTOLIC BLOOD PRESSURE: 174 MMHG | DIASTOLIC BLOOD PRESSURE: 85 MMHG

## 2022-10-29 DIAGNOSIS — I10 HYPERTENSION, BENIGN: ICD-10-CM

## 2022-10-29 DIAGNOSIS — E78.00 PURE HYPERCHOLESTEROLEMIA: ICD-10-CM

## 2022-10-29 DIAGNOSIS — M79.89 RIGHT LEG SWELLING: Primary | ICD-10-CM

## 2022-10-29 DIAGNOSIS — Z00.00 ROUTINE GENERAL MEDICAL EXAMINATION AT A HEALTH CARE FACILITY: ICD-10-CM

## 2022-10-29 LAB
ALBUMIN SERPL-MCNC: 3.2 G/DL (ref 3.4–5)
ALBUMIN/GLOB SERPL: 0.9 {RATIO} (ref 1–2)
ALP LIVER SERPL-CCNC: 65 U/L
ALT SERPL-CCNC: 14 U/L
ANION GAP SERPL CALC-SCNC: 6 MMOL/L (ref 0–18)
AST SERPL-CCNC: 10 U/L (ref 15–37)
BASOPHILS # BLD AUTO: 0.07 X10(3) UL (ref 0–0.2)
BASOPHILS NFR BLD AUTO: 1 %
BILIRUB SERPL-MCNC: 1 MG/DL (ref 0.1–2)
BUN BLD-MCNC: 16 MG/DL (ref 7–18)
CALCIUM BLD-MCNC: 9.1 MG/DL (ref 8.5–10.1)
CHLORIDE SERPL-SCNC: 111 MMOL/L (ref 98–112)
CHOLEST SERPL-MCNC: 231 MG/DL (ref ?–200)
CO2 SERPL-SCNC: 24 MMOL/L (ref 21–32)
CREAT BLD-MCNC: 0.82 MG/DL
EOSINOPHIL # BLD AUTO: 0.17 X10(3) UL (ref 0–0.7)
EOSINOPHIL NFR BLD AUTO: 2.5 %
ERYTHROCYTE [DISTWIDTH] IN BLOOD BY AUTOMATED COUNT: 14.6 %
GFR SERPLBLD BASED ON 1.73 SQ M-ARVRAT: 69 ML/MIN/1.73M2 (ref 60–?)
GLOBULIN PLAS-MCNC: 3.5 G/DL (ref 2.8–4.4)
GLUCOSE BLD-MCNC: 96 MG/DL (ref 70–99)
HCT VFR BLD AUTO: 40.1 %
HDLC SERPL-MCNC: 50 MG/DL (ref 40–59)
HGB BLD-MCNC: 13.1 G/DL
IMM GRANULOCYTES # BLD AUTO: 0.03 X10(3) UL (ref 0–1)
IMM GRANULOCYTES NFR BLD: 0.4 %
LDLC SERPL CALC-MCNC: 144 MG/DL (ref ?–100)
LYMPHOCYTES # BLD AUTO: 3.01 X10(3) UL (ref 1–4)
LYMPHOCYTES NFR BLD AUTO: 43.7 %
MCH RBC QN AUTO: 29.4 PG (ref 26–34)
MCHC RBC AUTO-ENTMCNC: 32.7 G/DL (ref 31–37)
MCV RBC AUTO: 90.1 FL
MONOCYTES # BLD AUTO: 0.7 X10(3) UL (ref 0.1–1)
MONOCYTES NFR BLD AUTO: 10.2 %
NEUTROPHILS # BLD AUTO: 2.91 X10 (3) UL (ref 1.5–7.7)
NEUTROPHILS # BLD AUTO: 2.91 X10(3) UL (ref 1.5–7.7)
NEUTROPHILS NFR BLD AUTO: 42.2 %
NONHDLC SERPL-MCNC: 181 MG/DL (ref ?–130)
OSMOLALITY SERPL CALC.SUM OF ELEC: 293 MOSM/KG (ref 275–295)
PLATELET # BLD AUTO: 254 10(3)UL (ref 150–450)
POTASSIUM SERPL-SCNC: 3.6 MMOL/L (ref 3.5–5.1)
PROT SERPL-MCNC: 6.7 G/DL (ref 6.4–8.2)
RBC # BLD AUTO: 4.45 X10(6)UL
SODIUM SERPL-SCNC: 141 MMOL/L (ref 136–145)
TRIGL SERPL-MCNC: 203 MG/DL (ref 30–149)
TSI SER-ACNC: 1.95 MIU/ML (ref 0.36–3.74)
VLDLC SERPL CALC-MCNC: 38 MG/DL (ref 0–30)
WBC # BLD AUTO: 6.9 X10(3) UL (ref 4–11)

## 2022-10-29 PROCEDURE — 99284 EMERGENCY DEPT VISIT MOD MDM: CPT

## 2022-10-29 PROCEDURE — 84443 ASSAY THYROID STIM HORMONE: CPT | Performed by: INTERNAL MEDICINE

## 2022-10-29 PROCEDURE — 80061 LIPID PANEL: CPT | Performed by: INTERNAL MEDICINE

## 2022-10-29 PROCEDURE — 36415 COLL VENOUS BLD VENIPUNCTURE: CPT | Performed by: INTERNAL MEDICINE

## 2022-10-29 PROCEDURE — 85025 COMPLETE CBC W/AUTO DIFF WBC: CPT | Performed by: EMERGENCY MEDICINE

## 2022-10-29 PROCEDURE — 36415 COLL VENOUS BLD VENIPUNCTURE: CPT

## 2022-10-29 PROCEDURE — 93970 EXTREMITY STUDY: CPT | Performed by: EMERGENCY MEDICINE

## 2022-10-29 PROCEDURE — 80053 COMPREHEN METABOLIC PANEL: CPT | Performed by: EMERGENCY MEDICINE

## 2022-10-29 NOTE — DISCHARGE INSTRUCTIONS
Follow-up with your primary care doctor as planned next week. Please review your labs with them, including her lipid panel. Return for any concerning symptoms.

## 2022-10-29 NOTE — ED INITIAL ASSESSMENT (HPI)
RLE swelling since last Sunday, states that it gets better when she wears a compression stocking.  Has since developed blisters, no pain, no SOB

## 2022-11-03 ENCOUNTER — PATIENT OUTREACH (OUTPATIENT)
Dept: CASE MANAGEMENT | Age: 87
End: 2022-11-03

## 2022-11-03 DIAGNOSIS — I10 HYPERTENSION, BENIGN: ICD-10-CM

## 2022-11-03 DIAGNOSIS — K58.0 IRRITABLE BOWEL SYNDROME WITH DIARRHEA: ICD-10-CM

## 2022-11-03 DIAGNOSIS — J45.40 MODERATE PERSISTENT ASTHMA WITHOUT COMPLICATION: ICD-10-CM

## 2022-11-03 DIAGNOSIS — M17.11 PRIMARY OSTEOARTHRITIS OF RIGHT KNEE: ICD-10-CM

## 2022-11-03 DIAGNOSIS — M47.816 OSTEOARTHRITIS OF SPINE WITHOUT MYELOPATHY OR RADICULOPATHY, LUMBAR REGION: ICD-10-CM

## 2022-11-03 DIAGNOSIS — M16.12 OSTEOARTHRITIS OF ONE HIP, LEFT: ICD-10-CM

## 2022-11-03 DIAGNOSIS — Z86.69 HX OF MIGRAINES: ICD-10-CM

## 2022-11-03 DIAGNOSIS — E78.5 DYSLIPIDEMIA: ICD-10-CM

## 2022-11-03 DIAGNOSIS — M17.12 PRIMARY OSTEOARTHRITIS OF LEFT KNEE: ICD-10-CM

## 2022-11-03 DIAGNOSIS — Z85.828 HISTORY OF SKIN CANCER: ICD-10-CM

## 2022-11-03 DIAGNOSIS — E78.00 PURE HYPERCHOLESTEROLEMIA: ICD-10-CM

## 2022-11-03 DIAGNOSIS — J42 CHRONIC BRONCHITIS, UNSPECIFIED CHRONIC BRONCHITIS TYPE (HCC): ICD-10-CM

## 2022-11-03 DIAGNOSIS — R19.7 DIARRHEA, UNSPECIFIED TYPE: ICD-10-CM

## 2022-11-04 ENCOUNTER — OFFICE VISIT (OUTPATIENT)
Dept: INTERNAL MEDICINE CLINIC | Facility: CLINIC | Age: 87
End: 2022-11-04
Payer: MEDICARE

## 2022-11-04 VITALS
RESPIRATION RATE: 18 BRPM | TEMPERATURE: 97 F | WEIGHT: 165.63 LBS | HEIGHT: 59.45 IN | DIASTOLIC BLOOD PRESSURE: 60 MMHG | OXYGEN SATURATION: 94 % | BODY MASS INDEX: 32.95 KG/M2 | SYSTOLIC BLOOD PRESSURE: 134 MMHG | HEART RATE: 96 BPM

## 2022-11-04 DIAGNOSIS — E78.5 DYSLIPIDEMIA: ICD-10-CM

## 2022-11-04 DIAGNOSIS — M81.0 SENILE OSTEOPOROSIS: ICD-10-CM

## 2022-11-04 DIAGNOSIS — Z98.890 S/P LUMBAR LAMINECTOMY: ICD-10-CM

## 2022-11-04 DIAGNOSIS — M16.12 OSTEOARTHRITIS OF ONE HIP, LEFT: ICD-10-CM

## 2022-11-04 DIAGNOSIS — G56.01 CARPAL TUNNEL SYNDROME OF RIGHT WRIST: ICD-10-CM

## 2022-11-04 DIAGNOSIS — I10 HYPERTENSION, BENIGN: ICD-10-CM

## 2022-11-04 DIAGNOSIS — J42 CHRONIC BRONCHITIS, UNSPECIFIED CHRONIC BRONCHITIS TYPE (HCC): ICD-10-CM

## 2022-11-04 DIAGNOSIS — K58.0 IRRITABLE BOWEL SYNDROME WITH DIARRHEA: ICD-10-CM

## 2022-11-04 DIAGNOSIS — Z00.00 ENCOUNTER FOR ANNUAL HEALTH EXAMINATION: Primary | ICD-10-CM

## 2022-11-04 DIAGNOSIS — F41.9 ANXIETY: ICD-10-CM

## 2022-11-04 DIAGNOSIS — I25.10 CORONARY ARTERY DISEASE INVOLVING NATIVE CORONARY ARTERY OF NATIVE HEART WITHOUT ANGINA PECTORIS: ICD-10-CM

## 2022-11-04 DIAGNOSIS — M17.12 PRIMARY OSTEOARTHRITIS OF LEFT KNEE: ICD-10-CM

## 2022-11-04 DIAGNOSIS — Z95.5 HISTORY OF HEART ARTERY STENT: ICD-10-CM

## 2022-11-04 DIAGNOSIS — M17.11 PRIMARY OSTEOARTHRITIS OF RIGHT KNEE: ICD-10-CM

## 2022-11-04 DIAGNOSIS — K86.89 PANCREATIC INSUFFICIENCY: ICD-10-CM

## 2022-11-04 DIAGNOSIS — M54.12 CERVICAL RADICULOPATHY AT C5: ICD-10-CM

## 2022-11-04 PROBLEM — Z85.828 HISTORY OF SKIN CANCER: Status: RESOLVED | Noted: 2019-09-13 | Resolved: 2022-11-04

## 2022-11-04 PROBLEM — M47.816 OSTEOARTHRITIS OF SPINE WITHOUT MYELOPATHY OR RADICULOPATHY, LUMBAR REGION: Status: RESOLVED | Noted: 2017-06-06 | Resolved: 2022-11-04

## 2022-11-04 PROBLEM — Z88.6 ALLERGY TO NONSTEROIDAL ANTI-INFLAMMATORY DRUG (NSAID): Status: RESOLVED | Noted: 2017-12-21 | Resolved: 2022-11-04

## 2022-11-04 PROBLEM — M21.41 ACQUIRED PES PLANUS OF RIGHT FOOT: Status: RESOLVED | Noted: 2018-06-30 | Resolved: 2022-11-04

## 2022-11-04 PROBLEM — Z98.1 STATUS POST LUMBAR SPINAL FUSION: Status: RESOLVED | Noted: 2019-09-13 | Resolved: 2022-11-04

## 2022-11-04 PROBLEM — R11.0 NAUSEA: Status: RESOLVED | Noted: 2019-12-16 | Resolved: 2022-11-04

## 2022-11-04 PROBLEM — R19.7 DIARRHEA: Status: RESOLVED | Noted: 2020-02-17 | Resolved: 2022-11-04

## 2022-11-04 PROCEDURE — 1126F AMNT PAIN NOTED NONE PRSNT: CPT | Performed by: INTERNAL MEDICINE

## 2022-11-04 PROCEDURE — G0439 PPPS, SUBSEQ VISIT: HCPCS | Performed by: INTERNAL MEDICINE

## 2022-11-07 ENCOUNTER — OFFICE VISIT (OUTPATIENT)
Facility: CLINIC | Age: 87
End: 2022-11-07
Payer: MEDICARE

## 2022-11-07 VITALS
SYSTOLIC BLOOD PRESSURE: 140 MMHG | BODY MASS INDEX: 32.59 KG/M2 | HEIGHT: 60 IN | RESPIRATION RATE: 16 BRPM | DIASTOLIC BLOOD PRESSURE: 70 MMHG | HEART RATE: 80 BPM | OXYGEN SATURATION: 98 % | WEIGHT: 166 LBS

## 2022-11-07 DIAGNOSIS — J45.30 MILD PERSISTENT ASTHMA WITHOUT COMPLICATION: Primary | ICD-10-CM

## 2022-11-07 RX ORDER — CLOTRIMAZOLE 10 MG/1
10 LOZENGE ORAL; TOPICAL
Qty: 25 TROCHE | Refills: 2 | Status: SHIPPED | OUTPATIENT
Start: 2022-11-07

## 2022-11-07 NOTE — PATIENT INSTRUCTIONS
-Plan:  -consider not wearing knee brace -   - elevate legs as much as possible  - call if getting worse -   - same pulmicort - rinse your mouth with water after using -   - begin lozenges for the thrush   - try to wean the prednisone - 2.5mg every other day   - see me in 6 months     Greyson Coelho MD  Pulmonary Medicine  11/7/2022

## 2022-11-14 ENCOUNTER — OFFICE VISIT (OUTPATIENT)
Dept: RHEUMATOLOGY | Facility: CLINIC | Age: 87
End: 2022-11-14
Payer: MEDICARE

## 2022-11-14 VITALS
BODY MASS INDEX: 32.59 KG/M2 | HEIGHT: 60 IN | SYSTOLIC BLOOD PRESSURE: 160 MMHG | WEIGHT: 166 LBS | OXYGEN SATURATION: 96 % | DIASTOLIC BLOOD PRESSURE: 70 MMHG

## 2022-11-14 DIAGNOSIS — Z98.890 S/P LUMBAR LAMINECTOMY: ICD-10-CM

## 2022-11-14 DIAGNOSIS — M17.12 PRIMARY OSTEOARTHRITIS OF LEFT KNEE: ICD-10-CM

## 2022-11-14 DIAGNOSIS — M17.11 PRIMARY OSTEOARTHRITIS OF RIGHT KNEE: Primary | ICD-10-CM

## 2022-11-14 RX ORDER — METHYLPREDNISOLONE ACETATE 40 MG/ML
40 INJECTION, SUSPENSION INTRA-ARTICULAR; INTRALESIONAL; INTRAMUSCULAR; SOFT TISSUE ONCE
Status: COMPLETED | OUTPATIENT
Start: 2022-11-14 | End: 2022-11-14

## 2022-11-14 RX ORDER — PREDNISONE 2.5 MG
2.5 TABLET ORAL DAILY
Qty: 90 TABLET | Refills: 3 | Status: CANCELLED
Start: 2022-11-14

## 2022-11-14 RX ADMIN — METHYLPREDNISOLONE ACETATE 40 MG: 40 INJECTION, SUSPENSION INTRA-ARTICULAR; INTRALESIONAL; INTRAMUSCULAR; SOFT TISSUE at 12:33:00

## 2022-11-14 NOTE — PATIENT INSTRUCTIONS
Continue prednisone at 2.5 mg/day for treatment of underlying arthritis and COPD. Intolerant of NSAIDs. For pain use extra strength Tylenol 500 mg 1 or 2 twice a day. Today right knee cortisone injection was given for right knee osteoarthritis. Go home and rest.  Apply ice if needed. Wear your right knee brace. For right ankle swelling, which is due to venous insufficiency, elevate your right leg. Wear support hose. Take an occasional water pill. Do not eat a lot of salt. Return to office for recheck 6 months.

## 2022-11-15 NOTE — PROCEDURES
After obtaining consent from the patient, the right knee was cleansed with Betadine alcohol wipes. Then the right knee was injected with 1 cc of 1% lidocaine and 40 mg of methylprednisolone. Patient tolerated the right knee injection without incident.

## 2022-11-18 ENCOUNTER — OFFICE VISIT (OUTPATIENT)
Dept: ORTHOPEDICS CLINIC | Facility: CLINIC | Age: 87
End: 2022-11-18
Payer: MEDICARE

## 2022-11-18 VITALS — HEART RATE: 77 BPM | OXYGEN SATURATION: 98 %

## 2022-11-18 DIAGNOSIS — M16.12 PRIMARY OSTEOARTHRITIS OF LEFT HIP: Primary | ICD-10-CM

## 2022-11-18 PROCEDURE — 20611 DRAIN/INJ JOINT/BURSA W/US: CPT | Performed by: ORTHOPAEDIC SURGERY

## 2022-11-18 RX ORDER — TRIAMCINOLONE ACETONIDE 40 MG/ML
40 INJECTION, SUSPENSION INTRA-ARTICULAR; INTRAMUSCULAR ONCE
Status: COMPLETED | OUTPATIENT
Start: 2022-11-18 | End: 2022-11-18

## 2022-11-18 RX ADMIN — TRIAMCINOLONE ACETONIDE 40 MG: 40 INJECTION, SUSPENSION INTRA-ARTICULAR; INTRAMUSCULAR at 11:40:00

## 2022-11-18 NOTE — PROCEDURES
After informed consent, the patient's left hip was marked, prepped with topical antiseptic, and locally anesthetized with skin refrigerant followed by injection of 1% lidocaine to create a skin wheal anteriorly at an insertion site a few fingerbreadths lateral to the femoral pulse, along the trajectory of the femoral neck. Next, the area was re-prepped with topical antiseptic, and ultrasound guidance was performed to direct a 20 gauge spinal needle into the hip joint at the junction of the femoral head and neck, after which a mixture of 1mL 40mg/mL Kenalog, 2mL 1% lidocaine and 2mL 0.5% marcaine was injected while visualizing the fluid under ultrasound. Confirmatory imaging was saved to the patient's chart. A band-aid was applied. The patient tolerated the procedure well. Counseled patient to monitor her response to the injection in order to help determine how much of her pain may be due to the hip versus an external source, as patient does report that it does not seem like it is her hip but more her back and posterior pelvis that is hurting.     Nicky Nicholson MD, [address] Orthopedic Surgery  Phone [phone]  Fax [phone]

## 2022-11-21 ENCOUNTER — TELEPHONE (OUTPATIENT)
Dept: ORTHOPEDICS CLINIC | Facility: CLINIC | Age: 87
End: 2022-11-21

## 2022-11-21 NOTE — TELEPHONE ENCOUNTER
Spoke with patient who stated that she wanted to let Dr. Tamara Griffin know she felt she had an allergic reaction to the injection she received on Friday. When asked what the symptoms were, she stated that she felt nauseous all day Saturday and was unable to eat. Pt denied any shortness of breath, chest pain, or rashes noted. Pt stated she felt better on Sunday, and was able to eat again. No vomiting/diarrhea noted. Pt given the names of the medications in the injection. Pt did also state that her hip felt better from the injection. Pt aware that Dr. Tamara Griffin will be made aware of this reaction, and if she has any further concerns to call us. Pt aware that if she has any new shortness of breath to go to her local ER or call 911. Pt verbalized understanding. No further questions at this time.

## 2022-11-21 NOTE — TELEPHONE ENCOUNTER
Patient called to report that she had an llergic reaction following injection on Friday. The injection did help the pain, however she seems allergic to something for pain. She said she thinks it was the \"1st shot. \"  She reports she was okay on Friday, but then on Saturday, she was very nauseous and could not eat all day. She reports being better yesterday. She would like to know what was given and this new allergy to be added to her list of allergies. Please call patient.

## 2022-12-10 ENCOUNTER — LAB ENCOUNTER (OUTPATIENT)
Dept: LAB | Facility: HOSPITAL | Age: 87
End: 2022-12-10
Attending: INTERNAL MEDICINE
Payer: MEDICARE

## 2022-12-10 DIAGNOSIS — Z20.822 ENCOUNTER FOR PREPROCEDURE SCREENING LABORATORY TESTING FOR COVID-19: ICD-10-CM

## 2022-12-10 DIAGNOSIS — Z01.812 ENCOUNTER FOR PREPROCEDURE SCREENING LABORATORY TESTING FOR COVID-19: ICD-10-CM

## 2022-12-12 ENCOUNTER — ANESTHESIA EVENT (OUTPATIENT)
Dept: ENDOSCOPY | Facility: HOSPITAL | Age: 87
End: 2022-12-12
Payer: MEDICARE

## 2022-12-12 LAB — SARS-COV-2 RNA RESP QL NAA+PROBE: NOT DETECTED

## 2022-12-13 ENCOUNTER — HOSPITAL ENCOUNTER (OUTPATIENT)
Facility: HOSPITAL | Age: 87
Setting detail: HOSPITAL OUTPATIENT SURGERY
Discharge: HOME | End: 2022-12-13
Attending: INTERNAL MEDICINE | Admitting: INTERNAL MEDICINE
Payer: MEDICARE

## 2022-12-13 ENCOUNTER — ANESTHESIA (OUTPATIENT)
Dept: ENDOSCOPY | Facility: HOSPITAL | Age: 87
End: 2022-12-13
Payer: MEDICARE

## 2022-12-13 VITALS
SYSTOLIC BLOOD PRESSURE: 156 MMHG | BODY MASS INDEX: 32.59 KG/M2 | HEIGHT: 60 IN | WEIGHT: 166 LBS | RESPIRATION RATE: 14 BRPM | OXYGEN SATURATION: 96 % | DIASTOLIC BLOOD PRESSURE: 67 MMHG | TEMPERATURE: 97 F | HEART RATE: 67 BPM

## 2022-12-13 DIAGNOSIS — Z20.822 ENCOUNTER FOR PREPROCEDURE SCREENING LABORATORY TESTING FOR COVID-19: ICD-10-CM

## 2022-12-13 DIAGNOSIS — K92.1 MELENA: ICD-10-CM

## 2022-12-13 DIAGNOSIS — Z01.812 ENCOUNTER FOR PREPROCEDURE SCREENING LABORATORY TESTING FOR COVID-19: ICD-10-CM

## 2022-12-13 PROCEDURE — 0DB58ZX EXCISION OF ESOPHAGUS, VIA NATURAL OR ARTIFICIAL OPENING ENDOSCOPIC, DIAGNOSTIC: ICD-10-PCS | Performed by: INTERNAL MEDICINE

## 2022-12-13 PROCEDURE — 88305 TISSUE EXAM BY PATHOLOGIST: CPT | Performed by: INTERNAL MEDICINE

## 2022-12-13 PROCEDURE — 0DB98ZX EXCISION OF DUODENUM, VIA NATURAL OR ARTIFICIAL OPENING ENDOSCOPIC, DIAGNOSTIC: ICD-10-PCS | Performed by: INTERNAL MEDICINE

## 2022-12-13 PROCEDURE — 0D738ZZ DILATION OF LOWER ESOPHAGUS, VIA NATURAL OR ARTIFICIAL OPENING ENDOSCOPIC: ICD-10-PCS | Performed by: INTERNAL MEDICINE

## 2022-12-13 PROCEDURE — 0DB78ZX EXCISION OF STOMACH, PYLORUS, VIA NATURAL OR ARTIFICIAL OPENING ENDOSCOPIC, DIAGNOSTIC: ICD-10-PCS | Performed by: INTERNAL MEDICINE

## 2022-12-13 RX ORDER — SODIUM CHLORIDE, SODIUM LACTATE, POTASSIUM CHLORIDE, CALCIUM CHLORIDE 600; 310; 30; 20 MG/100ML; MG/100ML; MG/100ML; MG/100ML
INJECTION, SOLUTION INTRAVENOUS CONTINUOUS
Status: DISCONTINUED | OUTPATIENT
Start: 2022-12-13 | End: 2022-12-13

## 2022-12-13 RX ORDER — LIDOCAINE HYDROCHLORIDE 10 MG/ML
INJECTION, SOLUTION EPIDURAL; INFILTRATION; INTRACAUDAL; PERINEURAL AS NEEDED
Status: DISCONTINUED | OUTPATIENT
Start: 2022-12-13 | End: 2022-12-13 | Stop reason: SURG

## 2022-12-13 RX ADMIN — LIDOCAINE HYDROCHLORIDE 25 MG: 10 INJECTION, SOLUTION EPIDURAL; INFILTRATION; INTRACAUDAL; PERINEURAL at 12:05:00

## 2022-12-13 RX ADMIN — SODIUM CHLORIDE, SODIUM LACTATE, POTASSIUM CHLORIDE, CALCIUM CHLORIDE: 600; 310; 30; 20 INJECTION, SOLUTION INTRAVENOUS at 12:02:00

## 2022-12-13 NOTE — ANESTHESIA POSTPROCEDURE EVALUATION
[id number] [facility] Patient Status:  Hospital Outpatient Surgery   Age/Gender 80year old female MRN [id number]   Location [address] [address] Attending Krystal Mccarty MD   Hosp Day # 0 PCP John Mckinney MD       Anesthesia Post-op Note    ESOPHAGOGASTRODUODENOSCOPY (EGD) with biopsies and balloon dilation to 18mm    Procedure Summary     Date: 12/13/22 Room / Location: [facility] ENDOSCOPY 02 / [facility] ENDOSCOPY    Anesthesia Start: 5094 Anesthesia Stop: 4274    Procedure: ESOPHAGOGASTRODUODENOSCOPY (EGD) with biopsies and balloon dilation to 18mm Diagnosis:       Melena      (hiatal hernia, esophageal stricture)    Surgeons: Krystal Mccarty MD Anesthesiologist: Kevin Pereira MD    Anesthesia Type: MAC ASA Status: 2          Anesthesia Type: MAC    Vitals Value Taken Time   /70 12/13/22 1220   Temp 98.0 12/13/22 1224   Pulse 72 12/13/22 1223   Resp 16 12/13/22 1224   SpO2 96 % 12/13/22 1223   Vitals shown include unvalidated device data. Patient Location: Endoscopy    Anesthesia Type: MAC    Airway Patency: patent    Postop Pain Control: adequate    Mental Status: mildly sedated but able to meaningfully participate in the post-anesthesia evaluation    Nausea/Vomiting: none    Cardiopulmonary/Hydration status: stable euvolemic    Complications: no apparent anesthesia related complications    Postop vital signs: stable    Dental Exam: Unchanged from Preop    Patient to be discharged home when criteria met.

## 2022-12-15 ENCOUNTER — TELEPHONE (OUTPATIENT)
Dept: INTERNAL MEDICINE CLINIC | Facility: CLINIC | Age: 87
End: 2022-12-15

## 2022-12-15 NOTE — TELEPHONE ENCOUNTER
Pt calling asking the name of dermatologist TB had given her in the past. Not Dr. Goran Maldonado though. Unble to locate information in pt's chart.

## 2022-12-15 NOTE — TELEPHONE ENCOUNTER
Please let pt know:    Kirstin Martin, [address] [facility] [phone]     Or    Dr. Rahman Public, Phone: [phone]

## 2022-12-15 NOTE — PROGRESS NOTES
Date: 12/15/2022    To: Lindsey Brown  :      I hope this letter finds you doing well. I am writing to inform you of the following: The biopsies obtained at the time of your recent endoscopic procedure were benign and showed no evidence of infection or malignancy. Please call the office at [phone] if there are any questions.     Krupa Bueno M.D.

## 2022-12-27 ENCOUNTER — TELEPHONE (OUTPATIENT)
Facility: CLINIC | Age: 87
End: 2022-12-27

## 2022-12-27 NOTE — TELEPHONE ENCOUNTER
Pt has arthritic knee issues - Dr. Maria G Camargo had suggested she may want to consider taking the brace off but she ended up needing a shot in her knee and it is too bad for surgery. She wanted to provide the update. Pt has Lupus and sees Rheumatologist - Dr. Maria G Camargo had advised she try to reduce her Prednisone but her Lupus physician wants her to stay on it. She wanted to make sure Dr. Maria G Camargo knows she didn't stop the medication as suggested.

## 2023-01-03 ENCOUNTER — PATIENT OUTREACH (OUTPATIENT)
Dept: CASE MANAGEMENT | Age: 88
End: 2023-01-03

## 2023-01-03 DIAGNOSIS — J42 CHRONIC BRONCHITIS, UNSPECIFIED CHRONIC BRONCHITIS TYPE (HCC): ICD-10-CM

## 2023-01-03 DIAGNOSIS — M16.12 OSTEOARTHRITIS OF ONE HIP, LEFT: ICD-10-CM

## 2023-01-03 DIAGNOSIS — I10 HYPERTENSION, BENIGN: ICD-10-CM

## 2023-01-03 DIAGNOSIS — M17.11 PRIMARY OSTEOARTHRITIS OF RIGHT KNEE: ICD-10-CM

## 2023-01-03 DIAGNOSIS — I25.10 CORONARY ARTERY DISEASE INVOLVING NATIVE CORONARY ARTERY OF NATIVE HEART WITHOUT ANGINA PECTORIS: ICD-10-CM

## 2023-01-03 DIAGNOSIS — M17.12 PRIMARY OSTEOARTHRITIS OF LEFT KNEE: ICD-10-CM

## 2023-01-03 DIAGNOSIS — E78.00 PURE HYPERCHOLESTEROLEMIA: ICD-10-CM

## 2023-01-03 DIAGNOSIS — E78.5 DYSLIPIDEMIA: ICD-10-CM

## 2023-01-03 DIAGNOSIS — F41.9 ANXIETY: ICD-10-CM

## 2023-01-03 DIAGNOSIS — M81.0 AGE-RELATED OSTEOPOROSIS WITHOUT CURRENT PATHOLOGICAL FRACTURE: ICD-10-CM

## 2023-01-04 ENCOUNTER — LAB ENCOUNTER (OUTPATIENT)
Dept: LAB | Facility: HOSPITAL | Age: 88
End: 2023-01-04
Payer: MEDICARE

## 2023-01-04 DIAGNOSIS — K92.1 MELENA: ICD-10-CM

## 2023-01-04 LAB
BASOPHILS # BLD AUTO: 0.07 X10(3) UL (ref 0–0.2)
BASOPHILS NFR BLD AUTO: 0.8 %
EOSINOPHIL # BLD AUTO: 0.07 X10(3) UL (ref 0–0.7)
EOSINOPHIL NFR BLD AUTO: 0.8 %
ERYTHROCYTE [DISTWIDTH] IN BLOOD BY AUTOMATED COUNT: 14.6 %
HCT VFR BLD AUTO: 42.5 %
HGB BLD-MCNC: 14 G/DL
IMM GRANULOCYTES # BLD AUTO: 0.04 X10(3) UL (ref 0–1)
IMM GRANULOCYTES NFR BLD: 0.4 %
LYMPHOCYTES # BLD AUTO: 2.49 X10(3) UL (ref 1–4)
LYMPHOCYTES NFR BLD AUTO: 27.2 %
MCH RBC QN AUTO: 29.5 PG (ref 26–34)
MCHC RBC AUTO-ENTMCNC: 32.9 G/DL (ref 31–37)
MCV RBC AUTO: 89.5 FL
MONOCYTES # BLD AUTO: 0.65 X10(3) UL (ref 0.1–1)
MONOCYTES NFR BLD AUTO: 7.1 %
NEUTROPHILS # BLD AUTO: 5.84 X10 (3) UL (ref 1.5–7.7)
NEUTROPHILS # BLD AUTO: 5.84 X10(3) UL (ref 1.5–7.7)
NEUTROPHILS NFR BLD AUTO: 63.7 %
PLATELET # BLD AUTO: 275 10(3)UL (ref 150–450)
RBC # BLD AUTO: 4.75 X10(6)UL
WBC # BLD AUTO: 9.2 X10(3) UL (ref 4–11)

## 2023-01-04 PROCEDURE — 85025 COMPLETE CBC W/AUTO DIFF WBC: CPT

## 2023-01-04 PROCEDURE — 36415 COLL VENOUS BLD VENIPUNCTURE: CPT

## 2023-02-03 ENCOUNTER — TELEPHONE (OUTPATIENT)
Dept: INTERNAL MEDICINE CLINIC | Facility: CLINIC | Age: 88
End: 2023-02-03

## 2023-02-06 ENCOUNTER — PATIENT OUTREACH (OUTPATIENT)
Dept: CASE MANAGEMENT | Age: 88
End: 2023-02-06

## 2023-02-17 ENCOUNTER — OFFICE VISIT (OUTPATIENT)
Dept: INTERNAL MEDICINE CLINIC | Facility: CLINIC | Age: 88
End: 2023-02-17
Payer: MEDICARE

## 2023-02-17 VITALS
BODY MASS INDEX: 31.84 KG/M2 | SYSTOLIC BLOOD PRESSURE: 140 MMHG | OXYGEN SATURATION: 96 % | HEART RATE: 88 BPM | DIASTOLIC BLOOD PRESSURE: 82 MMHG | HEIGHT: 60 IN | WEIGHT: 162.19 LBS | RESPIRATION RATE: 18 BRPM

## 2023-02-17 DIAGNOSIS — K22.2 ESOPHAGEAL STRICTURE: ICD-10-CM

## 2023-02-17 DIAGNOSIS — K58.0 IRRITABLE BOWEL SYNDROME WITH DIARRHEA: ICD-10-CM

## 2023-02-17 DIAGNOSIS — Z88.9 MULTIPLE DRUG ALLERGIES: ICD-10-CM

## 2023-02-17 DIAGNOSIS — R53.83 OTHER FATIGUE: ICD-10-CM

## 2023-02-17 DIAGNOSIS — K52.9 CHRONIC DIARRHEA: Primary | ICD-10-CM

## 2023-02-17 DIAGNOSIS — E55.9 VITAMIN D DEFICIENCY: ICD-10-CM

## 2023-02-17 DIAGNOSIS — K86.89 PANCREATIC INSUFFICIENCY: ICD-10-CM

## 2023-02-17 PROCEDURE — 99214 OFFICE O/P EST MOD 30 MIN: CPT | Performed by: FAMILY MEDICINE

## 2023-02-18 ENCOUNTER — LAB ENCOUNTER (OUTPATIENT)
Dept: LAB | Facility: HOSPITAL | Age: 88
End: 2023-02-18
Attending: INTERNAL MEDICINE
Payer: MEDICARE

## 2023-02-18 DIAGNOSIS — I25.10 CORONARY ATHEROSCLEROSIS OF NATIVE CORONARY ARTERY: Primary | ICD-10-CM

## 2023-02-18 DIAGNOSIS — E55.9 VITAMIN D DEFICIENCY: ICD-10-CM

## 2023-02-18 DIAGNOSIS — I10 ESSENTIAL HYPERTENSION, MALIGNANT: ICD-10-CM

## 2023-02-18 DIAGNOSIS — E78.5 HYPERLIPEMIA: ICD-10-CM

## 2023-02-18 DIAGNOSIS — R53.83 OTHER FATIGUE: ICD-10-CM

## 2023-02-18 DIAGNOSIS — K52.9 CHRONIC DIARRHEA: ICD-10-CM

## 2023-02-18 LAB
BASOPHILS # BLD AUTO: 0.07 X10(3) UL (ref 0–0.2)
BASOPHILS NFR BLD AUTO: 1 %
CHOLEST SERPL-MCNC: 246 MG/DL (ref ?–200)
EOSINOPHIL # BLD AUTO: 0.19 X10(3) UL (ref 0–0.7)
EOSINOPHIL NFR BLD AUTO: 2.7 %
ERYTHROCYTE [DISTWIDTH] IN BLOOD BY AUTOMATED COUNT: 14.8 %
FASTING PATIENT LIPID ANSWER: YES
HCT VFR BLD AUTO: 43.4 %
HDLC SERPL-MCNC: 55 MG/DL (ref 40–59)
HGB BLD-MCNC: 14 G/DL
IMM GRANULOCYTES # BLD AUTO: 0.03 X10(3) UL (ref 0–1)
IMM GRANULOCYTES NFR BLD: 0.4 %
LDLC SERPL CALC-MCNC: 149 MG/DL (ref ?–100)
LYMPHOCYTES # BLD AUTO: 2.48 X10(3) UL (ref 1–4)
LYMPHOCYTES NFR BLD AUTO: 34.8 %
MCH RBC QN AUTO: 28.7 PG (ref 26–34)
MCHC RBC AUTO-ENTMCNC: 32.3 G/DL (ref 31–37)
MCV RBC AUTO: 89.1 FL
MONOCYTES # BLD AUTO: 0.64 X10(3) UL (ref 0.1–1)
MONOCYTES NFR BLD AUTO: 9 %
NEUTROPHILS # BLD AUTO: 3.71 X10 (3) UL (ref 1.5–7.7)
NEUTROPHILS # BLD AUTO: 3.71 X10(3) UL (ref 1.5–7.7)
NEUTROPHILS NFR BLD AUTO: 52.1 %
NONHDLC SERPL-MCNC: 191 MG/DL (ref ?–130)
PLATELET # BLD AUTO: 290 10(3)UL (ref 150–450)
RBC # BLD AUTO: 4.87 X10(6)UL
TRIGL SERPL-MCNC: 234 MG/DL (ref 30–149)
VIT B12 SERPL-MCNC: 1800 PG/ML (ref 193–986)
VIT D+METAB SERPL-MCNC: 24.4 NG/ML (ref 30–100)
VLDLC SERPL CALC-MCNC: 45 MG/DL (ref 0–30)
WBC # BLD AUTO: 7.1 X10(3) UL (ref 4–11)

## 2023-02-18 PROCEDURE — 85025 COMPLETE CBC W/AUTO DIFF WBC: CPT

## 2023-02-18 PROCEDURE — 87493 C DIFF AMPLIFIED PROBE: CPT

## 2023-02-18 PROCEDURE — 36415 COLL VENOUS BLD VENIPUNCTURE: CPT

## 2023-02-18 PROCEDURE — 82306 VITAMIN D 25 HYDROXY: CPT

## 2023-02-18 PROCEDURE — 80061 LIPID PANEL: CPT

## 2023-02-18 PROCEDURE — 82607 VITAMIN B-12: CPT

## 2023-02-20 ENCOUNTER — TELEPHONE (OUTPATIENT)
Dept: INTERNAL MEDICINE CLINIC | Facility: CLINIC | Age: 88
End: 2023-02-20

## 2023-02-20 DIAGNOSIS — E55.9 VITAMIN D DEFICIENCY: Primary | ICD-10-CM

## 2023-02-20 LAB — C DIFF TOX B STL QL: NEGATIVE

## 2023-02-20 RX ORDER — ERGOCALCIFEROL 1.25 MG/1
50000 CAPSULE ORAL WEEKLY
Qty: 4 CAPSULE | Refills: 1 | Status: SHIPPED | OUTPATIENT
Start: 2023-02-20 | End: 2023-04-21

## 2023-02-20 NOTE — TELEPHONE ENCOUNTER
Called and spoke to pt. Advised to start taking weekly high dose for 8 weeks then resume 2000IU vit D3 daily. Rpt vit D in 3 mos. Cut b12 dose in half given level is high. CBC stable and C Diff neg. Pt stated understanding and agreed to plan. Rpt vit D ordered.

## 2023-02-20 NOTE — TELEPHONE ENCOUNTER
Vit d deficiency. Will begin weekly high dose therapy for 8 weeks then resume 2000 international units daily and recheck in 3 months. This may be contributing to her fatigue. If she is on a B12 supplement, she can half the dose as her level is high. Her CBC is stable and her C.diff is negative.

## 2023-02-21 RX ORDER — ERGOCALCIFEROL 1.25 MG/1
CAPSULE ORAL
Qty: 12 CAPSULE | Refills: 0 | OUTPATIENT
Start: 2023-02-21

## 2023-03-03 ENCOUNTER — TELEPHONE (OUTPATIENT)
Dept: INTERNAL MEDICINE CLINIC | Facility: CLINIC | Age: 88
End: 2023-03-03

## 2023-03-03 NOTE — TELEPHONE ENCOUNTER
Patient states she tested over the weekend and was positive for COVID, now she has a cough, fever 101,hoarse, cough is wet. Pt states she was going to go to there ER today however the EMT's told her there was an 8 hours wait at [facility] so she didn't go. Pt notified to go to the ER for evaluation , states she will talk to her dtr. Pt notified she will need to go to the ER if she has any sob, cp, fever that she cannot control, dizziness, lightheadedness. Pt verbalizes understanding. LOV 11/4/22 with TB. FYI to TB.

## 2023-03-03 NOTE — TELEPHONE ENCOUNTER
Pt calling to ask for clinical advice. Since the weekend she has come down with a cough, head congestion and fever today. She called 911 earlier today who came and told her there was an 8 hour wait so she didn't go. She is still not feeling good and not sure what to do. NO SOB, no chest pain.

## 2023-03-13 ENCOUNTER — OFFICE VISIT (OUTPATIENT)
Dept: INTERNAL MEDICINE CLINIC | Facility: CLINIC | Age: 88
End: 2023-03-13
Payer: MEDICARE

## 2023-03-13 ENCOUNTER — HOSPITAL ENCOUNTER (OUTPATIENT)
Dept: GENERAL RADIOLOGY | Age: 88
Discharge: HOME | End: 2023-03-13
Attending: INTERNAL MEDICINE
Payer: MEDICARE

## 2023-03-13 VITALS
WEIGHT: 161.19 LBS | SYSTOLIC BLOOD PRESSURE: 130 MMHG | RESPIRATION RATE: 16 BRPM | BODY MASS INDEX: 31.64 KG/M2 | OXYGEN SATURATION: 95 % | TEMPERATURE: 97 F | HEIGHT: 60 IN | HEART RATE: 84 BPM | DIASTOLIC BLOOD PRESSURE: 76 MMHG

## 2023-03-13 DIAGNOSIS — J06.9 VIRAL URI WITH COUGH: ICD-10-CM

## 2023-03-13 DIAGNOSIS — J45.30 MILD PERSISTENT ASTHMA WITHOUT COMPLICATION: ICD-10-CM

## 2023-03-13 DIAGNOSIS — J06.9 VIRAL URI WITH COUGH: Primary | ICD-10-CM

## 2023-03-13 PROCEDURE — 71046 X-RAY EXAM CHEST 2 VIEWS: CPT | Performed by: INTERNAL MEDICINE

## 2023-03-13 RX ORDER — ALBUTEROL SULFATE 90 UG/1
1 AEROSOL, METERED RESPIRATORY (INHALATION) EVERY 6 HOURS PRN
Qty: 1 EACH | Refills: 0 | Status: SHIPPED | OUTPATIENT
Start: 2023-03-13 | End: 2023-03-13

## 2023-03-13 RX ORDER — LEVALBUTEROL TARTRATE 45 UG/1
1 AEROSOL, METERED ORAL EVERY 6 HOURS PRN
Qty: 1 EACH | Refills: 0 | Status: SHIPPED | OUTPATIENT
Start: 2023-03-13

## 2023-03-13 RX ORDER — BENZONATATE 100 MG/1
100 CAPSULE ORAL 3 TIMES DAILY PRN
Qty: 30 CAPSULE | Refills: 0 | Status: SHIPPED | OUTPATIENT
Start: 2023-03-13

## 2023-03-13 RX ORDER — LEVALBUTEROL TARTRATE 45 UG/1
1 AEROSOL, METERED ORAL EVERY 6 HOURS PRN
Qty: 1 EACH | Refills: 0 | Status: SHIPPED | OUTPATIENT
Start: 2023-03-13 | End: 2023-03-13

## 2023-03-22 ENCOUNTER — APPOINTMENT (OUTPATIENT)
Dept: CT IMAGING | Facility: HOSPITAL | Age: 88
End: 2023-03-22
Attending: EMERGENCY MEDICINE
Payer: MEDICARE

## 2023-03-22 ENCOUNTER — HOSPITAL ENCOUNTER (EMERGENCY)
Facility: HOSPITAL | Age: 88
Discharge: HOME | End: 2023-03-22
Attending: EMERGENCY MEDICINE
Payer: MEDICARE

## 2023-03-22 VITALS
WEIGHT: 161.19 LBS | OXYGEN SATURATION: 92 % | TEMPERATURE: 97 F | RESPIRATION RATE: 22 BRPM | DIASTOLIC BLOOD PRESSURE: 62 MMHG | SYSTOLIC BLOOD PRESSURE: 131 MMHG | HEIGHT: 60 IN | BODY MASS INDEX: 31.64 KG/M2 | HEART RATE: 90 BPM

## 2023-03-22 DIAGNOSIS — R11.0 NAUSEA: ICD-10-CM

## 2023-03-22 DIAGNOSIS — S06.0X0A CONCUSSION WITHOUT LOSS OF CONSCIOUSNESS, INITIAL ENCOUNTER: Primary | ICD-10-CM

## 2023-03-22 LAB
ALBUMIN SERPL-MCNC: 3 G/DL (ref 3.4–5)
ALBUMIN/GLOB SERPL: 0.7 {RATIO} (ref 1–2)
ALP LIVER SERPL-CCNC: 67 U/L
ALT SERPL-CCNC: 35 U/L
ANION GAP SERPL CALC-SCNC: 6 MMOL/L (ref 0–18)
BASOPHILS # BLD AUTO: 0.07 X10(3) UL (ref 0–0.2)
BASOPHILS NFR BLD AUTO: 0.9 %
BILIRUB SERPL-MCNC: 0.9 MG/DL (ref 0.1–2)
BILIRUB UR QL STRIP.AUTO: NEGATIVE
BUN BLD-MCNC: 14 MG/DL (ref 7–18)
CALCIUM BLD-MCNC: 9 MG/DL (ref 8.5–10.1)
CHLORIDE SERPL-SCNC: 110 MMOL/L (ref 98–112)
CLARITY UR REFRACT.AUTO: CLEAR
CO2 SERPL-SCNC: 21 MMOL/L (ref 21–32)
COLOR UR AUTO: YELLOW
CREAT BLD-MCNC: 0.76 MG/DL
EOSINOPHIL # BLD AUTO: 0.18 X10(3) UL (ref 0–0.7)
EOSINOPHIL NFR BLD AUTO: 2.2 %
ERYTHROCYTE [DISTWIDTH] IN BLOOD BY AUTOMATED COUNT: 14.5 %
GFR SERPLBLD BASED ON 1.73 SQ M-ARVRAT: 75 ML/MIN/1.73M2 (ref 60–?)
GLOBULIN PLAS-MCNC: 4.3 G/DL (ref 2.8–4.4)
GLUCOSE BLD-MCNC: 92 MG/DL (ref 70–99)
GLUCOSE UR STRIP.AUTO-MCNC: NEGATIVE MG/DL
HCT VFR BLD AUTO: 44.4 %
HGB BLD-MCNC: 14.3 G/DL
IMM GRANULOCYTES # BLD AUTO: 0.04 X10(3) UL (ref 0–1)
IMM GRANULOCYTES NFR BLD: 0.5 %
KETONES UR STRIP.AUTO-MCNC: NEGATIVE MG/DL
LYMPHOCYTES # BLD AUTO: 3.75 X10(3) UL (ref 1–4)
LYMPHOCYTES NFR BLD AUTO: 46.8 %
MCH RBC QN AUTO: 28.4 PG (ref 26–34)
MCHC RBC AUTO-ENTMCNC: 32.2 G/DL (ref 31–37)
MCV RBC AUTO: 88.3 FL
MONOCYTES # BLD AUTO: 0.85 X10(3) UL (ref 0.1–1)
MONOCYTES NFR BLD AUTO: 10.6 %
NEUTROPHILS # BLD AUTO: 3.12 X10 (3) UL (ref 1.5–7.7)
NEUTROPHILS # BLD AUTO: 3.12 X10(3) UL (ref 1.5–7.7)
NEUTROPHILS NFR BLD AUTO: 39 %
NITRITE UR QL STRIP.AUTO: NEGATIVE
OSMOLALITY SERPL CALC.SUM OF ELEC: 284 MOSM/KG (ref 275–295)
PH UR STRIP.AUTO: 5 [PH] (ref 5–8)
PLATELET # BLD AUTO: 228 10(3)UL (ref 150–450)
PROT SERPL-MCNC: 7.3 G/DL (ref 6.4–8.2)
PROT UR STRIP.AUTO-MCNC: NEGATIVE MG/DL
RBC # BLD AUTO: 5.03 X10(6)UL
RBC UR QL AUTO: NEGATIVE
SODIUM SERPL-SCNC: 137 MMOL/L (ref 136–145)
SP GR UR STRIP.AUTO: 1.01 (ref 1–1.03)
UROBILINOGEN UR STRIP.AUTO-MCNC: <2 MG/DL
WBC # BLD AUTO: 8 X10(3) UL (ref 4–11)

## 2023-03-22 PROCEDURE — 87086 URINE CULTURE/COLONY COUNT: CPT | Performed by: EMERGENCY MEDICINE

## 2023-03-22 PROCEDURE — 87186 SC STD MICRODIL/AGAR DIL: CPT | Performed by: EMERGENCY MEDICINE

## 2023-03-22 PROCEDURE — 99285 EMERGENCY DEPT VISIT HI MDM: CPT

## 2023-03-22 PROCEDURE — 93010 ELECTROCARDIOGRAM REPORT: CPT

## 2023-03-22 PROCEDURE — 81001 URINALYSIS AUTO W/SCOPE: CPT | Performed by: EMERGENCY MEDICINE

## 2023-03-22 PROCEDURE — 87077 CULTURE AEROBIC IDENTIFY: CPT | Performed by: EMERGENCY MEDICINE

## 2023-03-22 PROCEDURE — 70450 CT HEAD/BRAIN W/O DYE: CPT | Performed by: EMERGENCY MEDICINE

## 2023-03-22 PROCEDURE — 96361 HYDRATE IV INFUSION ADD-ON: CPT

## 2023-03-22 PROCEDURE — 80053 COMPREHEN METABOLIC PANEL: CPT | Performed by: EMERGENCY MEDICINE

## 2023-03-22 PROCEDURE — 93005 ELECTROCARDIOGRAM TRACING: CPT

## 2023-03-22 PROCEDURE — 96374 THER/PROPH/DIAG INJ IV PUSH: CPT

## 2023-03-22 PROCEDURE — 85025 COMPLETE CBC W/AUTO DIFF WBC: CPT | Performed by: EMERGENCY MEDICINE

## 2023-03-22 PROCEDURE — 74176 CT ABD & PELVIS W/O CONTRAST: CPT | Performed by: EMERGENCY MEDICINE

## 2023-03-22 RX ORDER — ONDANSETRON 2 MG/ML
4 INJECTION INTRAMUSCULAR; INTRAVENOUS ONCE
Status: COMPLETED | OUTPATIENT
Start: 2023-03-22 | End: 2023-03-22

## 2023-03-22 NOTE — ED INITIAL ASSESSMENT (HPI)
Patient reports she fell one week ago and states since then she has been getting progressively more and more nauseated. When she fell last week, states she did hit her head.  No loc

## 2023-03-22 NOTE — ED QUICK NOTES
[facility] contacted for a [employer]. Service instructed to make sure patient has her walker prior to leaving the premises.

## 2023-03-22 NOTE — ED QUICK NOTES
Pt awake and alert, skin w/d,resps reg/unlabored. Pt aware of need for urine sample and believes she can void per bedpan.

## 2023-03-22 NOTE — ED QUICK NOTES
Pt attempting to void per bedpan but unable. Pt noted to have HR up to 126 with attempting to void. Pt denies palpitations or other complaints at this time. Pt appears NSR on monitor. When bedpan removed due to pt unable to void HR back down to 98. Discussed again with pt the option to straight catheterize in order to get urine and pt in agreement.

## 2023-03-22 NOTE — DISCHARGE INSTRUCTIONS
It is possible that your nausea is associated with your recent head injury. This may be symptoms of concussion. CT of your brain does not show any significant traumatic injury. I recommend that you rest and you can take over-the-counter pain medications if you are having headache. You should follow-up with your primary care doctor for reevaluation.

## 2023-03-22 NOTE — ED QUICK NOTES
Pt lying on cart with eyes closed but easily aroused, skin w/d,resps reg/unlabored. Pt appears comfortable and is aware of plan of care. Pt given additional warm blanket.

## 2023-03-22 NOTE — ED QUICK NOTES
Pt assisted with dressing and assisted to wheelchair. Gait slow but steady with assist. Pt has walker at home. medicar called at this time. Pt moved to payne in wheelchair. Pt given sandwich and juice. medicar to be here in 30 minutes. Pt aware.

## 2023-03-23 ENCOUNTER — PATIENT OUTREACH (OUTPATIENT)
Dept: CASE MANAGEMENT | Age: 88
End: 2023-03-23

## 2023-03-23 LAB
ATRIAL RATE: 76 BPM
P AXIS: 61 DEGREES
P-R INTERVAL: 160 MS
Q-T INTERVAL: 416 MS
QRS DURATION: 134 MS
QTC CALCULATION (BEZET): 468 MS
R AXIS: -33 DEGREES
T AXIS: 111 DEGREES
VENTRICULAR RATE: 76 BPM

## 2023-03-30 ENCOUNTER — OFFICE VISIT (OUTPATIENT)
Dept: INTERNAL MEDICINE CLINIC | Facility: CLINIC | Age: 88
End: 2023-03-30
Payer: MEDICARE

## 2023-03-30 VITALS
SYSTOLIC BLOOD PRESSURE: 128 MMHG | HEART RATE: 60 BPM | DIASTOLIC BLOOD PRESSURE: 76 MMHG | WEIGHT: 159.63 LBS | BODY MASS INDEX: 31 KG/M2
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
## 2023-03-30 DIAGNOSIS — R82.79 URINE CULTURE POSITIVE: ICD-10-CM

## 2023-03-30 DIAGNOSIS — K52.9 CHRONIC DIARRHEA: ICD-10-CM

## 2023-03-30 DIAGNOSIS — S06.0X0D CONCUSSION WITHOUT LOSS OF CONSCIOUSNESS, SUBSEQUENT ENCOUNTER: Primary | ICD-10-CM

## 2023-03-30 LAB
APPEARANCE: CLEAR
BILIRUBIN: NEGATIVE
GLUCOSE (URINE DIPSTICK): NEGATIVE MG/DL
KETONES (URINE DIPSTICK): NEGATIVE MG/DL
MULTISTIX LOT#: ABNORMAL NUMERIC
NITRITE, URINE: NEGATIVE
OCCULT BLOOD: NEGATIVE
PH, URINE: 5.5 (ref 4.5–8)
PROTEIN (URINE DIPSTICK): NEGATIVE MG/DL
SPECIFIC GRAVITY: 1 (ref 1–1.03)
URINE-COLOR: YELLOW
UROBILINOGEN,SEMI-QN: 0.2 MG/DL (ref 0–1.9)

## 2023-03-30 PROCEDURE — 81003 URINALYSIS AUTO W/O SCOPE: CPT | Performed by: PHYSICIAN ASSISTANT

## 2023-03-30 PROCEDURE — 99214 OFFICE O/P EST MOD 30 MIN: CPT | Performed by: PHYSICIAN ASSISTANT

## 2023-03-30 PROCEDURE — 87088 URINE BACTERIA CULTURE: CPT | Performed by: PHYSICIAN ASSISTANT

## 2023-03-30 PROCEDURE — 87186 SC STD MICRODIL/AGAR DIL: CPT | Performed by: PHYSICIAN ASSISTANT

## 2023-03-30 PROCEDURE — 87086 URINE CULTURE/COLONY COUNT: CPT | Performed by: PHYSICIAN ASSISTANT

## 2023-04-03 ENCOUNTER — TELEPHONE (OUTPATIENT)
Dept: INTERNAL MEDICINE CLINIC | Facility: CLINIC | Age: 88
End: 2023-04-03

## 2023-04-03 NOTE — TELEPHONE ENCOUNTER
Patient notified per AD instruction that he recommends continuing s/s observation, contact us with persistence or worsening of s/s and referred to Dr Sheila Barker Urology and [facility], info given to schedule asap. Pt verbalizes understanding.

## 2023-04-03 NOTE — TELEPHONE ENCOUNTER
In light of history of c diff, current diarrhea, and allergy profile, I would only recommend to treat for compelling symptoms. I recommend continuing symptom observation and contacting us with persistence or worsening. Patient referred to urology service; recommend to schedule ASAP.

## 2023-04-03 NOTE — TELEPHONE ENCOUNTER
Pt spoke w/AD yesterday about UA results and symptoms-she forgot to tell him that she does still have frequency.  Looks like we are still awaiting full results of UA

## 2023-04-11 RX ORDER — FLUTICASONE PROPIONATE 220 UG/1
AEROSOL, METERED RESPIRATORY (INHALATION)
Qty: 36 G | Refills: 1 | Status: SHIPPED | OUTPATIENT
Start: 2023-04-11

## 2023-04-20 ENCOUNTER — OFFICE VISIT (OUTPATIENT)
Dept: SURGERY | Facility: CLINIC | Age: 88
End: 2023-04-20

## 2023-04-20 DIAGNOSIS — R35.1 NOCTURIA: ICD-10-CM

## 2023-04-20 DIAGNOSIS — R82.71 BACTERIURIA: Primary | ICD-10-CM

## 2023-04-20 DIAGNOSIS — R82.90 URINE FINDING: ICD-10-CM

## 2023-04-20 LAB
APPEARANCE: CLEAR
BILIRUBIN: NEGATIVE
GLUCOSE (URINE DIPSTICK): NEGATIVE MG/DL
KETONES (URINE DIPSTICK): NEGATIVE MG/DL
MULTISTIX LOT#: ABNORMAL NUMERIC
NITRITE, URINE: NEGATIVE
OCCULT BLOOD: NEGATIVE
PH, URINE: 5.5 (ref 4.5–8)
PROTEIN (URINE DIPSTICK): NEGATIVE MG/DL
SPECIFIC GRAVITY: 1.03 (ref 1–1.03)
URINE-COLOR: YELLOW
UROBILINOGEN,SEMI-QN: 0.2 MG/DL (ref 0–1.9)

## 2023-04-20 PROCEDURE — 81003 URINALYSIS AUTO W/O SCOPE: CPT | Performed by: PHYSICIAN ASSISTANT

## 2023-04-20 PROCEDURE — 99203 OFFICE O/P NEW LOW 30 MIN: CPT | Performed by: PHYSICIAN ASSISTANT

## 2023-04-26 ENCOUNTER — TELEPHONE (OUTPATIENT)
Dept: SURGERY | Facility: CLINIC | Age: 88
End: 2023-04-26

## 2023-04-26 ENCOUNTER — TELEPHONE (OUTPATIENT)
Dept: INTERNAL MEDICINE CLINIC | Facility: CLINIC | Age: 88
End: 2023-04-26

## 2023-04-26 ENCOUNTER — PATIENT OUTREACH (OUTPATIENT)
Dept: CASE MANAGEMENT | Age: 88
End: 2023-04-26

## 2023-04-26 DIAGNOSIS — E78.00 PURE HYPERCHOLESTEROLEMIA: ICD-10-CM

## 2023-04-26 DIAGNOSIS — I25.10 CORONARY ARTERY DISEASE INVOLVING NATIVE CORONARY ARTERY OF NATIVE HEART WITHOUT ANGINA PECTORIS: ICD-10-CM

## 2023-04-26 DIAGNOSIS — M81.0 AGE-RELATED OSTEOPOROSIS WITHOUT CURRENT PATHOLOGICAL FRACTURE: ICD-10-CM

## 2023-04-26 DIAGNOSIS — M17.11 PRIMARY OSTEOARTHRITIS OF RIGHT KNEE: ICD-10-CM

## 2023-04-26 DIAGNOSIS — I10 HYPERTENSION, BENIGN: ICD-10-CM

## 2023-04-26 DIAGNOSIS — E78.5 DYSLIPIDEMIA: ICD-10-CM

## 2023-04-26 DIAGNOSIS — M16.12 OSTEOARTHRITIS OF ONE HIP, LEFT: ICD-10-CM

## 2023-04-26 DIAGNOSIS — F41.9 ANXIETY: ICD-10-CM

## 2023-04-26 DIAGNOSIS — M17.12 PRIMARY OSTEOARTHRITIS OF LEFT KNEE: ICD-10-CM

## 2023-04-26 DIAGNOSIS — J42 CHRONIC BRONCHITIS, UNSPECIFIED CHRONIC BRONCHITIS TYPE (HCC): ICD-10-CM

## 2023-04-26 DIAGNOSIS — K86.89 PANCREATIC INSUFFICIENCY: ICD-10-CM

## 2023-04-26 RX ORDER — VERAPAMIL HYDROCHLORIDE 240 MG/1
CAPSULE, EXTENDED RELEASE ORAL
COMMUNITY
Start: 2023-02-23 | End: 2023-04-26

## 2023-04-26 NOTE — TELEPHONE ENCOUNTER
Attempted to call patient again today regarding urine culture results (see 4/20 results). She was asymptomatic and I would monitor. We could start on methenamine but given her sensitivities may hold if she wishes. Consider cystoscopic evaluation. CT scan was negative.

## 2023-04-26 NOTE — TELEPHONE ENCOUNTER
Looks like Carlitos Urbina ordered urine and needs to speak to patient about results. Informed Kati Martinez to call their office in the AM. Kati Martinez verbs understanding.

## 2023-04-26 NOTE — TELEPHONE ENCOUNTER
Pt requested call from nurse to review recent Urine Culture results. Pt is not experiencing burning, itching, flank pain or poly uria and continues to drink cranberry juice once a day.     Pt can be reached at [phone]

## 2023-04-30 PROCEDURE — 99490 CHRNC CARE MGMT STAFF 1ST 20: CPT

## 2023-05-09 ENCOUNTER — OFFICE VISIT (OUTPATIENT)
Facility: CLINIC | Age: 88
End: 2023-05-09
Payer: MEDICARE

## 2023-05-09 VITALS
HEIGHT: 60 IN | DIASTOLIC BLOOD PRESSURE: 64 MMHG | HEART RATE: 68 BPM | BODY MASS INDEX: 32.2 KG/M2 | SYSTOLIC BLOOD PRESSURE: 128 MMHG | RESPIRATION RATE: 16 BRPM | WEIGHT: 164 LBS | OXYGEN SATURATION: 98 %

## 2023-05-09 DIAGNOSIS — J45.30 MILD PERSISTENT ASTHMA WITHOUT COMPLICATION: Primary | ICD-10-CM

## 2023-05-09 DIAGNOSIS — G47.33 OSA (OBSTRUCTIVE SLEEP APNEA): ICD-10-CM

## 2023-05-09 DIAGNOSIS — R76.8 ANA POSITIVE: ICD-10-CM

## 2023-05-09 PROCEDURE — 99214 OFFICE O/P EST MOD 30 MIN: CPT | Performed by: NURSE PRACTITIONER

## 2023-05-09 RX ORDER — PREDNISONE 2.5 MG/1
2.5 TABLET ORAL DAILY
Qty: 90 TABLET | Refills: 3 | Status: SHIPPED | OUTPATIENT
Start: 2023-05-09

## 2023-05-09 RX ORDER — FLUTICASONE PROPIONATE 220 UG/1
12 AEROSOL, METERED RESPIRATORY (INHALATION) 2 TIMES DAILY
Qty: 3 EACH | Refills: 3 | Status: SHIPPED | OUTPATIENT
Start: 2023-05-09 | End: 2023-05-09

## 2023-05-09 RX ORDER — FLUTICASONE PROPIONATE 220 UG/1
2 AEROSOL, METERED RESPIRATORY (INHALATION) 2 TIMES DAILY
Qty: 3 EACH | Refills: 3 | Status: SHIPPED | OUTPATIENT
Start: 2023-05-09

## 2023-05-09 RX ORDER — ALBUTEROL SULFATE 90 UG/1
2 AEROSOL, METERED RESPIRATORY (INHALATION) EVERY 6 HOURS PRN
Qty: 1 EACH | Refills: 1 | Status: SHIPPED | OUTPATIENT
Start: 2023-05-09

## 2023-05-16 ENCOUNTER — OFFICE VISIT (OUTPATIENT)
Dept: RHEUMATOLOGY | Facility: CLINIC | Age: 88
End: 2023-05-16
Payer: MEDICARE

## 2023-05-16 VITALS
OXYGEN SATURATION: 97 % | BODY MASS INDEX: 32.2 KG/M2 | SYSTOLIC BLOOD PRESSURE: 138 MMHG | RESPIRATION RATE: 18 BRPM | HEART RATE: 86 BPM | WEIGHT: 164 LBS | HEIGHT: 60 IN | DIASTOLIC BLOOD PRESSURE: 80 MMHG

## 2023-05-16 DIAGNOSIS — M17.12 PRIMARY OSTEOARTHRITIS OF LEFT KNEE: ICD-10-CM

## 2023-05-16 DIAGNOSIS — Z98.890 S/P LUMBAR LAMINECTOMY: ICD-10-CM

## 2023-05-16 DIAGNOSIS — M47.816 OSTEOARTHRITIS OF SPINE WITHOUT MYELOPATHY OR RADICULOPATHY, LUMBAR REGION: ICD-10-CM

## 2023-05-16 DIAGNOSIS — R19.7 DIARRHEA, UNSPECIFIED TYPE: ICD-10-CM

## 2023-05-16 DIAGNOSIS — K58.0 IRRITABLE BOWEL SYNDROME WITH DIARRHEA: ICD-10-CM

## 2023-05-16 DIAGNOSIS — M17.11 PRIMARY OSTEOARTHRITIS OF RIGHT KNEE: Primary | ICD-10-CM

## 2023-05-16 DIAGNOSIS — Z88.6 ALLERGY TO NONSTEROIDAL ANTI-INFLAMMATORY DRUG (NSAID): ICD-10-CM

## 2023-05-16 PROCEDURE — 99214 OFFICE O/P EST MOD 30 MIN: CPT | Performed by: INTERNAL MEDICINE

## 2023-05-16 PROCEDURE — 1125F AMNT PAIN NOTED PAIN PRSNT: CPT | Performed by: INTERNAL MEDICINE

## 2023-05-16 NOTE — PATIENT INSTRUCTIONS
Use over the counter cream - diclofenac gel apply 3-4 times a day to your sore joints - especially the knees. Use tylenol at times. Tylenol # 3  use occasionally  for severe joint pain. Imodium for diarrhea  Drink cranberry juice. Return to office 6 months.

## 2023-05-24 ENCOUNTER — TELEPHONE (OUTPATIENT)
Dept: INTERNAL MEDICINE CLINIC | Facility: CLINIC | Age: 88
End: 2023-05-24

## 2023-05-24 NOTE — TELEPHONE ENCOUNTER
Dr Maxine Wilson calling, saw pt for the first time yesterday, supposed to cime an see her for multiple allergies. Dr Maxine Wilson is looking to see if CS wanted to test a specific drug. Pt states she doesn't want PCN skin testing. Dr Maxine Wilson indicates they can do Drug Challenges if warranted. Pt kept telling her that she has had an urine infection for two months without being treated. CS, Dr Maxine Wilson says this is not urgent but would like to talk with you about this patient, states she will be in the office after 2 pm tomorrow then will be in the office on Tues of next week.

## 2023-05-24 NOTE — TELEPHONE ENCOUNTER
Dr. Moon Constable on the line requesting to speak to [facility] regarding pt's drug allergies.

## 2023-05-26 ENCOUNTER — PATIENT OUTREACH (OUTPATIENT)
Dept: CASE MANAGEMENT | Age: 88
End: 2023-05-26

## 2023-05-26 DIAGNOSIS — K86.89 PANCREATIC INSUFFICIENCY: ICD-10-CM

## 2023-05-26 DIAGNOSIS — M16.12 OSTEOARTHRITIS OF ONE HIP, LEFT: ICD-10-CM

## 2023-05-26 DIAGNOSIS — F41.9 ANXIETY: ICD-10-CM

## 2023-05-26 DIAGNOSIS — J42 CHRONIC BRONCHITIS, UNSPECIFIED CHRONIC BRONCHITIS TYPE (HCC): ICD-10-CM

## 2023-05-26 DIAGNOSIS — M17.12 PRIMARY OSTEOARTHRITIS OF LEFT KNEE: ICD-10-CM

## 2023-05-26 DIAGNOSIS — E78.00 PURE HYPERCHOLESTEROLEMIA: ICD-10-CM

## 2023-05-26 DIAGNOSIS — I10 HYPERTENSION, BENIGN: ICD-10-CM

## 2023-05-26 DIAGNOSIS — M17.11 PRIMARY OSTEOARTHRITIS OF RIGHT KNEE: ICD-10-CM

## 2023-05-26 DIAGNOSIS — E78.5 DYSLIPIDEMIA: ICD-10-CM

## 2023-05-26 DIAGNOSIS — I25.10 CORONARY ARTERY DISEASE INVOLVING NATIVE CORONARY ARTERY OF NATIVE HEART WITHOUT ANGINA PECTORIS: ICD-10-CM

## 2023-05-26 RX ORDER — VERAPAMIL HYDROCHLORIDE 240 MG/1
CAPSULE, EXTENDED RELEASE ORAL
COMMUNITY
Start: 2023-05-16

## 2023-05-30 ENCOUNTER — TELEPHONE (OUTPATIENT)
Dept: RHEUMATOLOGY | Facility: CLINIC | Age: 88
End: 2023-05-30

## 2023-05-30 NOTE — TELEPHONE ENCOUNTER
Patient allergic to aspirin - therefore not going to try voltaren gel. Lidocaine cream would be an alternative choice.   Dr. Jorge Alberto Vick

## 2023-05-30 NOTE — TELEPHONE ENCOUNTER
Called and left message with staff at Dr. Romel Holland office. Advised that I am most concerned about her potential allergies to various abx and would like testing for abx allergies if possible. I gave my cell phone number for Dr. Cece Lira to call back if she needs additional clarification.

## 2023-05-31 NOTE — TELEPHONE ENCOUNTER
Spoke with Dr. Varsha Ellsworth. The only skin testing they can do is for PCN. She states that many of pt's \"allergies\" could be more intolerances and if we have a specific abx that we need to try, then we could do a med challenge in her office to make sure pt does not experience any concerning symptoms. Specifically she notes that cephalosporins are not listed as an allergy for pt and she believes this would be a safe option to try if needed.

## 2023-06-13 ENCOUNTER — PROCEDURE (OUTPATIENT)
Dept: SURGERY | Facility: CLINIC | Age: 88
End: 2023-06-13

## 2023-06-13 VITALS — SYSTOLIC BLOOD PRESSURE: 139 MMHG | DIASTOLIC BLOOD PRESSURE: 82 MMHG

## 2023-06-13 DIAGNOSIS — R31.21 ASYMPTOMATIC MICROSCOPIC HEMATURIA: ICD-10-CM

## 2023-06-13 DIAGNOSIS — R82.71 BACTERIURIA, ASYMPTOMATIC: ICD-10-CM

## 2023-06-13 DIAGNOSIS — N30.90 CYSTITIS: Primary | ICD-10-CM

## 2023-06-13 LAB
APPEARANCE: CLEAR
BILIRUBIN: NEGATIVE
GLUCOSE (URINE DIPSTICK): NEGATIVE MG/DL
KETONES (URINE DIPSTICK): NEGATIVE MG/DL
MULTISTIX LOT#: ABNORMAL NUMERIC
NITRITE, URINE: NEGATIVE
PH, URINE: 6 (ref 4.5–8)
PROTEIN (URINE DIPSTICK): NEGATIVE MG/DL
URINE-COLOR: YELLOW
UROBILINOGEN,SEMI-QN: 0.2 MG/DL (ref 0–1.9)

## 2023-06-13 PROCEDURE — 99214 OFFICE O/P EST MOD 30 MIN: CPT | Performed by: UROLOGY

## 2023-06-13 PROCEDURE — 52000 CYSTOURETHROSCOPY: CPT | Performed by: UROLOGY

## 2023-06-13 PROCEDURE — 81003 URINALYSIS AUTO W/O SCOPE: CPT | Performed by: UROLOGY

## 2023-06-13 NOTE — PATIENT INSTRUCTIONS
1. Increase water intake to 40-60 ounces (2 liters) per day or enough to keep urine clear to pale yellow. 2. 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

## 2023-06-19 RX ORDER — LEVALBUTEROL TARTRATE 45 UG/1
2 AEROSOL, METERED ORAL EVERY 4 HOURS PRN
Refills: 0 | Status: CANCELLED | OUTPATIENT
Start: 2023-06-19

## 2023-06-19 NOTE — TELEPHONE ENCOUNTER
Asthma & COPD Medication Protocol Failed 06/19/2023 09:55 AM    AAP/ACT given in last 12 months    Asthma Action Score greater than or equal to 20    Appointment in past 6 or next 3 months      Pt last saw you for ER follow up 3/30/23    Please advise on refills, defer to TB if appropriate.

## 2023-06-21 ENCOUNTER — TELEPHONE (OUTPATIENT)
Facility: CLINIC | Age: 88
End: 2023-06-21

## 2023-06-21 DIAGNOSIS — J45.30 MILD PERSISTENT ASTHMA WITHOUT COMPLICATION: ICD-10-CM

## 2023-06-21 RX ORDER — LEVALBUTEROL TARTRATE 45 UG/1
1-2 AEROSOL, METERED ORAL
Qty: 1 EACH | Refills: 2 | Status: SHIPPED | OUTPATIENT
Start: 2023-06-21

## 2023-06-21 RX ORDER — LEVALBUTEROL TARTRATE 45 UG/1
1-2 AEROSOL, METERED ORAL EVERY 4 HOURS PRN
Qty: 1 EACH | Refills: 0 | Status: SHIPPED | OUTPATIENT
Start: 2023-06-21 | End: 2023-06-21

## 2023-06-21 RX ORDER — LEVALBUTEROL TARTRATE 45 UG/1
2 AEROSOL, METERED ORAL
Qty: 1 EACH | Refills: 3 | Status: SHIPPED | OUTPATIENT
Start: 2023-06-21 | End: 2023-06-21

## 2023-06-21 RX ORDER — LEVALBUTEROL TARTRATE 45 UG/1
1-2 AEROSOL, METERED ORAL EVERY 6 HOURS PRN
Qty: 1 EACH | Refills: 3 | Status: SHIPPED | OUTPATIENT
Start: 2023-06-21 | End: 2023-06-21

## 2023-06-21 NOTE — TELEPHONE ENCOUNTER
Pt did not get Levalbuterol after OV with Naveed DONALDSON. Albuterol was sent over and she did not receive it. Pt requesting that Levalbuterol be sent to [facility]. Rx pended for Evelina's signature.

## 2023-06-22 NOTE — TELEPHONE ENCOUNTER
Looks like there was another TE to her pulmonologist's office for a refill. I think I inadvertently cancelled. I sent a new Rx.

## 2023-06-26 ENCOUNTER — PATIENT OUTREACH (OUTPATIENT)
Dept: CASE MANAGEMENT | Age: 88
End: 2023-06-26

## 2023-06-26 DIAGNOSIS — E78.5 DYSLIPIDEMIA: ICD-10-CM

## 2023-06-26 DIAGNOSIS — I25.10 CORONARY ARTERY DISEASE INVOLVING NATIVE CORONARY ARTERY OF NATIVE HEART WITHOUT ANGINA PECTORIS: ICD-10-CM

## 2023-06-26 DIAGNOSIS — E78.00 PURE HYPERCHOLESTEROLEMIA: ICD-10-CM

## 2023-06-26 DIAGNOSIS — I10 HYPERTENSION, BENIGN: ICD-10-CM

## 2023-06-26 DIAGNOSIS — M17.11 PRIMARY OSTEOARTHRITIS OF RIGHT KNEE: Primary | ICD-10-CM

## 2023-06-26 DIAGNOSIS — M16.12 OSTEOARTHRITIS OF ONE HIP, LEFT: ICD-10-CM

## 2023-06-26 DIAGNOSIS — M17.12 PRIMARY OSTEOARTHRITIS OF LEFT KNEE: ICD-10-CM

## 2023-06-26 DIAGNOSIS — K86.89 PANCREATIC INSUFFICIENCY: ICD-10-CM

## 2023-06-26 NOTE — PROGRESS NOTES
Spoke to Eliud Angeles for CCM. Updates to patient care team/comments: UTD  Patient reported changes in medications: UTD  Med Adherence  Comment: Pt taking medications as prescribed     Health Maintenance: Pneumococcal Vaccine: 65+ Years(1 - PCV) Never done  Zoster Vaccines(1 of 2) Never done  COVID-19 Vaccine(2 [facility] series) due on 04/14/2021  Influenza Vaccine(Season Ended) due on 10/01/2023  Annual Physical due on 11/04/2023  Annual Depression Screening Completed  Fall Risk Screening (Annual) Completed    Patient updates/concerns:      Pt has not started to use lidocaine cream. Pt has been given tylenol with codeine and she uses that sparingly about 1 time every 3 days. Pt has been wearing a knee brace using the walker and taking regular tylenol and this has been effective at controlling the knee pain. Pt was able to receive an inhaler of levobuterol from her insurance company and is waiting for the order from [facility]. Pt has not been using inhaler more than usual and she has not had to pay close attention to air quality warnings. Pt has not been bothered by the wildfire smoke,  and although she enjoys spending time outdoors she tries not to stay out too long at one time. Pt also keeps her apartment air conditioned. Pt has derm f/u in a few weeks. Pt has noticed some blemishing on her ankle that she wants to have assessed. Pt declined [facility] assistance in trying to schedule a sooner appointment. Pt has followed urology advice and started to try and drink 40-60 ounces of water daily. Pt has not had any cramping or flank pain or urinary discomfort burning itching etc.   Pt is still taking imodium every morning. Pt has frequent diarrhea but no cramping or stomach pain. Pt states that this problem started following an upper GI scope and has not been normal since. [facility] listened to and provided support. Pt has been drinking more water recently and consistently avoids any trigger foods.     Pt did not have any new questions or concerns  Goals/Action Plan: Active goal from previous outreach: staying healthy    Patient reported progress towards goals:                - What: drinking more water           - When: throughout the day           - How:            - How Often: 40-60 oz            - Where:   Patient Reported Barriers and Concerns: none                   - Plan for overcoming barriers: n/    Care Managers Interventions: continue to provide education and encouragement for healthy coping and dx    Future Appointments:   Future Appointments   Date Time Provider [place]   6/30/2023  8:00 AM Marlena Hassan MD SGINP E[facility] SUB GI   8/9/2023 11:00 AM Yolie Khoury MD EEMG Pulm [facility] [place]   10/16/2023  1:00 PM Flip Couch PA-C [id number] EC [place]   20/23/2920 65:39 AM Trudi Us MD EMGRHEUMHBSN [facility] [place]         Next Care Manager Follow Up Date:     Reason For Follow Up: review progress and or barriers towards patient's goals. Time Spent This Encounter Total: 29 min medical record review, telephone communication, care plan updates where needed, education, goals, and action plan recreation/update. Provided acknowledgment and validation to patient's concerns.    Monthly Minute Total including today: 29  Physical assessment, complete health history, and need for CCM established by Allan Rodriguez MD.

## 2023-07-05 RX ORDER — PREDNISONE 2.5 MG/1
TABLET ORAL
Qty: 90 TABLET | Refills: 3 | Status: SHIPPED | OUTPATIENT
Start: 2023-07-05

## 2023-07-05 NOTE — TELEPHONE ENCOUNTER
Future Appointments   Date Time Provider Jeremiah Gonzalez   8/9/2023 11:00 AM Jose Abbasi MD EEMG Pulm [facility] [place]   9/29/2023 10:30 AM MENDOZA Garza SGINP E[facility] SUB GI   10/16/2023  1:00 PM Shekhar Daley PA-C [facility] [place] [facility]   01/76/3677 84:66 AM Anya Garcia MD EMGEUBSN EMG [place]     LOV  5/16/2023    Last refill  5/9/2023  90 tabs, 3 refills Interpolation Flap Text: A decision was made to reconstruct the defect utilizing an interpolation axial flap and a staged reconstruction.  A telfa template was made of the defect.  This telfa template was then used to outline the interpolation flap.  The donor area for the pedicle flap was then injected with anesthesia.  The flap was excised through the skin and subcutaneous tissue down to the layer of the underlying musculature.  The interpolation flap was carefully excised within this deep plane to maintain its blood supply.  The edges of the donor site were undermined.   The donor site was closed in a primary fashion.  The pedicle was then rotated into position and sutured.  Once the tube was sutured into place, adequate blood supply was confirmed with blanching and refill.  The pedicle was then wrapped with xeroform gauze and dressed appropriately with a telfa and gauze bandage to ensure continued blood supply and protect the attached pedicle.

## 2023-08-07 ENCOUNTER — PATIENT OUTREACH (OUTPATIENT)
Dept: CASE MANAGEMENT | Age: 88
End: 2023-08-07

## 2023-09-11 ENCOUNTER — TELEPHONE (OUTPATIENT)
Dept: INTERNAL MEDICINE CLINIC | Facility: CLINIC | Age: 88
End: 2023-09-11

## 2023-09-11 ENCOUNTER — PATIENT OUTREACH (OUTPATIENT)
Dept: CASE MANAGEMENT | Age: 88
End: 2023-09-11

## 2023-09-11 DIAGNOSIS — M17.12 PRIMARY OSTEOARTHRITIS OF LEFT KNEE: ICD-10-CM

## 2023-09-11 DIAGNOSIS — M16.12 OSTEOARTHRITIS OF ONE HIP, LEFT: ICD-10-CM

## 2023-09-11 DIAGNOSIS — E78.5 DYSLIPIDEMIA: ICD-10-CM

## 2023-09-11 DIAGNOSIS — E78.00 PURE HYPERCHOLESTEROLEMIA: Primary | ICD-10-CM

## 2023-09-11 DIAGNOSIS — M17.11 PRIMARY OSTEOARTHRITIS OF RIGHT KNEE: ICD-10-CM

## 2023-09-11 DIAGNOSIS — I10 HYPERTENSION, BENIGN: ICD-10-CM

## 2023-09-11 DIAGNOSIS — M81.0 AGE-RELATED OSTEOPOROSIS WITHOUT CURRENT PATHOLOGICAL FRACTURE: ICD-10-CM

## 2023-09-11 DIAGNOSIS — I25.10 CORONARY ARTERY DISEASE INVOLVING NATIVE CORONARY ARTERY OF NATIVE HEART WITHOUT ANGINA PECTORIS: ICD-10-CM

## 2023-09-11 DIAGNOSIS — L98.9 SKIN LESION: Primary | ICD-10-CM

## 2023-09-11 NOTE — PROGRESS NOTES
Spoke to Eliud Angeles for CCM. Updates to patient care team/comments: UTD  Patient reported changes in medications: UTD  Med Adherence  Comment: Pt taking medications as prescribed     Health Maintenance: Pneumococcal Vaccine: 65+ Years(1 - PCV) Never done  Zoster Vaccines(1 of 2) Never done  COVID-19 Vaccine(2 [facility] series) due on 04/14/2021  Annual Physical due on 11/04/2023  Influenza Vaccine(1) due on 10/01/2023  Annual Depression Screening Completed  Fall Risk Screening (Annual) Completed    Patient updates/concerns:       Pt states that she has developed some infections around areas on her arm where biopsies were taken from derm. Pt states that she is allergic to antibiotics and topicals and was not able to use to treat. Pt states that she has been in contact with nurse and told to apply vaseline. Pt states that it appears as though it is improving slightly. Pt uses [employer] for transportation and the price for round trip increased to 15 dollars. Pt states that she can still afford this. Pt frustrated that [employer] only operates in [place] a boundary. Pt would like to get eye glasses at the mall but would need alternative service for this. Pt does not use the bus from  [facility] apartments because she uses a walker. Pt family will shop for groceries one time a week. Pt also receives a 50 dollar grocery card every month from . Pt states she is able to afford all living expenses. Pt gets assistance in cleaning her apartment from granddaughter. Pt uses walker all the time. Her knee pain is improved and she tries to remember to wear the base every day. Pt declined ccm assistance in scheduling annual visit. Pt will review her calan silvia and do it at the time of next out reach. Pt does not plan to have flu shot or new Covid booster. Pt rx are current and up to date. Pt is sleeping well and waking rested. Her apettite is unchanged.     Pt would like to establish with provider at cancer center to track ongoing issues with moles/blemishes. Pt has been in touch with pcp office. Pt did not require any assistance from ccm in obtaining this. Goals/Action Plan: Active goal from previous outreach: staying healthy    Patient reported progress towards goals: pt continues to see providers as needed and takes medications as prescribed. - What: drinking water           - When: daily           - How:            - How Often: 40-60oz            - Where: home  Patient Reported Barriers and Concerns: n/a                   - Plan for overcoming barriers: nonr    Care Managers Interventions: provide encouragement and education for healthy coping and dx    Future Appointments:   Future Appointments   Date Time Provider Jeremiah Gonzalez   9/25/2023  2:30 PM Darlene Choe MD EEMG Pulm [facility] [place]   9/29/2023 10:30 AM MENDOZA Garcia SGINP E[facility] SUB GI   10/16/2023  1:00 PM Franck Luo PA-C [id number] EC [place] 4   08/16/9211 90:83 AM Jackie Connelly MD EMGRHEUMHBSN EMG [place]         Next Care Manager Follow Up Date: one month    Reason For Follow Up: review progress and or barriers towards patient's goals. Time Spent This Encounter Total: 21 min medical record review, telephone communication, care plan updates where needed, education, goals, and action plan recreation/update. Provided acknowledgment and validation to patient's concerns.    Monthly Minute Total including today: 21  Physical assessment, complete health history, and need for CCM established by Felicita Khoury MD.

## 2023-09-11 NOTE — TELEPHONE ENCOUNTER
Patient calling was seen by Rob Uribe who removed and biopsied several spots on arms and legs but patient is not happy with how they are treating her issues. Patient is asking for the recommendation for another provider to see. Patient asking to be referred to the cancer center.

## 2023-09-11 NOTE — TELEPHONE ENCOUNTER
Last annual with TB. Sending for review. Patient would like to see cancer center for second opinion, not happy with Surinder's office. Patient has hx of squamous cell, current lesions she states were high risk.

## 2023-09-12 NOTE — TELEPHONE ENCOUNTER
Called and spoke to pt. Pt open to seeing Dr. Florina Hudson. Referral placed and information given to pt. Pt thankful.

## 2023-09-12 NOTE — TELEPHONE ENCOUNTER
It would be another derm, does she want to see Dr. Karena Mortimer Reddy's team? If yes, we can place referral.

## 2023-09-25 ENCOUNTER — OFFICE VISIT (OUTPATIENT)
Facility: CLINIC | Age: 88
End: 2023-09-25
Payer: MEDICARE

## 2023-09-25 VITALS
RESPIRATION RATE: 18 BRPM | SYSTOLIC BLOOD PRESSURE: 134 MMHG | HEIGHT: 60 IN | OXYGEN SATURATION: 93 % | BODY MASS INDEX: 32 KG/M2 | DIASTOLIC BLOOD PRESSURE: 72 MMHG | HEART RATE: 84 BPM | WEIGHT: 163 LBS

## 2023-09-25 DIAGNOSIS — J45.30 MILD PERSISTENT ASTHMA WITHOUT COMPLICATION: Primary | ICD-10-CM

## 2023-09-25 PROCEDURE — 99214 OFFICE O/P EST MOD 30 MIN: CPT | Performed by: INTERNAL MEDICINE

## 2023-09-25 NOTE — PATIENT INSTRUCTIONS
Plan:    - continue same flovent daily and rescue use as needed   - vaccines -- COVID and FLU and RSV   - see me in 6 months     Gerry Brown MD  Pulmonary Medicine  1.03.20

## 2023-10-02 ENCOUNTER — TELEPHONE (OUTPATIENT)
Facility: CLINIC | Age: 88
End: 2023-10-02

## 2023-10-02 NOTE — TELEPHONE ENCOUNTER
Pt state she never started taking clotrimazole for oral thrush. Med noted to be first ordered on 11/7/22 by Dr. Kerrie Glover. Pt states she is allergic to it. Pt states she has a hard time breathing and gets diarrhea as a reaction. Pt was going through her med list and realized this med was ordered. Med list updated. Dr. Kerrie Glover notified.

## 2023-10-16 ENCOUNTER — PATIENT OUTREACH (OUTPATIENT)
Dept: CASE MANAGEMENT | Age: 88
End: 2023-10-16

## 2023-10-16 NOTE — PROGRESS NOTES
Contacting patient to follow up on CCM for the month. [facility].  Intended to discuss immodium use, recovery from thrush and pt assistance forms for medicaiton    Total time -  min  Total Monthly time- 3 min

## 2023-10-24 ENCOUNTER — TELEPHONE (OUTPATIENT)
Dept: INTERNAL MEDICINE CLINIC | Facility: CLINIC | Age: 88
End: 2023-10-24

## 2023-10-24 NOTE — TELEPHONE ENCOUNTER
Orders to     Samy Salinas          Pt aware to get labs done no sooner than 2 weeks prior to the appt. Pt aware to fast.  No call back required.     Future Appointments   Date Time Provider Jeremiah Lisa   1/3/2024 10:40 AM Wagner Colin MD [facility]

## 2023-11-27 ENCOUNTER — OFFICE VISIT (OUTPATIENT)
Dept: RHEUMATOLOGY | Facility: CLINIC | Age: 88
End: 2023-11-27
Payer: MEDICARE

## 2023-11-27 VITALS
HEIGHT: 60 IN | WEIGHT: 163.38 LBS | RESPIRATION RATE: 16 BRPM | TEMPERATURE: 98 F | HEART RATE: 85 BPM | BODY MASS INDEX: 32.08 KG/M2 | DIASTOLIC BLOOD PRESSURE: 68 MMHG | OXYGEN SATURATION: 96 % | SYSTOLIC BLOOD PRESSURE: 128 MMHG

## 2023-11-27 DIAGNOSIS — Z98.890 S/P LUMBAR LAMINECTOMY: ICD-10-CM

## 2023-11-27 DIAGNOSIS — L08.9 TOE INFECTION: ICD-10-CM

## 2023-11-27 DIAGNOSIS — M47.816 SPONDYLOSIS OF LUMBAR REGION WITHOUT MYELOPATHY OR RADICULOPATHY: ICD-10-CM

## 2023-11-27 DIAGNOSIS — M17.12 PRIMARY OSTEOARTHRITIS OF LEFT KNEE: ICD-10-CM

## 2023-11-27 DIAGNOSIS — M17.11 PRIMARY OSTEOARTHRITIS OF RIGHT KNEE: Primary | ICD-10-CM

## 2023-11-27 RX ORDER — CEFADROXIL 500 MG/1
500 CAPSULE ORAL 2 TIMES DAILY
Qty: 14 CAPSULE | Refills: 0 | Status: SHIPPED | OUTPATIENT
Start: 2023-11-27

## 2023-11-27 NOTE — PATIENT INSTRUCTIONS
Right big toe infection. Soak right big toe in warm soapy water daily, then apply neosporin. Duricef 500 mg twice a day for the infection. See podiatrist.    If redness in foot persists see dermatology also. Tylenol  mg for pain 1-2 per day. Tylenol #3 which has codeine in it, use rarely for severe arthritis pain. Walk for exercise. Return to office 6 months.

## 2023-11-28 ENCOUNTER — TELEPHONE (OUTPATIENT)
Dept: INTERNAL MEDICINE CLINIC | Facility: CLINIC | Age: 88
End: 2023-11-28

## 2023-11-28 ENCOUNTER — PATIENT OUTREACH (OUTPATIENT)
Dept: CASE MANAGEMENT | Age: 88
End: 2023-11-28

## 2023-11-28 DIAGNOSIS — Z00.00 ENCOUNTER FOR ANNUAL HEALTH EXAMINATION: ICD-10-CM

## 2023-11-28 DIAGNOSIS — E55.9 VITAMIN D DEFICIENCY: Primary | ICD-10-CM

## 2023-11-28 DIAGNOSIS — M17.11 PRIMARY OSTEOARTHRITIS OF RIGHT KNEE: ICD-10-CM

## 2023-11-28 DIAGNOSIS — K86.89 PANCREATIC INSUFFICIENCY: ICD-10-CM

## 2023-11-28 DIAGNOSIS — M17.12 PRIMARY OSTEOARTHRITIS OF LEFT KNEE: ICD-10-CM

## 2023-11-28 DIAGNOSIS — I10 HYPERTENSION, BENIGN: ICD-10-CM

## 2023-11-28 DIAGNOSIS — I25.10 CORONARY ARTERY DISEASE INVOLVING NATIVE CORONARY ARTERY OF NATIVE HEART WITHOUT ANGINA PECTORIS: Primary | ICD-10-CM

## 2023-11-28 DIAGNOSIS — E78.00 PURE HYPERCHOLESTEROLEMIA: ICD-10-CM

## 2023-11-28 DIAGNOSIS — R53.83 OTHER FATIGUE: ICD-10-CM

## 2023-11-28 DIAGNOSIS — M16.12 OSTEOARTHRITIS OF ONE HIP, LEFT: ICD-10-CM

## 2023-11-28 DIAGNOSIS — J42 CHRONIC BRONCHITIS, UNSPECIFIED CHRONIC BRONCHITIS TYPE (HCC): ICD-10-CM

## 2023-11-28 DIAGNOSIS — F41.9 ANXIETY: ICD-10-CM

## 2023-11-28 DIAGNOSIS — E78.5 DYSLIPIDEMIA: ICD-10-CM

## 2023-11-28 RX ORDER — PREDNISOLONE ACETATE 10 MG/ML
1 SUSPENSION/ DROPS OPHTHALMIC 4 TIMES DAILY
COMMUNITY
Start: 2023-11-17

## 2023-11-28 NOTE — TELEPHONE ENCOUNTER
Future Appointments   Date Time Provider Jeremiah Gonzalez          1/3/2024 10:40 AM Erkia Campbell MD [facility]     If pt requires lab work for [address], please review chart and place appropriate orders.     Thank You    Routed to [facility] clinical staff

## 2023-11-28 NOTE — PROGRESS NOTES
Spoke to Eliud Angeles for CCM. Updates to patient care team/comments: UTD  Patient reported changes in medications: UTD  Med Adherence  Comment: Pt taking medications as prescribed     Health Maintenance:   Health Maintenance   Topic Date Due    Pneumococcal Vaccine: 65+ Years (1 - PCV) Never done    Zoster Vaccines (1 of 2) Never done    COVID-19 Vaccine (2 - 2023-24 season) 09/01/2023    Influenza Vaccine (1) Never done    Annual Physical  11/04/2023    Annual Depression Screening  Completed    Fall Risk Screening (Annual)  Completed       Patient updates/concerns:    Pt was seen by dr Ave Melo yesterday who assessed pt toe and determined that she developed an infection on her big toe where the nail was cut. Pt was encouraged to soak it use neosporin and complete course of antibiotics. Discussed with pt the importance of completing the course and pt verbalized understanding. Pt is on second day of abx and states that she can sense some improvement already today. Pt is always concerned about developing a reaction to medication because she is allergic to several. Pt states that she just makes sure to drink some water and take it with food and the nausea is not significant. Pt is trying to avoid wearing shoe if possible and has been using slippers around the facility. Discussed with pt dr Ave Melo suggestion to increase her walking/activity. Pt states that she tries to walk around her facility  a few times a day but is limited in the type of exercise she can participate in due to her hx of back surgeries. Pt is very careful to pay attention to her limitations to prevent significant injury. Pt states that she has participated in some of the low impact classes at [facility] and plans to participate more regularly. Pt has submitted her zenpep/pancrealize assistance forms and has not been notified about response. Pt diarrhea has not been as prominent. Pt takes her imodium 2 x daily.    Pt will take tylenol for her knee pain infrequently. Pt states she never uses the tylenol with codeine. Pt has physical scheduled for January and has not been notified about lab orders. Pt requested [facility] assistance in having labs ordered prior to appt. Pt is aware that labs should not be drawn earlier than  2 weeks prior to appointment. Pt will not need the resource at [employer] for [place]. Pt was visited by counselor at facility and pt is comfortable with the decision to not change plans and remain on current plan. Pt did not have any new questions for pcp or [facility] nurses  Goals/Action Plan: Active goal from previous outreach: staying healthy    Patient reported progress towards goals: pt  continues to see providers as needed and takes medications as prescribed. Pt tries to walk during the day and will compile a list of the exercise classes that are safe for her to take. - What: drinking water           - Where/When/How: pt has been trying to drink minimum of 40 oz water daily  Patient Reported Barriers and Concerns: n/a                   - Plan for overcoming barriers: none    Care Managers Interventions: te [facility] clinical for labs mawv    Future Appointments:   Future Appointments   Date Time Provider Jeremiah Gonzalez   12/1/2023  1:00 PM MENDOZA Menendez [facility]   1/3/2024 10:40 AM Poonam Gandara MD [facility]   3/25/2024  1:30 PM Evelina Sandoval MD [facility]   9/37/5319 75:22 AM Opal Fonseca MD [facility]         Next Care Manager Follow Up Date: one month    Reason For Follow Up: review progress and or barriers towards patient's goals. Time Spent This Encounter Total: 32 min medical record review, telephone communication, care plan updates where needed, education, goals, and action plan recreation/update. Provided acknowledgment and validation to patient's concerns.    Monthly Minute Total including today: 32  Physical assessment, complete health history, and need for CCM established by John Mckinney MD.

## 2023-11-30 PROCEDURE — 99490 CHRNC CARE MGMT STAFF 1ST 20: CPT

## 2023-11-30 RX ORDER — FLUTICASONE PROPIONATE 220 UG/1
2 AEROSOL, METERED RESPIRATORY (INHALATION) 2 TIMES DAILY
Qty: 3 EACH | Refills: 3 | Status: SHIPPED | OUTPATIENT
Start: 2023-11-30

## 2023-11-30 RX ORDER — FLUTICASONE PROPIONATE 220 UG/1
2 AEROSOL, METERED RESPIRATORY (INHALATION) 2 TIMES DAILY
Qty: 24 G | Refills: 0 | Status: SHIPPED | OUTPATIENT
Start: 2023-11-30

## 2023-11-30 NOTE — TELEPHONE ENCOUNTER
Pt asking for flovent hfa refill. Requesting rx to go to [address] [place] [address] last OV 9/25/23  Per Dr. Babatunde Das:  -Plan:     - continue same flovent daily and rescue use as needed   - see me in 6 months      Next scheduled appt on:3/25/24    Rx pended and routed to Dr. Babatunde Das.

## 2023-11-30 NOTE — TELEPHONE ENCOUNTER
Last VISIT - 3/30/23 f/u from fall     Last CPE - No recent physical     Last REFILL -     Fluticasone Propionate HFA (FLOVENT HFA) 220 MCG/ACT Inhalation Aerosol 3 each 3 5/9/2023     Last LABS - 3/22/23 cbc, cmp 2/18/23 lipid    No future appointments. Per PROTOCOL? Failed      Please Approve or Deny.

## 2023-12-05 RX ORDER — CEFADROXIL 500 MG/1
500 CAPSULE ORAL 2 TIMES DAILY
Qty: 14 CAPSULE | Refills: 0 | OUTPATIENT
Start: 2023-12-05

## 2023-12-06 ENCOUNTER — LAB ENCOUNTER (OUTPATIENT)
Dept: LAB | Facility: HOSPITAL | Age: 88
End: 2023-12-06
Attending: INTERNAL MEDICINE
Payer: MEDICARE

## 2023-12-06 DIAGNOSIS — I10 HYPERTENSION, BENIGN: ICD-10-CM

## 2023-12-06 DIAGNOSIS — Z00.00 ENCOUNTER FOR ANNUAL HEALTH EXAMINATION: ICD-10-CM

## 2023-12-06 DIAGNOSIS — R53.83 OTHER FATIGUE: ICD-10-CM

## 2023-12-06 DIAGNOSIS — E55.9 VITAMIN D DEFICIENCY: ICD-10-CM

## 2023-12-06 DIAGNOSIS — K92.1 BLACK STOOLS: ICD-10-CM

## 2023-12-06 LAB
ALBUMIN SERPL-MCNC: 3.7 G/DL (ref 3.4–5)
ALBUMIN/GLOB SERPL: 1.1 {RATIO} (ref 1–2)
ALP LIVER SERPL-CCNC: 67 U/L
ALT SERPL-CCNC: 16 U/L
ANION GAP SERPL CALC-SCNC: 6 MMOL/L (ref 0–18)
AST SERPL-CCNC: 13 U/L (ref 15–37)
BASOPHILS # BLD AUTO: 0.05 X10(3) UL (ref 0–0.2)
BASOPHILS NFR BLD AUTO: 0.7 %
BILIRUB SERPL-MCNC: 0.8 MG/DL (ref 0.1–2)
BUN BLD-MCNC: 16 MG/DL (ref 9–23)
CALCIUM BLD-MCNC: 9.2 MG/DL (ref 8.5–10.1)
CHLORIDE SERPL-SCNC: 109 MMOL/L (ref 98–112)
CHOLEST SERPL-MCNC: 225 MG/DL (ref ?–200)
CO2 SERPL-SCNC: 25 MMOL/L (ref 21–32)
CREAT BLD-MCNC: 0.82 MG/DL
EGFRCR SERPLBLD CKD-EPI 2021: 69 ML/MIN/1.73M2 (ref 60–?)
EOSINOPHIL # BLD AUTO: 0.05 X10(3) UL (ref 0–0.7)
EOSINOPHIL NFR BLD AUTO: 0.7 %
ERYTHROCYTE [DISTWIDTH] IN BLOOD BY AUTOMATED COUNT: 14.7 %
FASTING PATIENT LIPID ANSWER: YES
FASTING STATUS PATIENT QL REPORTED: YES
GLOBULIN PLAS-MCNC: 3.3 G/DL (ref 2.8–4.4)
GLUCOSE BLD-MCNC: 101 MG/DL (ref 70–99)
HCT VFR BLD AUTO: 41.1 %
HDLC SERPL-MCNC: 51 MG/DL (ref 40–59)
HGB BLD-MCNC: 13.5 G/DL
IMM GRANULOCYTES # BLD AUTO: 0.03 X10(3) UL (ref 0–1)
IMM GRANULOCYTES NFR BLD: 0.4 %
LDLC SERPL CALC-MCNC: 145 MG/DL (ref ?–100)
LYMPHOCYTES # BLD AUTO: 1.98 X10(3) UL (ref 1–4)
LYMPHOCYTES NFR BLD AUTO: 28 %
MCH RBC QN AUTO: 28.1 PG (ref 26–34)
MCHC RBC AUTO-ENTMCNC: 32.8 G/DL (ref 31–37)
MCV RBC AUTO: 85.6 FL
MONOCYTES # BLD AUTO: 0.51 X10(3) UL (ref 0.1–1)
MONOCYTES NFR BLD AUTO: 7.2 %
NEUTROPHILS # BLD AUTO: 4.45 X10 (3) UL (ref 1.5–7.7)
NEUTROPHILS # BLD AUTO: 4.45 X10(3) UL (ref 1.5–7.7)
NEUTROPHILS NFR BLD AUTO: 63 %
NONHDLC SERPL-MCNC: 174 MG/DL (ref ?–130)
OSMOLALITY SERPL CALC.SUM OF ELEC: 291 MOSM/KG (ref 275–295)
PLATELET # BLD AUTO: 257 10(3)UL (ref 150–450)
POTASSIUM SERPL-SCNC: 4 MMOL/L (ref 3.5–5.1)
PROT SERPL-MCNC: 7 G/DL (ref 6.4–8.2)
RBC # BLD AUTO: 4.8 X10(6)UL
SODIUM SERPL-SCNC: 140 MMOL/L (ref 136–145)
TRIGL SERPL-MCNC: 160 MG/DL (ref 30–149)
TSI SER-ACNC: 1.07 MIU/ML (ref 0.36–3.74)
VIT B12 SERPL-MCNC: 719 PG/ML (ref 193–986)
VIT D+METAB SERPL-MCNC: 43.6 NG/ML (ref 30–100)
VLDLC SERPL CALC-MCNC: 30 MG/DL (ref 0–30)
WBC # BLD AUTO: 7.1 X10(3) UL (ref 4–11)

## 2023-12-06 PROCEDURE — 85025 COMPLETE CBC W/AUTO DIFF WBC: CPT

## 2023-12-06 PROCEDURE — 82306 VITAMIN D 25 HYDROXY: CPT

## 2023-12-06 PROCEDURE — 80061 LIPID PANEL: CPT

## 2023-12-06 PROCEDURE — 82607 VITAMIN B-12: CPT

## 2023-12-06 PROCEDURE — 36415 COLL VENOUS BLD VENIPUNCTURE: CPT

## 2023-12-06 PROCEDURE — 80053 COMPREHEN METABOLIC PANEL: CPT

## 2023-12-06 PROCEDURE — 84443 ASSAY THYROID STIM HORMONE: CPT

## 2023-12-07 ENCOUNTER — TELEPHONE (OUTPATIENT)
Dept: RHEUMATOLOGY | Facility: CLINIC | Age: 88
End: 2023-12-07

## 2023-12-07 PROCEDURE — 82272 OCCULT BLD FECES 1-3 TESTS: CPT

## 2023-12-07 RX ORDER — CEFADROXIL 500 MG/1
500 CAPSULE ORAL 2 TIMES DAILY
Qty: 14 CAPSULE | Refills: 0 | OUTPATIENT
Start: 2023-12-07

## 2023-12-07 RX ORDER — CEFADROXIL 500 MG/1
500 CAPSULE ORAL 2 TIMES DAILY
Qty: 14 CAPSULE | Refills: 0 | Status: SHIPPED | OUTPATIENT
Start: 2023-12-07

## 2023-12-07 RX ORDER — CEFADROXIL 500 MG/1
500 CAPSULE ORAL 2 TIMES DAILY
Qty: 14 CAPSULE | Refills: 0 | Status: SHIPPED | OUTPATIENT
Start: 2023-12-07 | End: 2023-12-07

## 2023-12-07 NOTE — TELEPHONE ENCOUNTER
Last office visit: 11/27/2023    Next Rheum KWQ:3/65/8519 Chaparrita Henning MD    Last Fill: 11/27/23, 14 tabs, 0 refills    Labs:   Lab Results   Component Value Date    CREATSERUM 0.82 12/06/2023    GFR 66 01/17/2018    ALKPHO 67 12/06/2023    AST 13 (L) 12/06/2023    ALT 16 12/06/2023    BILT 0.8 12/06/2023    TP 7.0 12/06/2023    ALB 3.7 12/06/2023       Lab Results   Component Value Date    WBC 7.1 12/06/2023    HGB 13.5 12/06/2023    .0 12/06/2023    NEPRELIM 4.45 12/06/2023    NEPERCENT 63.0 12/06/2023    LYPERCENT 28.0 12/06/2023    NE 4.45 12/06/2023    LYMABS 1.98 12/06/2023

## 2023-12-07 NOTE — TELEPHONE ENCOUNTER
Pt called stating she was on an antibiotic for a toe infection. She stated she finished it but that the infection is not completely gone and is asking for a refill. She said the pharmacy told her it was denied.

## 2023-12-07 NOTE — TELEPHONE ENCOUNTER
Pt called office, pt was prescribed Cefadroxil and feels it has improved her toe. Toe still remains red and swollen. Pt is requesting another round of antibiotics.

## 2023-12-08 ENCOUNTER — LAB ENCOUNTER (OUTPATIENT)
Dept: LAB | Facility: HOSPITAL | Age: 88
End: 2023-12-08
Payer: MEDICARE

## 2023-12-08 DIAGNOSIS — K92.1 BLACK STOOLS: ICD-10-CM

## 2023-12-26 ENCOUNTER — PATIENT OUTREACH (OUTPATIENT)
Dept: CASE MANAGEMENT | Age: 88
End: 2023-12-26

## 2023-12-26 DIAGNOSIS — E78.5 DYSLIPIDEMIA: ICD-10-CM

## 2023-12-26 DIAGNOSIS — M17.12 PRIMARY OSTEOARTHRITIS OF LEFT KNEE: ICD-10-CM

## 2023-12-26 DIAGNOSIS — E78.00 PURE HYPERCHOLESTEROLEMIA: ICD-10-CM

## 2023-12-26 DIAGNOSIS — I25.10 CORONARY ARTERY DISEASE INVOLVING NATIVE CORONARY ARTERY OF NATIVE HEART WITHOUT ANGINA PECTORIS: Primary | ICD-10-CM

## 2023-12-26 DIAGNOSIS — F41.9 ANXIETY: ICD-10-CM

## 2023-12-26 DIAGNOSIS — M16.12 OSTEOARTHRITIS OF ONE HIP, LEFT: ICD-10-CM

## 2023-12-27 NOTE — PROGRESS NOTES
Spoke to Eliud Angeles for CCM. Updates to patient care team/comments: UTD  Patient reported changes in medications: UTD  Med Adherence  Comment: pt taking medications as prescribed     Health Maintenance:   Health Maintenance   Topic Date Due    Pneumococcal Vaccine: 65+ Years (1 - PCV) Never done    Zoster Vaccines (1 of 2) Never done    COVID-19 Vaccine (2 - 2023-24 season) 09/01/2023    Influenza Vaccine (1) Never done    Annual Physical  11/04/2023    HTN: BP Follow-Up  01/01/2024    Annual Depression Screening  Completed    Fall Risk Screening (Annual)  Completed       Patient updates/concerns:    Pt has already completed labs for upcoming mawv. Pt confirms that she has already arranged transportation for appointment through [employer]. Pt states that she has tried a fitness class offered at her facility and it determined that it might be a little to strenuous. Pt states she needs to be careful about exercises that require too much bending. [facility] will mail pt easy no equipment workouts that are meant to be done in an office. The exercises are very low intensity and safe for her back. Pt does try to walk her facility at least once or twice a day. Pt is currently going through another bout of diarrhea and taking her imodium the last few days. Pt states that she will typically go a few days with it and then a few days without. Pt continues to take her probiotic every day and tries to remember to drink enough water. Pt continues to participate in Bible study weekly with other residents of her facility. Pt will also join others for birthday social. Pt states that she feels comfortable  being social with other residents and will also often play board games. Pt did not require any assistance in finishing zenpep application. Pt has derm appointment in January, pt has already confirmed transportation with [employer]. Goals/Action Plan:     Active goal from previous outreach: staying healthy    Patient reported progress towards goals:   Pt takes medications as prescribed and sees providers and specialists as needed. Pt walks her facility twice daily               - What: drinking water           - Where/When/How: pt has been trying to consistently drink at least 40 oz of water every day  Patient Reported Barriers and Concerns: n/a                   - Plan for overcoming barriers: none    Care Managers Interventions: continue to provide encouragement and education for healthy coping and dx    Future Appointments:   Future Appointments   Date Time Provider Jeremiah Gonzalez   1/3/2024 10:40 AM Tra Sands MD [facility]   3/1/2024  2:20 PM MENDOZA Brown [facility]   3/25/2024  1:30 PM Vargas Sorto MD [facility] [place]   6/19/8988 93:97 AM Gabriel Quintana MD [facility]         Next Care Manager Follow Up Date: one month    Reason For Follow Up: review progress and or barriers towards patient's goals. Time Spent This Encounter Total: 29 min medical record review, telephone communication, care plan updates where needed, education, goals, and action plan recreation/update. Provided acknowledgment and validation to patient's concerns. Monthly Minute Total including today: 29  Physical assessment, complete health history, and need for CCM established by MENDOZA Mancilla.

## 2024-01-03 ENCOUNTER — HOSPITAL ENCOUNTER (OUTPATIENT)
Dept: GENERAL RADIOLOGY | Age: 89
Discharge: HOME | End: 2024-01-03
Attending: INTERNAL MEDICINE
Payer: MEDICARE

## 2024-01-03 ENCOUNTER — OFFICE VISIT (OUTPATIENT)
Dept: INTERNAL MEDICINE CLINIC | Facility: CLINIC | Age: 89
End: 2024-01-03
Payer: MEDICARE

## 2024-01-03 VITALS
WEIGHT: 159.63 LBS | DIASTOLIC BLOOD PRESSURE: 76 MMHG | BODY MASS INDEX: 31.34 KG/M2 | TEMPERATURE: 97 F | HEIGHT: 60 IN | OXYGEN SATURATION: 96 % | HEART RATE: 82 BPM | SYSTOLIC BLOOD PRESSURE: 138 MMHG | RESPIRATION RATE: 18 BRPM

## 2024-01-03 DIAGNOSIS — F41.9 ANXIETY: ICD-10-CM

## 2024-01-03 DIAGNOSIS — M81.0 SENILE OSTEOPOROSIS: ICD-10-CM

## 2024-01-03 DIAGNOSIS — E78.5 DYSLIPIDEMIA: ICD-10-CM

## 2024-01-03 DIAGNOSIS — M25.552 ACUTE HIP PAIN, LEFT: ICD-10-CM

## 2024-01-03 DIAGNOSIS — J42 CHRONIC BRONCHITIS, UNSPECIFIED CHRONIC BRONCHITIS TYPE (HCC): ICD-10-CM
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
## 2024-01-03 DIAGNOSIS — Z00.00 ENCOUNTER FOR ANNUAL WELLNESS EXAM IN MEDICARE PATIENT: Primary | ICD-10-CM
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
## 2024-01-03 DIAGNOSIS — K86.89 PANCREATIC INSUFFICIENCY: ICD-10-CM

## 2024-01-03 DIAGNOSIS — Z98.890 S/P LUMBAR LAMINECTOMY: ICD-10-CM

## 2024-01-03 DIAGNOSIS — M17.0 PRIMARY OSTEOARTHRITIS OF BOTH KNEES: ICD-10-CM

## 2024-01-03 DIAGNOSIS — I10 HYPERTENSION, BENIGN: ICD-10-CM

## 2024-01-03 DIAGNOSIS — Z95.5 HISTORY OF HEART ARTERY STENT: ICD-10-CM

## 2024-01-03 DIAGNOSIS — K58.0 IRRITABLE BOWEL SYNDROME WITH DIARRHEA: ICD-10-CM

## 2024-01-03 PROCEDURE — 1125F AMNT PAIN NOTED PAIN PRSNT: CPT | Performed by: INTERNAL MEDICINE

## 2024-01-03 PROCEDURE — 99213 OFFICE O/P EST LOW 20 MIN: CPT | Performed by: INTERNAL MEDICINE

## 2024-01-03 PROCEDURE — G0439 PPPS, SUBSEQ VISIT: HCPCS | Performed by: INTERNAL MEDICINE

## 2024-01-03 PROCEDURE — 73502 X-RAY EXAM HIP UNI 2-3 VIEWS: CPT | Performed by: INTERNAL MEDICINE

## 2024-01-03 NOTE — PROGRESS NOTES
Subjective:   Karla Vallecillo is a 89 year old female who presents for a Medicare Subsequent Annual Wellness visit (Pt already had Initial Annual Wellness) and scheduled follow up of multiple significant but stable problems.   1. Encounter for annual wellness exam in Medicare patient (Primary)- she declined mammogram and dexa scan, declined all vaccines.  Screening colonoscopy no longer indicated at advanced age  2. Chronic bronchitis, unspecified chronic bronchitis type (HCC)- stable, doing well on flovent and albuterol prn. Follows with Dr. Jackson  3. History of heart artery stent- stable, she follows with Dr. Judy Reddy.   Overview:  Diagonal Stent 2012  4. Pancreatic insufficiency- doing well, on zenpep from Dr. Pierce  5. Dyslipidemia- intolerant to statins, 44 allergies listed  6. S/P lumbar laminectomy, DDD lumbar spine- she wears back brace, follows with rheum  7. Senile osteoporosis- she declined dexa and medications for osteoporosis due to potential SE, discussed adequate ca and vit d  8. Irritable bowel syndrome with diarrhea- diarrhea is better, c dif negative. She recently saw Dr Will and also follows with [facility]  9. Anxiety- controlled on xanax prn (prescribed by Tonya Martínez)  10. Hypertension, benign- controlled, she follows with cardiology  11. Acute hip pain, left- c/o acute on chronic pain since fall few weeks ago, hurts with ambulation. Tolerable pain when resting  12. Fall in home, initial encounter- as above  13. Primary osteoarthritis of both knees- on chronic low dose prednisone, follows with Dr. Tuttle    History/Other:   Fall Risk Assessment:   She has been screened for Falls and is High Risk. Fall Prevention information provided to patient in After Visit Summary.    Do you feel unsteady when standing or walking?: No  Do you worry about falling?: No  Have you fallen in the past year?: Yes  How many times have you fallen?: 1  Were you injured?: Yes     Cognitive Assessment:    Abnormal  What day of the week is this?: Correct  What month is it?: Correct  What year is it?: Correct  Recall \"Ball\": Correct  Recall \"Flag\": Incorrect  Recall \"Tree\": Incorrect    Functional Ability/Status:   Karla Vallecillo has some abnormal functions as listed below:  She has Hearing problems based on screening of functional status.She has Vision problems based on screening of functional status. She has Walking problems based on screening of functional status.       Depression Screening (PHQ-2/PHQ-9): PHQ-2 SCORE: 0, done 1/3/2024   If you checked off any problems, how difficult have these problems made it for you to do your work, take care of things at home, or get along with other people?: Not difficult at all  Last [place] Suicide Screening on 1/3/2024 was No Risk.         Advanced Directives:   She does NOT have a Living Will. [Do you have a living will?: No]  She does NOT have a Power of  for Health Care. [Do you have a healthcare power of ?: No]  Not discussed      Patient Active Problem List   Diagnosis    Cervical radiculopathy at C5    Primary osteoarthritis of right knee    Lumbar disc herniation    Irritable bowel syndrome with diarrhea    CAD (coronary artery disease), native coronary artery    Senile osteoporosis    Hypertension, benign    Dyslipidemia    Osteoarthritis of left knee    S/P lumbar laminectomy    Spondylosis of lumbar region without myelopathy or radiculopathy    Osteoarthritis of one hip, left    History of heart artery stent    Pancreatic insufficiency    Pure hypercholesterolemia    Carpal tunnel syndrome of right wrist    Chronic bronchitis, unspecified chronic bronchitis type (HCC)    Anxiety    Toe infection    Primary osteoarthritis of both knees     Allergies:  She is allergic to aspirin, breo ellipta, budesonide, celebrex [celecoxib], citalopram, clotrimazole, erythromycin, famotidine, fish oil, fluticasone, fluticasone-salmeterol, gabapentin, iodine  (topical), iodine solution [povidone iodine], levofloxacin, lidocaine, lisinopril, metamucil [psyllium], nitrofurantoin, nsaids, other, penicillins, plavix [clopidogrel], pulmicort, radiology contrast iodinated dyes, shellfish, mupirocin, advair hfa, bupropion, celecoxib, codeine, effient [prasugrel hydrochloride], linezolid, metronidazole, mold, penicillin g, questran [cholestyramine], risperdal [risperidone], shellfish-derived products, tramadol, vancomycin, warfarin, flulaval, and kenalog [triamcinolone].    Current Medications:  Outpatient Medications Marked as Taking for the 1/3/24 encounter (Office Visit) with Ghada Carballo MD   Medication Sig    cefadroxil 500 MG Oral Cap Take 1 capsule (500 mg total) by mouth 2 (two) times daily.    LOPERAMIDE 2 MG Oral Cap TAKE 1 CAPSULE BY MOUTH EVERY MORNING AND 1 CAPSULE AT BEDTIME    FLOVENT  MCG/ACT Inhalation Aerosol Inhale 2 puffs into the lungs 2 (two) times daily.    Fluticasone Propionate HFA (FLOVENT HFA) 220 MCG/ACT Inhalation Aerosol Inhale 2 puffs into the lungs 2 (two) times daily.    hydrocortisone 2.5 % External Cream ONE APPLICATION TOPICAL 2 TIMES A DAY FOR 10 DAYS ON ARMS AND RIGHT ANKLE    prednisoLONE 1 % Ophthalmic Suspension Place 1 drop into the left eye 4 (four) times daily.    Pancrelipase, Lip-Prot-Amyl, (ZENPEP) 63357-164214 units Oral Cap DR Particles Take 1 capsule by mouth 3 (three) times daily with meals. May increase to 2 capsules three times a day with meals if needed.    PREDNISONE 2.5 MG Oral Tab TAKE 1 TABLET(2.5 MG) BY MOUTH DAILY    Pancrelipase, Lip-Prot-Amyl, (PANCREAZE) 50624-38920 units Oral Cap DR Particles Take 37,000 units of lipase by mouth 3 (three) times daily with meals.    Levalbuterol Tartrate 45 MCG/ACT Inhalation Aerosol Inhale 1-2 puffs into the lungs every 4 to 6 hours as needed for Wheezing or Shortness of Breath.    Verapamil HCl  MG Oral Capsule SR 24 Hr     Acetaminophen-Codeine (TYLENOL WITH  CODEINE #3) 300-30 MG Oral Tab Take 1 tablet by mouth every 4 to 6 hours as needed for Pain.    clotrimazole 10 MG Mouth/Throat Alea Take 1 lozenge (10 mg total) by mouth 5 (five) times daily.    B Complex-C-E-Zn (STRESS PLUS ZINC) Oral Tab Take by mouth.    Ascorbic Acid (VITAMIN C OR) Take 1 tablet by mouth every evening.    ALPRAZolam 1 MG Oral Tab Take 0.5 tablets (0.5 mg total) by mouth nightly as needed.    NON FORMULARY Vitamin B complex with Vit B-12 one daily    ALPRAZolam 0.25 MG Oral Tab Take 1 tablet (0.25 mg total) by mouth every 6 (six) hours as needed.    IMODIUM MULTI-SYMPTOM RELIEF OR Take 1 tablet by mouth as needed.    Probiotic Product (PROBIOTIC-10 OR) Take 1 tablet by mouth daily.      acetaminophen 500 MG Oral Tab Take 2 tablets (1,000 mg total) by mouth every 6 (six) hours as needed for Pain.    Vitamin D3, Cholecalciferol, 50 MCG (2000 UT) Oral Cap Take 1 capsule (2,000 Units total) by mouth daily.    loratadine (CLARITIN) 10 MG Oral Tab Take 1 tablet (10 mg total) by mouth daily.    vitamin E 400 UNITS Oral Cap Take 1 capsule (400 Units total) by mouth daily.       Medical History:  She  has a past medical history of Anemia, Anesthesia complication, Anxiety state, unspecified, Arthritis, Asthma, Atherosclerosis of coronary artery, Back pain, Back problem, Black stools, Blood transfusion reaction, CAD (coronary artery disease), Calculus of kidney, Cancer (HCC) (2010), Change in hair, Chest pain on exertion, Coronary atherosclerosis of unspecified type of vessel, native or graft, Diarrhea, unspecified, Dyslipidemia, Easy bruising, Extrinsic asthma, unspecified, Fatigue, Glucose intolerance (pre-diabetes), Hearing impairment, Hearing loss, Heart palpitations, HIGH BLOOD PRESSURE, High cholesterol, Hip pain, History of blood transfusion (AT AGE 39), History of depression, Hoarseness, chronic, HTN (hypertension), Indigestion, Leg swelling, Loss of appetite, Nausea, Osteoarthrosis, unspecified  whether generalized or localized, unspecified site, Osteoporosis, Other and unspecified hyperlipidemia, Pain in joints, Pinched nerve in neck, Pneumonia, organism unspecified(486), PONV (postoperative nausea and vomiting), Shortness of breath, Sleep apnea, Stented coronary artery, Stress, Uncomfortable fullness after meals, Unspecified essential hypertension, Visual impairment, Wears glasses, and Weight loss.  Surgical History:  She  has a past surgical history that includes hysterectomy; cholecystectomy; benign biopsy left (a long time ago); injection, w/wo contrast, dx/therapeutic substance, epidural/subarachnoid; cervical/thoracic (9/23/2013); fluor gid & loclzj ndl/cath spi dx/ther njx (9/23/2013); patient withough preoperative order for iv antibiotic surgical site infection prophylaxis. (9/23/2013); patient documented not to have experienced any of the following events (9/23/2013); colonoscopy (10/21/2013); cath bare metal stent (bms) (2012); upper gi endoscopy,exam; injection, w/wo contrast, dx/therapeutic substance, epidural/subarachnoid; cervical/thoracic (N/A, 8/17/2015); patient withough preoperative order for iv antibiotic surgical site infection prophylaxis. (N/A, 8/17/2015); patient documented not to have experienced any of the following events (N/A, 8/17/2015); daniel localization wire 1 site left (cpt=19281) (1980'S); appendectomy; total abdom hysterectomy; tonsillectomy; egd; stent, coated/cov w/del sys; angioplasty (coronary); back surgery; other surgical history (8/7/12); excis lumbar disk,one level; hemorrhoidectomy,int/ext,simple; spine surgery procedure unlisted; and colonoscopy (N/A, 6/22/2021).   Family History:  Her family history includes Allergies in her daughter, father, and paternal grandmother; Arthritis in her paternal grandmother; Asthma in her father and sister; Autoimmune disease in her brother; Breast Cancer (age of onset: 70) in her mother; Cancer in her brother and maternal  grandfather; Colon Cancer in her father; Fibromyalgia in her daughter; Heart Attack in her brother, father, maternal grandfather, maternal grandmother, and paternal grandfather; Heart Disease in her father and paternal grandfather; Hepatitis C in her daughter; High Blood Pressure in her mother; Melanoma in her daughter; Thyroid Disorder in her sister.  Social History:  She  reports that she quit smoking about 51 years ago. Her smoking use included cigarettes. She has a 0.90 pack-year smoking history. She has never used smokeless tobacco. She reports that she does not currently use alcohol. She reports that she does not use drugs.    Tobacco:  She smoked tobacco in the past but quit greater than 12 months ago.  Social History    Tobacco Use      Smoking status: Former        Packs/day: 0.30        Years: 3.00        Additional pack years: 0.00        Total pack years: 0.90        Types: Cigarettes        Quit date: 1972        Years since quittin.9      Smokeless tobacco: Never       CAGE Alcohol Screen:   CAGE screening score of 0 on 1/3/2024, showing low risk of alcohol abuse.      Patient Care Team:  Ghada Carballo MD as PCP - General (Internal Medicine)  Juan Vigil as Consulting Physician (CARDIOLOGY)  Chi Nassar MD as Consulting Physician (GASTROENTEROLOGY)  Piter Henning MD as Consulting Physician (NEUROLOGY)  Tobi Larson MD (Internal Medicine)  Jerod Pierce MD (GASTROENTEROLOGY)  Omar Rubio (SURGERY, ORTHOPEDIC)  Baldemar Canchola ()  Tonya Martínez PA as Psychiatrist/APN (Physician Assistant)  Melissa Jackson MD (PULMONARY DISEASES)  Gloria Valentine APRN (GASTROENTEROLOGY)  Tomás Degroot CMA as CCM Care Manager  Tonya Orozco APRN (Nurse Practitioner)  Judy Reddy MD as Consulting Physician (Cardiovascular Diseases)  Lakisha Sepulveda APRN (Nurse Practitioner)  Josef Mcneill APRN (Nurse Practitioner)  Rbeeca Denise PA-C (Physician  Assistant Medical)    Review of Systems     Negative for f/c/CP/palp. Diarrhea resolving. Left hip pain since fall at home few months ago    Objective:   Physical Exam  General Appearance:  Alert, cooperative, no distress, appears stated age   Head:  Normocephalic, without obvious abnormality, atraumatic   Eyes:  PERRL, conjunctiva/corneas clear, EOM's intact both eyes   Ears:  Normal TM's and external ear canals, both ears   Nose: Nares normal, septum midline,mucosa normal   Throat: Lips, mucosa, and tongue normal   Neck: Supple, symmetrical, trachea midline   Back:   Symmetric, no curvature, ROM normal, no CVA tenderness   Lungs:   Clear to auscultation bilaterally, respirations unlabored   Heart:  Regular rate and rhythm, S1 and S2 normal   Abdomen:   Soft, non-tender, bowel sounds WNL   Pelvic: Deferred   Extremities: Left lateral hip with TTP, no swelling, ROM limited due to pain   Pulses: 2+ and symmetric       Lymph nodes: Cervical, supraclavicular, and axillary nodes normal   Neurologic: Alert and oriented       /76   Pulse 82   Temp 97.2 °F (36.2 °C) (Temporal)   Resp 18   Ht 5' (1.524 m)   Wt 159 lb 9.6 oz (72.4 kg)   LMP  (LMP Unknown)   SpO2 96%   BMI 31.17 kg/m²  Estimated body mass index is 31.17 kg/m² as calculated from the following:    Height as of this encounter: 5' (1.524 m).    Weight as of this encounter: 159 lb 9.6 oz (72.4 kg).    Medicare Hearing Assessment:   Hearing Screening    Time taken: 1/3/2024 10:40 AM  Entry User: Bryson Urbina CMA  Screening Method: Finger Rub  Finger Rub Result: Pass               Visual Acuity:   Right Eye Visual Acuity: Corrected Right Eye Chart Acuity: 20/50   Left Eye Visual Acuity: Corrected Left Eye Chart Acuity: 20/50   Both Eyes Visual Acuity: Corrected Both Eyes Chart Acuity: 20/50   Able To Tolerate Visual Acuity: Yes        Assessment & Plan:   Karla Vallecillo is a 89 year old female who presents for a Medicare Assessment.     1.  Encounter for annual wellness exam in Medicare patient (Primary)- she declined mammogram and dexa scan, declined all vaccines.  Screening colonoscopy no longer indicated at advanced age  2. Chronic bronchitis, unspecified chronic bronchitis type (HCC)- stable, doing well on flovent and albuterol prn. Follows with Dr. Jackson  3. History of heart artery stent- stable, she follows with Dr. Judy Reddy.   Overview:  Diagonal Stent 2012  4. Pancreatic insufficiency- doing well, on zenpep from Dr. Pierce  5. Dyslipidemia- intolerant to statins, 44 allergies listed  6. S/P lumbar laminectomy, DDD lumbar spine- she wears back brace, follows with rheum  7. Senile osteoporosis- she declined dexa and medications for osteoporosis due to potential SE, discussed adequate ca and vit d  8. Irritable bowel syndrome with diarrhea- diarrhea is better, c dif negative. She recently saw Dr Will and also follows with [facility]  9. Anxiety- controlled on xanax prn (prescribed by Tonya Martínez)  10. Hypertension, benign- controlled, she follows with cardiology  11. Acute hip pain, left- acute on chronic pain, will check hip xray today. Consider PT   -     Cancel: XR HIP + PELVIS MIN 4 VIEWS LEFT (CPT=73503); Future; Expected date: 01/03/2024  12. Fall in home, initial encounter- fell and landed on left hip per patient, will check xray  -     Cancel: XR HIP + PELVIS MIN 4 VIEWS LEFT (CPT=73503); Future; Expected date: 01/03/2024  13. Primary osteoarthritis of both knees- on chronic low dose prednisone, follows with Dr. Tuttle    The patient indicates understanding of these issues and agrees to the plan.  Continue with current treatment plan.  Reinforced healthy diet, lifestyle, and exercise.      Return if symptoms worsen or fail to improve.     Ghada Carballo MD, 1/3/2024     Supplementary Documentation:   General Health:  In the past six months, have you lost more than 10 pounds without trying?: 2 - No  Has your appetite been poor?: No  Type  of Diet: Balanced  How does the patient maintain a good energy level?: Other  How would you describe your daily physical activity?: Light  How would you describe your current health state?: Fair  How do you maintain positive mental well-being?: Social Interaction;Visiting Family;Visiting Friends;Games  On a scale of 0 to 10, with 0 being no pain and 10 being severe pain, what is your pain level?: 5 - (Moderate)  In the past six months, have you experienced urine leakage?: 0-No  At any time do you feel concerned for the safety/well-being of yourself and/or your children, in your home or elsewhere?: No  Have you had any immunizations at another office such as Influenza, Hepatitis B, Tetanus, or Pneumococcal?: No       Karla Vallecillo's SCREENING SCHEDULE   Tests on this list are recommended by your physician but may not be covered, or covered at this frequency, by your insurer.   Please check with your insurance carrier before scheduling to verify coverage.   PREVENTATIVE SERVICES FREQUENCY &  COVERAGE DETAILS LAST COMPLETION DATE   Diabetes Screening    Fasting Blood Sugar /  Glucose    One screening every 12 months if never tested or if previously tested but not diagnosed with pre-diabetes   One screening every 6 months if diagnosed with pre-diabetes Lab Results   Component Value Date    GLUCOSE 98 12/22/2015     (H) 12/06/2023        Cardiovascular Disease Screening    Lipid Panel  Cholesterol  Lipoprotein (HDL)  Triglycerides Covered every 5 years for all Medicare beneficiaries without apparent signs or symptoms of cardiovascular disease Lab Results   Component Value Date    CHOLEST 225 (H) 12/06/2023    HDL 51 12/06/2023     (H) 12/06/2023    TRIG 160 (H) 12/06/2023         Electrocardiogram (EKG)   Covered if needed at Welcome to Medicare, and non-screening if indicated for medical reasons 03/23/2023      Ultrasound Screening for Abdominal Aortic Aneurysm (AAA) Covered once in a lifetime for  one of the following risk factors    Men who are 65-75 years old and have ever smoked    Anyone with a family history -     Colorectal Cancer Screening  Covered for ages 50-85; only need ONE of the following:    Colonoscopy   Covered every 10 years    Covered every 2 years if patient is at high risk or previous colonoscopy was abnormal -    No recommendations at this time    Flexible Sigmoidoscopy   Covered every 4 years -    Fecal Occult Blood Test Covered annually 12/07/2023   Bone Density Screening    Bone density screening    Covered every 2 years after age 65 if diagnosed with risk of osteoporosis or estrogen deficiency.    Covered yearly for long-term glucocorticoid medication use (Steroids) No results found for this or any previous visit.      No recommendations at this time   Pap and Pelvic    Pap   Covered every 2 years for women at normal risk; Annually if at high risk -  No recommendations at this time    Chlamydia Annually if high risk 12/07/2023  No recommendations at this time   Screening Mammogram    Mammogram     Recommend annually for all female patients aged 40 and older    One baseline mammogram covered for patients aged 35-39 -    No recommendations at this time    Immunizations    Influenza Covered once per flu season  Please get every year -  Influenza Vaccine(1) Never done    Pneumococcal Each vaccine ([id number] & [id number]) covered once after 65 Prevnar 13: -    [id number]: -     Pneumococcal Vaccination(1 - PCV) Never done    Hepatitis B One screening covered for patients with certain risk factors   -  No recommendations at this time    Tetanus Toxoid Not covered by Medicare Part B unless medically necessary (cut with metal); may be covered with your pharmacy prescription benefits -    Tetanus, Diptheria and Pertusis TD and TDaP Not covered by Medicare Part B -  No recommendations at this time    Zoster Not covered by Medicare Part B; may be covered with your pharmacy  prescription  benefits -  Zoster Vaccines(1 of 2) Never done     Chronic Obstructive Pulmonary Disease (COPD)    Spirometry Annually Spirometry date: 10/11/2017

## 2024-01-10 ENCOUNTER — TELEPHONE (OUTPATIENT)
Dept: INTERNAL MEDICINE CLINIC | Facility: CLINIC | Age: 89
End: 2024-01-10

## 2024-01-10 DIAGNOSIS — Z98.890 S/P LUMBAR LAMINECTOMY: Primary | ICD-10-CM

## 2024-01-10 DIAGNOSIS — M51.26 LUMBAR DISC HERNIATION: ICD-10-CM

## 2024-01-23 NOTE — TELEPHONE ENCOUNTER
Called and spoke w/ pt. Notified TB stated pt can see Dr. Gale. Provided pt with contact info. Notified to reach out if anything further is needed.   
Can refer to Dr. Gale for ortho spine  
LOV 1/3/24 per OV note      Ghada Carballo MD  1/4/2024  9:39 PM CST       No fractures  Severe arthritic changes, consider seeing [facility] if pain persists     \"6. S/P lumbar laminectomy, DDD lumbar spine- she wears back brace, follows with rheum\"     Called and spoke w/ pt. Pt stated right now she doesn't have any hip pain and wants to see someone for her back. Pt stated she wears a back brace and wants her back pain addressed. Has never seen ortho for her back pain. Wants to ensure Dr. Katz can also see for back or not, notified to my knowledge his specialty is knees/hips.     TB - Pt believes her pain is from her back and wants to see ortho for her back, I don't think Dr. Katz would see her for back pain correct? Who do you recommend pt see?     
Please see result note, pt was advised to see Dr. Katz.   
Pt calling wants to be sure Dr. Katz is the right doctor to be seen for Laminectomy, 2 fusion - 2 levels and another for 3 levels.  Pt's stated her back is the problem, not her hip. Pt requesting a call back.  
Pt would like to know who TB would recommend as an  orthopedist. She can be reached at   [phone]   
No

## 2024-02-06 ENCOUNTER — TELEPHONE (OUTPATIENT)
Dept: INTERNAL MEDICINE CLINIC | Facility: CLINIC | Age: 89
End: 2024-02-06

## 2024-02-06 NOTE — TELEPHONE ENCOUNTER
Pt needing refill of below to go to [facility] pharmacy below. Pt stated pharmacy doesn't have flovent anymore so would need to shay something else. Pt would also like phone call once sent in so please route back to front          [id number] - [place], [place] - [address] AT [facility], [phone], [phone] [[id number]]               FLOVENT  MCG/ACT Inhalation Aerosol 24 g 0 11/30/2023 --    Sig - Route: Inhale 2 puffs into the lungs 2 (two) times daily. - Inhalation    Sent to pharmacy as: Flovent  MCG/ACT Inhalation Aerosol

## 2024-03-05 ENCOUNTER — PATIENT OUTREACH (OUTPATIENT)
Dept: CASE MANAGEMENT | Age: 89
End: 2024-03-05

## 2024-03-13 PROCEDURE — 99284 EMERGENCY DEPT VISIT MOD MDM: CPT

## 2024-03-14 ENCOUNTER — APPOINTMENT (OUTPATIENT)
Dept: GENERAL RADIOLOGY | Facility: HOSPITAL | Age: 89
End: 2024-03-14
Attending: EMERGENCY MEDICINE
Payer: MEDICARE

## 2024-03-14 ENCOUNTER — APPOINTMENT (OUTPATIENT)
Dept: CT IMAGING | Facility: HOSPITAL | Age: 89
End: 2024-03-14
Attending: EMERGENCY MEDICINE
Payer: MEDICARE

## 2024-03-14 ENCOUNTER — HOSPITAL ENCOUNTER (EMERGENCY)
Facility: HOSPITAL | Age: 89
Discharge: HOME | End: 2024-03-14
Attending: EMERGENCY MEDICINE
Payer: MEDICARE

## 2024-03-14 VITALS
TEMPERATURE: 97 F | WEIGHT: 160 LBS | RESPIRATION RATE: 18 BRPM | DIASTOLIC BLOOD PRESSURE: 87 MMHG | BODY MASS INDEX: 31 KG/M2 | SYSTOLIC BLOOD PRESSURE: 160 MMHG | OXYGEN SATURATION: 96 % | HEART RATE: 80 BPM
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
## 2024-03-14 DIAGNOSIS — S20.229A CONTUSION OF BACK, UNSPECIFIED LATERALITY, INITIAL ENCOUNTER: ICD-10-CM

## 2024-03-14 DIAGNOSIS — S09.90XA INJURY OF HEAD, INITIAL ENCOUNTER: ICD-10-CM

## 2024-03-14 PROCEDURE — 72100 X-RAY EXAM L-S SPINE 2/3 VWS: CPT | Performed by: EMERGENCY MEDICINE

## 2024-03-14 PROCEDURE — 72220 X-RAY EXAM SACRUM TAILBONE: CPT | Performed by: EMERGENCY MEDICINE

## 2024-03-14 PROCEDURE — 70450 CT HEAD/BRAIN W/O DYE: CPT | Performed by: EMERGENCY MEDICINE

## 2024-03-14 NOTE — ED PROVIDER NOTES
Patient Seen in: [facility] Emergency Department      History     Chief Complaint   Patient presents with    Back Pain     Stated Complaint: back pain after slid out of recliner chair this afternoon    Subjective:   HPI    9-year-old female presenting with fall.  Patient says she fell backwards striking her head with no loss of consciousness and also landed on her tailbone.  She says she has tailbone pain she says she did not have any issues initially after the fall but as the day progressed she started having some more discomfort.  She denies loss of bowel or bladder function she was able to ambulate she feels well she denies any other injury.    Objective:   Past Medical History:   Diagnosis Date    Anemia     Anesthesia complication     pt stated had a reacion with a medication that sounded like \"memo\"    Anxiety state, unspecified     Arthritis     Asthma (HCC)     Atherosclerosis of coronary artery     Back pain     Back problem     cervical pain    Black stools     Blood transfusion reaction     CAD (coronary artery disease)     Calculus of kidney     Cancer (HCC) 2010    skin cancer in left arm    Change in hair     Chest pain on exertion     Coronary atherosclerosis of unspecified type of vessel, native or graft     Diarrhea, unspecified     Dyslipidemia     Easy bruising     Extrinsic asthma, unspecified     Fatigue     Glucose intolerance (pre-diabetes)     Hearing impairment     HEARING LOSS BUT NO AIDS    Hearing loss     Heart palpitations     HIGH BLOOD PRESSURE     High cholesterol     Hip pain     History of blood transfusion AT AGE 39    AFTER HYST    History of depression     Hoarseness, chronic     HTN (hypertension)     Indigestion     Leg swelling     Loss of appetite     Nausea     Osteoarthrosis, unspecified whether generalized or localized, unspecified site     knees, back    Osteoporosis     Other and unspecified hyperlipidemia     Pain in joints     Pinched nerve in neck      Pneumonia, organism unspecified(486)     PONV (postoperative nausea and vomiting)     Shortness of breath     Sleep apnea     Stented coronary artery     Stress     Uncomfortable fullness after meals     Unspecified essential hypertension     Visual impairment     glasses    Wears glasses     Weight loss               Past Surgical History:   Procedure Laterality Date    ANGIOPLASTY (CORONARY)      stent    APPENDECTOMY      BACK SURGERY      lumbar x 4    BENIGN BIOPSY LEFT  a long time ago    x 2    CATH BARE METAL STENT (BMS)  2012    CHOLECYSTECTOMY      COLONOSCOPY  10/21/2013    Procedure: COLONOSCOPY;  Surgeon: Emmanuel Naranjo MD;  Location:  ENDOSCOPY    COLONOSCOPY N/A 6/22/2021    Procedure: COLONOSCOPY with biopsies and forcep, cold and hot snare polypectomy with saline lift and clip placement x3;  Surgeon: Jerod Pierce MD;  Location:  ENDOSCOPY    EGD      EXCIS LUMBAR DISK,ONE LEVEL      FLUOR GID & LOCLZJ NDL/CATH SPI DX/THER NJX  9/23/2013    Procedure: CERVICAL EPIDURAL;  Surgeon: Christiano Wiseman MD;  Location: [facility]    HEMORRHOIDECTOMY,INT/EXT,SIMPLE      HYSTERECTOMY      complete    INJECTION, W/WO CONTRAST, DX/THERAPEUTIC SUBSTANCE, EPIDURAL/SUBARACHNOID; CERVICAL/THORACIC  9/23/2013    Procedure: CERVICAL EPIDURAL;  Surgeon: Christiano Wiseman MD;  Location: [facility]    INJECTION, W/WO CONTRAST, DX/THERAPEUTIC SUBSTANCE, EPIDURAL/SUBARACHNOID; CERVICAL/THORACIC N/A 8/17/2015    Procedure: CERVICAL EPIDURAL;  Surgeon: Josue Stewart MD;  Location: [facility]    ALCIRA LOCALIZATION WIRE 1 SITE LEFT (CPT=19281)  1980'S    Benign    OTHER SURGICAL HISTORY  8/7/12    Diag cardiac stent, multi-link mini vision 2mmx 12mm.     PATIENT DOCUMENTED NOT TO HAVE EXPERIENCED ANY OF THE FOLLOWING EVENTS  9/23/2013    Procedure: CERVICAL EPIDURAL;  Surgeon: Christiano Wiseman MD;  Location: [facility]    PATIENT  DOCUMENTED NOT TO HAVE EXPERIENCED ANY OF THE FOLLOWING EVENTS N/A 2015    Procedure: CERVICAL EPIDURAL;  Surgeon: Josue Stewart MD;  Location: [facility]    PATIENT WITHOUGH PREOPERATIVE ORDER FOR IV ANTIBIOTIC SURGICAL SITE INFECTION PROPHYLAXIS.  2013    Procedure: CERVICAL EPIDURAL;  Surgeon: Christiano Wiseman MD;  Location: [facility]    PATIENT WITHOUGH PREOPERATIVE ORDER FOR IV ANTIBIOTIC SURGICAL SITE INFECTION PROPHYLAXIS. N/A 2015    Procedure: CERVICAL EPIDURAL;  Surgeon: Josue Stewart MD;  Location: [facility]    SPINE SURGERY PROCEDURE UNLISTED      STENT, COATED/COV W/DEL SYS      TONSILLECTOMY      TOTAL ABDOM HYSTERECTOMY      UPPER GI ENDOSCOPY,EXAM                  Social History     Socioeconomic History    Marital status:    Tobacco Use    Smoking status: Former     Packs/day: 0.30     Years: 3.00     Additional pack years: 0.00     Total pack years: 0.90     Types: Cigarettes     Quit date: 1972     Years since quittin.1    Smokeless tobacco: Never   Vaping Use    Vaping Use: Never used   Substance and Sexual Activity    Alcohol use: Not Currently    Drug use: No    Sexual activity: Never   Other Topics Concern    Caffeine Concern Yes    Exercise No   Social History Narrative    ** Merged History Encounter **          Social Determinants of Health     Physical Activity: Inactive (2023)    Exercise Vital Sign     Days of Exercise per Week: 0 days     Minutes of Exercise per Session: 0 min    Social Connections              Review of Systems    Positive for stated complaint: back pain after slid out of recliner chair this afternoon  Other systems are as noted in HPI.  Constitutional and vital signs reviewed.      All other systems reviewed and negative except as noted above.    Physical Exam     ED Triage Vitals [24 2351]   BP (!) 168/92   Pulse 80   Resp 18   Temp 97.4 °F (36.3 °C)   Temp  src Temporal   SpO2 95 %   O2 Device None (Room air)       Current:BP (!) 168/92   Pulse 80   Temp 97.4 °F (36.3 °C) (Temporal)   Resp 18   Wt 72.6 kg   LMP  (LMP Unknown)   SpO2 95%   BMI 31.25 kg/m²         Physical Exam  Awake alert patient appears no distress HEENT exam normal lungs normal cardiovascular exam normal abdomen extremities normal back exam patient is point to no tenderness in the lumbar region she is pointing to more the sacral region.  Lower extremity exam is normal no focal neurologic deficits skin is intact       ED Course   Labs Reviewed - No data to display          Differential diagnosis includes subarachnoid hemorrhage subdural hematoma         MDM                                         Medical Decision Making  89-year-old female presenting emerged part for fall.  Independent interpretation by ED physician of x-rays of the lumbar and sacral region showed no acute fractures and CT scan the brain shows no acute subarachnoid hemorrhage patient was given symptomatic care instructions and will be discharged home  The patient was screened and evaluated during this visit.  As a treating physician attending to the patient, I determined, within reasonable clinical confidence and prior to discharge, that an emergency medical condition was not or was no longer present.  There was no indication for further evaluation, treatment or admission on an emergency basis.    The usual and customary discharge instructions were discussed given the patient's ER course.  We discussed signs and symptoms that should prompt the patient's immediate return to the emergency department.  Reasonable over-the-counter and prescription treatment options and physician follow-up plan was discussed.  Patient was discharged home in good condition  This note was prepared using Dragon Medical voice recognition dictation software.  As a result errors may occur.  When identified to these areas have been corrected.  While every  attempt is made to correct errors during dictation discrepancies may still exist.  Please contact if there are any errors    Problems Addressed:  Contusion of back, unspecified laterality, initial encounter: acute illness or injury  Fall, initial encounter: acute illness or injury  Injury of head, initial encounter: acute illness or injury    Amount and/or Complexity of Data Reviewed  Radiology: ordered and independent interpretation performed. Decision-making details documented in ED Course.        Disposition and Plan     Clinical Impression:  1. Fall, initial encounter    2. Contusion of back, unspecified laterality, initial encounter    3. Injury of head, initial encounter         Disposition:  Discharge  3/14/2024  3:49 am    Follow-up:  No follow-up provider specified.        Medications Prescribed:  Current Discharge Medication List

## 2024-03-14 NOTE — ED INITIAL ASSESSMENT (HPI)
Pt fell backwards on a chair earlier in the day and is now having pain in tailbone and back of head. No LOC. No blood thinners.

## 2024-03-15 ENCOUNTER — PATIENT OUTREACH (OUTPATIENT)
Dept: CASE MANAGEMENT | Age: 89
End: 2024-03-15

## 2024-03-15 DIAGNOSIS — M17.12 PRIMARY OSTEOARTHRITIS OF LEFT KNEE: ICD-10-CM

## 2024-03-15 DIAGNOSIS — M17.11 PRIMARY OSTEOARTHRITIS OF RIGHT KNEE: ICD-10-CM

## 2024-03-15 DIAGNOSIS — K58.0 IRRITABLE BOWEL SYNDROME WITH DIARRHEA: ICD-10-CM

## 2024-03-15 DIAGNOSIS — G56.01 CARPAL TUNNEL SYNDROME OF RIGHT WRIST: ICD-10-CM

## 2024-03-15 DIAGNOSIS — I25.10 CORONARY ARTERY DISEASE INVOLVING NATIVE CORONARY ARTERY OF NATIVE HEART WITHOUT ANGINA PECTORIS: ICD-10-CM

## 2024-03-15 DIAGNOSIS — M16.12 OSTEOARTHRITIS OF ONE HIP, LEFT: ICD-10-CM

## 2024-03-15 DIAGNOSIS — I10 HYPERTENSION, BENIGN: Primary | ICD-10-CM

## 2024-03-15 DIAGNOSIS — K86.89 PANCREATIC INSUFFICIENCY (HCC): ICD-10-CM

## 2024-03-15 DIAGNOSIS — F41.9 ANXIETY: ICD-10-CM

## 2024-03-15 DIAGNOSIS — M17.0 PRIMARY OSTEOARTHRITIS OF BOTH KNEES: ICD-10-CM

## 2024-03-15 DIAGNOSIS — E78.00 PURE HYPERCHOLESTEROLEMIA: ICD-10-CM

## 2024-03-15 DIAGNOSIS — M81.0 SENILE OSTEOPOROSIS: ICD-10-CM

## 2024-03-15 NOTE — PROGRESS NOTES
Spoke to Karla for [facility].      Updates to patient care team/comments: UTD  Patient reported changes in medications: UTD  Med Adherence  Comment: pt taking medications as prescribed     Health Maintenance:   Health Maintenance   Topic Date Due    Pneumococcal Vaccine: 65+ Years (1 of 2 - PCV) Never done    Zoster Vaccines (1 of 2) Never done    COVID-19 Vaccine (2 - 2023-24 season) 09/01/2023    Influenza Vaccine (1) Never done    HTN: BP Follow-Up  04/01/2024    Annual Physical  01/03/2025    Annual Depression Screening  Completed    Fall Risk Screening (Annual)  Completed       Patient updates/concerns:    Spoke to pt for monthly outreach   Pt is recovering well from a fall she suffered. Pt states that as she was reclining her chair, the weight displaced too quickly and she flipped the chair backwards. In the process she hit her head on the ground and twisted her back. She was able to get herself up without a problem and call 911. Pt was well enough to answer the door and on initial assessment paramedics did not take pt to er. Because of pt previous back surgeries and hit to the head pt was encouraged by family to visit ER to rule out any back injuries or head injuries. Pt was given xray and ct and any internal damage was ruled out. Pt was discharged and prescribed tylenol three with codeine. Pt is unable to tolerate coding and has been managing any discomfort with tylenol. Pt states that lower back is still a little sore/tight but she is not limited in her movements, she is trying to get as much rest as possible. Pt is not using ice or heat.   Pt feels the use of support brace for her back has been a benefit.    Pt has not had any long term Covid symptoms. She states she never lost her taste or appetite. Pt has no cough and never had any fever or body aches. Pt appetite is appropriate and she continues to get meals on wheels every day. She supplements this with groceries that her daughters shop for. She puts  together a list every week and the daughters will shop for and deliver the items pt requests.    Reviewed rx with pt. Pt no longer takes flovent. Instead she has been using qvar. She feels that she takes it less than prescribed estimating most days only once daily. Pt is unsure if pcp will continue to manage inhaler since she routinely follows up with pulm. She has upcoming visit with pulm and will discuss.    Pt is satisfied using [facility] delivery for her prescriptions.   Pt continues to use xanax. She feels it works appropriately. She is falling asleep staying asleep and waking rested.    Pt declined [facility] assistance in scheduling hospital follow up. She feels that she has improved and continues to get relief with rest. Pt will call ccm back or contact pcp office if her condition worsens and she needs to schedule er fu   Pt continues to engage socially with other residents at least once monthly when they have a function.   Goals/Action Plan:    Active goal from previous outreach: stay healthy     Patient reported progress towards goals: pt continues to provide encouragement and education for healthy coping and dx               - What: hydrated           - Where/When/How: pt is consistently getting at least 40 oz of water a day. If she has bouts of constipation she will drink more  Patient Reported Barriers and Concerns: none                   - Plan for overcoming barriers: n/a    Care Managers Interventions: continue to provide encouragement and education for healthy coping and dx    Future Appointments:   Future Appointments   Date Time Provider Department Center   3/25/2024  1:30 PM Melissa Jackson MD [facility] [place]   5/28/2024 10:00 AM Abundio Tuttle MD [facility] [place]   7/1/2024  1:00 PM Josef Mcneill APRN [facility]         Next Care Manager Follow Up Date: one month     Reason For Follow Up: review progress and or barriers towards patient's goals.     Time Spent This  Encounter Total:35 min medical record review, telephone communication, care plan updates where needed, education, goals, and action plan recreation/update. Provided acknowledgment and validation to patient's concerns.   Monthly Minute Total including today: 35  Physical assessment, complete health history, and need for CCM established by Ghada Carballo MD.

## 2024-03-18 ENCOUNTER — TELEPHONE (OUTPATIENT)
Dept: INTERNAL MEDICINE CLINIC | Facility: CLINIC | Age: 89
End: 2024-03-18

## 2024-03-18 ENCOUNTER — OFFICE VISIT (OUTPATIENT)
Dept: INTERNAL MEDICINE CLINIC | Facility: CLINIC | Age: 89
End: 2024-03-18
Payer: MEDICARE

## 2024-03-18 VITALS
OXYGEN SATURATION: 99 % | WEIGHT: 157.38 LBS | DIASTOLIC BLOOD PRESSURE: 70 MMHG | TEMPERATURE: 99 F | BODY MASS INDEX: 31.73 KG/M2 | RESPIRATION RATE: 18 BRPM | HEART RATE: 98 BPM | SYSTOLIC BLOOD PRESSURE: 138 MMHG | HEIGHT: 59 IN

## 2024-03-18 DIAGNOSIS — S00.03XD CONTUSION OF OCCIPITAL REGION OF SCALP, SUBSEQUENT ENCOUNTER: ICD-10-CM

## 2024-03-18 DIAGNOSIS — M54.50 ACUTE BILATERAL LOW BACK PAIN WITHOUT SCIATICA: ICD-10-CM

## 2024-03-18 DIAGNOSIS — M53.3 COCCYDYNIA: ICD-10-CM
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
## 2024-03-18 DIAGNOSIS — R41.3 MEMORY CHANGES: ICD-10-CM
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
## 2024-03-18 PROCEDURE — 99214 OFFICE O/P EST MOD 30 MIN: CPT | Performed by: INTERNAL MEDICINE

## 2024-03-18 NOTE — TELEPHONE ENCOUNTER
Called and spoke w/ pt. Seen in ER 3/14/24 and imaging available in chart review. Lower back wasn't hurting in the beginning and now it is. Has history of back surgeries. Pain has worsened since Friday. C/o decreased appetite and nausea from pain. Has been taking Tylenol with meals. Ate breakfast today and felt a little nauseous after. Sleeping ok. Pain is worse when laying down. Right now sitting in the chair pain is 5/10. Unsure if Tylenol is helping. Has not tried ice/heat. Encouraged could try. Has been using her walker to get around, moving around okay. Denies any other symptoms. Daughter is bringing pt to apt today at 3:40pm for HFU.     TAHIR ARORA

## 2024-03-18 NOTE — TELEPHONE ENCOUNTER
Pt stated she was on a reclining chair and chair went back.  Pt stated she's had multiple back surgeries.  Pt stated she hit her head during the fall.  ER follow up visit scheduled on below.  Was in the ER on 3/11.    Pt stated she feels worse since the ER visit.  Pt stated when she walks, she has 8/10 in pain.  And added, when she lays down to go to sleep 8/10 for pain.  Pt stated she feels worse now, then before her ER visit.  Pt scheduled on below for ER f/u    Pt stated if she doesn't answer her house phone wants to be called on her cell phone.    Future Appointments   Date Time Provider Department Center   3/18/2024  3:40 PM Ghada Carballo MD [facility] [address] [facility]

## 2024-03-18 NOTE — PROGRESS NOTES
Karla Vallecillo is a 89 year old female.    Chief Complaint   Patient presents with    ER F/U     ES rm - 4 - fall on 3/14 with dull INTMT low back pain at a 4/10.       HPI:     Patient here with her daughter after a fall at home (slid off the chair) on 3/14/24. She did not have LOC. She hit back of her head, tailbone, low back and both buttocks. She went to ER and CT brain negative for acute IC abnormality. Lumbar xray, sacrum and coccyx xray negative for fracture. She reports pain as moderate, worse if she puts pressure on the tailbone. No significant HA or new focal weakness.  Her daughter is concerned about memory changes over the last 6+ months (long before this fall). She has to repeat things to patient more often, her short term memory seems to be more affected.       Patient Active Problem List   Diagnosis    Cervical radiculopathy at C5    Primary osteoarthritis of right knee    Lumbar disc herniation    Irritable bowel syndrome with diarrhea    CAD (coronary artery disease), native coronary artery    Senile osteoporosis    Hypertension, benign    Dyslipidemia    Osteoarthritis of left knee    S/P lumbar laminectomy    Spondylosis of lumbar region without myelopathy or radiculopathy    Osteoarthritis of one hip, left    History of heart artery stent    Pancreatic insufficiency (HCC)    Pure hypercholesterolemia    Carpal tunnel syndrome of right wrist    Chronic bronchitis, unspecified chronic bronchitis type (HCC)    Anxiety    Toe infection    Primary osteoarthritis of both knees     Current Outpatient Medications   Medication Sig Dispense Refill    loperamide 2 MG Oral Cap Take 1 capsule (2 mg total) by mouth daily. 30 capsule 3    Beclomethasone Diprop HFA 80 MCG/ACT Inhalation Aerosol, Breath Activated Inhale 2 puffs into the lungs in the morning and 2 puffs before bedtime. 10.6 g 1    cefadroxil 500 MG Oral Cap Take 1 capsule (500 mg total) by mouth 2 (two) times daily. 14 capsule 0     Fluticasone Propionate HFA (FLOVENT HFA) 220 MCG/ACT Inhalation Aerosol Inhale 2 puffs into the lungs 2 (two) times daily. 3 each 3    hydrocortisone 2.5 % External Cream ONE APPLICATION TOPICAL 2 TIMES A DAY FOR 10 DAYS ON ARMS AND RIGHT ANKLE      prednisoLONE 1 % Ophthalmic Suspension Place 1 drop into the left eye 4 (four) times daily.      Pancrelipase, Lip-Prot-Amyl, (ZENPEP) 87472-481430 units Oral Cap DR Particles Take 1 capsule by mouth 3 (three) times daily with meals. May increase to 2 capsules three times a day with meals if needed. 600 capsule 3    PREDNISONE 2.5 MG Oral Tab TAKE 1 TABLET(2.5 MG) BY MOUTH DAILY 90 tablet 3    Pancrelipase, Lip-Prot-Amyl, (PANCREAZE) 23313-71827 units Oral Cap DR Particles Take 37,000 units of lipase by mouth 3 (three) times daily with meals. 100 capsule 0    Levalbuterol Tartrate 45 MCG/ACT Inhalation Aerosol Inhale 1-2 puffs into the lungs every 4 to 6 hours as needed for Wheezing or Shortness of Breath. 1 each 2    Verapamil HCl  MG Oral Capsule SR 24 Hr       clotrimazole 10 MG Mouth/Throat Alea Take 1 lozenge (10 mg total) by mouth 5 (five) times daily. 25 Alea 2    B Complex-C-E-Zn (STRESS PLUS ZINC) Oral Tab Take by mouth.      Ascorbic Acid (VITAMIN C OR) Take 1 tablet by mouth every evening.      ALPRAZolam 1 MG Oral Tab Take 0.5 tablets (0.5 mg total) by mouth nightly as needed.      NON FORMULARY Vitamin B complex with Vit B-12 one daily      ALPRAZolam 0.25 MG Oral Tab Take 1 tablet (0.25 mg total) by mouth every 6 (six) hours as needed.      IMODIUM MULTI-SYMPTOM RELIEF OR Take 1 tablet by mouth as needed.      Probiotic Product (PROBIOTIC-10 OR) Take 1 tablet by mouth daily.        acetaminophen 500 MG Oral Tab Take 2 tablets (1,000 mg total) by mouth every 6 (six) hours as needed for Pain.      Vitamin D3, Cholecalciferol, 50 MCG (2000 UT) Oral Cap Take 1 capsule (2,000 Units total) by mouth daily.      loratadine (CLARITIN) 10 MG Oral Tab Take 1  tablet (10 mg total) by mouth daily.      vitamin E 400 UNITS Oral Cap Take 1 capsule (400 Units total) by mouth daily.        Past Medical History:   Diagnosis Date    Anemia     Anesthesia complication     pt stated had a reacion with a medication that sounded like \"memo\"    Anxiety state, unspecified     Arthritis     Asthma (HCC)     Atherosclerosis of coronary artery     Back pain     Back problem     cervical pain    Black stools     Blood transfusion reaction     CAD (coronary artery disease)     Calculus of kidney     Cancer (HCC) 2010    skin cancer in left arm    Change in hair     Chest pain on exertion     Coronary atherosclerosis of unspecified type of vessel, native or graft     Diarrhea, unspecified     Dyslipidemia     Easy bruising     Extrinsic asthma, unspecified     Fatigue     Glucose intolerance (pre-diabetes)     Hearing impairment     HEARING LOSS BUT NO AIDS    Hearing loss     Heart palpitations     HIGH BLOOD PRESSURE     High cholesterol     Hip pain     History of blood transfusion AT AGE 39    AFTER HYST    History of depression     Hoarseness, chronic     HTN (hypertension)     Indigestion     Leg swelling     Loss of appetite     Nausea     Osteoarthrosis, unspecified whether generalized or localized, unspecified site     knees, back    Osteoporosis     Other and unspecified hyperlipidemia     Pain in joints     Pinched nerve in neck     Pneumonia, organism unspecified(486)     PONV (postoperative nausea and vomiting)     Shortness of breath     Sleep apnea     Stented coronary artery     Stress     Uncomfortable fullness after meals     Unspecified essential hypertension     Visual impairment     glasses    Wears glasses     Weight loss       Social History:  Social History     Socioeconomic History    Marital status:    Tobacco Use    Smoking status: Former     Packs/day: 0.30     Years: 3.00     Additional pack years: 0.00     Total pack years: 0.90     Types: Cigarettes      Quit date: 1972     Years since quittin.1    Smokeless tobacco: Never   Vaping Use    Vaping Use: Never used   Substance and Sexual Activity    Alcohol use: Not Currently    Drug use: No    Sexual activity: Never   Other Topics Concern    Caffeine Concern Yes    Exercise No   Social History Narrative    ** Merged History Encounter **          Social Determinants of Health     Physical Activity: Inactive (2023)    Exercise Vital Sign     Days of Exercise per Week: 0 days     Minutes of Exercise per Session: 0 min    Social Connections     Family History   Problem Relation Age of Onset    Breast Cancer Mother 70    High Blood Pressure Mother     Allergies Father     Asthma Father     Colon Cancer Father     Heart Disease Father     Heart Attack Father     Heart Attack Paternal Grandfather     Heart Disease Paternal Grandfather     Arthritis Paternal Grandmother     Allergies Paternal Grandmother     Cancer Maternal Grandfather     Heart Attack Maternal Grandfather     Heart Attack Maternal Grandmother     Heart Attack Brother     Other (Autoimmune disease) Brother     Thyroid Disorder Sister     Asthma Sister     Cancer Brother     Allergies Daughter     Fibromyalgia Daughter     Melanoma Daughter     Other (Hepatitis C) Daughter         Allergies  Allergies   Allergen Reactions    Aspirin DIARRHEA and SHORTNESS OF BREATH    Breo Ellipta OTHER (SEE COMMENTS) and SHORTNESS OF BREATH     Pneumonia  Pneumonia    Budesonide SHORTNESS OF BREATH     Coughing    Celebrex [Celecoxib] SHORTNESS OF BREATH    Citalopram SHORTNESS OF BREATH and OTHER (SEE COMMENTS)     Oral     Clotrimazole DIARRHEA and SHORTNESS OF BREATH    Erythromycin OTHER (SEE COMMENTS) and SHORTNESS OF BREATH     Short of Breath  Short of Breath  External  Oral   Short of Breath    Famotidine SHORTNESS OF BREATH    Fish Oil ANAPHYLAXIS, OTHER (SEE COMMENTS) and SHORTNESS OF BREATH     Oral     Fluticasone OTHER (SEE COMMENTS)     Per pt not  true  Other reaction(s): Propensity to adverse reactions to drug    Fluticasone-Salmeterol Coughing, SHORTNESS OF BREATH and OTHER (SEE COMMENTS)     Inhalation    Gabapentin WHEEZING    Iodine (Topical) RASH and SHORTNESS OF BREATH     Rash with topical Iodine.   Rash with topical Iodine.   Rash with topical Iodine.     Iodine Solution [Povidone Iodine] ANAPHYLAXIS     IVP Dye    Levofloxacin SHORTNESS OF BREATH and OTHER (SEE COMMENTS)     Nausea, leg pain    Nausea, leg pain    Intravenous  Other reaction(s): Propensity to adverse reactions to drug    Lidocaine ANAPHYLAXIS and UNKNOWN     \"Breathing issues\"-- per patient okay with marcaine     Lisinopril SHORTNESS OF BREATH     Other reaction(s): Propensity to adverse reactions to drug    Metamucil [Psyllium] SHORTNESS OF BREATH     And abdominal discomfort.    Nitrofurantoin SHORTNESS OF BREATH and UNKNOWN    Nsaids SHORTNESS OF BREATH and UNKNOWN    Other RASH and SHORTNESS OF BREATH     Transdermal \"memo \" patch ;novacaine Pt states she is okay with marcaine  per allergy testing.    Penicillins SHORTNESS OF BREATH     Short of Breath    Plavix [Clopidogrel] SHORTNESS OF BREATH    Pulmicort SHORTNESS OF BREATH and UNKNOWN     Coughing      Radiology Contrast Iodinated Dyes ANAPHYLAXIS     Other reaction(s): Propensity to adverse reactions to drug    Shellfish SHORTNESS OF BREATH    Mupirocin OTHER (SEE COMMENTS)     Felt short of breath  Other reaction(s): Propensity to adverse reactions to drug    Advair Hfa OTHER (SEE COMMENTS)    Bupropion OTHER (SEE COMMENTS)     constipation    Celecoxib OTHER (SEE COMMENTS)     Oral     Codeine OTHER (SEE COMMENTS)     Per pt not true pt takes this medication       Effient [Prasugrel Hydrochloride] SHORTNESS OF BREATH    Linezolid DIARRHEA     Other reaction(s): Propensity to adverse reactions to drug    Metronidazole ASTHMA and UNKNOWN     Other reaction(s): Propensity to adverse reactions to drug    Mold SHORTNESS OF  BREATH    Penicillin G RASH     sob  Other reaction(s): Propensity to adverse reactions to drug    Questran [Cholestyramine] HIVES and Coughing    Risperdal [Risperidone] SHORTNESS OF BREATH    Shellfish-Derived Products      Sob      Tramadol UNKNOWN     Arms turned red like a sunburn    Vancomycin RASH     Rash to the face and neck     Warfarin OTHER (SEE COMMENTS)     Asthma attack  Other reaction(s): Propensity to adverse reactions to drug    Flulaval RASH     Other reaction(s): Propensity to adverse reactions to drug    Kenalog [Triamcinolone] NAUSEA ONLY         REVIEW OF SYSTEMS:   GENERAL HEALTH:  no fevers   RESPIRATORY: no cough  CARDIOVASCULAR: denies chest pain  GI: denies abdominal pain  MS: back of head, low back, tailbone and buttock pain since fall  NEURO: denies headaches  HEME: No adenopathy      EXAM:   /70 (BP Location: Left arm, Patient Position: Sitting, Cuff Size: large)   Pulse 98   Temp 98.6 °F (37 °C) (Temporal)   Resp 18   Ht 4' 11\" (1.499 m)   Wt 157 lb 6.4 oz (71.4 kg)   LMP  (LMP Unknown)   SpO2 99%   BMI 31.79 kg/m²   GENERAL: well developed, well nourished,in no apparent distress  HEENT: occipital region with mild TTP, no swelling, no lacerations  NECK: no LAD or JVD  LUNGS: normal rate without respiratory distress  CARDIO: RRR nl S1 S2  Spine: TTP lumbar spine and tailbone, neg SLR  EXTREMITIES: no cyanosis, clubbing   NEURO: Alert and oriented, motor and sensory wnl, ambulates with walker    ASSESSMENT AND PLAN:     Encounter Diagnoses   Name     Fall in home, subsequent encounter- mechanical fall on 3/14, slipped off the recliner. ER records reviewed with patient and her daughter. CT head neg. Neuro exam today at baseline. Advised to monitor for any confusion, HA, weakness, acute neuro changes.      Coccydynia- donut hole pillow, otc tylenol prn     Acute bilateral low back pain without sciatica- lumbar xray with degenerative changes, no fractures. Otc tylenol prn,  consider PT if symptoms persist     Contusion of occipital region of scalp, subsequent encounter- ice packs and tylenol prn     Memory changes- vit b12 and TSH end of 2023 WNL. Referred to neurology for further evaluation.        No orders of the defined types were placed in this encounter.      Meds & Refills for this Visit:  Requested Prescriptions      No prescriptions requested or ordered in this encounter       Imaging & Consults:  NEURO - INTERNAL    Return in about 6 weeks (around 4/29/2024) for follow up.  There are no Patient Instructions on file for this visit.      The patient indicates understanding of these issues and agrees to the plan.

## 2024-03-26 ENCOUNTER — TELEPHONE (OUTPATIENT)
Dept: INTERNAL MEDICINE CLINIC | Facility: CLINIC | Age: 89
End: 2024-03-26

## 2024-03-26 NOTE — TELEPHONE ENCOUNTER
Pt was referred to see Dr Melva Good and was not able to get in until 5/29/24  they got her in sooner with Dr Froilan Hawthorne and her new appt is 5/13/24 pt stated this is too far. They did add her to the wait list which she is having a hard time login into [employer]. I gave her [employer] support number.

## 2024-03-27 NOTE — TELEPHONE ENCOUNTER
Agree with going on waiting list, I don't think an emergency can be justified for memory changes over the last year

## 2024-04-04 ENCOUNTER — VIRTUAL PHONE E/M (OUTPATIENT)
Facility: CLINIC | Age: 89
End: 2024-04-04
Payer: MEDICARE

## 2024-04-04 ENCOUNTER — PATIENT OUTREACH (OUTPATIENT)
Dept: CASE MANAGEMENT | Age: 89
End: 2024-04-04

## 2024-04-04 DIAGNOSIS — E78.00 PURE HYPERCHOLESTEROLEMIA: ICD-10-CM

## 2024-04-04 DIAGNOSIS — M81.0 SENILE OSTEOPOROSIS: ICD-10-CM

## 2024-04-04 DIAGNOSIS — F41.9 ANXIETY: ICD-10-CM

## 2024-04-04 DIAGNOSIS — M17.11 PRIMARY OSTEOARTHRITIS OF RIGHT KNEE: ICD-10-CM

## 2024-04-04 DIAGNOSIS — K86.89 PANCREATIC INSUFFICIENCY (HCC): ICD-10-CM

## 2024-04-04 DIAGNOSIS — I10 HYPERTENSION, BENIGN: ICD-10-CM

## 2024-04-04 DIAGNOSIS — I25.10 CORONARY ARTERY DISEASE INVOLVING NATIVE CORONARY ARTERY OF NATIVE HEART WITHOUT ANGINA PECTORIS: Primary | ICD-10-CM

## 2024-04-04 DIAGNOSIS — J45.30 MILD PERSISTENT ASTHMA WITHOUT COMPLICATION (HCC): Primary | ICD-10-CM

## 2024-04-04 DIAGNOSIS — M16.12 OSTEOARTHRITIS OF ONE HIP, LEFT: ICD-10-CM

## 2024-04-04 DIAGNOSIS — M17.12 PRIMARY OSTEOARTHRITIS OF LEFT KNEE: ICD-10-CM

## 2024-04-04 DIAGNOSIS — E78.5 DYSLIPIDEMIA: ICD-10-CM

## 2024-04-04 PROCEDURE — 99443 PHONE E/M BY PHYS 21-30 MIN: CPT | Performed by: INTERNAL MEDICINE

## 2024-04-04 NOTE — PROGRESS NOTES
Pulmonary Progress Note    History of Present Illness:  Karla Vallecillo is a 89 year old female presenting to pulmonary clinic ongoing diarrhea- once black -- all the time  Asthma ongoing   Imodium- helps - due for upper scope foor ? Blockage --pandolfi = last colonscopy 1.5 yrs ago - for Dec   Seeing PA now   Went to er last week-- for right leg swelling- - had neg dopplers-- need replacement of right knee- - recommended to take water pills in the past   Saw jessica - no water pill -   sw dr ramos- - few months -   Breathing - thinks more short of breath -- unclear if asthma or heart-   Using flovent -- 2 puffs bid-- was down to one BID   No rescue use though carries it  No limitation to activities at home - not cleaning  prednsione 2.5mg every day-- per bety - to see him -     9,23- saw marlene - no reason- though diet related - resume pancreas diet   No er visits   Skin cancer in the past - now with rt arm issues and pimples - rt arm pre cancer   Breathing is not too bad-   Flovent 1-2 puffs in am and some at night depending on how she is feeling  And PRN - rescue use - none at all   Reaction to bad container of flovent -     4.24-- 30min phone visit     Had covid one month prior - no hosp   Had fall - 3/18th saw dr lopez - no LOC - back pain - went to er with neg head CT and back   Thinks had concussion -- sleeping more at first now unable to sleep   Some memory issues - thinks about the same   Back is now all better - remains on brace-   Breathing is pretty good - now on qvar 80 - 2 puffs twice a day - - no rescue -- not really coughing   To see neurologist   Remains on prednsione 2.5 mg? Daily from dr albert - no changes           Social History:   Social History     Socioeconomic History    Marital status:    Tobacco Use    Smoking status: Former     Packs/day: 0.30     Years: 3.00     Additional pack years: 0.00     Total pack years: 0.90     Types: Cigarettes     Quit date: 2/13/1972     Years  since quittin.1    Smokeless tobacco: Never   Vaping Use    Vaping Use: Never used   Substance and Sexual Activity    Alcohol use: Not Currently    Drug use: No    Sexual activity: Never   Other Topics Concern    Caffeine Concern Yes    Exercise No        Medications:   Current Outpatient Medications   Medication Sig Dispense Refill    loperamide 2 MG Oral Cap Take 1 capsule (2 mg total) by mouth daily. 30 capsule 3    Beclomethasone Diprop HFA 80 MCG/ACT Inhalation Aerosol, Breath Activated Inhale 2 puffs into the lungs in the morning and 2 puffs before bedtime. 10.6 g 1    cefadroxil 500 MG Oral Cap Take 1 capsule (500 mg total) by mouth 2 (two) times daily. 14 capsule 0    Fluticasone Propionate HFA (FLOVENT HFA) 220 MCG/ACT Inhalation Aerosol Inhale 2 puffs into the lungs 2 (two) times daily. 3 each 3    hydrocortisone 2.5 % External Cream ONE APPLICATION TOPICAL 2 TIMES A DAY FOR 10 DAYS ON ARMS AND RIGHT ANKLE      prednisoLONE 1 % Ophthalmic Suspension Place 1 drop into the left eye 4 (four) times daily.      Pancrelipase, Lip-Prot-Amyl, (ZENPEP) [id number] units Oral Cap DR Particles Take 1 capsule by mouth 3 (three) times daily with meals. May increase to 2 capsules three times a day with meals if needed. 600 capsule 3    PREDNISONE 2.5 MG Oral Tab TAKE 1 TABLET(2.5 MG) BY MOUTH DAILY 90 tablet 3    Pancrelipase, Lip-Prot-Amyl, (PANCREAZE) [id number] units Oral Cap DR Particles Take 37,000 units of lipase by mouth 3 (three) times daily with meals. 100 capsule 0    Levalbuterol Tartrate 45 MCG/ACT Inhalation Aerosol Inhale 1-2 puffs into the lungs every 4 to 6 hours as needed for Wheezing or Shortness of Breath. 1 each 2    Verapamil HCl  MG Oral Capsule SR 24 Hr       clotrimazole 10 MG Mouth/Throat Alea Take 1 lozenge (10 mg total) by mouth 5 (five) times daily. 25 Alea 2    B Complex-C-E-Zn (STRESS PLUS ZINC) Oral Tab Take by mouth.      Ascorbic Acid (VITAMIN C OR) Take 1 tablet by  mouth every evening.      ALPRAZolam 1 MG Oral Tab Take 0.5 tablets (0.5 mg total) by mouth nightly as needed.      NON FORMULARY Vitamin B complex with Vit B-12 one daily      ALPRAZolam 0.25 MG Oral Tab Take 1 tablet (0.25 mg total) by mouth every 6 (six) hours as needed.      IMODIUM MULTI-SYMPTOM RELIEF OR Take 1 tablet by mouth as needed.      Probiotic Product (PROBIOTIC-10 OR) Take 1 tablet by mouth daily.        acetaminophen 500 MG Oral Tab Take 2 tablets (1,000 mg total) by mouth every 6 (six) hours as needed for Pain.      Vitamin D3, Cholecalciferol, 50 MCG (2000 UT) Oral Cap Take 1 capsule (2,000 Units total) by mouth daily.      loratadine (CLARITIN) 10 MG Oral Tab Take 1 tablet (10 mg total) by mouth daily.      vitamin E 400 UNITS Oral Cap Take 1 capsule (400 Units total) by mouth daily.         Review of Systems:    Weight is stable - 167-- now 163 - reports stable -- reports 157 at [facility] office   Right leg with swelling- using knee brace - but thinks not related -   Recent rt arm lesions removed   Rmains with diarrhea -- marlene-- told to take imodium- - not daily - seems better   Sleeping well - awakens at times- - thinks OK - xanax - 1/2 pill - and some xanax during the day 2-3 times   No gerd - noted -- at all -   Rare episode - of choking with white chicken- daughter  of choking - follows with marlene -denies recent issues  Denies any significant swelling      Physical Exam:  LMP  (LMP Unknown)    Constitutional: comfortable . No acute distress. Very connected   Able to tell dosing   Results: reviewed     Assessment/Plan:      #C. difficile colitis  With hospitalization  after diagnosis C. difficile and history reaction questionably to vancomycin  Still with diarrhea at times every few days  Follows with [facility]   resurgence to get C. difficile  10/21 positive and was planned follows with Dr. Pierce had    diarrhea remains a chief complaint polyps on scope    diarrhea continues to be a chief complaint--reports plan for upper scope with history of dilatation-- Imodium helps--  9.23- remains with diarrhea - to revisit with marlene better on Imodium  4.24- diarrhea much better - imodium as needed per dr rosario  and has seen Suman       #TRANG positive  With osteoarthritis  Follows with Dr. Albert-remains on prednisone 2.5 mg daily recent increase 5/20 self increased to 5 mg a day  11/20 remains on 2.5 daily wants to try every other day  2/21 was well on 2.5 every other day now back to 5 mg daily for breathing not taking ICS  4/21 prednisone 2.5 a day  4/22 in December increased to 5 mg a day  11/22- remains on prednisone thinks 2.5 daily - unclear but thinks needed -- to see dr albert   9.23- rare visits - remains on prednisone 2.5 mg daily - - thinks needs more   4.24 stable -- sees rheum every 6 months       #Asthma  Normal PFTs at [facility] in 2016-normal spirometry Dr. Mujica's office 4/17  Very stable on exam and clinically  Remains on ICS and rescue use  Questionably some chemical sensitivity with   Recurrent flare possibly related to smoke from next-door apartment  Remains on Flovent 2 to 3 puffs for day  Ongoing symptoms with construction  5/20 remains on Flovent twice daily  2/21 states allergic to Flovent refuses to take now on Pulmicort 181 puff a day though states immediately short of breath and not breathing good after taking    2/21 states allergic to Pulmicort no other symptoms at this time refuses to retry  Did retry Flovent had small reaction remains with coughing with clear exam  4/21 states budesonide gave her an asthma attack refuses to retry  10/21 did not resume Flovent  4/22 has been on Flovent twice daily for several months states stable--  11/22 remains on Flovent 2 puffs twice a day reports stability rare Xopenex use  9.23- doing well with flovent as needed though daily at least   No er /uc - to com   4.24-- changed to qvar- 2 puffs BID-  no rescue use-- to cpm     #Obstructive sleep apnea  Multiple times study has been requested recommended remains with frequent awakenings and fatigue  Never went for the study  Again and again recommended--daughters aware  9.23- pill at night - never went to sleep study   4.24-- no sleep study - sleeps well       #Tachycardia  Thought related to diarrhea without active issue  Seems resolved - 80s       #Allergic rhinitis  Remains on allergy pill Claritin/Allegra  4/22 remains very stable  11/22 no change  9.23- thinks taking daily Claritin   4/24- stable on meds       #Coronary artery disease  History of 2 stents 2012 in [place]  Was seeing cardiology at [facility]  Now seeing  with recent negative stress test  Recent palpitations to the emergency room Holter unremarkable  Recent negative stress test  Revisit 12/19 and due in June 2020 11/20 echo with diastolic dysfunction PAS of 48--comparatively echo 2018 with a PAS of questionably 29 mmHg with diastolic dysfunction  Negative stress test 8/20  10/21 had abnormal EKG and was admitted overnight Echo unremarkable was in the emergency room with a foot infection  4/22 seeing  repeat echo pending  11/22--Echo 4/22 EF 60 to 65%--grade 1 diastolic dysfunction mild mitral regurg  RVSP 39 mmHg--improved  9.23 - just saw dr ramos -- for pet/ct heart   Last echo with normal EF with diastolic dysfunction - mild elevation PAPs   4.24- no recent changes             -Plan:    - continue  Qvar -- 2 puffs daily and rescue use as needed -  -- OK to increase to 2 puffs twice a day if you get sick or if you notice a difference ( like more coughing )   - call with any issues or changes   - see me in 6 months     Melissa Jackson MD  Pulmonary Medicine  4.4.24

## 2024-04-05 NOTE — PROGRESS NOTES
Spoke to Karla for [facility].      Updates to patient care team/comments: UTD  Patient reported changes in medications: UTD  Med Adherence  Comment: pt taking medications as prescribed     Health Maintenance:   Health Maintenance   Topic Date Due    Pneumococcal Vaccine: 65+ Years (1 of 2 - PCV) Never done    Zoster Vaccines (1 of 2) Never done    COVID-19 Vaccine (2 - 2023-24 season) 09/01/2023    Influenza Vaccine (Season Ended) 10/01/2024    Annual Physical  01/03/2025    Annual Depression Screening  Completed    Fall Risk Screening (Annual)  Completed       Patient updates/concerns:    Pt was seen on televisit with dr eisenberg today and pt states that she understood all of the guidance provided by the specialist. Pt did not require any clarification or review of the office visit notes.    Pt discussed some frustration she was having regarding visit to neuro. Pt has visit in may. Pt states that there is no waiting list but she could use my chart to check for availability. Pt states that she cannot utilize [employer] at this time because her computer has a virus and her phone is not compatible. Pt feels it would be important to be able to participate in video visits in the future and is considering the purchase of a new smart phone. Pt will f/u with ccm or pcp office if she needs help coordinating with [employer] help desk for new access code.    Pt swelling has not been a concern. Pt states that sometimes her ankle might get swollen but she does not need to weigh herself daily to monitor retention.    Pt discussed not being a candidate for knee surgery. She feels that she has appropriate stability while wearing a brace and does not have to deal with any pain. Pt activity is not limited because of the knee.   Pt still uses walker to ambulate. She tries to take several trips out of her apartment a day. She will walk to meals and then participate in many of the social engagements that are put on by the facility.    Pt feels her  appetite is appropriate. Pt tries to limit her portion sizes uses meals on wheels and daughter will help shop for some extra fresh ingredients.    Pt is falling asleep staying asleep and waking well rested. Pt typically does not need a nap during the day.   Goals/Action Plan:    Active goal from previous outreach: stay healthy    Patient reported progress towards goals: pt continues to take medications as prescribed and sees providers and specialists as needed               - What: hydrated           - Where/When/How: pt makes sure to consciously drink water throughout the day. She tries to drink more than 40 oz every day. Pt states she has avoided any bouts of constipation  Patient Reported Barriers and Concerns: n/a                   - Plan for overcoming barriers: none    Care Managers Interventions: continue to provide encouragement and education for healthy coping and dx    Future Appointments:   Future Appointments   Date Time Provider Department Center   5/13/2024  2:00 PM Froilan Hawthorne MD [facility] [place]   5/28/2024 10:00 AM Abundio Tuttle MD [facility] [place]   7/1/2024  1:00 PM Josef Mcneill, APRN [facility]         Next Care Manager Follow Up Date: one month    Reason For Follow Up: review progress and or barriers towards patient's goals.     Time Spent This Encounter Total: 23 min medical record review, telephone communication, care plan updates where needed, education, goals, and action plan recreation/update. Provided acknowledgment and validation to patient's concerns.   Monthly Minute Total including today: 23  Physical assessment, complete health history, and need for CCM established by Ghada Carballo MD.

## 2024-04-06 ENCOUNTER — LAB ENCOUNTER (OUTPATIENT)
Dept: LAB | Facility: HOSPITAL | Age: 89
End: 2024-04-06
Attending: INTERNAL MEDICINE
Payer: MEDICARE

## 2024-04-06 DIAGNOSIS — I10 BENIGN HYPERTENSION: ICD-10-CM

## 2024-04-06 DIAGNOSIS — I25.10 CAD (CORONARY ARTERY DISEASE): Primary | ICD-10-CM

## 2024-04-06 DIAGNOSIS — R60.0 LOCALIZED EDEMA: ICD-10-CM

## 2024-04-06 DIAGNOSIS — R06.00 DYSPNEA: ICD-10-CM

## 2024-04-06 DIAGNOSIS — E78.5 DYSLIPIDEMIA: ICD-10-CM

## 2024-04-06 DIAGNOSIS — Z95.5 HISTORY OF HEART ARTERY STENT: ICD-10-CM

## 2024-04-06 LAB
ALBUMIN SERPL-MCNC: 3.6 G/DL (ref 3.4–5)
ALBUMIN/GLOB SERPL: 1.1 {RATIO} (ref 1–2)
ALP LIVER SERPL-CCNC: 92 U/L
ALT SERPL-CCNC: 15 U/L
ANION GAP SERPL CALC-SCNC: 7 MMOL/L (ref 0–18)
AST SERPL-CCNC: 11 U/L (ref 15–37)
BILIRUB SERPL-MCNC: 0.6 MG/DL (ref 0.1–2)
BUN BLD-MCNC: 12 MG/DL (ref 9–23)
CALCIUM BLD-MCNC: 8.9 MG/DL (ref 8.5–10.1)
CHLORIDE SERPL-SCNC: 108 MMOL/L (ref 98–112)
CO2 SERPL-SCNC: 27 MMOL/L (ref 21–32)
CREAT BLD-MCNC: 0.84 MG/DL
EGFRCR SERPLBLD CKD-EPI 2021: 66 ML/MIN/1.73M2 (ref 60–?)
FASTING STATUS PATIENT QL REPORTED: YES
GLOBULIN PLAS-MCNC: 3.3 G/DL (ref 2.8–4.4)
GLUCOSE BLD-MCNC: 92 MG/DL (ref 70–99)
OSMOLALITY SERPL CALC.SUM OF ELEC: 293 MOSM/KG (ref 275–295)
POTASSIUM SERPL-SCNC: 3.8 MMOL/L (ref 3.5–5.1)
PROT SERPL-MCNC: 6.9 G/DL (ref 6.4–8.2)
SODIUM SERPL-SCNC: 142 MMOL/L (ref 136–145)
TSI SER-ACNC: 0.96 MIU/ML (ref 0.36–3.74)

## 2024-04-06 PROCEDURE — 84443 ASSAY THYROID STIM HORMONE: CPT

## 2024-04-06 PROCEDURE — 36415 COLL VENOUS BLD VENIPUNCTURE: CPT

## 2024-04-06 PROCEDURE — 80053 COMPREHEN METABOLIC PANEL: CPT

## 2024-04-17 ENCOUNTER — APPOINTMENT (OUTPATIENT)
Dept: CT IMAGING | Facility: HOSPITAL | Age: 89
End: 2024-04-17
Attending: EMERGENCY MEDICINE
Payer: MEDICARE

## 2024-04-17 ENCOUNTER — HOSPITAL ENCOUNTER (EMERGENCY)
Facility: HOSPITAL | Age: 89
Discharge: HOME | End: 2024-04-17
Attending: EMERGENCY MEDICINE
Payer: MEDICARE

## 2024-04-17 VITALS
WEIGHT: 160 LBS | TEMPERATURE: 98 F | BODY MASS INDEX: 32.25 KG/M2 | OXYGEN SATURATION: 98 % | HEIGHT: 59 IN | SYSTOLIC BLOOD PRESSURE: 154 MMHG | DIASTOLIC BLOOD PRESSURE: 80 MMHG | RESPIRATION RATE: 20 BRPM | HEART RATE: 72 BPM

## 2024-04-17 DIAGNOSIS — R41.3 SHORT-TERM MEMORY LOSS: ICD-10-CM

## 2024-04-17 DIAGNOSIS — R53.83 FATIGUE, UNSPECIFIED TYPE: ICD-10-CM

## 2024-04-17 DIAGNOSIS — S16.1XXA STRAIN OF NECK MUSCLE, INITIAL ENCOUNTER: Primary | ICD-10-CM

## 2024-04-17 LAB
ALBUMIN SERPL-MCNC: 3.8 G/DL (ref 3.4–5)
ALBUMIN/GLOB SERPL: 1 {RATIO} (ref 1–2)
ALP LIVER SERPL-CCNC: 94 U/L
ALT SERPL-CCNC: 16 U/L
ANION GAP SERPL CALC-SCNC: 6 MMOL/L (ref 0–18)
AST SERPL-CCNC: 22 U/L (ref 15–37)
BASOPHILS # BLD AUTO: 0.08 X10(3) UL (ref 0–0.2)
BASOPHILS NFR BLD AUTO: 0.9 %
BILIRUB SERPL-MCNC: 0.7 MG/DL (ref 0.1–2)
BILIRUB UR QL STRIP.AUTO: NEGATIVE
BUN BLD-MCNC: 20 MG/DL (ref 9–23)
CALCIUM BLD-MCNC: 9.9 MG/DL (ref 8.5–10.1)
CHLORIDE SERPL-SCNC: 109 MMOL/L (ref 98–112)
CLARITY UR REFRACT.AUTO: CLEAR
CO2 SERPL-SCNC: 23 MMOL/L (ref 21–32)
COLOR UR AUTO: COLORLESS
CREAT BLD-MCNC: 0.86 MG/DL
EGFRCR SERPLBLD CKD-EPI 2021: 65 ML/MIN/1.73M2 (ref 60–?)
EOSINOPHIL # BLD AUTO: 0.08 X10(3) UL (ref 0–0.7)
EOSINOPHIL NFR BLD AUTO: 0.9 %
ERYTHROCYTE [DISTWIDTH] IN BLOOD BY AUTOMATED COUNT: 15.4 %
GLOBULIN PLAS-MCNC: 4 G/DL (ref 2.8–4.4)
GLUCOSE BLD-MCNC: 93 MG/DL (ref 70–99)
GLUCOSE UR STRIP.AUTO-MCNC: NORMAL MG/DL
HCT VFR BLD AUTO: 46.6 %
HGB BLD-MCNC: 14.8 G/DL
IMM GRANULOCYTES # BLD AUTO: 0.05 X10(3) UL (ref 0–1)
IMM GRANULOCYTES NFR BLD: 0.6 %
KETONES UR STRIP.AUTO-MCNC: NEGATIVE MG/DL
LEUKOCYTE ESTERASE UR QL STRIP.AUTO: NEGATIVE
LYMPHOCYTES # BLD AUTO: 1.93 X10(3) UL (ref 1–4)
LYMPHOCYTES NFR BLD AUTO: 22.3 %
MCH RBC QN AUTO: 28.4 PG (ref 26–34)
MCHC RBC AUTO-ENTMCNC: 31.8 G/DL (ref 31–37)
MCV RBC AUTO: 89.3 FL
MONOCYTES # BLD AUTO: 0.6 X10(3) UL (ref 0.1–1)
MONOCYTES NFR BLD AUTO: 6.9 %
NEUTROPHILS # BLD AUTO: 5.92 X10 (3) UL (ref 1.5–7.7)
NEUTROPHILS # BLD AUTO: 5.92 X10(3) UL (ref 1.5–7.7)
NEUTROPHILS NFR BLD AUTO: 68.4 %
NITRITE UR QL STRIP.AUTO: NEGATIVE
OSMOLALITY SERPL CALC.SUM OF ELEC: 288 MOSM/KG (ref 275–295)
PH UR STRIP.AUTO: 5 [PH] (ref 5–8)
PLATELET # BLD AUTO: 261 10(3)UL (ref 150–450)
POTASSIUM SERPL-SCNC: 4.6 MMOL/L (ref 3.5–5.1)
PROT SERPL-MCNC: 7.8 G/DL (ref 6.4–8.2)
PROT UR STRIP.AUTO-MCNC: NEGATIVE MG/DL
RBC # BLD AUTO: 5.22 X10(6)UL
RBC UR QL AUTO: NEGATIVE
SODIUM SERPL-SCNC: 138 MMOL/L (ref 136–145)
SP GR UR STRIP.AUTO: 1.01 (ref 1–1.03)
UROBILINOGEN UR STRIP.AUTO-MCNC: NORMAL MG/DL
WBC # BLD AUTO: 8.7 X10(3) UL (ref 4–11)

## 2024-04-17 PROCEDURE — 99285 EMERGENCY DEPT VISIT HI MDM: CPT

## 2024-04-17 PROCEDURE — 96360 HYDRATION IV INFUSION INIT: CPT

## 2024-04-17 PROCEDURE — 70450 CT HEAD/BRAIN W/O DYE: CPT | Performed by: EMERGENCY MEDICINE

## 2024-04-17 PROCEDURE — 81003 URINALYSIS AUTO W/O SCOPE: CPT | Performed by: EMERGENCY MEDICINE

## 2024-04-17 PROCEDURE — 85025 COMPLETE CBC W/AUTO DIFF WBC: CPT | Performed by: EMERGENCY MEDICINE

## 2024-04-17 PROCEDURE — 99284 EMERGENCY DEPT VISIT MOD MDM: CPT

## 2024-04-17 PROCEDURE — 72125 CT NECK SPINE W/O DYE: CPT | Performed by: EMERGENCY MEDICINE

## 2024-04-17 PROCEDURE — 80053 COMPREHEN METABOLIC PANEL: CPT | Performed by: EMERGENCY MEDICINE

## 2024-04-17 NOTE — ED PROVIDER NOTES
Patient Seen in: [facility] Emergency Department      History     Chief Complaint   Patient presents with    Neck Pain    Headache     Stated Complaint: headache, neck pain , and pain radiating down riight arm early this am, fall 1 *    Subjective:   HPI    89-year-old female presents to the emergency department with complaints of headache and neck pain and she states this morning she had an episode where the pain in her neck radiated down her right arm.  She was not doing anything exertional at the time.  Patient states that she fell about a month ago and that \"she has not been right since\" she did have a CT scan of her head at that time that was unremarkable.  She states that she has been having headaches and she feels that she is forgetting more and she states that after the fall she had a 2-week.  Where she was very lethargic.  She does feel that she is more active now but still is not back to her baseline.  No new new changes in medications.  No fevers or chills.  No other acute complaint    Objective:   Past Medical History:    Anemia    Anesthesia complication    pt stated had a reacion with a medication that sounded like \"memo\"    Anxiety state, unspecified    Arthritis    Asthma (HCC)    Atherosclerosis of coronary artery    Back pain    Back problem    cervical pain    Black stools    Blood transfusion reaction    CAD (coronary artery disease)    Calculus of kidney    Cancer (HCC)    skin cancer in left arm    Change in hair    Chest pain on exertion    Coronary atherosclerosis of unspecified type of vessel, native or graft    Diarrhea, unspecified    Dyslipidemia    Easy bruising    Extrinsic asthma, unspecified    Fatigue    Glucose intolerance (pre-diabetes)    Hearing impairment    HEARING LOSS BUT NO AIDS    Hearing loss    Heart palpitations    HIGH BLOOD PRESSURE    High cholesterol    Hip pain    History of blood transfusion    AFTER HYST    History of depression    Hoarseness, chronic     HTN (hypertension)    Indigestion    Leg swelling    Loss of appetite    Nausea    Osteoarthrosis, unspecified whether generalized or localized, unspecified site    knees, back    Osteoporosis    Other and unspecified hyperlipidemia    Pain in joints    Pinched nerve in neck    Pneumonia, organism unspecified(486)    PONV (postoperative nausea and vomiting)    Shortness of breath    Sleep apnea    Stented coronary artery    Stress    Uncomfortable fullness after meals    Unspecified essential hypertension    Visual impairment    glasses    Wears glasses    Weight loss              Past Surgical History:   Procedure Laterality Date    Angioplasty (coronary)      stent    Appendectomy      Back surgery      lumbar x 4    Benign biopsy left  a long time ago    x 2    Cath bare metal stent (bms)  2012    Cholecystectomy      Colonoscopy  10/21/2013    Procedure: COLONOSCOPY;  Surgeon: Emmanuel Naranjo MD;  Location:  ENDOSCOPY    Colonoscopy N/A 6/22/2021    Procedure: COLONOSCOPY with biopsies and forcep, cold and hot snare polypectomy with saline lift and clip placement x3;  Surgeon: Jerod Pierce MD;  Location:  ENDOSCOPY    Egd      Excis lumbar disk,one level      Fluor gid & loclzj ndl/cath spi dx/ther njx  9/23/2013    Procedure: CERVICAL EPIDURAL;  Surgeon: Christiano Wiseman MD;  [place] [facility]    Hemorrhoidectomy,int/ext,simple      Hysterectomy      complete    Injection, w/wo contrast, dx/therapeutic substance, epidural/subarachnoid; cervical/thoracic  9/23/2013    Procedure: CERVICAL EPIDURAL;  Surgeon: Christiano Wiseman MD;  Location: [facility]    Injection, w/wo contrast, dx/therapeutic substance, epidural/subarachnoid; cervical/thoracic N/A 8/17/2015    Procedure: CERVICAL EPIDURAL;  Surgeon: Josue Stewart MD;  Location: [facility]    Dex localization wire 1 site left (cpt=19281)  1980'S    Benign    Other surgical history  8/7/12     Diag cardiac stent, multi-link mini vision 2mmx 12mm.     Patient documented not to have experienced any of the following events  2013    Procedure: CERVICAL EPIDURAL;  Surgeon: Christiano Wiseman MD;  Location: [facility]    Patient documented not to have experienced any of the following events N/A 2015    Procedure: CERVICAL EPIDURAL;  Surgeon: Josue Stewart MD;  Location: [facility]    Patient withough preoperative order for iv antibiotic surgical site infection prophylaxis.  2013    Procedure: CERVICAL EPIDURAL;  Surgeon: Christiano Wiseman MD;  Location: [facility]    Patient withough preoperative order for iv antibiotic surgical site infection prophylaxis. N/A 2015    Procedure: CERVICAL EPIDURAL;  Surgeon: Josue Stewart MD;  Location: [facility]    Spine surgery procedure unlisted      Stent, coated/cov w/del sys      Tonsillectomy      Total abdom hysterectomy      Upper gi endoscopy,exam                  Social History     Socioeconomic History    Marital status:    Tobacco Use    Smoking status: Former     Current packs/day: 0.00     Average packs/day: 0.3 packs/day for 3.0 years (0.9 ttl pk-yrs)     Types: Cigarettes     Start date: 1969     Quit date: 1972     Years since quittin.2    Smokeless tobacco: Never   Vaping Use    Vaping status: Never Used   Substance and Sexual Activity    Alcohol use: Not Currently    Drug use: No    Sexual activity: Never   Other Topics Concern    Caffeine Concern Yes    Exercise No   Social History Narrative    ** Merged History Encounter **          Social Determinants of Health     Financial Resource Strain: Low Risk  (6/15/2021)    Received from [facility] and Care    Overall Financial Resource Strain (CARDIA)     Difficulty of Paying Living Expenses: Not hard at all   Food Insecurity: No Food Insecurity (6/15/2021)    Received from [facility]  and Care    Hunger Vital Sign     Worried About Running Out of Food in the Last Year: Never true     Ran Out of Food in the Last Year: Never true   Transportation Needs: No Transportation Needs (6/15/2021)    Received from [facility]    PRAPARE - Transportation     Lack of Transportation (Medical): No     Lack of Transportation (Non-Medical): No   Physical Activity: Inactive (2/7/2023)    Exercise Vital Sign     Days of Exercise per Week: 0 days     Minutes of Exercise per Session: 0 min   Stress: No Stress Concern Present (6/15/2021)    Received from [facility]    [employer] [place] [employer] - Occupational Stress Questionnaire     Feeling of Stress : Not at all    Social Connections   Housing Stability: Low Risk  (6/15/2021)    Received from [facility]    Housing Stability Vital Sign     Unable to Pay for Housing in the Last Year: No     Number of Places Lived in the Last Year: 1     In the last 12 months, was there a time when you did not have a steady place to sleep or slept in a shelter (including now)?: No              Review of Systems   All other systems reviewed and are negative.      Positive for stated complaint: headache, neck pain , and pain radiating down riight arm early this am, fall 1 *  Other systems are as noted in HPI.  Constitutional and vital signs reviewed.      All other systems reviewed and negative except as noted above.    Physical Exam     ED Triage Vitals [04/17/24 1222]   /80   Pulse 86   Resp 20   Temp 98.1 °F (36.7 °C)   Temp src Oral   SpO2 96 %   O2 Device None (Room air)       Current:/80   Pulse 72   Temp 98.1 °F (36.7 °C) (Oral)   Resp 20   Ht 149.9 cm (4' 11\")   Wt 72.6 kg   LMP  (LMP Unknown)   SpO2 98%   BMI 32.32 kg/m²         Physical Exam  Vitals and nursing note reviewed. Chaperone present: Daughter in room.   Constitutional:       Appearance: Normal appearance. She is well-developed.   HENT:      Head:  Normocephalic and atraumatic.      Comments: Patient points to her right occipital area where she is having some tenderness no point midline tenderness of her neck  Cardiovascular:      Rate and Rhythm: Normal rate and regular rhythm.      Pulses: Normal pulses.      Heart sounds: Normal heart sounds.   Pulmonary:      Effort: Pulmonary effort is normal.      Breath sounds: Normal breath sounds. No stridor. No wheezing.   Abdominal:      General: Bowel sounds are normal.      Palpations: Abdomen is soft.      Tenderness: There is no abdominal tenderness. There is no rebound.   Musculoskeletal:         General: No tenderness. Normal range of motion.      Cervical back: Normal range of motion and neck supple.   Lymphadenopathy:      Cervical: No cervical adenopathy.   Skin:     General: Skin is warm and dry.      Coloration: Skin is not pale.   Neurological:      General: No focal deficit present.      Mental Status: She is alert and oriented to person, place, and time.      Cranial Nerves: No cranial nerve deficit.      Coordination: Coordination normal.              ED Course     Labs Reviewed   URINALYSIS, ROUTINE - Abnormal; Notable for the following components:       Result Value    Urine Color Colorless (*)     Squamous Epi. Cells Few (*)     All other components within normal limits   COMP METABOLIC PANEL (14) - Normal   CBC WITH DIFFERENTIAL WITH PLATELET    Narrative:     The following orders were created for panel order CBC With Differential With Platelet.  Procedure                               Abnormality         Status                     ---------                               -----------         ------                     CBC W/ DIFFERENTIAL[[id number]]                              Final result                 Please view results for these tests on the individual orders.   CBC W/ DIFFERENTIAL             CT SPINE CERVICAL (CPT=72125)    Result Date: 4/17/2024  PROCEDURE:  CT SPINE CERVICAL (CPT=72125)   COMPARISON:  [facility] , CT, CT SPINE CERVICAL (CPT=72125), 12/14/2019, 12:41 PM.  INDICATIONS:  headache, neck pain , and pain radiating down riight arm early this am, fall 1 month ago denies pain now  TECHNIQUE:  Noncontrast CT scanning of the cervical spine is performed from the skull base through C7.  Multiplanar reconstructions are generated.  Dose reduction techniques were used. Dose information is transmitted to the ACR (American College of Radiology) NRDR (National Radiology Data Registry) which includes the Dose Index Registry.  PATIENT STATED HISTORY: (As transcribed by Technologist)  Patient is here for headache and neck pain radiating down into right arm. Fell x 1month ago.    FINDINGS:  CRANIOCERVICAL AREA:  Normal foramen magnum with no Chiari malformation. PARASPINAL AREA:  There is no prevertebral soft tissue swelling.  There is calcification of the posterior longitudinal ligament.  BONES:  Preservation of cervical vertebral body height.  No acute cervical spine fracture.  There is multilevel endplate hypertrophic changes most pronounced at C3-4 C4-5 and C5-6 levels.  There is associated disc space narrowing at these levels with degenerative disc disease.  There is associated neural foraminal narrowing secondary to uncinate hypertrophy at C3-4 C4-5 C5-6 levels right greater than left and C6-7.            CONCLUSION:  No acute cervical spine fracture or subluxation.  Hypertrophic degenerative changes cervical spine.  This is most pronounced at C3-4 C4-5 C5-6 levels.   LOCATION:  [facility]   Dictated by (CST): Kacy Lyons MD on 4/17/2024 at 4:43 PM     Finalized by (CST): Kacy Lyons MD on 4/17/2024 at 4:48 PM       CT BRAIN OR HEAD (13822)    Result Date: 4/17/2024  PROCEDURE:  CT BRAIN OR HEAD (78011)  COMPARISON:  [facility] C , CT, CT BRAIN OR HEAD (86095), 3/14/2024, 3:21 AM.  INDICATIONS:  headache, neck pain , and pain radiating down riight arm early this am, fall 1 month ago denies pain now   TECHNIQUE:  Noncontrast CT scanning is performed through the brain. Dose reduction techniques were used. Dose information is transmitted to the ACR (American College of Radiology) NRDR (National Radiology Data Registry) which includes the Dose Index Registry.  PATIENT STATED HISTORY: (As transcribed by Technologist)  Patient is here for headache and neck pain radiating down into right arm. Fell x 1month ago.    FINDINGS:   VENTRICLES/SULCI:   Mild diffuse cerebral and cerebellar atrophy.  INTRACRANIAL:  There is patchy decreased attenuation in the periventricular white matter both cerebral hemispheres extending into the centrum semiovale consistent with chronic microvascular ischemic changes of aging.  There are no abnormal extraaxial fluid collections.  There is no midline shift. No evidence of acute intracranial hemorrhage.  SINUSES:  No significant inflammatory changes.  MASTOIDS:  The mastoids are clear.  SKULL:  No depressed calvarial fracture.            CONCLUSION:  No CT evidence for acute intracranial hemorrhage or depressed calvarial fracture.  There is cerebral and cerebellar atrophy with chronic microvascular ischemic changes of aging..    LOCATION:  [facility]   Dictated by (CST): Kacy Lyons MD on 4/17/2024 at 4:40 PM     Finalized by (CST): Kacy Lyons MD on 4/17/2024 at 4:42 PM              MDM      Patient had IV established and blood work obtained.  Discussed with her that it be unusual for her to have problems this far out and then to have sudden worsening after she had improvement.  Regardless we did do some repeat imaging of her head I did image her neck which was not imaged previously.  I reviewed the head CT did not see any obvious intracranial bleeding or midline shift.  I reviewed the radiology report they did not appreciate any acute intercranial abnormality.  Her CT scan revealed DJD but no acute compression fractures or other abnormality.  We discussed there could be other etiologies to  feeling fatigued such as dehydration or renal dysfunction or infection.  She had a normal white count and does not have a fever to go along with infection.  She had no renal issues.  She has no evidence of hyponatremia or glucose issues.  We did discuss that perhaps she could have a component of being deconditioned overall.  At this time we did not find an acute reason for emergent inpatient admission.  Was discharged with her daughter.  We did discuss being in an assisted living situation at this time patient is resistant to this.                                   Medical Decision Making      Disposition and Plan     Clinical Impression:  1. Strain of neck muscle, initial encounter    2. Fatigue, unspecified type    3. Short-term memory loss         Disposition:  Discharge  4/17/2024  5:18 pm    Follow-up:  Ghada Carballo MD  [address] [place] [id number]  [phone]    Schedule an appointment as soon as possible for a visit            Medications Prescribed:  Discharge Medication List as of 4/17/2024  5:22 PM

## 2024-04-17 NOTE — ED INITIAL ASSESSMENT (HPI)
Fell about a month ago and has been off since. Was seen in ER the night of the fall. States this morning she woke up with a terrible right sided headache, neck pain and radiated down to her right arm. Denies any pain at this time.

## 2024-04-18 ENCOUNTER — PATIENT OUTREACH (OUTPATIENT)
Dept: CASE MANAGEMENT | Age: 89
End: 2024-04-18

## 2024-04-18 NOTE — PROGRESS NOTES
1st attempt ER f/up apt request  PCP -decline, pt stated she has to talk to her dtr first  Closing encounter

## 2024-04-22 ENCOUNTER — OFFICE VISIT (OUTPATIENT)
Dept: INTERNAL MEDICINE CLINIC | Facility: CLINIC | Age: 89
End: 2024-04-22
Payer: MEDICARE

## 2024-04-22 VITALS
TEMPERATURE: 97 F | HEIGHT: 61 IN | RESPIRATION RATE: 20 BRPM | WEIGHT: 159.38 LBS | OXYGEN SATURATION: 96 % | BODY MASS INDEX: 30.09 KG/M2 | DIASTOLIC BLOOD PRESSURE: 60 MMHG | SYSTOLIC BLOOD PRESSURE: 132 MMHG | HEART RATE: 88 BPM

## 2024-04-22 DIAGNOSIS — Z98.890 S/P LUMBAR LAMINECTOMY: ICD-10-CM

## 2024-04-22 DIAGNOSIS — M54.12 CERVICAL RADICULOPATHY: ICD-10-CM

## 2024-04-22 DIAGNOSIS — S16.1XXD STRAIN OF NECK MUSCLE, SUBSEQUENT ENCOUNTER: Primary | ICD-10-CM

## 2024-04-22 NOTE — PROGRESS NOTES
Karla Vallecillo is a 89 year old female.    Chief Complaint   Patient presents with    Neck Pain     ER for neck pain and headache       HPI:     Patient with CAD, DDD lumbar and cervical spine, s/p lumbar laminectomy, IBS, anxiety, h/o fall at home on 3/14 here for ER follow up. She went to ER 4/17 for headache and neck pain. She was concerned about the HA being related to the fall. It was mod-severe in severity. Neck pain was radiating into her right arm and causing some numbness and tingling. CT head and cervical spine done in the ER and negative for anything acute. She was discharged with diagnosis of neck strain and advised to f/u in clinic. Patient says neck pain and HA have completely resolved at this time. She still has symptoms in her right arm which are relatively mild. She would like parking placard form filled out today for arthritis of the spine and multiple back surgeries. She ambulates with rolling walker. She does not drive anymore, her daughter drives her to appointments. No other acute complaints.        Patient Active Problem List   Diagnosis    Cervical radiculopathy    Primary osteoarthritis of right knee    Lumbar disc herniation    Irritable bowel syndrome with diarrhea    CAD (coronary artery disease), native coronary artery    Senile osteoporosis    Hypertension, benign    Dyslipidemia    Osteoarthritis of left knee    S/P lumbar laminectomy    Spondylosis of lumbar region without myelopathy or radiculopathy    Osteoarthritis of one hip, left    History of heart artery stent    Pancreatic insufficiency (HCC)    Pure hypercholesterolemia    Carpal tunnel syndrome of right wrist    Chronic bronchitis, unspecified chronic bronchitis type (HCC)    Anxiety    Toe infection    Primary osteoarthritis of both knees     Current Outpatient Medications   Medication Sig Dispense Refill    loperamide 2 MG Oral Cap Take 1 capsule (2 mg total) by mouth daily. 30 capsule 3    hydrocortisone 2.5 %  External Cream ONE APPLICATION TOPICAL 2 TIMES A DAY FOR 10 DAYS ON ARMS AND RIGHT ANKLE      PREDNISONE 2.5 MG Oral Tab TAKE 1 TABLET(2.5 MG) BY MOUTH DAILY 90 tablet 3    Levalbuterol Tartrate 45 MCG/ACT Inhalation Aerosol Inhale 1-2 puffs into the lungs every 4 to 6 hours as needed for Wheezing or Shortness of Breath. 1 each 2    Verapamil HCl  MG Oral Capsule SR 24 Hr daily.      B Complex-C-E-Zn (STRESS PLUS ZINC) Oral Tab Take by mouth.      Ascorbic Acid (VITAMIN C OR) Take 1 tablet by mouth every evening.      ALPRAZolam 1 MG Oral Tab Take 0.5 tablets (0.5 mg total) by mouth nightly as needed.      NON FORMULARY Vitamin B complex with Vit B-12 one daily      ALPRAZolam 0.25 MG Oral Tab Take 1 tablet (0.25 mg total) by mouth every 6 (six) hours as needed.      Probiotic Product (PROBIOTIC-10 OR) Take 1 tablet by mouth daily.        acetaminophen 500 MG Oral Tab Take 1 tablet (500 mg total) by mouth every 6 (six) hours as needed for Pain.      Vitamin D3, Cholecalciferol, 50 MCG (2000 UT) Oral Cap Take 1 capsule (2,000 Units total) by mouth daily.      loratadine (CLARITIN) 10 MG Oral Tab Take 1 tablet (10 mg total) by mouth daily.      vitamin E 400 UNITS Oral Cap Take 1 capsule (400 Units total) by mouth daily.      Beclomethasone Diprop HFA 80 MCG/ACT Inhalation Aerosol, Breath Activated Inhale 2 puffs into the lungs in the morning and 2 puffs before bedtime. 10.6 g 1    cefadroxil 500 MG Oral Cap Take 1 capsule (500 mg total) by mouth 2 (two) times daily. (Patient not taking: Reported on 4/22/2024) 14 capsule 0    Fluticasone Propionate HFA (FLOVENT HFA) 220 MCG/ACT Inhalation Aerosol Inhale 2 puffs into the lungs 2 (two) times daily. (Patient not taking: Reported on 4/22/2024) 3 each 3    prednisoLONE 1 % Ophthalmic Suspension Place 1 drop into the left eye 4 (four) times daily. (Patient not taking: Reported on 4/22/2024)      Pancrelipase, Lip-Prot-Amyl, (ZENPEP) 04583-103373 units Oral Cap   Particles Take 1 capsule by mouth 3 (three) times daily with meals. May increase to 2 capsules three times a day with meals if needed. 600 capsule 3    Pancrelipase, Lip-Prot-Amyl, (PANCREAZE) 01519-24420 units Oral Cap DR Particles Take 37,000 units of lipase by mouth 3 (three) times daily with meals. 100 capsule 0    clotrimazole 10 MG Mouth/Throat Alea Take 1 lozenge (10 mg total) by mouth 5 (five) times daily. (Patient not taking: Reported on 4/22/2024) 25 Alea 2    IMODIUM MULTI-SYMPTOM RELIEF OR Take 1 tablet by mouth as needed. (Patient not taking: Reported on 4/22/2024)        Past Medical History:    Anemia    Anesthesia complication    pt stated had a reacion with a medication that sounded like \"memo\"    Anxiety state, unspecified    Arthritis    Asthma (HCC)    Atherosclerosis of coronary artery    Back pain    Back problem    cervical pain    Black stools    Blood transfusion reaction    CAD (coronary artery disease)    Calculus of kidney    Cancer (HCC)    skin cancer in left arm    Change in hair    Chest pain on exertion    Coronary atherosclerosis of unspecified type of vessel, native or graft    Diarrhea, unspecified    Dyslipidemia    Easy bruising    Extrinsic asthma, unspecified    Fatigue    Glucose intolerance (pre-diabetes)    Hearing impairment    HEARING LOSS BUT NO AIDS    Hearing loss    Heart palpitations    HIGH BLOOD PRESSURE    High cholesterol    Hip pain    History of blood transfusion    AFTER HYST    History of depression    Hoarseness, chronic    HTN (hypertension)    Indigestion    Leg swelling    Loss of appetite    Nausea    Osteoarthrosis, unspecified whether generalized or localized, unspecified site    knees, back    Osteoporosis    Other and unspecified hyperlipidemia    Pain in joints    Pinched nerve in neck    Pneumonia, organism unspecified(486)    PONV (postoperative nausea and vomiting)    Shortness of breath    Sleep apnea    Stented coronary artery    Stress     Uncomfortable fullness after meals    Unspecified essential hypertension    Visual impairment    glasses    Wears glasses    Weight loss      Social History:  Social History     Socioeconomic History    Marital status:    Tobacco Use    Smoking status: Former     Current packs/day: 0.00     Average packs/day: 0.3 packs/day for 3.0 years (0.9 ttl pk-yrs)     Types: Cigarettes     Start date: 1969     Quit date: 1972     Years since quittin.2    Smokeless tobacco: Never   Vaping Use    Vaping status: Never Used   Substance and Sexual Activity    Alcohol use: Not Currently    Drug use: No    Sexual activity: Never   Other Topics Concern    Caffeine Concern Yes    Exercise No   Social History Narrative    ** Merged History Encounter **          Social Determinants of Health     Financial Resource Strain: Low Risk  (6/15/2021)    Received from [facility]    Overall Financial Resource Strain (CARDIA)     Difficulty of Paying Living Expenses: Not hard at all   Food Insecurity: No Food Insecurity (6/15/2021)    Received from [facility]    Hunger Vital Sign     Worried About Running Out of Food in the Last Year: Never true     Ran Out of Food in the Last Year: Never true   Transportation Needs: No Transportation Needs (6/15/2021)    Received from [facility]    PRAPARE - Transportation     Lack of Transportation (Medical): No     Lack of Transportation (Non-Medical): No   Physical Activity: Inactive (2023)    Exercise Vital Sign     Days of Exercise per Week: 0 days     Minutes of Exercise per Session: 0 min   Stress: No Stress Concern Present (6/15/2021)    Received from [facility]    [employer] [place] [employer] - Occupational Stress Questionnaire     Feeling of Stress : Not at all    Social Connections   Housing Stability: Low Risk  (6/15/2021)    Received from [facility]    Housing Stability Vital Sign     Unable to Pay for Housing in  the Last Year: No     Number of Places Lived in the Last Year: 1     In the last 12 months, was there a time when you did not have a steady place to sleep or slept in a shelter (including now)?: No     Family History   Problem Relation Age of Onset    Breast Cancer Mother 70    High Blood Pressure Mother     Allergies Father     Asthma Father     Colon Cancer Father     Heart Disease Father     Heart Attack Father     Heart Attack Paternal Grandfather     Heart Disease Paternal Grandfather     Arthritis Paternal Grandmother     Allergies Paternal Grandmother     Cancer Maternal Grandfather     Heart Attack Maternal Grandfather     Heart Attack Maternal Grandmother     Heart Attack Brother     Other (Autoimmune disease) Brother     Thyroid Disorder Sister     Asthma Sister     Cancer Brother     Allergies Daughter     Fibromyalgia Daughter     Melanoma Daughter     Other (Hepatitis C) Daughter         Allergies  Allergies   Allergen Reactions    Aspirin DIARRHEA and SHORTNESS OF BREATH    Breo Ellipta OTHER (SEE COMMENTS) and SHORTNESS OF BREATH     Pneumonia  Pneumonia    Budesonide SHORTNESS OF BREATH     Coughing    Celebrex [Celecoxib] SHORTNESS OF BREATH    Citalopram SHORTNESS OF BREATH and OTHER (SEE COMMENTS)     Oral     Clotrimazole DIARRHEA and SHORTNESS OF BREATH    Erythromycin OTHER (SEE COMMENTS) and SHORTNESS OF BREATH     Short of Breath  Short of Breath  External  Oral   Short of Breath    Famotidine SHORTNESS OF BREATH    Fish Oil ANAPHYLAXIS, OTHER (SEE COMMENTS) and SHORTNESS OF BREATH     Oral     Fluticasone OTHER (SEE COMMENTS)     Per pt not true  Other reaction(s): Propensity to adverse reactions to drug    Fluticasone-Salmeterol Coughing, SHORTNESS OF BREATH and OTHER (SEE COMMENTS)     Inhalation    Gabapentin WHEEZING    Iodine (Topical) RASH and SHORTNESS OF BREATH     Rash with topical Iodine.   Rash with topical Iodine.   Rash with topical Iodine.     Iodine Solution [Povidone Iodine]  ANAPHYLAXIS     IVP Dye    Levofloxacin SHORTNESS OF BREATH and OTHER (SEE COMMENTS)     Nausea, leg pain    Nausea, leg pain    Intravenous  Other reaction(s): Propensity to adverse reactions to drug    Lidocaine ANAPHYLAXIS and UNKNOWN     \"Breathing issues\"-- per patient okay with marcaine     Lisinopril SHORTNESS OF BREATH     Other reaction(s): Propensity to adverse reactions to drug    Metamucil [Psyllium] SHORTNESS OF BREATH     And abdominal discomfort.    Nitrofurantoin SHORTNESS OF BREATH and UNKNOWN    Nsaids SHORTNESS OF BREATH and UNKNOWN    Other RASH and SHORTNESS OF BREATH     Transdermal \"memo \" patch ;novacaine Pt states she is okay with marcaine  per allergy testing.    Penicillins SHORTNESS OF BREATH     Short of Breath    Plavix [Clopidogrel] SHORTNESS OF BREATH    Pulmicort SHORTNESS OF BREATH and UNKNOWN     Coughing      Radiology Contrast Iodinated Dyes ANAPHYLAXIS     Other reaction(s): Propensity to adverse reactions to drug    Shellfish SHORTNESS OF BREATH    Mupirocin OTHER (SEE COMMENTS)     Felt short of breath  Other reaction(s): Propensity to adverse reactions to drug    Advair Hfa OTHER (SEE COMMENTS)    Bupropion OTHER (SEE COMMENTS)     constipation    Celecoxib OTHER (SEE COMMENTS)     Oral     Codeine OTHER (SEE COMMENTS)     Per pt not true pt takes this medication       Effient [Prasugrel Hydrochloride] SHORTNESS OF BREATH    Linezolid DIARRHEA     Other reaction(s): Propensity to adverse reactions to drug    Metronidazole ASTHMA and UNKNOWN     Other reaction(s): Propensity to adverse reactions to drug    Mold SHORTNESS OF BREATH    Penicillin G RASH     sob  Other reaction(s): Propensity to adverse reactions to drug    Questran [Cholestyramine] HIVES and Coughing    Risperdal [Risperidone] SHORTNESS OF BREATH    Shellfish-Derived Products      Sob      Tramadol UNKNOWN     Arms turned red like a sunburn    Vancomycin RASH     Rash to the face and neck     Warfarin OTHER  (SEE COMMENTS)     Asthma attack  Other reaction(s): Propensity to adverse reactions to drug    Flulaval RASH     Other reaction(s): Propensity to adverse reactions to drug    Kenalog [Triamcinolone] NAUSEA ONLY         REVIEW OF SYSTEMS:   GENERAL HEALTH:  no fevers   RESPIRATORY: no cough  CARDIOVASCULAR: denies chest pain  GI: denies abdominal pain  : no dysuria  NEURO: denies headaches  PSYCH: No reported depression   HEME: No adenopathy      EXAM:   /60 (BP Location: Right arm, Patient Position: Sitting, Cuff Size: adult)   Pulse 88   Temp 97 °F (36.1 °C) (Skin)   Resp 20   Ht 5' 1\" (1.549 m)   Wt 159 lb 6.4 oz (72.3 kg)   LMP  (LMP Unknown)   SpO2 96%   BMI 30.12 kg/m²   GENERAL: well developed, well nourished,in no apparent distress  HEENT: atraumatic, normocephalic  NECK: TTP posteriorly  LUNGS: normal rate without respiratory distress, lungs clear to auscultation  CARDIO: RRR nl S1 S2  GI: normal bowel sounds, soft, NT/ND  EXTREMITIES: no cyanosis, clubbing or edema  NEURO: Alert and oriented, ambulates with walker    ASSESSMENT AND PLAN:     Encounter Diagnoses   Name     Strain of neck muscle, subsequent encounter- resolving, heat packs and tylenol prn. CT cervical spine neg for fracture or dislocation     S/P lumbar laminectomy, chronic back pain- form filled out today for [email or web address] placard      Cervical radiculopathy- symptoms radiating to right arm and hand, suggested PT (she declined at this time) Her daughter says she has similar condition and can teach stretching exercises to patient        No orders of the defined types were placed in this encounter.      Meds & Refills for this Visit:  Requested Prescriptions      No prescriptions requested or ordered in this encounter       Imaging & Consults:  None    Return if symptoms worsen or fail to improve.  There are no Patient Instructions on file for this visit.      The patient indicates understanding of these issues and agrees to the  plan.

## 2024-04-23 ENCOUNTER — TELEPHONE (OUTPATIENT)
Dept: INTERNAL MEDICINE CLINIC | Facility: CLINIC | Age: 89
End: 2024-04-23

## 2024-04-23 NOTE — TELEPHONE ENCOUNTER
LOV 4/22/24     Called and spoke w/ pt. Pt stated she tried sleeping on her back and it bothered her neck right away. Pt stated she tried laying on her left side and it was fine at first, and then pain went down her arm. Pt denies pain when she is on her right side. Pt has not tried the heat pack or Tylenol as advised per OV note. Advised to try that in the mean time until neuro apt. Neuro apt 5/13/24. Pt stated this pain is not normal. Pt wants to know what is going on before she does physical therapy, so that was the reason for declining PT at [facility].     TB - Advised to try heat and Tylenol as stated in OV note. Also advised to lay on right side if that is where she doesn't have the pain. Do you have further recs?

## 2024-04-23 NOTE — TELEPHONE ENCOUNTER
Patient stated she was seen yesterday for regarding her back and neck pain. She was told to try to sleep on her back. She stated she tried but she couldn't as her neck was bothering her.

## 2024-04-24 NOTE — TELEPHONE ENCOUNTER
Called patient and offered recommendation of PT for DDD.  Patient states she is eating breakfast now and is not having any neck pain.  She is sleeping on right side and left side and neck pain seems to be getting better.  She has not used a heating pad or Tylenol as she does not feel she needs it.  She again declines PT, stating she had a bad experience years ago with PT.  Advised patient if she changes her mind to let us know.  She confirms understanding.

## 2024-04-24 NOTE — TELEPHONE ENCOUNTER
Agree with your advise, she has DDD of cervical spine - PT would have been helpful but she declined

## 2024-05-07 ENCOUNTER — PATIENT OUTREACH (OUTPATIENT)
Dept: CASE MANAGEMENT | Age: 89
End: 2024-05-07

## 2024-05-07 NOTE — PROGRESS NOTES
Spoke to Karla for CCM.      Updates to patient care team/comments: UTD  Patient reported changes in medications: UTD  Med Adherence  Comment: pt taking medications as prescribed     Health Maintenance:   Health Maintenance   Topic Date Due    Pneumococcal Vaccine: 65+ Years (1 of 2 - PCV) Never done    Zoster Vaccines (1 of 2) Never done    COVID-19 Vaccine (2 - 2023-24 season) 09/01/2023    Influenza Vaccine (Season Ended) 10/01/2024    Annual Physical  01/03/2025    Annual Depression Screening  Completed    Fall Risk Screening (Annual)  Completed       Patient updates/concerns:    Pt states her neck and back stiffness/pain is gradually improving. She states that she can sleep pretty consistently and manages to shift a little to get relief. Pt has not needed to take any tylenol and she does not use a heating pad at all. She states that she will feel more stiff the longer she sleeps but after moving around a little she can tolerate daily activity. Pt states it does not interfere with her daily activity because her activities are not very demanding and she ambulates with a walker.    Pt declined Physical therapy.   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    Pt tries to participate in a variety of social activities at the facility. She has a few times a week to play [employer], the residents celebrate monthly birthday parties and get together for movies. Pt is looking forward to an off site trip where a bus will be taking them to a variety of restaurants.    Pt has coordinated with senior services for meals on wheels where she gets 5 prepared meals a week for lunch. Pt is familiar with how to coordinate pick ups and pause/restart deliveries if need be. Pt is able to coordinate with them if she has to be away at scheduled delivery time.    Pt facility was providing Covid shots this weekend and pt declined. Pt feels that her exposure to Covid and  historic diagnosis should provide necessary immunity. Pt also attributes some side effects she had following first shot to a possible allergic reaction and does not plan to have any more Covid vaccines going forward.    Pt did not have any new questions or concerns for pcp or nurses  Goals/Action Plan:  Active goal from previous outreach: stay healthy    Patient reported progress towards goals: pt sees providers as needed and takes medications as prescribed               - What: hydrated           - Where/When/How: pt has a goal of 40 oz every day   Patient Reported Barriers and Concerns: n/a                   - Plan for overcoming barriers: none    Care Managers Interventions: continue to provide encouragement and education for healthy coping and dx    Future Appointments:   Future Appointments   Date Time Provider Department Center   5/13/2024  2:00 PM Froilan Hawthorne MD [facility]   5/28/2024 10:00 AM Abundio Tuttle MD [facility] [place]   7/1/2024  1:00 PM Josef Mcneill, APRN [facility]         Next Care Manager Follow Up Date: one month    Reason For Follow Up: review progress and or barriers towards patient's goals.     Time Spent This Encounter Total: 23 min medical record review, telephone communication, care plan updates where needed, education, goals, and action plan recreation/update. Provided acknowledgment and validation to patient's concerns.   Monthly Minute Total including today: 23  Physical assessment, complete health history, and need for CCM established by Ghada Carballo MD.

## 2024-05-13 ENCOUNTER — TELEPHONE (OUTPATIENT)
Dept: INTERNAL MEDICINE CLINIC | Facility: CLINIC | Age: 89
End: 2024-05-13

## 2024-05-13 ENCOUNTER — OFFICE VISIT (OUTPATIENT)
Facility: CLINIC | Age: 89
End: 2024-05-13
Payer: MEDICARE

## 2024-05-13 VITALS
HEART RATE: 77 BPM | SYSTOLIC BLOOD PRESSURE: 118 MMHG | RESPIRATION RATE: 16 BRPM | DIASTOLIC BLOOD PRESSURE: 58 MMHG | BODY MASS INDEX: 30 KG/M2 | WEIGHT: 159 LBS

## 2024-05-13 DIAGNOSIS — R20.0 NUMBNESS AND TINGLING OF RIGHT HAND: ICD-10-CM

## 2024-05-13 DIAGNOSIS — R41.3 MEMORY LOSS: Primary | ICD-10-CM

## 2024-05-13 DIAGNOSIS — R20.2 NUMBNESS AND TINGLING OF RIGHT HAND: ICD-10-CM

## 2024-05-13 PROCEDURE — 99204 OFFICE O/P NEW MOD 45 MIN: CPT | Performed by: OTHER

## 2024-05-13 NOTE — PROGRESS NOTES
Neurology New History & Physical     ASSESSMENT & PLAN:      ICD-10-CM    1. Memory loss  R41.3       2. Numbness and tingling of right hand  R20.0     R20.2           Had some post concussive headache and right arm numbness/paresthesiae which are improving.  Exam not concerning, will monitor clinically.    Cognitive dysfunction / Memory loss, uncontrolled. Exam shows mild findings, could be MCI vs. Normal aging.  Will pursue Folate and neuropsych testing.      Return in about 3 months (around 8/13/2024).       ~~~~~~~~~~~~~~~~~~~~~~~~~~~    CHIEF COMPLAINT / REASON FOR VISIT:    Chief Complaint   Patient presents with    Neurologic Problem     Patient states numbness sensation in the right hand which occurs on and off. Patient is present with her daughter.     Headache     Decrease in headaches.      HISTORY OBTAINED FROM:  Patient and others as above  Chart review    HISTORY OF PRESENT ILLNESS:  Karla Vallecillo is a 89 year old female who fell backward out of a chair in March 2024, hit back of head and neck, and did not pass out.  She has had headaches, which have improved, but when she lays on her back, or leans back, she gets numbness in the right arm + hand (arm has improved, hand is still present intermittently).  Does have intermittent paresthesiae (feels as if hand is asleep, movement improves it).  No weakness or incoordination.  No LLE or BLE issues.    Daughter is concerned about dementia, she is forgetful, may misplace her keys or purse, may forget what she has eaten.  She lives by herself, daughter concerned about falls but only had this one event recently, she misplaced her rosary but then she found it.  Daughter is not sure that she remembers to take her medications (pt says they are all laid out in her box appropriately, but daughter says sometimes they find pills on the floor).  She repeats herself sometimes, she may forget a conversation she had the same day.  She only forgot rent once in past year,  otherwise she manages her finances and makes her grocery lists, and cooks.  Her daughter / granddaughter helps clean and do laundry because of difficulty lifting things.    Driving status:  NOT Driving    DATA REVIEWED:  As documented in the HPI    April 2024  PCP note  CBC, CMP unremarkable   ER note  Head CT unremarkable    March 2024  Head CT unremarkable     Dec 2023  B12, TSH unremarkable      PHYSICAL EXAMINATION:  /58   Pulse 77   Resp 16   Wt 159 lb (72.1 kg)   LMP  (LMP Unknown)   BMI 30.04 kg/m²     Gen: WDWN, in NAD  MSE:  AAOx3, recalls 1/3 after 5 minutes (2/3 with guessing/cues), normal attention, normal language  CN: II - PERRL, VFF; Ill, IV, VI - EOMI; V - nl facial sensation bilaterally; VII - nl facial mvmt bilaterally; VIII - nl hearing bilaterally; IX - nl palate elevation bilaterally; X - nl voice; XI - nl shoulder shrug b/I; XII - nl tongue movement  Motor: 5/5 x4; normal tone; no abnormal movements  Sensory: nl vibration x4  Coord: nl FTN b/I  Reflex: 2+ bilaterally in upper and 1+ bilaterally lower extremities   Gait: normal gait      Allergies   Allergen Reactions    Aspirin DIARRHEA and SHORTNESS OF BREATH    Breo Ellipta OTHER (SEE COMMENTS) and SHORTNESS OF BREATH     Pneumonia  Pneumonia    Budesonide SHORTNESS OF BREATH     Coughing    Celebrex [Celecoxib] SHORTNESS OF BREATH    Citalopram SHORTNESS OF BREATH and OTHER (SEE COMMENTS)     Oral     Clotrimazole DIARRHEA and SHORTNESS OF BREATH    Erythromycin OTHER (SEE COMMENTS) and SHORTNESS OF BREATH     Short of Breath  Short of Breath  External  Oral   Short of Breath    Famotidine SHORTNESS OF BREATH    Fish Oil ANAPHYLAXIS, OTHER (SEE COMMENTS) and SHORTNESS OF BREATH     Oral     Fluticasone OTHER (SEE COMMENTS)     Per pt not true  Other reaction(s): Propensity to adverse reactions to drug    Fluticasone-Salmeterol Coughing, SHORTNESS OF BREATH and OTHER (SEE COMMENTS)     Inhalation    Gabapentin WHEEZING    Iodine  (Topical) RASH and SHORTNESS OF BREATH     Rash with topical Iodine.   Rash with topical Iodine.   Rash with topical Iodine.     Iodine Solution [Povidone Iodine] ANAPHYLAXIS     IVP Dye    Levofloxacin SHORTNESS OF BREATH and OTHER (SEE COMMENTS)     Nausea, leg pain    Nausea, leg pain    Intravenous  Other reaction(s): Propensity to adverse reactions to drug    Lidocaine ANAPHYLAXIS and UNKNOWN     \"Breathing issues\"-- per patient okay with marcaine     Lisinopril SHORTNESS OF BREATH     Other reaction(s): Propensity to adverse reactions to drug    Metamucil [Psyllium] SHORTNESS OF BREATH     And abdominal discomfort.    Nitrofurantoin SHORTNESS OF BREATH and UNKNOWN    Nsaids SHORTNESS OF BREATH and UNKNOWN    Other RASH and SHORTNESS OF BREATH     Transdermal \"memo \" patch ;novacaine Pt states she is okay with marcaine  per allergy testing.    Penicillins SHORTNESS OF BREATH     Short of Breath    Plavix [Clopidogrel] SHORTNESS OF BREATH    Pulmicort SHORTNESS OF BREATH and UNKNOWN     Coughing      Radiology Contrast Iodinated Dyes ANAPHYLAXIS     Other reaction(s): Propensity to adverse reactions to drug    Shellfish SHORTNESS OF BREATH    Mupirocin OTHER (SEE COMMENTS)     Felt short of breath  Other reaction(s): Propensity to adverse reactions to drug    Advair Hfa OTHER (SEE COMMENTS)    Bupropion OTHER (SEE COMMENTS)     constipation    Celecoxib OTHER (SEE COMMENTS)     Oral     Codeine OTHER (SEE COMMENTS)     Per pt not true pt takes this medication       Effient [Prasugrel Hydrochloride] SHORTNESS OF BREATH    Linezolid DIARRHEA     Other reaction(s): Propensity to adverse reactions to drug    Metronidazole ASTHMA and UNKNOWN     Other reaction(s): Propensity to adverse reactions to drug    Mold SHORTNESS OF BREATH    Penicillin G RASH     sob  Other reaction(s): Propensity to adverse reactions to drug    Questran [Cholestyramine] HIVES and Coughing    Risperdal [Risperidone] SHORTNESS OF BREATH     Shellfish-Derived Products      Sob      Tramadol UNKNOWN     Arms turned red like a sunburn    Vancomycin RASH     Rash to the face and neck     Warfarin OTHER (SEE COMMENTS)     Asthma attack  Other reaction(s): Propensity to adverse reactions to drug    Flulaval RASH     Other reaction(s): Propensity to adverse reactions to drug    Kenalog [Triamcinolone] NAUSEA ONLY       Current medications:   loperamide 2 MG Oral Cap Take 1 capsule (2 mg total) by mouth daily. 30 capsule 3    Beclomethasone Diprop HFA 80 MCG/ACT Inhalation Aerosol, Breath Activated Inhale 2 puffs into the lungs in the morning and 2 puffs before bedtime. 10.6 g 1    Fluticasone Propionate HFA (FLOVENT HFA) 220 MCG/ACT Inhalation Aerosol Inhale 2 puffs into the lungs 2 (two) times daily. 3 each 3    hydrocortisone 2.5 % External Cream ONE APPLICATION TOPICAL 2 TIMES A DAY FOR 10 DAYS ON ARMS AND RIGHT ANKLE      Pancrelipase, Lip-Prot-Amyl, (ZENPEP) 14258-740665 units Oral Cap DR Particles Take 1 capsule by mouth 3 (three) times daily with meals. May increase to 2 capsules three times a day with meals if needed. 600 capsule 3    PREDNISONE 2.5 MG Oral Tab TAKE 1 TABLET(2.5 MG) BY MOUTH DAILY 90 tablet 3    Verapamil HCl  MG Oral Capsule SR 24 Hr Take 240 mg by mouth daily.      B Complex-C-E-Zn (STRESS PLUS ZINC) Oral Tab Take by mouth.      Ascorbic Acid (VITAMIN C OR) Take 1 tablet by mouth every evening.      ALPRAZolam 1 MG Oral Tab Take 0.5 tablets (0.5 mg total) by mouth nightly as needed.      NON FORMULARY Vitamin B complex with Vit B-12 one daily      ALPRAZolam 0.25 MG Oral Tab Take 1 tablet (0.25 mg total) by mouth every 6 (six) hours as needed.      IMODIUM MULTI-SYMPTOM RELIEF OR Take 1 tablet by mouth as needed.      Probiotic Product (PROBIOTIC-10 OR) Take 1 tablet by mouth daily.        acetaminophen 500 MG Oral Tab Take 1 tablet (500 mg total) by mouth every 6 (six) hours as needed for Pain.      Vitamin D3,  Cholecalciferol, 50 MCG (2000 UT) Oral Cap Take 1 capsule (2,000 Units total) by mouth daily.      loratadine (CLARITIN) 10 MG Oral Tab Take 1 tablet (10 mg total) by mouth daily.      vitamin E 400 UNITS Oral Cap Take 1 capsule (400 Units total) by mouth daily.         Past Medical History:    Anemia    Anesthesia complication    pt stated had a reacion with a medication that sounded like \"memo\"    Anxiety state, unspecified    Arthritis    Asthma (HCC)    Atherosclerosis of coronary artery    Back pain    Back problem    cervical pain    Black stools    Blood transfusion reaction    CAD (coronary artery disease)    Calculus of kidney    Cancer (HCC)    skin cancer in left arm    Change in hair    Chest pain on exertion    Coronary atherosclerosis of unspecified type of vessel, native or graft    Diarrhea, unspecified    Dyslipidemia    Easy bruising    Extrinsic asthma, unspecified    Fatigue    Glucose intolerance (pre-diabetes)    Hearing impairment    HEARING LOSS BUT NO AIDS    Hearing loss    Heart palpitations    HIGH BLOOD PRESSURE    High cholesterol    Hip pain    History of blood transfusion    AFTER HYST    History of depression    Hoarseness, chronic    HTN (hypertension)    Indigestion    Leg swelling    Loss of appetite    Nausea    Osteoarthrosis, unspecified whether generalized or localized, unspecified site    knees, back    Osteoporosis    Other and unspecified hyperlipidemia    Pain in joints    Pinched nerve in neck    Pneumonia, organism unspecified(486)    PONV (postoperative nausea and vomiting)    Shortness of breath    Sleep apnea    Stented coronary artery    Stress    Uncomfortable fullness after meals    Unspecified essential hypertension    Visual impairment    glasses    Wears glasses    Weight loss       Past Surgical History:   Procedure Laterality Date    Angioplasty (coronary)      stent    Appendectomy      Back surgery      lumbar x 4    Benign biopsy left  a long time ago    x  2    Cath bare metal stent (bms)  2012    Cholecystectomy      Colonoscopy  10/21/2013    Procedure: COLONOSCOPY;  Surgeon: Emmanuel Naranjo MD;  Location:  ENDOSCOPY    Colonoscopy N/A 6/22/2021    Procedure: COLONOSCOPY with biopsies and forcep, cold and hot snare polypectomy with saline lift and clip placement x3;  Surgeon: Jerod Pierce MD;  Location:  ENDOSCOPY    Egd      Excis lumbar disk,one level      Fluor gid & loclzj ndl/cath spi dx/ther njx  9/23/2013    Procedure: CERVICAL EPIDURAL;  Surgeon: Christiano Wiseman MD;  Location: [facility]    Hemorrhoidectomy,int/ext,simple      Hysterectomy      complete    Injection, w/wo contrast, dx/therapeutic substance, epidural/subarachnoid; cervical/thoracic  9/23/2013    Procedure: CERVICAL EPIDURAL;  Surgeon: Christiano Wiseman MD;  Location: [facility]    Injection, w/wo contrast, dx/therapeutic substance, epidural/subarachnoid; cervical/thoracic N/A 8/17/2015    Procedure: CERVICAL EPIDURAL;  Surgeon: Josue Stewart MD;  Location: [facility]    Dex localization wire 1 site left (cpt=19281)  1980'S    Benign    Other surgical history  8/7/12    Diag cardiac stent, multi-link mini vision 2mmx 12mm.     Patient documented not to have experienced any of the following events  9/23/2013    Procedure: CERVICAL EPIDURAL;  Surgeon: Christiano Wiseman MD;  Location: [facility]    Patient documented not to have experienced any of the following events N/A 8/17/2015    Procedure: CERVICAL EPIDURAL;  Surgeon: Josue Stewart MD;  Location: [facility]    Patient withough preoperative order for iv antibiotic surgical site infection prophylaxis.  9/23/2013    Procedure: CERVICAL EPIDURAL;  Surgeon: Christiano Wiseman MD;  Location: [facility]    Patient withough preoperative order for iv antibiotic surgical site infection prophylaxis. N/A 8/17/2015     Procedure: CERVICAL EPIDURAL;  Surgeon: Josue Stewart MD;  Location: [facility]    Spine surgery procedure unlisted      Stent, coated/cov w/del sys      Tonsillectomy      Total abdom hysterectomy      Upper gi endoscopy,exam         Social History     Socioeconomic History    Marital status:    Tobacco Use    Smoking status: Former     Current packs/day: 0.00     Average packs/day: 0.3 packs/day for 3.0 years (0.9 ttl pk-yrs)     Types: Cigarettes     Start date: 1969     Quit date: 1972     Years since quittin.2    Smokeless tobacco: Never   Vaping Use    Vaping status: Never Used   Substance and Sexual Activity    Alcohol use: Not Currently    Drug use: No    Sexual activity: Never   Other Topics Concern    Caffeine Concern Yes     Comment: tea    Exercise No       Family History   Problem Relation Age of Onset    Breast Cancer Mother 70    High Blood Pressure Mother     Allergies Father     Asthma Father     Colon Cancer Father     Heart Disease Father     Heart Attack Father     Heart Attack Paternal Grandfather     Heart Disease Paternal Grandfather     Arthritis Paternal Grandmother     Allergies Paternal Grandmother     Cancer Maternal Grandfather     Heart Attack Maternal Grandfather     Heart Attack Maternal Grandmother     Heart Attack Brother     Other (Autoimmune disease) Brother     Thyroid Disorder Sister     Asthma Sister     Cancer Brother     Allergies Daughter     Fibromyalgia Daughter     Melanoma Daughter     Other (Hepatitis C) Daughter          Froilan Hawthorne MD, FAES, FAAN  Board-Certified in Neurology, Epilepsy, and Clinical Neurophysiology  [facility]

## 2024-05-13 NOTE — TELEPHONE ENCOUNTER
Patient states at last office visit 4-22-24 she handed in a parking placard form to provider to fill out. She hasn't heard anything back regarding this? Was it completed and sent in or can we do a new one for patient? Please call patient back with update.

## 2024-05-13 NOTE — PATIENT INSTRUCTIONS
Refill policies:    Allow 2-3 business days for refills; controlled substances may take longer.  Contact your pharmacy at least 5 days prior to running out of medication and have them send an electronic request or submit request through the “request refill” option in your [employer] account.  Refills are not addressed on weekends; covering physicians do not authorize routine medications on weekends.  No narcotics or controlled substances are refilled after noon on Fridays or by on call physicians.  By law, narcotics must be electronically prescribed.  A 30 day supply with no refills is the maximum allowed.  If your prescription is due for a refill, you may be due for a follow up appointment.  To best provide you care, patients receiving routine medications need to be seen at least once a year.  Patients receiving narcotic/controlled substance medications need to be seen at least once every 3 months.  In the event that your preferred pharmacy does not have the requested medication in stock (e.g. Backordered), it is your responsibility to find another pharmacy that has the requested medication available.  We will gladly send a new prescription to that pharmacy at your request.    Scheduling Tests:    If your physician has ordered radiology tests such as MRI or CT scans, please contact Central Scheduling at [phone] right away to schedule the test.  Once scheduled, the [facility] Centralized Referral Team will work with your insurance carrier to obtain pre-certification or prior authorization.  Depending on your insurance carrier, approval may take 3-10 days.  It is highly recommended patients assure they have received an authorization before having a test performed.  If test is done without insurance authorization, patient may be responsible for the entire amount billed.      Precertification and Prior Authorizations:  If your physician has recommended that you have a procedure or additional testing performed the [facility]  Centralized Referral Team will contact your insurance carrier to obtain pre-certification or prior authorization.    You are strongly encouraged to contact your insurance carrier to verify that your procedure/test has been approved and is a COVERED benefit.  Although the [facility] Centralized Referral Team does its due diligence, the insurance carrier gives the disclaimer that \"Although the procedure is authorized, this does not guarantee payment.\"    Ultimately the patient is responsible for payment.   Thank you for your understanding in this matter.  Paperwork Completion:  If you require FMLA or disability paperwork for your recovery, please make sure to either drop it off or have it faxed to our office at [phone]. Be sure the form has your name and date of birth on it.  The form will be faxed to our Forms Department and they will complete it for you.  There is a 25$ fee for all forms that need to be filled out.  Please be aware there is a 10-14 day turnaround time.  You will need to sign a release of information (ALYSSIA) form if your paperwork does not come with one.  You may call the Forms Department with any questions at [phone].  Their fax number is [phone].

## 2024-05-15 ENCOUNTER — TELEPHONE (OUTPATIENT)
Dept: NEUROLOGY | Facility: CLINIC | Age: 89
End: 2024-05-15

## 2024-05-15 NOTE — TELEPHONE ENCOUNTER
Patient seen on her after visit summary that she is taking immodium as needed. Advised patient that is what was reported however on the medication list it is recorded as taking daily. Patient verbalized understanding and just wanted it known she takes daily.

## 2024-05-15 NOTE — TELEPHONE ENCOUNTER
Pt reviewed office notes from 5/13 appt. She is requesting the following update; Imodium is taken daily, not as needed. Pt's best call back number if there are any questions is [phone]. Endorsed to RN.

## 2024-05-18 ENCOUNTER — LAB ENCOUNTER (OUTPATIENT)
Dept: LAB | Facility: HOSPITAL | Age: 89
End: 2024-05-18
Attending: Other

## 2024-05-18 DIAGNOSIS — R41.3 MEMORY LOSS: ICD-10-CM

## 2024-05-18 LAB — FOLATE SERPL-MCNC: 14.4 NG/ML (ref 8.7–?)

## 2024-05-18 PROCEDURE — 36415 COLL VENOUS BLD VENIPUNCTURE: CPT

## 2024-05-18 PROCEDURE — 82746 ASSAY OF FOLIC ACID SERUM: CPT

## 2024-05-21 ENCOUNTER — TELEPHONE (OUTPATIENT)
Dept: NEUROLOGY | Facility: CLINIC | Age: 89
End: 2024-05-21

## 2024-05-21 NOTE — TELEPHONE ENCOUNTER
Patient is calling requesting her lab results. Please call patient back with lab results at [phone].

## 2024-05-21 NOTE — TELEPHONE ENCOUNTER
Patient advised regarding lab results. She verbalized understanding.    She states she has not booked the neuropsychological testing yet as she needs to find a ride. Advised patient they are booking out several months and my want to obtain an appointment first.

## 2024-05-28 ENCOUNTER — TELEPHONE (OUTPATIENT)
Dept: ORTHOPEDICS CLINIC | Facility: CLINIC | Age: 89
End: 2024-05-28

## 2024-05-28 ENCOUNTER — OFFICE VISIT (OUTPATIENT)
Dept: RHEUMATOLOGY | Facility: CLINIC | Age: 89
End: 2024-05-28

## 2024-05-28 VITALS
WEIGHT: 159 LBS | TEMPERATURE: 98 F | HEART RATE: 98 BPM | BODY MASS INDEX: 30 KG/M2 | OXYGEN SATURATION: 94 % | DIASTOLIC BLOOD PRESSURE: 76 MMHG | SYSTOLIC BLOOD PRESSURE: 116 MMHG | RESPIRATION RATE: 18 BRPM

## 2024-05-28 DIAGNOSIS — M47.816 SPONDYLOSIS OF LUMBAR REGION WITHOUT MYELOPATHY OR RADICULOPATHY: ICD-10-CM

## 2024-05-28 DIAGNOSIS — M17.0 PRIMARY OSTEOARTHRITIS OF BOTH KNEES: Primary | ICD-10-CM

## 2024-05-28 DIAGNOSIS — Z98.890 S/P LUMBAR LAMINECTOMY: ICD-10-CM

## 2024-05-28 PROCEDURE — 99214 OFFICE O/P EST MOD 30 MIN: CPT | Performed by: INTERNAL MEDICINE

## 2024-05-28 RX ORDER — PREDNISONE 2.5 MG/1
2.5 TABLET ORAL DAILY
Qty: 90 TABLET | Refills: 3 | Status: SHIPPED | OUTPATIENT
Start: 2024-05-28

## 2024-05-28 RX ORDER — ACETAMINOPHEN AND CODEINE PHOSPHATE 300; 30 MG/1; MG/1
1 TABLET ORAL EVERY 8 HOURS PRN
Qty: 50 TABLET | Refills: 1 | Status: SHIPPED | OUTPATIENT
Start: 2024-05-28

## 2024-05-28 NOTE — PROGRESS NOTES
[facility]  Dr. Tuttle Progress Note     Subjective:   Karla Vallecillo is a(n) 89 year old female.   Current complaints:   Chief Complaint   Patient presents with    Follow - Up     6 month f/u. Pt has been having consistent diarrhea for the last few years but nothing has helped. Denies taking any new medications. Currently taking Immodium to help with symptoms. Wearing a brace all the time on right knee and using the walker; pain has remained the same since LOV. Did fall 6 weeks before Easter, did have CT scan; back healed and no head injurys.    Chronic knee pain right > left knee.  Has OA knees and OA lumbar spine.  Allergic to NSAidS.    C/o diarrhea - aggravated since having a colonoscopy.  Has seen Dr. Pierce of GI.  Chronic diarrhea.   Uses Lomotil.  Told she possibly has pancreatic insufficiency.  In the past had black stools once last year.   Uses Zenpep samples from GI for her problem.  Using a knee brace for her knee arthritis.   Allergic to peanuts, sensitive to dairy products.  Using Prednisone 2.5 mg per day for arthritis pain.  Sees Dr. Jackson for asthma.  Uses Tylenol#3 once and awhile for arthritis pain.  Needs refills on Prednisone and Tylenol#3.   Left hip painful at times. Has seen Dr. Katz.  Objective:   /76   Pulse 98   Temp 98.2 °F (36.8 °C)   Resp 18   Wt 159 lb (72.1 kg)   LMP  (LMP Unknown)   SpO2 94%   BMI 30.04 kg/m²   Lungs  clear  Heart nsr    Right knee tender and hypertrophic    1/3/24  Xray left hip - severe degenerative hips changes.   3/14/24  Xray back - degenerative lumbar spine - multilevel disc narrowing with disc space narrowing with vacuum phenomena = degenerative disc disease.   Assessment:     Encounter Diagnoses   Name Primary?    Primary osteoarthritis of both knees Yes    Spondylosis of lumbar region without myelopathy or radiculopathy     S/P lumbar laminectomy      Plan:     Patient Instructions   See Dr Morro freeman GI or Dr Pierce  for loose stools.  ZenPep a pancreatic substitute tried and it didn't work,  Dr. Will's pancreatic test was ok.  For arthritis pain use Prednisone 2.5 mg per day,   Occcasional ES Tylernol 500 mg twice a  day.  Rare use of Tylenol #e for severe pain.  You have osteoarthritis of the knees and osteoarthritis of the left hip,  Your back has degenerative disc disease of the lumbar spine.  Dr. Minda Valera is an orthopedic surgeon you can consult -[phone],  [facility].  Use Metamucil 1 tablespoon per day for the diarrhea.    Return to office 6 months.               Abundio Tuttle MD 5/28/2024 10:15 AM[facility]

## 2024-05-28 NOTE — TELEPHONE ENCOUNTER
Xrays done 1/2024- Would you like repeat? Please advise?     Impression   CONCLUSION:  No acute fracture or dislocation.  Severe degenerative changes in the left hip are noted.

## 2024-05-28 NOTE — PATIENT INSTRUCTIONS
See Dr Morro freeman GI or Dr Pierce for loose stools.  ZenPep a pancreatic substitute tried and it didn't work,  Dr. Will's pancreatic test was ok.  For arthritis pain use Prednisone 2.5 mg per day,   Occcasional ES Tylernol 500 mg twice a  day.  Rare use of Tylenol #e for severe pain.  You have osteoarthritis of the knees and osteoarthritis of the left hip,  Your back has degenerative disc disease of the lumbar spine.  Dr. Minda Valera is an orthopedic surgeon you can consult -[phone],  [facility].  Use Metamucil 1 tablespoon per day for the diarrhea.    Return to office 6 months.

## 2024-05-28 NOTE — TELEPHONE ENCOUNTER
Future Appointments   Date Time Provider Department Center   6/21/2024 10:40 AM Juan Carlos Pantoja PA-C [facility]       This patient is coming for LT Hip injury from a fall. There was imaging done in epic. Please advise if additional views are needed for this appt. Thanks.      Patient may be reached at [phone]

## 2024-06-08 ENCOUNTER — APPOINTMENT (OUTPATIENT)
Dept: GENERAL RADIOLOGY | Facility: HOSPITAL | Age: 89
End: 2024-06-08
Attending: STUDENT IN AN ORGANIZED HEALTH CARE EDUCATION/TRAINING PROGRAM
Payer: MEDICARE

## 2024-06-08 ENCOUNTER — HOSPITAL ENCOUNTER (EMERGENCY)
Facility: HOSPITAL | Age: 89
Discharge: HOME | End: 2024-06-08
Attending: STUDENT IN AN ORGANIZED HEALTH CARE EDUCATION/TRAINING PROGRAM
Payer: MEDICARE

## 2024-06-08 VITALS
HEIGHT: 61 IN | HEART RATE: 76 BPM | SYSTOLIC BLOOD PRESSURE: 146 MMHG | RESPIRATION RATE: 20 BRPM | TEMPERATURE: 98 F | OXYGEN SATURATION: 96 % | BODY MASS INDEX: 30.21 KG/M2 | WEIGHT: 160 LBS | DIASTOLIC BLOOD PRESSURE: 76 MMHG

## 2024-06-08 DIAGNOSIS — T14.8XXA BRUISING: Primary | ICD-10-CM

## 2024-06-08 PROCEDURE — 99284 EMERGENCY DEPT VISIT MOD MDM: CPT

## 2024-06-08 PROCEDURE — 73590 X-RAY EXAM OF LOWER LEG: CPT | Performed by: STUDENT IN AN ORGANIZED HEALTH CARE EDUCATION/TRAINING PROGRAM

## 2024-06-08 PROCEDURE — 73610 X-RAY EXAM OF ANKLE: CPT | Performed by: STUDENT IN AN ORGANIZED HEALTH CARE EDUCATION/TRAINING PROGRAM

## 2024-06-08 PROCEDURE — S0119 ONDANSETRON 4 MG: HCPCS | Performed by: STUDENT IN AN ORGANIZED HEALTH CARE EDUCATION/TRAINING PROGRAM

## 2024-06-08 RX ORDER — ACETAMINOPHEN 325 MG/1
650 TABLET ORAL ONCE
Status: COMPLETED | OUTPATIENT
Start: 2024-06-08 | End: 2024-06-08

## 2024-06-08 RX ORDER — ONDANSETRON 4 MG/1
4 TABLET, ORALLY DISINTEGRATING ORAL ONCE
Status: COMPLETED | OUTPATIENT
Start: 2024-06-08 | End: 2024-06-08

## 2024-06-08 NOTE — ED PROVIDER NOTES
Patient Seen in: [facility] Emergency Department      History     Chief Complaint   Patient presents with    Rash Skin Problem     Stated Complaint: rash to left leg since monday - no fever.    Subjective:   HPI    The patient is a 89-year-old female presented to the emergency room with what she believes to be a rash near her anterior aspect of her distal left lower extremity.  She does use a walker but she does not recall any trauma.  She states she noticed it on Monday and while it is much better than it was then she still has no idea what led to this finding.  She denies any numbness or tingling.  She states during the day when she is up and about it is worse and when she lays down and elevates her leg it appears better.    Objective:   Past Medical History:    Anemia    Anesthesia complication    pt stated had a reacion with a medication that sounded like \"memo\"    Anxiety state, unspecified    Arthritis    Asthma (HCC)    Atherosclerosis of coronary artery    Back pain    Back problem    cervical pain    Black stools    Blood transfusion reaction    CAD (coronary artery disease)    Calculus of kidney    Cancer (HCC)    skin cancer in left arm    Change in hair    Chest pain on exertion    Coronary atherosclerosis of unspecified type of vessel, native or graft    Diarrhea, unspecified    Dyslipidemia    Easy bruising    Extrinsic asthma, unspecified    Fatigue    Glucose intolerance (pre-diabetes)    Hearing impairment    HEARING LOSS BUT NO AIDS    Hearing loss    Heart palpitations    HIGH BLOOD PRESSURE    High cholesterol    Hip pain    History of blood transfusion    AFTER HYST    History of depression    Hoarseness, chronic    HTN (hypertension)    Indigestion    Leg swelling    Loss of appetite    Nausea    Osteoarthrosis, unspecified whether generalized or localized, unspecified site    knees, back    Osteoporosis    Other and unspecified hyperlipidemia    Pain in joints    Pinched nerve in neck     Pneumonia, organism unspecified(486)    PONV (postoperative nausea and vomiting)    Shortness of breath    Sleep apnea    Stented coronary artery    Stress    Uncomfortable fullness after meals    Unspecified essential hypertension    Visual impairment    glasses    Wears glasses    Weight loss              Past Surgical History:   Procedure Laterality Date    Angioplasty (coronary)      stent    Appendectomy      Back surgery      lumbar x 4    Benign biopsy left  a long time ago    x 2    Cath bare metal stent (bms)  2012    Cholecystectomy      Colonoscopy  10/21/2013    Procedure: COLONOSCOPY;  Surgeon: Emmanuel Naranjo MD;  Location:  ENDOSCOPY    Colonoscopy N/A 6/22/2021    Procedure: COLONOSCOPY with biopsies and forcep, cold and hot snare polypectomy with saline lift and clip placement x3;  Surgeon: Jerod Pierce MD;  Location:  ENDOSCOPY    Egd      Excis lumbar disk,one level      Fluor gid & loclzj ndl/cath spi dx/ther njx  9/23/2013    Procedure: CERVICAL EPIDURAL;  Surgeon: Christiano Wiseman MD;  Location: [facility]    Hemorrhoidectomy,int/ext,simple      Hysterectomy      complete    Injection, w/wo contrast, dx/therapeutic substance, epidural/subarachnoid; cervical/thoracic  9/23/2013    Procedure: CERVICAL EPIDURAL;  Surgeon: Christiano Wiseman MD;  Location: [facility]    Injection, w/wo contrast, dx/therapeutic substance, epidural/subarachnoid; cervical/thoracic N/A 8/17/2015    Procedure: CERVICAL EPIDURAL;  Surgeon: Josue Stewart MD;  Location: [facility]    Dex localization wire 1 site left (cpt=19281)  1980'S    Benign    Other surgical history  8/7/12    Diag cardiac stent, multi-link mini vision 2mmx 12mm.     Patient documented not to have experienced any of the following events  9/23/2013    Procedure: CERVICAL EPIDURAL;  Surgeon: Christiano Wiseman MD;  Location: [facility]    Patient documented not  to have experienced any of the following events N/A 2015    Procedure: CERVICAL EPIDURAL;  Surgeon: Josue Stewart MD;  Location: [facility]    Patient withough preoperative order for iv antibiotic surgical site infection prophylaxis.  2013    Procedure: CERVICAL EPIDURAL;  Surgeon: Christiano Wiseman MD;  Location: [facility]    Patient withough preoperative order for iv antibiotic surgical site infection prophylaxis. N/A 2015    Procedure: CERVICAL EPIDURAL;  Surgeon: Josue Stewart MD;  Location: [facility]    Spine surgery procedure unlisted      Stent, coated/cov w/del sys      Tonsillectomy      Total abdom hysterectomy      Upper gi endoscopy,exam                  Social History     Socioeconomic History    Marital status:    Tobacco Use    Smoking status: Former     Current packs/day: 0.00     Average packs/day: 0.3 packs/day for 3.0 years (0.9 ttl pk-yrs)     Types: Cigarettes     Start date: 1969     Quit date: 1972     Years since quittin.3    Smokeless tobacco: Never   Vaping Use    Vaping status: Never Used   Substance and Sexual Activity    Alcohol use: Not Currently    Drug use: No    Sexual activity: Never   Other Topics Concern    Caffeine Concern Yes     Comment: tea    Exercise No   Social History Narrative    ** Merged History Encounter **          Social Determinants of Health     Financial Resource Strain: Low Risk  (6/15/2021)    Received from [facility]    Overall Financial Resource Strain (CARDIA)     Difficulty of Paying Living Expenses: Not hard at all   Food Insecurity: No Food Insecurity (6/15/2021)    Received from [facility]    Hunger Vital Sign     Worried About Running Out of Food in the Last Year: Never true     Ran Out of Food in the Last Year: Never true   Transportation Needs: No Transportation Needs (6/15/2021)     [facility]    PRAPARE - Transportation     Lack of Transportation (Medical): No     Lack of Transportation (Non-Medical): No   Physical Activity: Inactive (2/7/2023)    Exercise Vital Sign     Days of Exercise per Week: 0 days     Minutes of Exercise per Session: 0 min   Stress: No Stress Concern Present (6/15/2021)    [facility]    [employer] - Occupational Stress Questionnaire     Feeling of Stress : Not at all    Social Connections   Housing Stability: Low Risk  (6/15/2021)    Received from [facility]    Housing Stability Vital Sign     Unable to Pay for Housing in the Last Year: No     Number of Places Lived in the Last Year: 1     Unstable Housing in the Last Year: No              Review of Systems    Positive for stated complaint: rash to left leg since monday - no fever.  Other systems are as noted in HPI.  Constitutional and vital signs reviewed.      All other systems reviewed and negative except as noted above.    Physical Exam     ED Triage Vitals [06/08/24 0921]   BP (!) 162/71   Pulse 97   Resp 17   Temp 97.8 °F (36.6 °C)   Temp src Temporal   SpO2 97 %   O2 Device None (Room air)       Current Vitals:   Vital Signs  BP: 146/76  Pulse: 76  Resp: 20  Temp: 97.8 °F (36.6 °C)  Temp src: Temporal    Oxygen Therapy  SpO2: 96 %  O2 Device: None (Room air)            Physical Exam  Vitals and nursing note reviewed.   Constitutional:       General: She is not in acute distress.     Appearance: Normal appearance.   HENT:      Head: Normocephalic.      Nose: Nose normal.      Mouth/Throat:      Mouth: Mucous membranes are moist.   Eyes:      Extraocular Movements: Extraocular movements intact.      Pupils: Pupils are equal, round, and reactive to light.   Cardiovascular:      Rate and Rhythm: Normal rate and regular rhythm.      Pulses: Normal pulses.    Pulmonary:      Effort: Pulmonary effort is normal.   Abdominal:      General: Abdomen is flat. Bowel sounds are normal. There is no distension.      Palpations: Abdomen is soft.      Tenderness: There is no abdominal tenderness. There is no right CVA tenderness, left CVA tenderness, guarding or rebound.      Hernia: No hernia is present.   Musculoskeletal:         General: No swelling or tenderness. Normal range of motion.      Cervical back: Normal range of motion.      Comments: There is a small area of ecchymosis near the anterior proximal ankle.  No significant tenderness to palpation no crepitus   Skin:     General: Skin is warm and dry.   Neurological:      Mental Status: She is alert and oriented to person, place, and time. Mental status is at baseline.   Psychiatric:         Mood and Affect: Mood normal.           XR ANKLE (MIN 3 VIEWS), LEFT (CPT=73610)    Result Date: 6/8/2024  CONCLUSION:  See above.   LOCATION:  [facility]   Dictated by (CST): Stromberg, LeRoy, MD on 6/08/2024 at 10:33 AM     Finalized by (CST): Stromberg, LeRoy, MD on 6/08/2024 at 10:35 AM       XR TIBIA + FIBULA (2 VIEWS), LEFT (CPT=73590)    Result Date: 6/8/2024  CONCLUSION:  See above.   LOCATION:  [facility]   Dictated by (CST): Stromberg, LeRoy, MD on 6/08/2024 at 10:31 AM     Finalized by (CST): Stromberg, LeRoy, MD on 6/08/2024 at 10:33 AM          ED Course   Labs Reviewed - No data to display    MDM      Medical Decision Making    The differential includes the following  Ecchymosis secondary to blunt trauma, fracture lower suspicion, not consistent with cellulitis but I did consider this    Pertinent comorbidities include  As listed above -she is not on blood thinners or aspirin    Pertinent social history includes  Uses a walker        Imaging studies  I reviewed the images and my independent interpreation after review is no evidence of fracture. Additionaly, I reviewd the radiology report that states the following soft tissue  swelling, no fracture.        ER course  Patient slightly hypertensive here but otherwise hemodynamically stable.  She has full range of motion of her lower extremities and is neurovascularly intact on exam.  I suspect that her \"rash\" is actually a bruise likely from blunt trauma which could have been done simply hitting her leg against her walker however I will be obtaining an x-ray to ensure that there is no evidence of a fracture.  Patient been given Tylenol for pain which she happens to not be allergic to.    11:08 AM   Xrays negative. Will discharge the patient home.     Disposition and Plan     Clinical Impression:  1. Bruising         Disposition:  Discharge  6/8/2024 11:06 am    Follow-up:  Ghada Carballo MD  [address] [place] [id number]  [phone]    Follow up            Medications Prescribed:  Current Discharge Medication List

## 2024-06-08 NOTE — ED INITIAL ASSESSMENT (HPI)
Patient noticed a circular red and blue spot on her lower left leg on Monday.  She states when she starts walking and moving \"the spot becomes worse looking\".  She states every day it looks a little better but she's never had anything like it before.  States its painful only if you touch it.  She is feeling nauseated this morning which she said is new as well.

## 2024-06-08 NOTE — DISCHARGE INSTRUCTIONS
Your xrays  do not show any signs of an broken bone     I think you have a bruise. I recommend ice, rest, elevation and soft compression

## 2024-06-10 ENCOUNTER — TELEPHONE (OUTPATIENT)
Dept: INTERNAL MEDICINE CLINIC | Facility: CLINIC | Age: 89
End: 2024-06-10

## 2024-06-10 ENCOUNTER — PATIENT OUTREACH (OUTPATIENT)
Dept: CASE MANAGEMENT | Age: 89
End: 2024-06-10

## 2024-06-10 DIAGNOSIS — G56.01 CARPAL TUNNEL SYNDROME OF RIGHT WRIST: ICD-10-CM

## 2024-06-10 DIAGNOSIS — I25.10 CORONARY ARTERY DISEASE INVOLVING NATIVE CORONARY ARTERY OF NATIVE HEART WITHOUT ANGINA PECTORIS: Primary | ICD-10-CM

## 2024-06-10 DIAGNOSIS — F41.9 ANXIETY: ICD-10-CM

## 2024-06-10 DIAGNOSIS — K86.89 PANCREATIC INSUFFICIENCY (HCC): ICD-10-CM

## 2024-06-10 DIAGNOSIS — M17.12 PRIMARY OSTEOARTHRITIS OF LEFT KNEE: ICD-10-CM

## 2024-06-10 DIAGNOSIS — M17.0 PRIMARY OSTEOARTHRITIS OF BOTH KNEES: ICD-10-CM

## 2024-06-10 DIAGNOSIS — E78.5 DYSLIPIDEMIA: ICD-10-CM

## 2024-06-10 DIAGNOSIS — M16.12 OSTEOARTHRITIS OF ONE HIP, LEFT: ICD-10-CM

## 2024-06-10 DIAGNOSIS — J42 CHRONIC BRONCHITIS, UNSPECIFIED CHRONIC BRONCHITIS TYPE (HCC): ICD-10-CM

## 2024-06-10 DIAGNOSIS — M17.11 PRIMARY OSTEOARTHRITIS OF RIGHT KNEE: ICD-10-CM

## 2024-06-10 DIAGNOSIS — K58.0 IRRITABLE BOWEL SYNDROME WITH DIARRHEA: ICD-10-CM

## 2024-06-10 NOTE — PROGRESS NOTES
Spoke to Karla for CCM.      Updates to patient care team/comments: UTD  Patient reported changes in medications: UTD  Med Adherence  Comment: pt taking medications as prescribed     Health Maintenance:   Health Maintenance   Topic Date Due    Pneumococcal Vaccine: 65+ Years (1 of 2 - PCV) Never done    Zoster Vaccines (1 of 2) Never done    COVID-19 Vaccine (2 - 2023-24 season) 09/01/2023    Influenza Vaccine (Season Ended) 10/01/2024    Annual Physical  01/03/2025    Annual Depression Screening  Completed    Fall Risk Screening (Annual)  Completed       Patient updates/concerns:    Spoke to pt for monthly outreach   Pt requested assistance from [facility] in reviewing notes to determine status of handicapped placard application. [facility] confirmed with pt that form was completed and ready for  at office. Pt does not drive and has to rely on daughters for transportation and tries to limit her requests for extra trips. Pt requested [facility] assistance in determining if staff at [facility] would mail the completed application to pt home for her to review and mail. [facility] sent request to [facility].   Pt was recently seen in ER for a derm issue. Pt has noticed a bruise on her leg that appears to her to change in color and get darker. Pt states that it is not sensitive to the touch. She states the area was xrayed and no underlying injure was discovered. Pt was asked to wrap the area which she has continued to do. Pt is not applying anything otherwise. Pt declined [facility] assistance in scheduling follow up with pcp.    Pt states that her balance/gait is not affected by the bruising and she tries to take several trips around her facility during the day for exercise.   Pt continues to participate in social activities sponsored by the facility. She stays in touch withe her daughters. They visit her regularly. Pt will compile a grocery list and daughter will shop for her.    Pt has routine follow up with gi and no testing/labs were requested ahead  of visit. Pt states that she has been routinely following to discuss ongoing issues with loose stool diarrhea. Pt states that the episodes still occur but have not increased in frequency or intensity.    Pt discussed neuropsych testing. She has not yet scheduled it. She states that specialist encouraged she undergo the testing to determine if her cognition/memory are in decline. Pt has a detailed description of the testing and she plans on reviewing it with her daughters at next visit. Pt is then instructed to follow up with neuro team when she is done.   Goals/Action Plan:    Active goal from previous outreach: staying healthy    Patient reported progress towards goals: pt continues to see providers as needed and takes meds as prescribed               - What: hydrated           - Where/When/How: pt feels she is meeting her goal of 40 oz a day  Patient Reported Barriers and Concerns: n/a                   - Plan for overcoming barriers: none    Care Managers Interventions: continue to provide encouragement and education for healthy coping and dx    Future Appointments:   Future Appointments   Date Time Provider Department Center   6/21/2024 10:40 AM Juan Carlos Pantoja PA-C [facility]   7/1/2024  1:00 PM Josef Mcneill, APRN [facility]   8/13/2024 11:00 AM Froilan Hawthorne MD [facility]   12/2/2024 12:00 PM Abundio Tuttle MD [facility]         Next Care Manager Follow Up Date: one month    Reason For Follow Up: review progress and or barriers towards patient's goals.     Time Spent This Encounter Total: 23 min medical record review, telephone communication, care plan updates where needed, education, goals, and action plan recreation/update. Provided acknowledgment and validation to patient's concerns.   Monthly Minute Total including today: 23  Physical assessment, complete health history, and need for CCM established by Ghada Carballo MD.

## 2024-06-10 NOTE — TELEPHONE ENCOUNTER
Pt unable to coordinate with family to  handicapped placard application at [facility].    Pt is requesting the completed form be mailed to her home address.    Can that form be mailed to pt instead of picking up in person?    Please advise    Thank you    Routed to [facility] clinical

## 2024-07-08 ENCOUNTER — LAB ENCOUNTER (OUTPATIENT)
Dept: LAB | Age: 89
End: 2024-07-08
Payer: MEDICARE

## 2024-07-08 DIAGNOSIS — R19.7 DIARRHEA, UNSPECIFIED TYPE: ICD-10-CM

## 2024-07-08 LAB
ANION GAP SERPL CALC-SCNC: 7 MMOL/L (ref 0–18)
BUN BLD-MCNC: 15 MG/DL (ref 9–23)
CALCIUM BLD-MCNC: 9.2 MG/DL (ref 8.5–10.1)
CHLORIDE SERPL-SCNC: 109 MMOL/L (ref 98–112)
CO2 SERPL-SCNC: 25 MMOL/L (ref 21–32)
CREAT BLD-MCNC: 0.86 MG/DL
EGFRCR SERPLBLD CKD-EPI 2021: 65 ML/MIN/1.73M2 (ref 60–?)
FASTING STATUS PATIENT QL REPORTED: YES
GLUCOSE BLD-MCNC: 90 MG/DL (ref 70–99)
OSMOLALITY SERPL CALC.SUM OF ELEC: 292 MOSM/KG (ref 275–295)
POTASSIUM SERPL-SCNC: 4 MMOL/L (ref 3.5–5.1)
SODIUM SERPL-SCNC: 141 MMOL/L (ref 136–145)

## 2024-07-08 PROCEDURE — 80048 BASIC METABOLIC PNL TOTAL CA: CPT

## 2024-07-08 PROCEDURE — 36415 COLL VENOUS BLD VENIPUNCTURE: CPT

## 2024-07-15 ENCOUNTER — PATIENT OUTREACH (OUTPATIENT)
Dept: CASE MANAGEMENT | Age: 89
End: 2024-07-15

## 2024-07-26 NOTE — TELEPHONE ENCOUNTER
REFILL PASSED PER [facility] PROTOCOLS     Please review pended refill request as unable to refill due to high/very high drug interaction warning copied here;        New Warnings (7)  High  Allergy/Contraindication: Qvar RediHalerReactions: OTHER (SEE COMMENTS), SHORTNESS OF BREATH. No reaction type specified. User documented allergy severity: High.  Level 2 with BREO ELLIPTA (Class: CORTICOSTEROIDS).  \"Pneumonia Pneumonia\"  Details  Override reason        QVAR REDIHALER 80 MCG/ACT Inhalation Aerosol, Breath Activated [Pharmacy Med Name: QVAR REDIHALER 80MCG ORALINH (120)]  Prescription. Reordered.  High  Allergy/Contraindication: Qvar RediHalerReactions: OTHER (SEE COMMENTS). No reaction type specified. User documented allergy severity: High.  Level 2 with FLUTICASONE (Class: CORTICOSTEROIDS).  \"Per pt not true Other reaction(s): Propensity to adverse reactions to drug\"  Details  Override reason        QVAR REDIHALER 80 MCG/ACT Inhalation Aerosol, Breath Activated [Pharmacy Med Name: QVAR REDIHALER 80MCG ORALINH (120)]  Prescription. Reordered.  High  Allergy/Contraindication: Qvar RediHalerReactions: SHORTNESS OF BREATH, UNKNOWN. No reaction type specified. User documented allergy severity: High.  Level 2 with PULMICORT (Class: CORTICOSTEROIDS).  \"Coughing \"  Details  Override reason        QVAR REDIHALER 80 MCG/ACT Inhalation Aerosol, Breath Activated [Pharmacy Med Name: QVAR REDIHALER 80MCG ORALINH (120)]  Prescription. Reordered.  High  Allergy/Contraindication: Qvar RediHalerReactions: OTHER (SEE COMMENTS). No reaction type specified. User  Requested Prescriptions   Pending Prescriptions Disp Refills    QVAR REDIHALER 80 MCG/ACT Inhalation Aerosol, Breath Activated [Pharmacy Med Name: QVAR REDIHALER 80MCG ORALINH (120)] 10.6 g 1     Sig: INHALE 2 PUFFS INTO THE LUNGS IN THE MORNING AND INHALE 2 PUFFS BEFORE BEDTIME       Asthma & COPD Medication Protocol Passed - 7/23/2024  5:47 PM        Passed - Asthma  Action Score greater than or equal to 20        Passed - Appointment in past 6 or next 3 months      Recent Outpatient Visits              3 weeks ago Diarrhea, unspecified type    [facility] Suad Mcneill APRN    Office Visit    1 month ago Primary osteoarthritis of both knees    [facility], [facility] Abundio Tuttle MD    Office Visit    2 months ago Memory loss    [facility], [facility] Froilan Hawthorne MD    Office Visit    3 months ago Strain of neck muscle, subsequent encounter    [facility], [address] [place] [address] Ghada Carballo MD    Office Visit    3 months ago Mild persistent asthma without complication (HCC)    [facility] Melissa Jackson MD    Virtual Phone E/M          Future Appointments         Provider Department Appt Notes    In 2 weeks Froilan Hawthorne MD [facility] 3 MONTH F/U    In 1 month Suad Mcneill APRN [facility] **7/1/24  2 mos follow up w/ suad childers    In 4 months Abundio Tuttle MD [facility] 6mo                    Passed - AAP/ACT given in last 12 months     Yes  Yes  Yes  23               Future Appointments         Provider Department Appt Notes    In 2 weeks Froilan Hawthorne MD [facility] 3 MONTH F/U    In 1 month Suad Mcneill APRN [facility] **7/1/24  2 mos follow up w/ suad childers    In 4 months Abundio Tuttle MD [facility] 6mo          Recent Outpatient Visits              3 weeks ago Diarrhea, unspecified type    [facility] Suad Mcneill APRN    Office Visit    1 month ago Primary osteoarthritis of both knees    [place]  [facility], [facility] Abundio Tuttle MD    Office Visit    2 months ago Memory loss    [facility] Froilan Hawthorne MD    Office Visit    3 months ago Strain of neck muscle, subsequent encounter    [facility], [address] [place] [address] Ghada Carballo MD    Office Visit    3 months ago Mild persistent asthma without complication (HCC)    [facility], [facility] Melsisa Jackson MD    Virtual Phone E/M

## 2024-07-28 RX ORDER — BECLOMETHASONE DIPROPIONATE HFA 80 UG/1
AEROSOL, METERED RESPIRATORY (INHALATION)
Qty: 10.6 G | Refills: 1 | Status: SHIPPED | OUTPATIENT
Start: 2024-07-28

## 2024-08-13 ENCOUNTER — OFFICE VISIT (OUTPATIENT)
Dept: NEUROLOGY | Facility: CLINIC | Age: 89
End: 2024-08-13
Payer: MEDICARE

## 2024-08-13 VITALS
HEIGHT: 60 IN | BODY MASS INDEX: 31.22 KG/M2 | RESPIRATION RATE: 16 BRPM | WEIGHT: 159 LBS | OXYGEN SATURATION: 95 % | SYSTOLIC BLOOD PRESSURE: 140 MMHG | HEART RATE: 93 BPM | DIASTOLIC BLOOD PRESSURE: 70 MMHG

## 2024-08-13 DIAGNOSIS — R20.2 NUMBNESS AND TINGLING OF RIGHT HAND: ICD-10-CM

## 2024-08-13 DIAGNOSIS — R41.3 MEMORY LOSS: Primary | ICD-10-CM

## 2024-08-13 DIAGNOSIS — R20.0 NUMBNESS AND TINGLING OF RIGHT HAND: ICD-10-CM

## 2024-08-13 PROCEDURE — 99214 OFFICE O/P EST MOD 30 MIN: CPT | Performed by: OTHER
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
## 2024-08-13 NOTE — PATIENT INSTRUCTIONS
Instructions from Dr. Hawthorne:       I understand you are concerning if you had a brain injury from the fall, and I understand your family is concerning about your memory in general.  As far as injury or concussion from the fall, that is diagnosed by your symptoms and not a scan.  The neuropsychological testing would be the way to formally evaluate your memory and thinking.  I would recommend the test if you or your family have an ongoing concern about memory or thinking.  This test is several hours long and may not change how we take care of you, meaning I wouldn't recommend medications.  However, the test would give us an understanding of your memory, thinking, and they would compare results against people of the same age and educational level.  I do not believe you have Alzheimers or dementia.      ~~~~~~~~~~~~~~~~~~~~~~~        Refill policies:    Allow 2-3 business days for refills; controlled substances may take longer.  Contact your pharmacy at least 5 days prior to running out of medication and have them send an electronic request or submit request through the “request refill” option in your [employer] account.  Refills are not addressed on weekends; covering physicians do not authorize routine medications on weekends.  No narcotics or controlled substances are refilled after noon on Fridays or by on call physicians.  By law, narcotics must be electronically prescribed.  A 30 day supply with no refills is the maximum allowed.  If your prescription is due for a refill, you may be due for a follow up appointment.  To best provide you care, patients receiving routine medications need to be seen at least once a year.  Patients receiving narcotic/controlled substance medications need to be seen at least once every 3 months.  In the event that your preferred pharmacy does not have the requested medication in stock (e.g. Backordered), it is your responsibility to find another pharmacy that has the requested medication  available.  We will gladly send a new prescription to that pharmacy at your request.    Scheduling Tests:    If your physician has ordered radiology tests such as MRI or CT scans, please contact Central Scheduling at [phone] right away to schedule the test.  Once scheduled, the [facility] Centralized Referral Team will work with your insurance carrier to obtain pre-certification or prior authorization.  Depending on your insurance carrier, approval may take 3-10 days.  It is highly recommended patients assure they have received an authorization before having a test performed.  If test is done without insurance authorization, patient may be responsible for the entire amount billed.      Precertification and Prior Authorizations:  If your physician has recommended that you have a procedure or additional testing performed the [facility] Centralized Referral Team will contact your insurance carrier to obtain pre-certification or prior authorization.    You are strongly encouraged to contact your insurance carrier to verify that your procedure/test has been approved and is a COVERED benefit.  Although the [facility] Centralized Referral Team does its due diligence, the insurance carrier gives the disclaimer that \"Although the procedure is authorized, this does not guarantee payment.\"    Ultimately the patient is responsible for payment.   Thank you for your understanding in this matter.  Paperwork Completion:  If you require FMLA or disability paperwork for your recovery, please make sure to either drop it off or have it faxed to our office at [phone]. Be sure the form has your name and date of birth on it.  The form will be faxed to our Forms Department and they will complete it for you.  There is a 25$ fee for all forms that need to be filled out.  Please be aware there is a 10-14 day turnaround time.  You will need to sign a release of information (ALYSSIA) form if your paperwork does not come with one.  You may call the Forms  Department with any questions at [phone].  Their fax number is [phone].

## 2024-08-14 ENCOUNTER — TELEPHONE (OUTPATIENT)
Dept: NEUROLOGY | Facility: CLINIC | Age: 89
End: 2024-08-14

## 2024-08-14 NOTE — TELEPHONE ENCOUNTER
Faxed Pt's order for neuropsychological testing. Rec'd delivery confirmation rec'd 8/15 @ 10:50 a.m.

## 2024-08-15 ENCOUNTER — PATIENT OUTREACH (OUTPATIENT)
Dept: CASE MANAGEMENT | Age: 89
End: 2024-08-15

## 2024-08-15 DIAGNOSIS — G56.01 CARPAL TUNNEL SYNDROME OF RIGHT WRIST: ICD-10-CM

## 2024-08-15 DIAGNOSIS — M16.12 OSTEOARTHRITIS OF ONE HIP, LEFT: ICD-10-CM

## 2024-08-15 DIAGNOSIS — I25.10 CORONARY ARTERY DISEASE INVOLVING NATIVE CORONARY ARTERY OF NATIVE HEART WITHOUT ANGINA PECTORIS: ICD-10-CM

## 2024-08-15 DIAGNOSIS — I10 HYPERTENSION, BENIGN: ICD-10-CM

## 2024-08-15 DIAGNOSIS — M17.12 PRIMARY OSTEOARTHRITIS OF LEFT KNEE: ICD-10-CM

## 2024-08-15 DIAGNOSIS — E78.5 DYSLIPIDEMIA: Primary | ICD-10-CM

## 2024-08-15 DIAGNOSIS — M17.0 PRIMARY OSTEOARTHRITIS OF BOTH KNEES: ICD-10-CM

## 2024-08-15 DIAGNOSIS — M17.11 PRIMARY OSTEOARTHRITIS OF RIGHT KNEE: ICD-10-CM

## 2024-08-15 DIAGNOSIS — E78.00 PURE HYPERCHOLESTEROLEMIA: ICD-10-CM

## 2024-08-15 NOTE — PROGRESS NOTES
Spoke to Karla for Chronic Care Management.      Updates to patient care team/comments: UTD  Patient reported changes in medications: UTD  Med Adherence  Comment: pt taking medications as prescribed     Health Maintenance:   Health Maintenance   Topic Date Due    Pneumococcal Vaccine: 65+ Years (1 of 2 - PCV) Never done    Zoster Vaccines (1 of 2) Never done    COVID-19 Vaccine (2 - 2023-24 season) 09/01/2023    HTN: BP Follow-Up  09/13/2024    Influenza Vaccine (1) 10/01/2024    Annual Physical  01/03/2025    Annual Depression Screening  Completed    Fall Risk Screening (Annual)  Completed       Patient updates/concerns:    Spoke to pt for monthly outreach   Pt discussed her visit with neurology and understands the reason for recommendation to complete neuro psych testing. She has scheduled with brigid dias on September 20th at 9am. She does not have any specific questions about testing at this time.  Pt attests that she does not feel like she is forgetting more and has not had any ongoing issues with dizziness or headaches since she fell and hit her head six months ago.    Pt has received her handicapped placard. She gets most of her transportation provided by her daughter.    Pt is no longer wrapping her leg. The bruise on it has appropriately healed.    Pt states she is comfortable managing her own medications. She uses a pillbox and says that this helps remind her that medications were or were not taken. Pt does not feel she needs a more interactive alarm pillbox.   Pt continues to wear a knee brace all day every day. Pt feels that this provides her with enough support to safely walk. She feels that in combination with a walker that her balance and gait are appropriate and she does not have to worry about strength in her steps.    Pt continues to participate in social activities at [facility]. She plans on enjoying a movie with other residents this afternoon.    Pt gets her exercise with daily  activity. Pt discussed how limited she is in her ability to do even moderately strenuous activity because of past surgeries. Pt continues to walk frequently during the day and tries to get outside to walk when weather cooperates.   Goals/Action Plan:    Active goal from previous outreach: staying healthy    Patient reported progress towards goals: pt continues to see providers as needed and takes medications as prescribed               - What: staying hydrated           - Where/When/How: pt tries to meet a goal of 40oz a day  Patient Reported Barriers and Concerns: n/a                   - Plan for overcoming barriers: none    Care Managers Interventions: continue to provide encouragement and education for healthy coping and dx    Future Appointments:   Future Appointments   Date Time Provider Department Center   9/3/2024  1:40 PM Josef Mcneill APRN [facility]   12/2/2024 12:00 PM Abundio Tuttle MD [facility] [place]   12/17/2024  2:30 PM Froilan Hawthorne MD [facility] Care Manager Follow Up Date: one month    Reason For Follow Up: review progress and or barriers towards patient's goals.     Time Spent This Encounter Total: 22 min medical record review, telephone communication, care plan updates where needed, education, goals, and action plan recreation/update. Provided acknowledgment and validation to patient's concerns.   Monthly Minute Total including today: 22  Physical assessment, complete health history, and need for CCM established by Ghada Carballo MD.

## 2024-09-13 ENCOUNTER — PATIENT OUTREACH (OUTPATIENT)
Dept: CASE MANAGEMENT | Age: 89
End: 2024-09-13

## 2024-09-13 DIAGNOSIS — M17.0 PRIMARY OSTEOARTHRITIS OF BOTH KNEES: ICD-10-CM

## 2024-09-13 DIAGNOSIS — M17.11 PRIMARY OSTEOARTHRITIS OF RIGHT KNEE: ICD-10-CM

## 2024-09-13 DIAGNOSIS — M16.12 OSTEOARTHRITIS OF ONE HIP, LEFT: ICD-10-CM

## 2024-09-13 DIAGNOSIS — I25.10 CORONARY ARTERY DISEASE INVOLVING NATIVE CORONARY ARTERY OF NATIVE HEART WITHOUT ANGINA PECTORIS: Primary | ICD-10-CM

## 2024-09-13 DIAGNOSIS — E78.00 PURE HYPERCHOLESTEROLEMIA: ICD-10-CM

## 2024-09-13 DIAGNOSIS — K86.89 PANCREATIC INSUFFICIENCY (HCC): ICD-10-CM

## 2024-09-13 DIAGNOSIS — G56.01 CARPAL TUNNEL SYNDROME OF RIGHT WRIST: ICD-10-CM

## 2024-09-13 DIAGNOSIS — J42 CHRONIC BRONCHITIS, UNSPECIFIED CHRONIC BRONCHITIS TYPE (HCC): ICD-10-CM

## 2024-09-13 DIAGNOSIS — E78.5 DYSLIPIDEMIA: ICD-10-CM

## 2024-09-13 DIAGNOSIS — M17.12 PRIMARY OSTEOARTHRITIS OF LEFT KNEE: ICD-10-CM

## 2024-09-13 NOTE — PROGRESS NOTES
Spoke to Karla for Chronic Care Management.      Updates to patient care team/comments: UTD  Patient reported changes in medications: utd  Med Adherence  Comment: pt taking medications as prescribed     Health Maintenance:   Health Maintenance   Topic Date Due    Pneumococcal Vaccine: 65+ Years (1 of 2 - PCV) Never done    Zoster Vaccines (1 of 2) Never done    COVID-19 Vaccine (2 - 2023-24 season) 09/01/2024    Influenza Vaccine (1) 10/01/2024    Annual Physical  01/03/2025    Annual Depression Screening  Completed    Fall Risk Screening (Annual)  Completed       Patient updates/concerns:    Pt has left a message for Gastro office regarding gregory pep samples. Pt feels this medication has given some improvement to her symptoms and she would like to continue but the medication is cost prohibitive. Pt states that she completed forms for pt assistance for gregory pep but has not been able to get consistent amounts or  on a routine schedule, so pt is never confident that she will not eventually run out. Pt welcomed ccm assistance [facility] to [facility] standing or re start the application process.    Pt was seen by GI and pt was accompanied by daughter. Pt was advised to continue with zenpep and was given a limited supply of samples. Pt was asked to return in four months.  Pt was advised to continue with imodium. She states that she tries to not take it every day, but takes it frequently.    Pt has confirmed appt at [facility] on sept 20th and has organized transportation.    Pt appetite is strong. She does not have a discernable trigger for her chronic diarrhea so she does not avoid any foods but sometimes she can eat more than others depending on her condition.   Pt is falling asleep staying asleep and waking rested.    Pt visits with family one time a week. They help her do shopping and run errands.    Pt is not planning on getting either flu or Covid vaccine.    Reviewed meds pt has every  thing refilled.   Pt uses walker for support and wears a knee brace. Pt feels her balance is appropriate.     Pt goal staying healthy       Patient reported progress towards goals: pt continues to take medications as prescribed and follow up with pcp and specialists as needed               - What: hydration           - Where/When/How: pt is meeting her goal of 40 oz daily  Patient Reported Barriers and Concerns: n/a                   - Plan for overcoming barriers: none    Care Managers Interventions: continue to provide encouragement and education for healthy coping and dx    Future Appointments:   Future Appointments   Date Time Provider Department Center   12/2/2024 12:00 PM Abundio Tuttle MD [facility] [place]   12/17/2024  2:30 PM Froilan Hawthorne MD [facility] [place]   12/23/2024  1:00 PM Josef Mcneill, M[facility]         Next Care Manager Follow Up Date: one month    Reason For Follow Up: review progress and or barriers towards patient's goals.     Time Spent This Encounter Total: 22 min medical record review, telephone communication, care plan updates where needed, education, goals, and action plan recreation/update. Provided acknowledgment and validation to patient's concerns.   Monthly Minute Total including today: 22  Physical assessment, complete health history, and need for CCM established by Ghada Carballo MD.

## 2024-09-19 ENCOUNTER — OFFICE VISIT (OUTPATIENT)
Dept: INTERNAL MEDICINE CLINIC | Facility: CLINIC | Age: 89
End: 2024-09-19
Payer: MEDICARE

## 2024-09-19 ENCOUNTER — TELEPHONE (OUTPATIENT)
Dept: INTERNAL MEDICINE CLINIC | Facility: CLINIC | Age: 89
End: 2024-09-19

## 2024-09-19 VITALS
OXYGEN SATURATION: 97 % | RESPIRATION RATE: 16 BRPM | HEIGHT: 60 IN | WEIGHT: 159 LBS | TEMPERATURE: 97 F | BODY MASS INDEX: 31.22 KG/M2 | SYSTOLIC BLOOD PRESSURE: 138 MMHG | HEART RATE: 93 BPM | DIASTOLIC BLOOD PRESSURE: 62 MMHG

## 2024-09-19 DIAGNOSIS — Z86.19 HISTORY OF CLOSTRIDIOIDES DIFFICILE COLITIS: ICD-10-CM

## 2024-09-19 DIAGNOSIS — R82.90 ABNORMAL FINDING ON URINALYSIS: ICD-10-CM

## 2024-09-19 DIAGNOSIS — R35.0 FREQUENT URINATION: Primary | ICD-10-CM

## 2024-09-19 LAB
BILIRUBIN: NEGATIVE
GLUCOSE (URINE DIPSTICK): NEGATIVE MG/DL
KETONES (URINE DIPSTICK): NEGATIVE MG/DL
MULTISTIX LOT#: ABNORMAL NUMERIC
NITRITE, URINE: POSITIVE
PH, URINE: 5.5 (ref 4.5–8)
PROTEIN (URINE DIPSTICK): NEGATIVE MG/DL
SPECIFIC GRAVITY: 1.01 (ref 1–1.03)
URINE-COLOR: YELLOW
UROBILINOGEN,SEMI-QN: 0.2 MG/DL (ref 0–1.9)

## 2024-09-19 PROCEDURE — 87077 CULTURE AEROBIC IDENTIFY: CPT | Performed by: PHYSICIAN ASSISTANT

## 2024-09-19 PROCEDURE — 87186 SC STD MICRODIL/AGAR DIL: CPT | Performed by: PHYSICIAN ASSISTANT

## 2024-09-19 PROCEDURE — 87086 URINE CULTURE/COLONY COUNT: CPT | Performed by: PHYSICIAN ASSISTANT

## 2024-09-19 PROCEDURE — 81003 URINALYSIS AUTO W/O SCOPE: CPT | Performed by: PHYSICIAN ASSISTANT

## 2024-09-19 PROCEDURE — 99214 OFFICE O/P EST MOD 30 MIN: CPT | Performed by: PHYSICIAN ASSISTANT

## 2024-09-19 RX ORDER — SULFAMETHOXAZOLE/TRIMETHOPRIM 800-160 MG
1 TABLET ORAL 2 TIMES DAILY
Qty: 6 TABLET | Refills: 0 | Status: SHIPPED | OUTPATIENT
Start: 2024-09-19 | End: 2024-09-22

## 2024-09-19 RX ORDER — VANCOMYCIN HYDROCHLORIDE 125 MG/1
125 CAPSULE ORAL 4 TIMES DAILY
Qty: 12 CAPSULE | Refills: 0 | Status: SHIPPED | OUTPATIENT
Start: 2024-09-19 | End: 2024-09-22

## 2024-09-19 NOTE — TELEPHONE ENCOUNTER
Noted.  Will send Keflex instead if needed given the reported sulfa allergy.  Please have her just wait to take any antibiotics until urine culture results are back.  I will let her know the results.

## 2024-09-19 NOTE — PROGRESS NOTES
Chief Complaint   Patient presents with    UTI     Frequent urination yesterday denies pain or discomfort        HPI:  Pt presents with c/o urinary frequency yesterday.  She denies dysuria and blood in urine.  Pt denies f/c.  She denies abd pain.  Pt reports thus far today her urinary frequency has resolved.  Pt states her last UTI was a while ago.      Review of Systems   See HPI.  No other complaints today.    Past Medical History:    Anemia    Anesthesia complication    pt stated had a reacion with a medication that sounded like \"memo\"    Anxiety state, unspecified    Arthritis    Asthma (HCC)    Atherosclerosis of coronary artery    Back pain    Back problem    cervical pain    Black stools    Blood transfusion reaction    CAD (coronary artery disease)    Calculus of kidney    Cancer (HCC)    skin cancer in left arm    Change in hair    Chest pain on exertion    Coronary atherosclerosis of unspecified type of vessel, native or graft    Diarrhea, unspecified    Dyslipidemia    Easy bruising    Extrinsic asthma, unspecified    Fatigue    Glucose intolerance (pre-diabetes)    Hearing impairment    HEARING LOSS BUT NO AIDS    Hearing loss    Heart palpitations    HIGH BLOOD PRESSURE    High cholesterol    Hip pain    History of blood transfusion    AFTER HYST    History of depression    Hoarseness, chronic    HTN (hypertension)    Indigestion    Leg swelling    Loss of appetite    Nausea    Osteoarthrosis, unspecified whether generalized or localized, unspecified site    knees, back    Osteoporosis    Other and unspecified hyperlipidemia    Pain in joints    Pinched nerve in neck    Pneumonia, organism unspecified(486)    PONV (postoperative nausea and vomiting)    Shortness of breath    Sleep apnea    Stented coronary artery    Stress    Uncomfortable fullness after meals    Unspecified essential hypertension    Visual impairment    glasses    Wears glasses    Weight loss       Patient Active Problem List    Diagnosis    Cervical radiculopathy    Primary osteoarthritis of right knee    Lumbar disc herniation    Irritable bowel syndrome with diarrhea    CAD (coronary artery disease), native coronary artery    Senile osteoporosis    Hypertension, benign    Dyslipidemia    Osteoarthritis of left knee    S/P lumbar laminectomy    Spondylosis of lumbar region without myelopathy or radiculopathy    Osteoarthritis of one hip, left    History of heart artery stent    Pancreatic insufficiency (HCC)    Pure hypercholesterolemia    Carpal tunnel syndrome of right wrist    Chronic bronchitis, unspecified chronic bronchitis type (HCC)    Anxiety    Toe infection    Primary osteoarthritis of both knees       Current Outpatient Medications   Medication Sig Dispense Refill    sulfamethoxazole-trimethoprim -160 MG Oral Tab per tablet Take 1 tablet by mouth 2 (two) times daily for 3 days. 6 tablet 0    vancomycin 125 MG Oral Cap Take 1 capsule (125 mg total) by mouth 4 (four) times daily for 3 days. 12 capsule 0    acetaminophen 500 MG Oral Tab Take 1 tablet (500 mg total) by mouth every 6 (six) hours as needed for Pain.      Pancrelipase, Lip-Prot-Amyl, (ZENPEP) 81214-082529 units Oral Cap DR Particles Take 1 capsule by mouth 3 (three) times daily with meals. Take 1 capsule by mouth 3 (three) times daily with meals. May increase to 2 capsules three times a day with meals if needed. 600 capsule 0    QVAR REDIHALER 80 MCG/ACT Inhalation Aerosol, Breath Activated INHALE 2 PUFFS INTO THE LUNGS IN THE MORNING AND INHALE 2 PUFFS BEFORE BEDTIME 10.6 g 1    loperamide 2 MG Oral Cap Take 1 capsule (2 mg total) by mouth daily. 90 capsule 0    Fluticasone Propionate HFA (FLOVENT HFA) 220 MCG/ACT Inhalation Aerosol Inhale 2 puffs into the lungs 2 (two) times daily. 3 each 3    Pancrelipase, Lip-Prot-Amyl, (ZENPEP) 88517-423909 units Oral Cap DR Particles Take 1 capsule by mouth 3 (three) times daily with meals. May increase to 2 capsules  three times a day with meals if needed. 600 capsule 3    PREDNISONE 2.5 MG Oral Tab TAKE 1 TABLET(2.5 MG) BY MOUTH DAILY 90 tablet 3    Verapamil HCl  MG Oral Capsule SR 24 Hr Take 240 mg by mouth daily.      Ascorbic Acid (VITAMIN C OR) Take 1 tablet by mouth every evening.      ALPRAZolam 1 MG Oral Tab Take 0.5 tablets (0.5 mg total) by mouth nightly as needed.      NON FORMULARY Vitamin B complex with Vit B-12 one daily      ALPRAZolam 0.25 MG Oral Tab Take 1 tablet (0.25 mg total) by mouth every 6 (six) hours as needed.      IMODIUM MULTI-SYMPTOM RELIEF OR Take 1 tablet by mouth as needed.      Probiotic Product (PROBIOTIC-10 OR) Take 1 tablet by mouth daily.        Vitamin D3, Cholecalciferol, 50 MCG (2000 UT) Oral Cap Take 1 capsule (2,000 Units total) by mouth daily.      loratadine (CLARITIN) 10 MG Oral Tab Take 1 tablet (10 mg total) by mouth daily.      vitamin E 400 UNITS Oral Cap Take 1 capsule (400 Units total) by mouth daily.         Physical Exam  /62   Pulse 93   Temp 97 °F (36.1 °C)   Resp 16   Ht 5' (1.524 m)   Wt 159 lb (72.1 kg)   LMP  (LMP Unknown)   SpO2 97%   BMI 31.05 kg/m²   Constitutional:  No distress.   HEENT:  Normocephalic and atraumatic.  Eyes: Conjunctivae are normal.  Cardiovascular: Normal rate, regular rhythm.  No murmur, rubs or gallops.   Pulmonary/Chest: Effort normal and breath sounds normal. No respiratory distress. No wheezes, rhonchi or rales  Abdominal: Soft. Bowel sounds are normal. Non tender, no masses, no organomegaly or hernias.  No CVAT.  Skin: Skin is warm and dry. No rash noted. No erythema. No pallor.     UA:  + blood and nit, mod leuks    A/P:    Encounter Diagnoses   Name Primary?    Frequent urination - UA positive for UTI but sxs resolved today.  Given h/o C Diff will have pt wait to start Bactrim till we see urine culture results.  Alternatively we discussed starting Bactrim if her sxs recur in the next 2 days before her culture results are  available. Yes    Abnormal finding on urinalysis - Likely UTI, send for urine culture.     History of Clostridioides difficile colitis - Discussed with Dr. Carballo.  Will prescribe Vanco while pt on Bactrim.  Pt has listed rash to Vanco but pt and Dr. Carballo believe pt was able to finish course in the past.  Will try again if culture positive for UTI.        Code selection for this visit was based on time spent (35 min) on date of service in preparing to see the patient, obtaining and/or reviewing separately obtained history, performing a medically appropriate examination, counseling and educating the patient/family/caregiver, ordering medications or testing, referring and communicating with other healthcare providers, documenting clinical information in the EHR, independently interpreting results and communicating results to the patient/family/caregiver and care coordination with the patient's other providers.      Orders Placed This Encounter   Procedures    URINALYSIS, AUTO, W/O SCOPE    Urine Culture, Routine [E]       Meds & Refills for this Visit:  Requested Prescriptions     Signed Prescriptions Disp Refills    sulfamethoxazole-trimethoprim -160 MG Oral Tab per tablet 6 tablet 0     Sig: Take 1 tablet by mouth 2 (two) times daily for 3 days.    vancomycin 125 MG Oral Cap 12 capsule 0     Sig: Take 1 capsule (125 mg total) by mouth 4 (four) times daily for 3 days.       Imaging & Consults:  None    Return if symptoms worsen or fail to improve.  There are no Patient Instructions on file for this visit.    All questions were answered and the patient understands the plan.

## 2024-09-19 NOTE — TELEPHONE ENCOUNTER
Pt seen today by CB.     Pt called and stated the pharmacy called her about rx that was sent today. Pharmacy stated they have listed that pt has a sulfa allergy, however not on our list. Pt stated it was so long ago, she doesn't remember when she last took sulfa med or reaction. She is wondering if she should wait to start medication until after the culture is back?     CB - Please advise.

## 2024-09-20 NOTE — TELEPHONE ENCOUNTER
Called and spoke w/ pt. Notified pt of CB message below. Pt stated she did fill the medication as it was only $7 however she did NOT start taking medication yet. I advised pt to not take and we will call once we have final culture results. Pt verbalizes understanding and is agreeable to plan.

## 2024-09-25 RX ORDER — PREDNISONE 2.5 MG/1
2.5 TABLET ORAL DAILY
Qty: 90 TABLET | Refills: 3 | Status: SHIPPED | OUTPATIENT
Start: 2024-09-25

## 2024-09-25 NOTE — TELEPHONE ENCOUNTER
Last office visit: 5/28/2024    Next Rheum Apt:12/2/2024 Abundio Tuttle MD    Last fill: 9/25/2024  90 tabs    Labs:   Lab Results   Component Value Date    CREATSERUM 0.86 07/08/2024    GFR 66 01/17/2018    ALKPHO 94 04/17/2024    AST 22 04/17/2024    ALT 16 04/17/2024    BILT 0.7 04/17/2024    TP 7.8 04/17/2024    ALB 3.8 04/17/2024       Lab Results   Component Value Date    WBC 8.7 04/17/2024    HGB 14.8 04/17/2024    .0 04/17/2024    NEPRELIM 5.92 04/17/2024    NEPERCENT 68.4 04/17/2024    LYPERCENT 22.3 04/17/2024    NE 5.92 04/17/2024    LYMABS 1.93 04/17/2024

## 2024-10-01 ENCOUNTER — NURSE TRIAGE (OUTPATIENT)
Dept: INTERNAL MEDICINE CLINIC | Facility: CLINIC | Age: 89
End: 2024-10-01

## 2024-10-01 NOTE — TELEPHONE ENCOUNTER
Action Requested: Summary for Provider     []  Critical Lab, Recommendations Needed  [] Need Additional Advice  [x]   FYI    []   Need Orders  [] Need Medications Sent to Pharmacy  []  Other     SUMMARY: Patient called complaining of diarrhea. Patient reports had one (1) episode of diarrhea last night. Patient was seen in office on 9/19/24 by REJI Phillips for a UTI. Patient was prescribed Bactrim and Vancomycin. Patient reports taking the bactrim starting 9/19 for 3 days and then taking the vancomycin starting 9/22 for 3 days. Patient reports having a history of C.diff and mentioning \"this smells like it did before\". Patient denies an chest pain, shortness of breath, dizziness, fever, nausea/vomiting. Patient reports poor appetite and low motivation to drink fluids. Patient states understanding that remaining hydrated is important. Patient states that she has two daughters and will call one of them to take her to an Immediate Care today for an evaluation. Patient agrees with this plan.    Reason for call: Acute  Onset: 9/30/24      Reason for Disposition   Recent antibiotic therapy (i.e., within last 2 months) and diarrhea present > 3 days since antibiotic was stopped    Protocols used: Diarrhea-A-OH

## 2024-10-04 ENCOUNTER — TELEPHONE (OUTPATIENT)
Dept: INTERNAL MEDICINE CLINIC | Facility: CLINIC | Age: 89
End: 2024-10-04

## 2024-10-04 NOTE — TELEPHONE ENCOUNTER
Patient states she received a [employer] message that stool culture results are complete. She does not use MyChart and wondered what results show. Patient went to IC 3 days ago, lab results thru [facility] Everywhere.   Routing to provider for review.

## 2024-10-08 NOTE — TELEPHONE ENCOUNTER
Patient returned call.  Informed stool spec for C-Diff was negative.  States had one loose mushy stool yesterday in the am.  None since then. Feels well.  Advised to let us know if symptoms return. Verbalizes understanding.

## 2024-10-14 ENCOUNTER — TELEPHONE (OUTPATIENT)
Dept: NEUROLOGY | Facility: CLINIC | Age: 89
End: 2024-10-14

## 2024-10-14 NOTE — TELEPHONE ENCOUNTER
Pt states she had a new symptom over the weekend she never had prior to her head injury. She was going to bed and began urinating without any notice. She believes it is related to her injury. Pt's temporary phone number is [phone].  If that number does work [phone] or [phone]. Endorsed to RN.

## 2024-10-14 NOTE — TELEPHONE ENCOUNTER
Patient reports one episode of urinary incontinence over the weekend before bed. Denies any neurological symptoms. Advised to call PCP as patient recently had a urinary tract infection and to rule out urinary issue. Patient verbalized understanding.

## 2024-10-19 NOTE — TELEPHONE ENCOUNTER
Appt with TB cancelled for today.
Patient states she had a huge BM yesterday after having constipation, today she had an urge for a BM but when she went there was no BM and was all bright red blood, no abdominal pain or nausea. Pt had appt scheduled with TB in 20 minutes.   Per TB, pt sent
pt has appt today at 9:40 w/TB she had constipation and finally went yesterday but was painful and hard-today she went to the bathroom and was bloody-should she still keep her appt today for wellness visit or go to ER?  Sent call to triage
Patient will perform supine<->sit with supervision by 2 weeks to allow patient to get in/out of bed safely.

## 2024-10-21 ENCOUNTER — PATIENT OUTREACH (OUTPATIENT)
Dept: CASE MANAGEMENT | Age: 89
End: 2024-10-21

## 2024-10-30 ENCOUNTER — TELEPHONE (OUTPATIENT)
Dept: INTERNAL MEDICINE CLINIC | Facility: CLINIC | Age: 89
End: 2024-10-30

## 2024-10-30 NOTE — TELEPHONE ENCOUNTER
Rosalia from [facility] called to request list of allergies and current medications be faxed to them as pt is unable to remember all her allergies. Pt has appointment on Monday.     Med list and allergy list faxed to [phone]

## 2024-10-31 ENCOUNTER — HOSPITAL ENCOUNTER (OUTPATIENT)
Dept: LAB | Facility: HOSPITAL | Age: 89
Discharge: HOME | End: 2024-10-31
Payer: MEDICARE

## 2024-10-31 DIAGNOSIS — R19.5 DARK STOOLS: ICD-10-CM

## 2024-10-31 LAB
BASOPHILS # BLD AUTO: 0.07 X10(3) UL (ref 0–0.2)
BASOPHILS NFR BLD AUTO: 0.8 %
EOSINOPHIL # BLD AUTO: 0.12 X10(3) UL (ref 0–0.7)
EOSINOPHIL NFR BLD AUTO: 1.4 %
ERYTHROCYTE [DISTWIDTH] IN BLOOD BY AUTOMATED COUNT: 14.6 %
HCT VFR BLD AUTO: 40.7 %
HGB BLD-MCNC: 13.3 G/DL
IMM GRANULOCYTES # BLD AUTO: 0.03 X10(3) UL (ref 0–1)
IMM GRANULOCYTES NFR BLD: 0.3 %
LYMPHOCYTES # BLD AUTO: 2.21 X10(3) UL (ref 1–4)
LYMPHOCYTES NFR BLD AUTO: 25.3 %
MCH RBC QN AUTO: 29.1 PG (ref 26–34)
MCHC RBC AUTO-ENTMCNC: 32.7 G/DL (ref 31–37)
MCV RBC AUTO: 89.1 FL
MONOCYTES # BLD AUTO: 0.78 X10(3) UL (ref 0.1–1)
MONOCYTES NFR BLD AUTO: 8.9 %
NEUTROPHILS # BLD AUTO: 5.52 X10 (3) UL (ref 1.5–7.7)
NEUTROPHILS # BLD AUTO: 5.52 X10(3) UL (ref 1.5–7.7)
NEUTROPHILS NFR BLD AUTO: 63.3 %
PLATELET # BLD AUTO: 238 10(3)UL (ref 150–450)
RBC # BLD AUTO: 4.57 X10(6)UL
WBC # BLD AUTO: 8.7 X10(3) UL (ref 4–11)

## 2024-10-31 PROCEDURE — 36415 COLL VENOUS BLD VENIPUNCTURE: CPT

## 2024-10-31 PROCEDURE — 85025 COMPLETE CBC W/AUTO DIFF WBC: CPT

## 2024-11-04 ENCOUNTER — TELEPHONE (OUTPATIENT)
Dept: NEUROLOGY | Facility: CLINIC | Age: 89
End: 2024-11-04

## 2024-11-04 NOTE — TELEPHONE ENCOUNTER
Rosalia from [facility] ph# [phone] stated she never received the list of allergies and current medications. Please refax to [phone]

## 2024-11-07 ENCOUNTER — TELEPHONE (OUTPATIENT)
Dept: INTERNAL MEDICINE CLINIC | Facility: CLINIC | Age: 89
End: 2024-11-07

## 2024-11-07 ENCOUNTER — TELEPHONE (OUTPATIENT)
Facility: CLINIC | Age: 89
End: 2024-11-07

## 2024-11-07 NOTE — TELEPHONE ENCOUNTER
Triage call transferred.   Spoke with pt stating saw dentist for gum issues. Pt has hx of allergic reaction to novocaine- will get asthma attack.  Dental office was going to give pt sample of mepivacaine to try, but concerned of possible allergy and recommended pt see Allergist for further eval.  No referral required.   Informed will relay to CB for further recommendations.  No further questions or concerns. Pt verbalized understanding and agreed with POC.    Please advise. Thank you.

## 2024-11-07 NOTE — TELEPHONE ENCOUNTER
Called pt to inform as per CB.  Tonja Anderson MD [place]  [address] [place] [phone]  P: [phone]  No further questions or concerns. Pt verbalized understanding and agreed with POC.

## 2024-11-07 NOTE — TELEPHONE ENCOUNTER
Discussed with TB who usually sees her and probably should have gotten this message, I saw pt for acute visit.    Has seen Monica in the past related to her many allergies and would suggest she see her again related to these questions.

## 2024-11-07 NOTE — TELEPHONE ENCOUNTER
Patient called to further discuss. Patient states she is having a procedure done at her dentist office. Dentist wants to ensure she's not allergic to the anesthesia she will receive given her extensive allergy list. Patient was informed she is to  a sample of the medication to be administered on Monday and she is to take it to her allergist to be tested for its allergy. Patient made aware we don't do allergy testing at this particular location and that she would have to establish care with an allergist as Dr. Jackson is not an allergist. Patient states she was seeing Dr. Berry a few years back but she stopped seeing physician because she was told she no longer needed follow up. Dr. Silver [facility] office called ([phone]) to further assess situation and get better understanding as to what is needed from a pulmonary standpoint. Spoke with Sonia. Per Sonia, patient had planned procedure this coming Monday, procedure has since been canceled due to potential allergy to anesthetic to be used. Patient listed on their form she was allergic to a 'memo' anesthetic, conflicted to what is on her medication list. To ensure patient safety, procedure has been canceled and patient will stop by Dr. Mccarthy's office this coming Monday, 11/11 to  a sample of the medication that will be used for a potential procedure. Patient is to get tested for allergy. Rosa made aware our office doesn't do allergy testing. [facility] may have it done at their location but that I will have to contact Dr. Berry for further recommendation. Medication to be used is Marcaine. Discussed with Dr. Berry, advised patient to be given a sample of PAPA containing and non paba containing anesthetics. Sonia called, made request to provide patient with a sample of both classes. Per Sonia, patient will be provided a sample of Marcaine and Mepivacaine without epinephrine. Reached out to Rosa from [facility]. Marcaine skin  testing can be done at [facility]. Patient needs to be established with Dr. Payton before being tested. Booking out until 1/2025.       Patient called and made aware of the above. Was provided information on an allergy doctor by pcp. Will call them tomorrow and see if they can get her seen sooner than January.

## 2024-11-19 ENCOUNTER — PATIENT OUTREACH (OUTPATIENT)
Dept: CASE MANAGEMENT | Age: 89
End: 2024-11-19

## 2024-11-19 DIAGNOSIS — M17.11 PRIMARY OSTEOARTHRITIS OF RIGHT KNEE: ICD-10-CM

## 2024-11-19 DIAGNOSIS — E78.00 PURE HYPERCHOLESTEROLEMIA: ICD-10-CM

## 2024-11-19 DIAGNOSIS — I10 HYPERTENSION, BENIGN: ICD-10-CM

## 2024-11-19 DIAGNOSIS — M17.0 PRIMARY OSTEOARTHRITIS OF BOTH KNEES: ICD-10-CM

## 2024-11-19 DIAGNOSIS — E78.5 DYSLIPIDEMIA: ICD-10-CM

## 2024-11-19 DIAGNOSIS — K86.89 PANCREATIC INSUFFICIENCY (HCC): ICD-10-CM

## 2024-11-19 DIAGNOSIS — I25.10 CORONARY ARTERY DISEASE INVOLVING NATIVE CORONARY ARTERY OF NATIVE HEART WITHOUT ANGINA PECTORIS: Primary | ICD-10-CM

## 2024-11-19 DIAGNOSIS — M16.12 OSTEOARTHRITIS OF ONE HIP, LEFT: ICD-10-CM

## 2024-11-19 DIAGNOSIS — M17.12 PRIMARY OSTEOARTHRITIS OF LEFT KNEE: ICD-10-CM

## 2024-11-19 NOTE — PROGRESS NOTES
Spoke to Karla for Chronic Care Management.      Updates to patient care team/comments: UTD  Patient reported changes in medications: UTD  Med Adherence  Comment: pt taking medications as prescribed     Health Maintenance:   Health Maintenance   Topic Date Due    Pneumococcal Vaccine: 65+ Years (1 of 2 - PCV) Never done    Zoster Vaccines (1 of 2) Never done    COVID-19 Vaccine (2 - 2024-25 season) 09/01/2024    Influenza Vaccine (1) Never done    Annual Physical  01/03/2025    Annual Depression Screening  Completed    Fall Risk Screening (Annual)  Completed       Patient updates/concerns:    Spoke to pt for monthly outreach   Pt has seen allergist to establish care. She states that no testing was done today. She will be organizing her transportation first and will then f/u with office to schedule.   Pt states that the dental work that required the allergy testing will be postponed until testing is done. Pt states they require two tests for numbing agents used during procedure. Pt states that she can tolerate the delay as she is not in any current or constant discomfort or pain.   Pt has been able to rely on a neighbor for transportation, but it needs to be organized in advance. She can also request rides from daughter on weekends. She is also signed up for ride assist [facility].  Reviewed with pt instructions from GI she states that she has not had any issues with dark stools since the appointment. She continues to take immodium every day. She is currently out of gregory pep and has not coordinated any samples from office. She has completed her paperwork for patient assistance but has not yet received a response. She states that she has been accepted into that program in the past.   Pt still talks to daughter at least once weekly. She states that daughter will do her grocery shopping for her. Pt will also get some social activity with functions at the facility. She will watch movies or play cards.        Goals/Action  Plan:    Active goal from previous outreach: staying healthy     Patient reported progress towards goals: pt continues to see providers as needed and takes meds as prescribed. Pt has submitted pt assistance forms for zenpep               - What: hydration           - Where/When/How: pt has a goal of at least 40 oz water daily   Patient Reported Barriers and Concerns: n/a                   - Plan for overcoming barriers: none    Care Managers Interventions: continue to provide encouragement and education for healthy coping and dx    Future Appointments:   Future Appointments   Date Time Provider Department Center   11/21/2024  1:00 PM Melissa Jackson MD [facility] [place]   12/2/2024 12:00 PM Abundio Tuttle MD [facility] [place]   12/17/2024  2:30 PM Froilan Hawthorne MD [facility] [place]         Next Care Manager Follow Up Date: one month    Reason For Follow Up: review progress and or barriers towards patient's goals.     Time Spent This Encounter Total: 20 min medical record review, telephone communication, care plan updates where needed, education, goals, and action plan recreation/update. Provided acknowledgment and validation to patient's concerns.   Monthly Minute Total including today: 20  Physical assessment, complete health history, and need for CCM established by Ghada Carballo MD.    Friendly reminder - 2025 Benefits Open Enrollment is here!   We encourage you to review your current Medicare coverage and explore your options during this period. We have developed a Medicare Information Page on our website to serve as a resource for guidance and answers to any questions you might have.   You can access it by visiting, [email or web address]

## 2024-11-21 ENCOUNTER — OFFICE VISIT (OUTPATIENT)
Facility: CLINIC | Age: 89
End: 2024-11-21
Payer: MEDICARE

## 2024-11-21 VITALS
SYSTOLIC BLOOD PRESSURE: 110 MMHG | HEART RATE: 92 BPM | BODY MASS INDEX: 30.63 KG/M2 | DIASTOLIC BLOOD PRESSURE: 62 MMHG | OXYGEN SATURATION: 97 % | RESPIRATION RATE: 16 BRPM | WEIGHT: 156 LBS | HEIGHT: 60 IN

## 2024-11-21 DIAGNOSIS — J45.30 MILD PERSISTENT ASTHMA WITHOUT COMPLICATION (HCC): Primary | ICD-10-CM

## 2024-11-21 DIAGNOSIS — R76.8 ANA POSITIVE: ICD-10-CM

## 2024-11-21 PROCEDURE — 99214 OFFICE O/P EST MOD 30 MIN: CPT | Performed by: INTERNAL MEDICINE

## 2024-11-21 NOTE — PATIENT INSTRUCTIONS
-Plan:    - continue  Qvar -- 2 puffs twice a day - rinse and spit -- OK to take one puff only at night   - call with any issues or changes   - see me in 6 months     Melissa Jackson MD  Pulmonary Medicine  11/21/24

## 2024-11-21 NOTE — PROGRESS NOTES
Pulmonary Progress Note    History of Present Illness:  Karla Vallecillo is a 89 year old female presenting to pulmonary clinic ongoing diarrhea- once black -- all the time  Asthma ongoing   Imodium- helps - due for upper scope foor ? Blockage --pandolfi = last colonscopy 1.5 yrs ago - for Dec   Seeing PA now   Went to er last week-- for right leg swelling- - had neg dopplers-- need replacement of right knee- - recommended to take water pills in the past   Saw jessica - no water pill -   sw dr ramos- - few months -   Breathing - thinks more short of breath -- unclear if asthma or heart-   Using flovent -- 2 puffs bid-- was down to one BID   No rescue use though carries it  No limitation to activities at home - not cleaning  prednsione 2.5mg every day-- per bety - to see him -     9,23- saw pandolfi - no reason- though diet related - resume pancreas diet   No er visits   Skin cancer in the past - now with rt arm issues and pimples - rt arm pre cancer   Breathing is not too bad-   Flovent 1-2 puffs in am and some at night depending on how she is feeling  And PRN - rescue use - none at all   Reaction to bad container of flovent -     4.24-- 30min phone visit     Had covid one month prior - no hosp   Had fall - 3/18th saw dr lopez - no LOC - back pain - went to er with neg head CT and back   Thinks had concussion -- sleeping more at first now unable to sleep   Some memory issues - thinks about the same   Back is now all better - remains on brace-   Breathing is pretty good - now on qvar 80 - 2 puffs twice a day - - no rescue -- not really coughing   To see neurologist   Remains on prednsione 2.5 mg? Daily from dr albert - no changes   11/24- remains with diarrhea though now seems better- -    Neg cdiff take immodium - daily - - worse after bactrim for UTI in October - simba - normal stool fat - cardiac with dr ramos in April no issues   Back pain is OK_ remains with brace on back- not that bad-   Legs are weaker --  now using walker all the time- -   Daughters discussed ? NH placement - memory thought related to fall and hitting head- - -   Not waking up at night - occasionally  - bed to 9pm- and up early = nocturia x 2        Social History:   Social History     Socioeconomic History    Marital status:    Tobacco Use    Smoking status: Former     Current packs/day: 0.00     Average packs/day: 0.3 packs/day for 3.0 years (0.9 ttl pk-yrs)     Types: Cigarettes     Start date: 1969     Quit date: 1972     Years since quittin.8    Smokeless tobacco: Never   Vaping Use    Vaping status: Never Used   Substance and Sexual Activity    Alcohol use: Not Currently    Drug use: No    Sexual activity: Never   Other Topics Concern    Caffeine Concern Yes     Comment: tea    Exercise No        Medications:   Current Outpatient Medications   Medication Sig Dispense Refill    predniSONE 2.5 MG Oral Tab Take 1 tablet (2.5 mg total) by mouth daily. 90 tablet 3    QVAR REDIHALER 80 MCG/ACT Inhalation Aerosol, Breath Activated INHALE 2 PUFFS INTO THE LUNGS IN THE MORNING AND INHALE 2 PUFFS BEFORE BEDTIME 10.6 g 1    Fluticasone Propionate HFA (FLOVENT HFA) 220 MCG/ACT Inhalation Aerosol Inhale 2 puffs into the lungs 2 (two) times daily. 3 each 3    Verapamil HCl  MG Oral Capsule SR 24 Hr Take 240 mg by mouth daily.      Ascorbic Acid (VITAMIN C OR) Take 1 tablet by mouth every evening.      ALPRAZolam 1 MG Oral Tab Take 0.5 tablets (0.5 mg total) by mouth nightly as needed.      NON FORMULARY Vitamin B complex with Vit B-12 one daily      ALPRAZolam 0.25 MG Oral Tab Take 1 tablet (0.25 mg total) by mouth every 6 (six) hours as needed.      IMODIUM MULTI-SYMPTOM RELIEF OR Take 1 tablet by mouth as needed.      acetaminophen 500 MG Oral Tab Take 1 tablet (500 mg total) by mouth every 6 (six) hours as needed for Pain.      Vitamin D3, Cholecalciferol, 50 MCG (2000 UT) Oral Cap Take 1 capsule (2,000 Units total) by mouth  daily.      loratadine (CLARITIN) 10 MG Oral Tab Take 1 tablet (10 mg total) by mouth daily.      vitamin E 400 UNITS Oral Cap Take 1 capsule (400 Units total) by mouth daily.      Pancrelipase, Lip-Prot-Amyl, (ZENPEP) 36120-697950 units Oral Cap DR Particles Take 1 capsule by mouth 3 (three) times daily with meals. May increase to 2 capsules three times a day with meals if needed. (Patient not taking: Reported on 2024) 600 capsule 3    Pancrelipase, Lip-Prot-Amyl, 51626-603411 units Oral Cap DR Particles Take 1 capsule by mouth 3 (three) times daily with meals. 120 capsule 0    LOPERAMIDE 2 MG Oral Cap TAKE 1 CAPSULE BY MOUTH DAILY 90 capsule 0    Probiotic Product (PROBIOTIC-10 OR) Take 1 tablet by mouth daily.           Review of Systems:    Weight is stable - 167-- now 163 - reports stable -- reports 157 at imary office - 7# down from one year   Right leg with swelling- using knee brace - but thinks not related -   Recent rt arm lesions removed   Rmains with diarrhea -- marlene-- told to take imodium- - not daily - seems better   Sleeping well - awakens at times- - thinks OK - xanax - 1/2 pill - and some xanax during the day 2-3 times   No gerd - noted -- at all -   Rare episode - of choking with white chicken- daughter  of choking - follows with marlene -denies recent issues  Denies any significant swelling      Physical Exam:  /62   Pulse 92   Resp 16   Ht 5' (1.524 m)   Wt 156 lb (70.8 kg)   LMP  (LMP Unknown)   SpO2 97%   BMI 30.47 kg/m²    Constitutional: comfortable . No acute distress. Very connected   Able to tell dosing   Results: reviewed     Assessment/Plan:      #C. difficile colitis  With hospitalization  after diagnosis C. difficile and history reaction questionably to vancomycin  Still with diarrhea at times every few days  Follows with [facility]   resurgence to get C. difficile  10/21 positive and was planned follows with Dr. Pierce had    diarrhea  remains a chief complaint polyps on scope  11/22 diarrhea continues to be a chief complaint--reports plan for upper scope with history of dilatation-- Imodium helps--  9.23- remains with diarrhea - to revisit with marlene better on Imodium  4.24- diarrhea much better - imodium as needed per dr rosario  and has seen Suman   11/24- ongoing dairrhea though unclear-- had neg cdiff test in October-- seems controlled with imodium - maintaining weight       #TRNAG positive  With osteoarthritis  Follows with Dr. Albert-remains on prednisone 2.5 mg daily recent increase 5/20 self increased to 5 mg a day  11/20 remains on 2.5 daily wants to try every other day  2/21 was well on 2.5 every other day now back to 5 mg daily for breathing not taking ICS  4/21 prednisone 2.5 a day  4/22 in December increased to 5 mg a day  11/22- remains on prednisone thinks 2.5 daily - unclear but thinks needed -- to see dr albert   9.23- rare visits - remains on prednisone 2.5 mg daily - - thinks needs more   4.24 stable -- sees rheum every 6 months   11/24- remains prednsione 2.5mg - to see dr albert - arthritis is ok- back and hip is worse - hx back surgery x4       #Asthma  Normal PFTs at [facility] [place] [place] in 2016-normal spirometry Dr. Mujica's office 4/17  Very stable on exam and clinically  Remains on ICS and rescue use  Questionably some chemical sensitivity with   Recurrent flare possibly related to smoke from next-door apartment  Remains on Flovent 2 to 3 puffs for day  Ongoing symptoms with construction  5/20 remains on Flovent twice daily  2/21 states allergic to Flovent refuses to take now on Pulmicort 181 puff a day though states immediately short of breath and not breathing good after taking    2/21 states allergic to Pulmicort no other symptoms at this time refuses to retry  Did retry Flovent had small reaction remains with coughing with clear exam  4/21 states budesonide gave her an asthma attack refuses to retry  10/21 did not  resume Flovent  4/22 has been on Flovent twice daily for several months states stable--  11/22 remains on Flovent 2 puffs twice a day reports stability rare Xopenex use  9.23- doing well with flovent as needed though daily at least   No er /uc - to com   4.24-- changed to qvar- 2 puffs BID- no rescue use-- to cpm   11/24- qvar 2 BID - no albuterol needed - rare dyspnea in the cold- no coughing -     #Obstructive sleep apnea  Multiple times study has been requested recommended remains with frequent awakenings and fatigue  Never went for the study  Again and again recommended--daughters aware  9.23- pill at night - never went to sleep study   4.24-- no sleep study - sleeps well   11/24- thinks sleeping well - daughter stopped using cpap   Plan to try overnight         #Tachycardia  Thought related to diarrhea without active issue  Seems resolved - 80s       #Allergic rhinitis  Remains on allergy pill Claritin/Allegra  4/22 remains very stable  11/22 no change  9.23- thinks taking daily Claritin   4/24- stable on meds   11/24- remains on Claritin as needed - most days      #Coronary artery disease  History of 2 stents 2012 in [place]  Was seeing cardiology at [facility]  Now seeing  with recent negative stress test  Recent palpitations to the emergency room Holter unremarkable  Recent negative stress test  Revisit 12/19 and due in June 2020 11/20 echo with diastolic dysfunction PAS of 48--comparatively echo 2018 with a PAS of questionably 29 mmHg with diastolic dysfunction  Negative stress test 8/20  10/21 had abnormal EKG and was admitted overnight Echo unremarkable was in the emergency room with a foot infection  4/22 seeing  repeat echo pending  11/22--Echo 4/22 EF 60 to 65%--grade 1 diastolic dysfunction mild mitral regurg  RVSP 39 mmHg--improved  9.23 - just saw dr ramos -- for pet/ct heart   Last echo with normal EF with diastolic dysfunction - mild elevation PAPs   4.24- no  recent changes   11/24- all good and no further testing planned for now   Doing well     # cognitive deficits- dementia told- saw neuropsych once   No mention of medications per pt - thinks to see neuro   Remains concerned about need to enter a nursing home or assisted living  Discussed need for safety-thus far seems connected about her medications    Reports allergies to all vaccines    -Plan:    - continue  Qvar -- 2 puffs twice a day - rinse and spit -- OK to take one puff only at night   - call with any issues or changes   - see me in 6 months     Melissa Jackson MD  Pulmonary Medicine  11/21/24

## 2024-12-02 ENCOUNTER — OFFICE VISIT (OUTPATIENT)
Dept: RHEUMATOLOGY | Facility: CLINIC | Age: 89
End: 2024-12-02
Payer: MEDICARE

## 2024-12-02 VITALS
BODY MASS INDEX: 30.63 KG/M2 | OXYGEN SATURATION: 95 % | TEMPERATURE: 98 F | HEART RATE: 92 BPM | RESPIRATION RATE: 16 BRPM | HEIGHT: 60 IN | WEIGHT: 156 LBS | DIASTOLIC BLOOD PRESSURE: 82 MMHG | SYSTOLIC BLOOD PRESSURE: 138 MMHG

## 2024-12-02 DIAGNOSIS — M17.11 PRIMARY OSTEOARTHRITIS OF RIGHT KNEE: ICD-10-CM

## 2024-12-02 DIAGNOSIS — M47.816 SPONDYLOSIS OF LUMBAR REGION WITHOUT MYELOPATHY OR RADICULOPATHY: ICD-10-CM

## 2024-12-02 DIAGNOSIS — M17.0 PRIMARY OSTEOARTHRITIS OF BOTH KNEES: Primary | ICD-10-CM

## 2024-12-02 DIAGNOSIS — Z98.890 S/P LUMBAR LAMINECTOMY: ICD-10-CM

## 2024-12-02 PROBLEM — T39.391A ALLERGIC REACTION TO NONSTEROIDAL ANTI-INFLAMMATORY DRUG (NSAID): Status: ACTIVE | Noted: 2024-12-02

## 2024-12-02 RX ORDER — PREDNISONE 2.5 MG/1
2.5 TABLET ORAL DAILY
Qty: 90 TABLET | Refills: 3 | Status: SHIPPED | OUTPATIENT
Start: 2024-12-02

## 2024-12-02 RX ORDER — METHYLPREDNISOLONE ACETATE 40 MG/ML
40 INJECTION, SUSPENSION INTRA-ARTICULAR; INTRALESIONAL; INTRAMUSCULAR; SOFT TISSUE ONCE
Status: COMPLETED | OUTPATIENT
Start: 2024-12-02 | End: 2024-12-02

## 2024-12-02 RX ADMIN — METHYLPREDNISOLONE ACETATE 40 MG: 40 INJECTION, SUSPENSION INTRA-ARTICULAR; INTRALESIONAL; INTRAMUSCULAR; SOFT TISSUE at 12:43:00

## 2024-12-02 NOTE — PATIENT INSTRUCTIONS
You have advanced osteoarthritis of both knees.  Right knee is very painful.  Today the right knee was injected with cortisone.  Go home and rest.  Use a knee brace on the right knee.  Okay to apply ice to the knee.  Okay to apply Thera-Gesic cream 3 times a day to the right knee or you can use Zostrix cream.  For arthritis pain take prednisone 2.5 mg once a day.  Use extra strength Tylenol 500 mg anywhere from 1 to 3 tablets a day.  No anti-inflammatory medicines, no NSAIDs such as aspirin or ibuprofen because you are allergic to them.  Okay to use a heating pad on your knee if needed.  Return to office for recheck 6 months.

## 2024-12-02 NOTE — PROGRESS NOTES
[facility]  Dr. Tuttle Progress Note     Subjective:   Karla Vallecillo is a(n) 89 year old female.   Current complaints:   Chief Complaint   Patient presents with    Osteoarthritis    Follow - Up     LOV: 5/28/24  Patient is here for a follow up visit. Patient states that her pain isn't too bad with knee brace on. Pain in shoulders from a fall.   Rapid 3:    History of osteoarthritis of both knees and osteoarthritis of lumbar spine.  Status post lumbar laminectomy.  Wearing a knee brace.   Thinking of moving to [place].  Fell ten months ago, fell backwards in a chair and hit her head,  Right hand feels as if it falls asleep.  Using Prednisone 2.5 mg a day for arthritis pain.   Allergic to NSAIDS.   Uses ES Tylenol.   Right knee is painful, has advanced arthritis.  Karla agrees to right knee injection.  Objective:   /82   Pulse 92   Temp 97.8 °F (36.6 °C)   Resp 16   Ht 5' (1.524 m)   Wt 156 lb (70.8 kg)   LMP  (LMP Unknown)   SpO2 95%   BMI 30.47 kg/m²   Right knee tender and hypertrophic.   Left Knee - hypertrophic  Hands non deformed.   6/8/24  xray left tibia and fibula-advanced degenerative changes left knee.  Scattered atherosclerosis seen.   Assessment:     Encounter Diagnoses   Name Primary?    Primary osteoarthritis of both knees Yes    Primary osteoarthritis of right knee     S/P lumbar laminectomy     Spondylosis of lumbar region without myelopathy or radiculopathy      Plan:     Patient Instructions   You have advanced osteoarthritis of both knees.  Right knee is very painful.  Today the right knee was injected with cortisone.  Go home and rest.  Use a knee brace on the right knee.  Okay to apply ice to the knee.  Okay to apply Thera-Gesic cream 3 times a day to the right knee or you can use Zostrix cream.  For arthritis pain take prednisone 2.5 mg once a day.  Use extra strength Tylenol 500 mg anywhere from 1 to 3 tablets a day.  No anti-inflammatory medicines, no NSAIDs such  as aspirin or ibuprofen because you are allergic to them.  Okay to use a heating pad on your knee if needed.  Return to office for recheck 6 months.        Abundio Tuttle MD 12/2/2024 12:04 PM

## 2024-12-02 NOTE — PROCEDURES
14871  Right knee injection  Right Knee Primary Osteoarthritis  First a timeout was done, consent was obtained from the patient, and the right knee was cleaned on the medial aspect with Hibiclens and alcohol wipes.  Once this was done the right knee was injected with 40 mg of methylprednisolone and 1 cc 1% lidocaine.  Patient tolerated the procedure without incident.

## 2024-12-17 ENCOUNTER — TELEPHONE (OUTPATIENT)
Dept: NEUROLOGY | Facility: CLINIC | Age: 89
End: 2024-12-17

## 2024-12-17 NOTE — TELEPHONE ENCOUNTER
During check out, patient mentioned that she forgot to let Dr. Hawthorne know that she still follows up with her psychologist once a month over the phone.

## 2024-12-23 ENCOUNTER — PATIENT OUTREACH (OUTPATIENT)
Dept: CASE MANAGEMENT | Age: 89
End: 2024-12-23

## 2024-12-23 NOTE — PROGRESS NOTES
Called pt for monthly outreach    Unable to leave message. Call keeps disconnecting. Will f/u at a later date

## 2024-12-27 RX ORDER — BECLOMETHASONE DIPROPIONATE HFA 80 UG/1
2 AEROSOL, METERED RESPIRATORY (INHALATION) 2 TIMES DAILY
Qty: 10.6 G | Refills: 3 | Status: SHIPPED | OUTPATIENT
Start: 2024-12-27

## 2024-12-27 NOTE — TELEPHONE ENCOUNTER
Refill passed per [facility] protocol.    Temitope Butler6 minutes ago (3:31 PM)     SC  The patient is out of her medication and is hoping to have it asap          Please review pended refill request as unable to refill due to high/very high interaction warning copied here:      High  Allergy/Contraindication: Qvar RediHalerReactions: OTHER (SEE COMMENTS), SHORTNESS OF BREATH. No reaction type specified. User documented allergy severity: High.  Level 2 with BREO ELLIPTA (Class: CORTICOSTEROIDS).  \"Pneumonia Pneumonia\"  Details  Override reason        Beclomethasone Diprop HFA (QVAR REDIHALER) 80 MCG/ACT Inhalation Aerosol, Breath Activated [Pharmacy Med Name: QVAR REDIHALER 80MCG ORALINH (120)]  Prescription. Reordered.  High  Allergy/Contraindication: Qvar RediHalerReactions: OTHER (SEE COMMENTS). No reaction type specified. User documented allergy severity: High.  Level 2 with FLUTICASONE (Class: CORTICOSTEROIDS).  \"Per pt not true Other reaction(s): Propensity to adverse reactions to drug\"  Details  Override reason        Beclomethasone Diprop HFA (QVAR REDIHALER) 80 MCG/ACT Inhalation Aerosol, Breath Activated [Pharmacy Med Name: QVAR REDIHALER 80MCG ORALINH (120)]  Prescription. Reordered.  High  Allergy/Contraindication: Qvar RediHalerReactions: SHORTNESS OF BREATH, UNKNOWN. No reaction type specified. User documented allergy severity: High.  Level 2 with PULMICORT (Class: CORTICOSTEROIDS).  \"Coughing \"  Details  Override reason        Beclomethasone Diprop HFA (QVAR REDIHALER) 80 MCG/ACT Inhalation Aerosol, Breath Activated [Pharmacy Med Name: QVAR REDIHALER 80MCG ORALINH (120)]  Prescription. Reordered.  High  Allergy/Contraindication: Qvar RediHalerReactions: OTHER (SEE COMMENTS). No reaction type specified. User documented allergy severity: None specified.  Level 2 with ADVAIR HFA (Class: CORTICOSTEROIDS).  Details  Override reason        Beclomethasone Diprop HFA (QVAR REDIHALER) 80 MCG/ACT Inhalation  Aerosol, Breath Activated [Pharmacy Med Name: QVAR REDIHALER 80MCG ORALINH (120)]  Prescription. Reordered.  High  Allergy/Contraindication: Qvar RediHalerReactions: NAUSEA ONLY. No reaction type specified. User documented allergy severity: Low.  Level 2 with TRIAMCINOLONE (Class: CORTICOSTEROIDS).  Details  Override reason        Beclomethasone Diprop HFA (QVAR REDIHALER) 80 MCG/ACT Inhalation Aerosol, Breath Activated [Pharmacy Med Name: QVAR REDIHALER 80MCG ORALINH (120)]  Prescription. Reordered.  High  Allergy/Contraindication: Qvar RediHalerReactions: SHORTNESS OF BREATH. No reaction type specified. User documented allergy severity: High.  Level 2 with BUDESONIDE (Class: CORTICOSTEROIDS).  \"Coughing\"  Details  Override reason        Beclomethasone Diprop HFA (QVAR REDIHALER) 80 MCG/ACT Inhalation Aerosol, Breath Activated [Pharmacy Med Name: QVAR REDIHALER 80MCG ORALINH (120)]  Prescription. Reordered.  High  Allergy/Contraindication: Qvar RediHalerReactions: Coughing, SHORTNESS OF BREATH, OTHER (SEE COMMENTS). No reaction type specified. User documented allergy severity: High.  Level 2 with FLUTICASONE-SALMETEROL (Class: CORTICOSTEROIDS).  \"Inhalation\"  Details  Override reason        Beclomethasone Diprop HFA (QVAR REDIHALER) 80 MCG/ACT Inhalation Aerosol, Breath Activated [Pharmacy Med Name: QVAR REDIHALER 80MCG ORALINH (120)]  Prescription. Reordered.    Requested Prescriptions   Pending Prescriptions Disp Refills    QVAR REDIHALER 80 MCG/ACT Inhalation Aerosol, Breath Activated [Pharmacy Med Name: QVAR REDIHALER 80MCG ORALINH (120)] 10.6 g 1     Sig: INHALE 2 PUFFS INTO THE LUNGS IN THE MORNING AND INHALE 2 PUFFS BEFORE BEDTIME       Asthma & COPD Medication Protocol Passed - 12/27/2024  3:32 PM        Passed - ACT Score greater than or equal to 20     23 (1/3/2024 11:30 AM)            Passed - Appointment in past 6 or next 3 months      Recent Outpatient Visits              1 week ago Mild Alzheimer's  dementia, unspecified timing of dementia onset, unspecified whether behavioral, psychotic, or mood disturbance or anxiety (HCC)    [facility], [facility] Froilan Vásquez MD    Office Visit    3 weeks ago Primary osteoarthritis of both knees    [facility]    Office Visit    1 month ago Mild persistent asthma without complication (HCC)    [facility] Melissa Pedraza MD    Office Visit    1 month ago [facility] Morales, Raina, APRN    Office Visit    3 months ago Frequent urination    [facility], [address] [place] [address] Kandy Simeon PA-C    Office Visit          Future Appointments         Provider Department Appt Notes    In 5 months Abundio Tuttle MD [facility], [facility] fu 6 mo    In 5 months Froilan Hawthorne MD [facility] 6 month f/u Mild Alzheimer's dementia                    Passed - ACT recorded in the last 12 months     23 (1/3/2024 11:30 AM)               Recent Outpatient Visits              1 week ago Mild Alzheimer's dementia, unspecified timing of dementia onset, unspecified whether behavioral, psychotic, or mood disturbance or anxiety (HCC)    [facility] Froilan Vásquez MD    Office Visit    3 weeks ago Primary osteoarthritis of both knees    [facility], [facility] Abundio Irizarry MD    Office Visit    1 month ago Mild persistent asthma without complication (HCC)    [facility]    Office Visit    1 month ago [facility] Morales, Raina, APRN    Office Visit    3 months ago Frequent urination    [facility], [address] [place], [place] [address]z,  JOAQUIN Gaitan    Office Visit          Future Appointments         Provider Department Appt Notes    In 5 months Abundio Tuttle MD [facility] fu 6 mo    In 5 months Froilan Hawthorne MD [facility], [facility] 6 month f/u Mild Alzheimer's dementia

## 2024-12-27 NOTE — TELEPHONE ENCOUNTER
Patient called to state she just missed a phone call from someone. I do not see a TE.  I advised the refill was sent high priority to the oncall doctor. She was following up on her medication refill

## 2025-01-01 ENCOUNTER — HOSPITAL ENCOUNTER (EMERGENCY)
Facility: HOSPITAL | Age: OVER 89
Discharge: HOME | End: 2025-01-01
Attending: EMERGENCY MEDICINE
Payer: MEDICARE

## 2025-01-01 ENCOUNTER — APPOINTMENT (OUTPATIENT)
Dept: CT IMAGING | Facility: HOSPITAL | Age: OVER 89
End: 2025-01-01
Attending: EMERGENCY MEDICINE
Payer: MEDICARE

## 2025-01-01 ENCOUNTER — APPOINTMENT (OUTPATIENT)
Dept: GENERAL RADIOLOGY | Facility: HOSPITAL | Age: OVER 89
End: 2025-01-01
Attending: EMERGENCY MEDICINE
Payer: MEDICARE

## 2025-01-01 VITALS
BODY MASS INDEX: 31.41 KG/M2 | TEMPERATURE: 98 F | OXYGEN SATURATION: 95 % | RESPIRATION RATE: 20 BRPM | HEART RATE: 93 BPM | DIASTOLIC BLOOD PRESSURE: 71 MMHG | HEIGHT: 60 IN | WEIGHT: 160 LBS | SYSTOLIC BLOOD PRESSURE: 179 MMHG

## 2025-01-01 DIAGNOSIS — R53.1 WEAKNESS GENERALIZED: Primary | ICD-10-CM

## 2025-01-01 LAB
ALBUMIN SERPL-MCNC: 4.5 G/DL (ref 3.2–4.8)
ALBUMIN/GLOB SERPL: 1.7 {RATIO} (ref 1–2)
ALP LIVER SERPL-CCNC: 67 U/L
ALT SERPL-CCNC: 13 U/L
ANION GAP SERPL CALC-SCNC: 8 MMOL/L (ref 0–18)
AST SERPL-CCNC: 17 U/L (ref ?–34)
BASOPHILS # BLD AUTO: 0.06 X10(3) UL (ref 0–0.2)
BASOPHILS NFR BLD AUTO: 0.9 %
BILIRUB SERPL-MCNC: 0.7 MG/DL (ref 0.2–0.9)
BILIRUB UR QL STRIP.AUTO: NEGATIVE
BUN BLD-MCNC: 23 MG/DL (ref 9–23)
CALCIUM BLD-MCNC: 9.7 MG/DL (ref 8.7–10.4)
CHLORIDE SERPL-SCNC: 110 MMOL/L (ref 98–112)
CLARITY UR REFRACT.AUTO: CLEAR
CO2 SERPL-SCNC: 26 MMOL/L (ref 21–32)
COLOR UR AUTO: COLORLESS
CREAT BLD-MCNC: 0.84 MG/DL
EGFRCR SERPLBLD CKD-EPI 2021: 66 ML/MIN/1.73M2 (ref 60–?)
EOSINOPHIL # BLD AUTO: 0.21 X10(3) UL (ref 0–0.7)
EOSINOPHIL NFR BLD AUTO: 3 %
ERYTHROCYTE [DISTWIDTH] IN BLOOD BY AUTOMATED COUNT: 14.5 %
FLUAV + FLUBV RNA SPEC NAA+PROBE: NEGATIVE
FLUAV + FLUBV RNA SPEC NAA+PROBE: NEGATIVE
GLOBULIN PLAS-MCNC: 2.7 G/DL (ref 2–3.5)
GLUCOSE BLD-MCNC: 90 MG/DL (ref 70–99)
GLUCOSE UR STRIP.AUTO-MCNC: NORMAL MG/DL
HCT VFR BLD AUTO: 41.6 %
HGB BLD-MCNC: 13.9 G/DL
IMM GRANULOCYTES # BLD AUTO: 0.02 X10(3) UL (ref 0–1)
IMM GRANULOCYTES NFR BLD: 0.3 %
KETONES UR STRIP.AUTO-MCNC: NEGATIVE MG/DL
LEUKOCYTE ESTERASE UR QL STRIP.AUTO: 25
LYMPHOCYTES # BLD AUTO: 3.6 X10(3) UL (ref 1–4)
LYMPHOCYTES NFR BLD AUTO: 51.7 %
MCH RBC QN AUTO: 29.5 PG (ref 26–34)
MCHC RBC AUTO-ENTMCNC: 33.4 G/DL (ref 31–37)
MCV RBC AUTO: 88.3 FL
MONOCYTES # BLD AUTO: 0.6 X10(3) UL (ref 0.1–1)
MONOCYTES NFR BLD AUTO: 8.6 %
NEUTROPHILS # BLD AUTO: 2.47 X10 (3) UL (ref 1.5–7.7)
NEUTROPHILS # BLD AUTO: 2.47 X10(3) UL (ref 1.5–7.7)
NEUTROPHILS NFR BLD AUTO: 35.5 %
NITRITE UR QL STRIP.AUTO: NEGATIVE
OSMOLALITY SERPL CALC.SUM OF ELEC: 301 MOSM/KG (ref 275–295)
PH UR STRIP.AUTO: 7 [PH] (ref 5–8)
PLATELET # BLD AUTO: 255 10(3)UL (ref 150–450)
POTASSIUM SERPL-SCNC: 3.8 MMOL/L (ref 3.5–5.1)
PROT SERPL-MCNC: 7.2 G/DL (ref 5.7–8.2)
PROT UR STRIP.AUTO-MCNC: NEGATIVE MG/DL
RBC # BLD AUTO: 4.71 X10(6)UL
RBC UR QL AUTO: NEGATIVE
RSV RNA SPEC NAA+PROBE: NEGATIVE
SARS-COV-2 RNA RESP QL NAA+PROBE: NOT DETECTED
SODIUM SERPL-SCNC: 144 MMOL/L (ref 136–145)
SP GR UR STRIP.AUTO: 1.01 (ref 1–1.03)
TROPONIN I SERPL HS-MCNC: 4 NG/L
TROPONIN I SERPL HS-MCNC: 5 NG/L
UROBILINOGEN UR STRIP.AUTO-MCNC: NORMAL MG/DL
WBC # BLD AUTO: 7 X10(3) UL (ref 4–11)

## 2025-01-01 PROCEDURE — 96361 HYDRATE IV INFUSION ADD-ON: CPT

## 2025-01-01 PROCEDURE — 84484 ASSAY OF TROPONIN QUANT: CPT | Performed by: EMERGENCY MEDICINE

## 2025-01-01 PROCEDURE — 71045 X-RAY EXAM CHEST 1 VIEW: CPT | Performed by: EMERGENCY MEDICINE

## 2025-01-01 PROCEDURE — 87086 URINE CULTURE/COLONY COUNT: CPT | Performed by: EMERGENCY MEDICINE

## 2025-01-01 PROCEDURE — 85025 COMPLETE CBC W/AUTO DIFF WBC: CPT

## 2025-01-01 PROCEDURE — 0241U SARS-COV-2/FLU A AND B/RSV BY PCR (GENEXPERT): CPT | Performed by: EMERGENCY MEDICINE

## 2025-01-01 PROCEDURE — 93005 ELECTROCARDIOGRAM TRACING: CPT

## 2025-01-01 PROCEDURE — 99285 EMERGENCY DEPT VISIT HI MDM: CPT

## 2025-01-01 PROCEDURE — 80053 COMPREHEN METABOLIC PANEL: CPT

## 2025-01-01 PROCEDURE — 70450 CT HEAD/BRAIN W/O DYE: CPT | Performed by: EMERGENCY MEDICINE

## 2025-01-01 PROCEDURE — 93010 ELECTROCARDIOGRAM REPORT: CPT

## 2025-01-01 PROCEDURE — 81001 URINALYSIS AUTO W/SCOPE: CPT | Performed by: EMERGENCY MEDICINE

## 2025-01-01 PROCEDURE — 96360 HYDRATION IV INFUSION INIT: CPT

## 2025-01-01 RX ORDER — SODIUM CHLORIDE 9 MG/ML
INJECTION, SOLUTION INTRAVENOUS CONTINUOUS
Status: DISCONTINUED | OUTPATIENT
Start: 2025-01-01 | End: 2025-01-01

## 2025-01-01 NOTE — ED PROVIDER NOTES
Patient Seen in: [facility] Emergency Department      History     Chief Complaint   Patient presents with    Dizziness     Stated Complaint: Dizziness; no injury    Subjective:   HPI      This is a 89-year-old female who woke up feeling generalized fatigue and weakness.  The patient states that she woke up she was not feeling well yesterday was little nauseous she said she did not want to go down to get for a holiday party she had a food brought up to her.  The patient states that she woke up this morning and then subsequently she states that she tried to go to the bathroom and just felt weak and tired.  She had no headache she denies one-sided weakness she describes generalized weakness..  She denies any chest pain or shortness of breath.  Patient states that she had a little bit of nausea but no vomiting no abdominal pain no diarrhea denies any fevers chills blood in her stools.  She denies any trouble speaking denies any one-sided numbness or weakness.  The patient states that she did get up to use the walker and got to the washroom and came back but she had a hard time getting out of bed she is just felt generalized weakness.  She has had no fever no chills.    Objective:     Past Medical History:    Anemia    Anesthesia complication    pt stated had a reacion with a medication that sounded like \"memo\"    Anxiety state, unspecified    Arthritis    Asthma (HCC)    Atherosclerosis of coronary artery    Back pain    Back problem    cervical pain    Black stools    Blood transfusion reaction    CAD (coronary artery disease)    Calculus of kidney    Cancer (HCC)    skin cancer in left arm    Change in hair    Chest pain on exertion    Coronary atherosclerosis of unspecified type of vessel, native or graft    Diarrhea, unspecified    Dyslipidemia    Easy bruising    Extrinsic asthma, unspecified    Fatigue    Glucose intolerance (pre-diabetes)    Hearing impairment    HEARING LOSS BUT NO AIDS    Hearing loss     Heart palpitations    HIGH BLOOD PRESSURE    High cholesterol    Hip pain    History of blood transfusion    AFTER HYST    History of depression    Hoarseness, chronic    HTN (hypertension)    Indigestion    Leg swelling    Loss of appetite    Mouth sores    Nausea    Osteoarthrosis, unspecified whether generalized or localized, unspecified site    knees, back    Osteoporosis    Other and unspecified hyperlipidemia    Pain in joints    Pinched nerve in neck    Pneumonia, organism unspecified(486)    PONV (postoperative nausea and vomiting)    Shortness of breath    Sleep apnea    Stented coronary artery    Stress    Uncomfortable fullness after meals    Unspecified essential hypertension    Visual impairment    glasses    Wears glasses    Weight loss              Past Surgical History:   Procedure Laterality Date    Angioplasty (coronary)      stent    Appendectomy      Back surgery      lumbar x 4    Benign biopsy left  a long time ago    x 2    Cath bare metal stent (bms)  2012    Cholecystectomy      Colonoscopy  10/21/2013    Procedure: COLONOSCOPY;  Surgeon: Emmanuel Naranjo MD;  Location:  ENDOSCOPY    Colonoscopy N/A 6/22/2021    Procedure: COLONOSCOPY with biopsies and forcep, cold and hot snare polypectomy with saline lift and clip placement x3;  Surgeon: Jerod Pierce MD;  Location:  ENDOSCOPY    Egd      Excis lumbar disk,one level      Fluor gid & loclzj ndl/cath spi dx/ther njx  9/23/2013    Procedure: CERVICAL EPIDURAL;  Surgeon: Christiano Wiseman MD;  Location: [facility]    Hemorrhoidectomy,int/ext,simple      Hysterectomy      complete    Injection, w/wo contrast, dx/therapeutic substance, epidural/subarachnoid; cervical/thoracic  9/23/2013    Procedure: CERVICAL EPIDURAL;  Surgeon: Christiano Wiseman MD;  Location: [facility]    Injection, w/wo contrast, dx/therapeutic substance, epidural/subarachnoid; cervical/thoracic N/A 8/17/2015    Procedure:  CERVICAL EPIDURAL;  Surgeon: Josue Stewart MD;  Location: [facility]    Dex localization wire 1 site left (cpt=19281)      Benign    Other surgical history  12    Diag cardiac stent, multi-link mini vision 2mmx 12mm.     Patient documented not to have experienced any of the following events  2013    Procedure: CERVICAL EPIDURAL;  Surgeon: Christiano Wiseman MD;  Location: [facility]    Patient documented not to have experienced any of the following events N/A 2015    Procedure: CERVICAL EPIDURAL;  Surgeon: Josue Stewart MD;  Location: [facility]    Patient withough preoperative order for iv antibiotic surgical site infection prophylaxis.  2013    Procedure: CERVICAL EPIDURAL;  Surgeon: Christiano Wiseman MD;  Location: [facility]    Patient withough preoperative order for iv antibiotic surgical site infection prophylaxis. N/A 2015    Procedure: CERVICAL EPIDURAL;  Surgeon: Josue Stewart MD;  Location: [facility]    Spine surgery procedure unlisted      Stent, coated/cov w/del sys      Tonsillectomy      Total abdom hysterectomy      Upper gi endoscopy,exam                  Social History     Socioeconomic History    Marital status:    Tobacco Use    Smoking status: Former     Current packs/day: 0.00     Average packs/day: 0.3 packs/day for 3.0 years (0.9 ttl pk-yrs)     Types: Cigarettes     Start date: 1969     Quit date: 1972     Years since quittin.9    Smokeless tobacco: Never   Vaping Use    Vaping status: Never Used   Substance and Sexual Activity    Alcohol use: Not Currently    Drug use: No    Sexual activity: Never   Other Topics Concern    Caffeine Concern Yes     Comment: tea    Exercise No   Social History Narrative    ** Merged History Encounter **          Social Drivers of Health     Financial Resource Strain: Low Risk  (6/15/2021)    Received  from [facility]    Overall Financial Resource Strain (CARDIA)     Difficulty of Paying Living Expenses: Not hard at all   Food Insecurity: No Food Insecurity (6/15/2021)    Received from [facility]    Hunger Vital Sign     Worried About Running Out of Food in the Last Year: Never true     Ran Out of Food in the Last Year: Never true   Transportation Needs: No Transportation Needs (6/15/2021)    Received from [facility]    PRAPARE - Transportation     Lack of Transportation (Medical): No     Lack of Transportation (Non-Medical): No   Physical Activity: Inactive (2/7/2023)    Exercise Vital Sign     Days of Exercise per Week: 0 days     Minutes of Exercise per Session: 0 min   Stress: No Stress Concern Present (6/15/2021)    Received from [facility]    [place] [employer] - Occupational Stress Questionnaire     Feeling of Stress : Not at all    Social Connections   Housing Stability: Low Risk  (6/15/2021)    Received from [facility]    Housing Stability Vital Sign     Unable to Pay for Housing in the Last Year: No     Number of Places Lived in the Last Year: 1     Unstable Housing in the Last Year: No                  Physical Exam     ED Triage Vitals   BP 01/01/25 0945 (S) (!) 162/80   Pulse 01/01/25 0927 66   Resp 01/01/25 0945 16   Temp 01/01/25 0927 97.7 °F (36.5 °C)   Temp src 01/01/25 0927 Oral   SpO2 01/01/25 0945 98 %   O2 Device 01/01/25 0945 None (Room air)       Current Vitals:   Vital Signs  BP: (!) 179/71  Pulse: 93  Resp: 20  Temp: 97.7 °F (36.5 °C)  Temp src: Oral  MAP (mmHg): (!) 104    Oxygen Therapy  SpO2: 95 %  O2 Device: None (Room air)        Physical Exam  General: Pleasant older female no respiratory distress..    The patient is in no respiratory distress. The patient is not septic or toxic    HEENT: Atraumatic,  conjunctiva are not pale.  There is no icterus.  Oral mucosa Is wet. The neck is supple. There is no meningismus.  There is no facial asymmetry.    LUNGS: Clear to auscultation, there is no wheezing or retraction.  No crackles.    CV: Cardiovascular is regular without murmurs or rubs.    ABD: The abdomen is soft nondistended nontender.  There is no rebound.  There is no guarding.  Bowel sounds are present.    EXT: There is good pulses bilaterally.  There is no calf tenderness.  There is no rash noted.  There is no edema    NEURO: Alert and oriented x4.      Cranial nerves are grossly intact.       Extraocular muscles are intact.      Muscle strength is 5 out of 5 in both upperand lower extremities    Sensory exam is grossly normal bilaterally upper and lower extremity    There is no pronator drift.    There is normal finger to nose bilaterally.          ED Course     Labs Reviewed   COMP METABOLIC PANEL (14) - Abnormal; Notable for the following components:       Result Value    Calculated Osmolality 301 (*)     All other components within normal limits   URINALYSIS WITH CULTURE REFLEX - Abnormal; Notable for the following components:    Urine Color Colorless (*)     Leukocyte Esterase Urine 25 (*)     Squamous Epi. Cells Few (*)     All other components within normal limits   TROPONIN I HIGH SENSITIVITY - Normal   TROPONIN I HIGH SENSITIVITY - Normal   SARS-COV-2/FLU A AND B/RSV BY PCR (GENEXPERT) - Normal    Narrative:     This test is intended for the qualitative detection and differentiation of SARS-CoV-2, influenza A, influenza B, and respiratory syncytial virus (RSV) viral RNA in nasopharyngeal or nares swabs from individuals suspected of respiratory viral infection consistent with COVID-19 by their healthcare provider. Signs and symptoms of respiratory viral infection due to SARS-CoV-2, influenza, and RSV can be similar.    Test performed using the Xpert Xpress SARS-CoV-2/FLU/RSV (real time RT-PCR)  assay on  the [employer] instrument, [employer], [place] [address].   This test is being used under the Food and Drug Administration's Emergency Use Authorization.    The authorized Fact Sheet for Healthcare Providers for this assay is available upon request from the laboratory.   CBC WITH DIFFERENTIAL WITH PLATELET   RAINBOW DRAW LAVENDER   RAINBOW DRAW LIGHT GREEN   RAINBOW DRAW BLUE   RAINBOW DRAW GOLD   URINE CULTURE, ROUTINE               The patient was placed on monitors, IV was started, blood was drawn.    Was done to rule out electrolyte imbalance, UTI, COVID, flu, intracranial bleed was considered although less likely and no clinical findings of a stroke.  NIH score 0.       MDM        The EKG shows normal sinus rhythm.  There is sinus rhythm the rest of the EKG including rate rhythm axis and intervals I agree with the EKG report . The rate is 66 left bundle branch block is noted.    When compared to an old EKG from March 2023 the left bundle was seen previously      The patient's troponin was negative, CBC, comprehensive was grossly normal.    I personally reviewed the radiographs and my individual interpretation shows    CT of the brain shows no acute bleed, tumor.  Chest x-ray shows some atelectasis no obvious large pneumonia or pneumothorax  Also reviewed official report and it shows  CT BRAIN OR HEAD (CPT=70450)    Result Date: 1/1/2025  PROCEDURE:  CT BRAIN OR HEAD (52517)  COMPARISON:  [facility] , CT, CT BRAIN OR HEAD (09265), 4/17/2024, 4:23 PM.  INDICATIONS:  Dizziness; no injury  TECHNIQUE:  Noncontrast CT scanning is performed through the brain. Dose reduction techniques were used. Dose information is transmitted to the ACR (American College of Radiology) NRDR (National Radiology Data Registry) which includes the Dose Index Registry.  PATIENT STATED HISTORY: (As transcribed by Technologist)  Dizziness, no injury.    FINDINGS:  VENTRICLES/SULCI:  Ventricles and sulci are normal in size.  INTRACRANIAL:  There  are no abnormal extraaxial fluid collections.  There is no midline shift.  A background of mild chronic microvascular ischemic changes is present within the white matter.  There is nothing specific for acute infarct.  There is no hemorrhage or mass lesion.  SINUSES:           No sign of acute sinusitis.  MASTOIDS:          No sign of acute inflammation. SKULL:             No evidence for fracture or osseous abnormality. OTHER:             None.            CONCLUSION:  No significant midline shift or mass effect.  No acute intracranial hemorrhage.  Mild chronic microvascular ischemic changes are favored within the white matter.  If there is persistent clinical concern then consider MRI.     LOCATION:  [facility]   Dictated by (CST): Adolfo Saldivar MD on 1/01/2025 at 1:12 PM     Finalized by (CST): Adolfo Saldivar MD on 1/01/2025 at 1:13 PM       XR CHEST AP PORTABLE  (CPT=71045)    Result Date: 1/1/2025  PROCEDURE:  XR CHEST AP PORTABLE  (CPT=71045)  TECHNIQUE:  AP chest radiograph was obtained.  COMPARISON:  [facility], XR, XR CHEST PA + LAT CHEST (CPT=71046), 3/13/2023, 2:47 PM.  INDICATIONS:  Dizziness; no injury  PATIENT STATED HISTORY: (As transcribed by Technologist)  Patient has dizziness, weakness and nausea since yesterday.    FINDINGS:  Heart size is within normal limits.  Pleural spaces appear clear.  Mediastinal and hilar contours are normal.  No focal consolidation.  If clinical symptoms persist then recommend follow-up imaging.            CONCLUSION:  See above.   LOCATION:  [facility]      Dictated by (CST): Adolfo Saldivar MD on 1/01/2025 at 12:11 PM     Finalized by (CST): Adolfo Saldivar MD on 1/01/2025 at 12:11 PM          Urinalysis did show trace of leukocytes 25 but no acute nitrates no significant white cells in the urine.  No bacteria seen on the urine and the squamous cells were few patient urine was sent for culture COVID, flu was negative comprehensive troponin CBC was grossly negative.          She had gotten some  IV fluids here.  I did go back and reexamined her she was now able to ambulate without any problems to the washroom she had no complaints she said she thinks he might have been a little dehydrated she want to go home.  She has no headache no chest pain no shortness of breath no abdominal pain no numbness no weakness I reexamined her she is neurologically intact.  Neck is supple muscle strength sensory exam is normal she has no focal findings at this present time I discussed with her generalized weakness could be anything could be dehydration, I do not feel this is an acute stroke as she had no one-sided weakness she want to go home..  I discussed with her I do not know what caused it but it could be a multitude o the troponin if that is negative patient will be discharged to home with close follow-up.  f factors I do not see any obvious pneumonia, urinary tract infection infection acute coronary syndrome.  I will repeat the patient's blood pressure was slightly high.  But she does take verapamil.  Should be taken as an outpatient and should recheck her blood pressure as an outpatient    I discussed with the patient that were seeing them  in a short period of time.  I discussed with them that there is always a possibility that things can change and a need reevaluation with their primary care physician as soon as possible.  I've also discussed with them that if the pain gets worse to return to the emergency room immediately.  Troponin was negative I talked to the family at length.  I did discuss with I do not see an thing acute if she actually feels much better she has no dysuria no headache no chest pain no shortness of breath I did go back and reexamined her about 2 PM I did talk to the family at length the family was here and I did talk to him I do not see any acute she wants to go home she does not want to be admitted the family wants her to go home.  And she has no complaints she is able to ambulate with a walker  she is not dizzy or lightheaded maybe a little bit of dehydration I recommended she does closely follow-up with her primary care physician follow-up with her doctor she should recheck her blood pressure as an outpatient.  She does take verapamil but she is not having any chest pain shortness of breath and she is neurologically intact with no focal findings.    They feel comfortable going home.  There is no acute findings of acute coronary syndrome no obvious pneumonia the urine shows some questionable artifact but no gross findings of UTI.  The urine was sent for culture.  No findings of stroke..  The patient's blood pressure was slightly elevated but she had not taken her normal dose of blood pressure medicine recommend she does take her blood pressure medicines at home.  And to return if any other complaints close follow-up with her primary care physician.  Medical Decision Making      Disposition and Plan     Clinical Impression:  1. Weakness generalized         Disposition:  Discharge  1/1/2025  1:58 pm    Follow-up:  Ghada Carballo MD  [address] [place] [id number] [phone]    Follow up in 2 day(s)            Medications Prescribed:  Current Discharge Medication List              Supplementary Documentation:

## 2025-01-01 NOTE — ED INITIAL ASSESSMENT (HPI)
Pt woke up this morning and felt dizzy going to the bathroom; urinating ok; no injury; no hx CVA or fall; pt states she has had low appetite and energy since yesterday; nausea but vomiting or diarrhea; denies blood thinners, diabetes; Aox4 baseline

## 2025-01-01 NOTE — DISCHARGE INSTRUCTIONS
Close follow-up with your primary care physician if you have any chest pain shortness of breath dizziness lightheadedness or any other concerns return here    You were seen in the emergency room in a limited time.  There is a possibility that although we do not see any acute process at this present time that things can change with time.  Is therefore imperative that you follow-up with primary care physician for close follow-up.  If there is any significant progression of your pain  or other symptoms you to return immediately to the emergency room.

## 2025-01-02 ENCOUNTER — PATIENT OUTREACH (OUTPATIENT)
Dept: CASE MANAGEMENT | Age: OVER 89
End: 2025-01-02

## 2025-01-02 DIAGNOSIS — M17.11 PRIMARY OSTEOARTHRITIS OF RIGHT KNEE: ICD-10-CM

## 2025-01-02 DIAGNOSIS — K86.89 PANCREATIC INSUFFICIENCY (HCC): ICD-10-CM

## 2025-01-02 DIAGNOSIS — G56.01 CARPAL TUNNEL SYNDROME OF RIGHT WRIST: ICD-10-CM

## 2025-01-02 DIAGNOSIS — E78.00 PURE HYPERCHOLESTEROLEMIA: ICD-10-CM

## 2025-01-02 DIAGNOSIS — J42 CHRONIC BRONCHITIS, UNSPECIFIED CHRONIC BRONCHITIS TYPE (HCC): ICD-10-CM

## 2025-01-02 DIAGNOSIS — M17.12 PRIMARY OSTEOARTHRITIS OF LEFT KNEE: Primary | ICD-10-CM

## 2025-01-02 DIAGNOSIS — I25.10 CORONARY ARTERY DISEASE INVOLVING NATIVE CORONARY ARTERY OF NATIVE HEART WITHOUT ANGINA PECTORIS: ICD-10-CM

## 2025-01-02 DIAGNOSIS — E78.5 DYSLIPIDEMIA: ICD-10-CM

## 2025-01-02 DIAGNOSIS — I10 HYPERTENSION, BENIGN: ICD-10-CM

## 2025-01-02 LAB
ATRIAL RATE: 66 BPM
P AXIS: 57 DEGREES
P-R INTERVAL: 164 MS
Q-T INTERVAL: 446 MS
QRS DURATION: 134 MS
QTC CALCULATION (BEZET): 467 MS
R AXIS: -37 DEGREES
T AXIS: 110 DEGREES
VENTRICULAR RATE: 66 BPM

## 2025-01-02 NOTE — PROGRESS NOTES
Spoke to Karla for Chronic Care Management.      Updates to patient care team/comments: UTD  Patient reported changes in medications: UTD  Med Adherence  Comment: pt taking medications as prescribed     Health Maintenance:   Health Maintenance   Topic Date Due    Pneumococcal Vaccine: 65+ Years (1 of 2 - PCV) Never done    Zoster Vaccines (1 of 2) Never done    COVID-19 Vaccine (2 - 2024-25 season) 09/01/2024    Influenza Vaccine (1) Never done    Annual Depression Screening  01/01/2025    Annual Physical  01/03/2025    Fall Risk Screening (Annual)  Completed       Patient updates/concerns:    Spoke to pt for monthly outreach   Pt feels her condition is stable since visiting the ER yesterday. Pt has already scheduled er v/u at pcp office. Pt did not have any questions or concerns for pcp or clinical staff ahead of the visit.  Pt states that labs were reviewed and results were satisfactory. Pt was told that her symptoms of dizziness and fatigue were likely related to dehydration. Pt does not feel any dizziness this morning and she feels her strength is appropriate.    Pt daughter helps pt with grocery shopping and picking up prescriptions. Daughter will frequently check in with pt    Pt states that she is having trouble filling her qvar inhaler. According to her local pharmacy the inhaler is on back order. Pt has had success filling back orders at local [facility] and plans to contact [place] this afternoon. Pt will f/u with ccm/ pcp office if rx cannot transfer and new one is needed. If back order is affecting multiple pharmacies pt will wait until 1/6 er f/u appt to request alternative rx.  Pt has been using this inhaler once every day.    Pt is falling asleep staying asleep and waking rested. Her appetite has been appropriate since returning from hospital.   Pt has not had recent trouble with diahrrea. She does not avoid any specific foods.    Pt makes sure to participate frequently in events at her facility. She states  that they often have monthly birthday parties and game nights.      Goals/Action Plan:    Active goal from previous outreach: staying healthy    Patient reported progress towards goals: pt continues to see providers as needed and takes medicaitonss as prescribed               - What: hydration           - Where/When/How: pt has a goal of 40 oz a day  Patient Reported Barriers and Concerns: n/a                   - Plan for overcoming barriers: none    Care Managers Interventions: continue to provide encouragement and education for heatlhy coping and dx    Future Appointments:   Future Appointments   Date Time Provider Department Center   1/6/2025 10:00 AM Rebeca Denise PA-C [facility]   6/2/2025 11:20 AM Abundio Tuttle MD [facility] [place]   6/17/2025  2:00 PM [facility] [place]         Next Care Manager Follow Up Date: one month    Reason For Follow Up: review progress and or barriers towards patient's goals.     Time Spent This Encounter Total: 20 min medical record review, telephone communication, care plan updates where needed, education, goals, and action plan recreation/update. Provided acknowledgment and validation to patient's concerns.   Monthly Minute Total including today: 20  Physical assessment, complete health history, and need for CCM established by Ghada Carballo MD.

## 2025-01-06 ENCOUNTER — TELEPHONE (OUTPATIENT)
Dept: INTERNAL MEDICINE CLINIC | Facility: CLINIC | Age: OVER 89
End: 2025-01-06

## 2025-01-06 ENCOUNTER — OFFICE VISIT (OUTPATIENT)
Dept: INTERNAL MEDICINE CLINIC | Facility: CLINIC | Age: OVER 89
End: 2025-01-06
Payer: MEDICARE

## 2025-01-06 VITALS
HEIGHT: 60 IN | TEMPERATURE: 97 F | SYSTOLIC BLOOD PRESSURE: 130 MMHG | RESPIRATION RATE: 18 BRPM | OXYGEN SATURATION: 95 % | BODY MASS INDEX: 31.09 KG/M2 | WEIGHT: 158.38 LBS | DIASTOLIC BLOOD PRESSURE: 70 MMHG | HEART RATE: 91 BPM

## 2025-01-06 DIAGNOSIS — R53.1 WEAKNESS: Primary | ICD-10-CM

## 2025-01-06 DIAGNOSIS — E86.0 DEHYDRATION: ICD-10-CM

## 2025-01-06 DIAGNOSIS — K58.0 IRRITABLE BOWEL SYNDROME WITH DIARRHEA: ICD-10-CM

## 2025-01-06 DIAGNOSIS — J42 CHRONIC BRONCHITIS, UNSPECIFIED CHRONIC BRONCHITIS TYPE (HCC): ICD-10-CM

## 2025-01-06 PROBLEM — G30.9: Status: ACTIVE | Noted: 2025-01-06

## 2025-01-06 PROBLEM — K86.1 OTHER CHRONIC PANCREATITIS (HCC): Status: ACTIVE | Noted: 2025-01-06

## 2025-01-06 PROBLEM — F02.A0: Status: ACTIVE | Noted: 2025-01-06

## 2025-01-06 PROCEDURE — 99214 OFFICE O/P EST MOD 30 MIN: CPT | Performed by: PHYSICIAN ASSISTANT

## 2025-01-06 RX ORDER — ALBUTEROL SULFATE 90 UG/1
2 INHALANT RESPIRATORY (INHALATION) EVERY 4 HOURS PRN
Qty: 1 EACH | Refills: 1 | Status: SHIPPED | OUTPATIENT
Start: 2025-01-06

## 2025-01-06 NOTE — PROGRESS NOTES
Karla Vallecillo is a 89 year old female.   Chief Complaint   Patient presents with    TCM (Transition Of Care Management)     CHRIS Rm 3- Pt is here for TCM     HPI:    Pt presents for ER f/u.   Seen in ER on 1/1/25 for fatigue and weakness. She has had issues with diarrhea over the last year and she feels she was dehydrated from the diarrhea. Diarrhea is now better - she is followed by GI.   Workup in the ER was normal/negative including labs, CT brain, CXR, and EKG.   Weakness and fatigue have resolved.   No acute complaints.     Asthma.   She needs a refill of her maintenance inhaler. However, qvar is unavailable at her pharmacy as well as at [facility].       Allergies:  Allergies[1]   Current Meds:  Current Outpatient Medications   Medication Sig Dispense Refill    albuterol (PROAIR HFA) 108 (90 Base) MCG/ACT Inhalation Aero Soln Inhale 2 puffs into the lungs every 4 (four) hours as needed for Wheezing. 1 each 1    predniSONE 2.5 MG Oral Tab Take 1 tablet (2.5 mg total) by mouth daily. 90 tablet 3    LOPERAMIDE 2 MG Oral Cap TAKE 1 CAPSULE BY MOUTH DAILY 90 capsule 0    Verapamil HCl  MG Oral Capsule SR 24 Hr Take 240 mg by mouth daily.      Ascorbic Acid (VITAMIN C OR) Take 1 tablet by mouth every evening.      NON FORMULARY Vitamin B complex with Vit B-12 one daily      ALPRAZolam 0.25 MG Oral Tab Take 1 tablet (0.25 mg total) by mouth every 6 (six) hours as needed.      IMODIUM MULTI-SYMPTOM RELIEF OR Take 1 tablet by mouth as needed.      acetaminophen 500 MG Oral Tab Take 1 tablet (500 mg total) by mouth every 6 (six) hours as needed for Pain.      Vitamin D3, Cholecalciferol, 50 MCG (2000 UT) Oral Cap Take 1 capsule (2,000 Units total) by mouth daily.      loratadine (CLARITIN) 10 MG Oral Tab Take 1 tablet (10 mg total) by mouth daily.      vitamin E 400 UNITS Oral Cap Take 1 capsule (400 Units total) by mouth daily.          PMH:     Past Medical History:    Anemia    Anesthesia complication    pt  stated had a reacion with a medication that sounded like \"memo\"    Anxiety state, unspecified    Arthritis    Asthma (HCC)    Atherosclerosis of coronary artery    Back pain    Back problem    cervical pain    Black stools    Blood transfusion reaction    CAD (coronary artery disease)    Calculus of kidney    Cancer (HCC)    skin cancer in left arm    Change in hair    Chest pain on exertion    Coronary atherosclerosis of unspecified type of vessel, native or graft    Diarrhea, unspecified    Dyslipidemia    Easy bruising    Extrinsic asthma, unspecified    Fatigue    Glucose intolerance (pre-diabetes)    Hearing impairment    HEARING LOSS BUT NO AIDS    Hearing loss    Heart palpitations    HIGH BLOOD PRESSURE    High cholesterol    Hip pain    History of blood transfusion    AFTER HYST    History of depression    Hoarseness, chronic    HTN (hypertension)    Indigestion    Leg swelling    Loss of appetite    Mouth sores    Nausea    Osteoarthrosis, unspecified whether generalized or localized, unspecified site    knees, back    Osteoporosis    Other and unspecified hyperlipidemia    Pain in joints    Pinched nerve in neck    Pneumonia, organism unspecified(486)    PONV (postoperative nausea and vomiting)    Shortness of breath    Sleep apnea    Stented coronary artery    Stress    Uncomfortable fullness after meals    Unspecified essential hypertension    Visual impairment    glasses    Wears glasses    Weight loss       ROS:   GENERAL: Negative for fever, chills and fatigue. NAD.  HENT: Negative for congestion, sore throat, and ear pain.  RESPIRATORY: Negative for cough, chest tightness, shortness of breath and wheezing.    CV: Negative for chest pain, palpitations and leg swelling.   GI: Negative for nausea, vomiting, abdominal pain, diarrhea, and blood in stool.   : Negative for dysuria, hematuria and difficulty urinating.   MUSCULOSKELETAL: Negative for myalgias, back pain, joint swelling, arthralgias and  gait problem.   NEURO: Negative for dizziness, syncope, weakness, numbness, tingling and headaches.   PSYCH: The patient is not nervous/anxious. No depression.      PHYSICAL EXAM:    /70   Pulse 91   Temp 97.2 °F (36.2 °C) (Temporal)   Resp 18   Ht 5' (1.524 m)   Wt 158 lb 6.4 oz (71.8 kg)   LMP  (LMP Unknown)   SpO2 95%   BMI 30.94 kg/m²     GENERAL: NAD. A&Ox3  HEENT: Ear canals clear, TMs pearly gray bilaterally. Throat without erythema or exudates.  NECK: No LAD.   RESPIRATORY: CTAB, no R/R/W  CV: RRR, no murmurs.   ABD: Soft, non-tender, non-distended. +bowel sounds. No rebound tenderness.   NEURO: No focal deficits.   MUSCULOSKELETAL: Full ROM of all extremities. No swelling.   PSYCH: Appropriate mood and affect.      ASSESSMENT/ PLAN:   1. Weakness  Improved with IVF in the ER   Advised to call or return to the ER if recurs     2. Dehydration  Improved with IVF in the ER     3. Irritable bowel syndrome with diarrhea  Followed by GI   Now better controlled and denies diarrhea at this time   F/u with GI if worse again     4. Chronic bronchitis, unspecified chronic bronchitis type (HCC)  Stable but needs alternative maintenance inhaler - called and spoke with pharmacy (flovent, pulmicort, and qvar unavailable), pt has not tolerated inhalers with LABA in the past, message sent to her pulmonologist for advice        Health Maintenance Due   Topic Date Due    Pneumococcal Vaccine: 65+ Years (1 of 2 - PCV) Never done    Zoster Vaccines (1 of 2) Never done    COVID-19 Vaccine (2 - 2024-25 season) 09/01/2024    Influenza Vaccine (1) Never done    Annual Depression Screening  01/01/2025    Annual Physical  01/03/2025           Pt indicates understanding and agrees to the plan.     No follow-ups on file.    Rebeca Denise PA-C           [1]   Allergies  Allergen Reactions    Aspirin DIARRHEA and SHORTNESS OF BREATH    Breo Ellipta OTHER (SEE COMMENTS) and SHORTNESS OF BREATH     Pneumonia  Pneumonia     Budesonide SHORTNESS OF BREATH     Coughing    Celebrex [Celecoxib] SHORTNESS OF BREATH    Citalopram SHORTNESS OF BREATH and OTHER (SEE COMMENTS)     Oral     Clotrimazole DIARRHEA and SHORTNESS OF BREATH    Erythromycin OTHER (SEE COMMENTS) and SHORTNESS OF BREATH     Short of Breath  Short of Breath  External  Oral   Short of Breath    Famotidine SHORTNESS OF BREATH    Fish Oil ANAPHYLAXIS, OTHER (SEE COMMENTS) and SHORTNESS OF BREATH     Oral     Fluticasone OTHER (SEE COMMENTS)     Per pt not true  Other reaction(s): Propensity to adverse reactions to drug    Fluticasone-Salmeterol Coughing, SHORTNESS OF BREATH and OTHER (SEE COMMENTS)     Inhalation    Gabapentin WHEEZING    Iodine (Topical) RASH and SHORTNESS OF BREATH     Rash with topical Iodine.   Rash with topical Iodine.   Rash with topical Iodine.     Iodine Solution [Povidone Iodine] ANAPHYLAXIS     IVP Dye    Levofloxacin SHORTNESS OF BREATH and OTHER (SEE COMMENTS)     Nausea, leg pain    Nausea, leg pain    Intravenous  Other reaction(s): Propensity to adverse reactions to drug    Lidocaine ANAPHYLAXIS and UNKNOWN     \"Breathing issues\"-- per patient okay with marcaine     Lisinopril SHORTNESS OF BREATH     Other reaction(s): Propensity to adverse reactions to drug    Metamucil [Psyllium] SHORTNESS OF BREATH     And abdominal discomfort.    Nitrofurantoin SHORTNESS OF BREATH and UNKNOWN    Nsaids SHORTNESS OF BREATH and UNKNOWN    Other RASH and SHORTNESS OF BREATH     Transdermal \"memo \" patch ;novacaine Pt states she is okay with marcaine  per allergy testing.    Penicillins SHORTNESS OF BREATH     Short of Breath    Plavix [Clopidogrel] SHORTNESS OF BREATH    Pulmicort SHORTNESS OF BREATH and UNKNOWN     Coughing      Radiology Contrast Iodinated Dyes ANAPHYLAXIS     Other reaction(s): Propensity to adverse reactions to drug    Shellfish SHORTNESS OF BREATH    Mupirocin OTHER (SEE COMMENTS)     Felt short of breath  Other reaction(s): Propensity  to adverse reactions to drug    Advair Hfa OTHER (SEE COMMENTS)    Bupropion OTHER (SEE COMMENTS)     constipation    Celecoxib OTHER (SEE COMMENTS)     Oral     Codeine OTHER (SEE COMMENTS)     Per pt not true pt takes this medication       Effient [Prasugrel Hydrochloride] SHORTNESS OF BREATH    Linezolid DIARRHEA     Other reaction(s): Propensity to adverse reactions to drug    Metronidazole ASTHMA and UNKNOWN     Other reaction(s): Propensity to adverse reactions to drug    Mold SHORTNESS OF BREATH    Penicillin G RASH     sob  Other reaction(s): Propensity to adverse reactions to drug    Questran [Cholestyramine] HIVES and Coughing    Risperdal [Risperidone] SHORTNESS OF BREATH    Shellfish-Derived Products      Sob      Tramadol UNKNOWN     Arms turned red like a sunburn    Warfarin OTHER (SEE COMMENTS)     Asthma attack  Other reaction(s): Propensity to adverse reactions to drug    Flulaval RASH     Other reaction(s): Propensity to adverse reactions to drug    Kenalog [Triamcinolone] NAUSEA ONLY    Vancomycin RASH     Rash to the face and neck

## 2025-01-06 NOTE — TELEPHONE ENCOUNTER
See 1/6/25 office visit. Pt's daughter called to provide update. States she will take pt for a hearing test on Wednesday. She also wanted provider to know that Dr. Hawthorne, neurologist, sent pt for testing at [facility]. Pt has mild dementia. Daughter will have them fax results over to the office at [phone].     Pt has another daughter who lives in [place]. They are planning on moving pt to an assisted living facility in [place]. Her sister will [facility] for this.

## 2025-01-07 ENCOUNTER — TELEPHONE (OUTPATIENT)
Dept: INTERNAL MEDICINE CLINIC | Facility: CLINIC | Age: OVER 89
End: 2025-01-07

## 2025-01-07 ENCOUNTER — TELEPHONE (OUTPATIENT)
Dept: NEUROLOGY | Facility: CLINIC | Age: OVER 89
End: 2025-01-07

## 2025-01-07 ENCOUNTER — MED REC SCAN ONLY (OUTPATIENT)
Dept: INTERNAL MEDICINE CLINIC | Facility: CLINIC | Age: OVER 89
End: 2025-01-07

## 2025-01-07 RX ORDER — FLUTICASONE PROPIONATE 44 UG/1
2 AEROSOL, METERED RESPIRATORY (INHALATION) 2 TIMES DAILY
Qty: 10.6 G | Refills: 1 | Status: SHIPPED | OUTPATIENT
Start: 2025-01-07 | End: 2025-04-07

## 2025-01-07 NOTE — PROGRESS NOTES
Patient's current neuro psych profile is consistent with a Major Neurocognitive Disorder (mild dementia)  due to evidence of significant decline in spatial reasoning, memory recall, and semantic processing. Pt daughter requested records be sent for continuation of care. Consult note placed in TB bin.

## 2025-01-07 NOTE — TELEPHONE ENCOUNTER
Called pt, no answer. Unable to leave voicemail.    Called and spoke to pt's daughter Liyah (ok per HIPAA). Relayed Rebeca Denise PA-C's message below. She verbalized understanding and will inform pt of this change in medication.

## 2025-01-07 NOTE — TELEPHONE ENCOUNTER
Received Neuropsychological Evaluation from [facility] date of service 10/16/24. Placed in provider folder for review.

## 2025-01-07 NOTE — TELEPHONE ENCOUNTER
Please call pt. I spoke with Dr Jackson. She recommends starting with flovent 44 mcg 2 puffs twice daily (to replace qvar) and see how pt responds to this dose.

## 2025-01-08 ENCOUNTER — TELEPHONE (OUTPATIENT)
Dept: INTERNAL MEDICINE CLINIC | Facility: CLINIC | Age: OVER 89
End: 2025-01-08

## 2025-01-08 NOTE — TELEPHONE ENCOUNTER
Pt Daughter ( Indiana Vallecillo ) who is on HIPAA drop off The [employer]ident [employer] Assessment for Assisted Living Facilities Form For doctor to fill out. Form is in provider folder at .    Pt daughter ( Indiana Vallecillo ) will like a call back once forms are complete. Number: [phone]

## 2025-01-08 NOTE — TELEPHONE ENCOUNTER
See 1/6/25 office visit. Pt states she was switched from Qvar to new Rx (fluticasone propionate 44 MCG/ACT Inhalation Aerosol). She went to [facility] yesterday and they don't have it.    Conference call made to [facility] and spoke to Abril. They do not have the medication in stock and the shipment will arrive today. They will notify pt when medication is ready for .

## 2025-01-14 ENCOUNTER — TELEPHONE (OUTPATIENT)
Dept: INTERNAL MEDICINE CLINIC | Facility: CLINIC | Age: OVER 89
End: 2025-01-14

## 2025-01-14 ENCOUNTER — MED REC SCAN ONLY (OUTPATIENT)
Dept: INTERNAL MEDICINE CLINIC | Facility: CLINIC | Age: OVER 89
End: 2025-01-14

## 2025-01-14 DIAGNOSIS — Z00.00 ENCOUNTER FOR ANNUAL HEALTH EXAMINATION: Primary | ICD-10-CM

## 2025-01-14 DIAGNOSIS — I10 HYPERTENSION, BENIGN: ICD-10-CM

## 2025-01-14 NOTE — TELEPHONE ENCOUNTER
Orders to      [facility]         Pt aware to get labs done no sooner than 2 weeks prior to the appt.  Pt aware to fast.  No call back required.    Future Appointments   Date Time Provider Department Center   1/30/2025 11:20 AM Ghada Carballo MD [facility]

## 2025-01-16 ENCOUNTER — MED REC SCAN ONLY (OUTPATIENT)
Dept: INTERNAL MEDICINE CLINIC | Facility: CLINIC | Age: OVER 89
End: 2025-01-16

## 2025-01-16 NOTE — PROGRESS NOTES
Received letter from [employer] for temporary supply of Fluticas HFA AER 44mcg as it is not on their Formulary List with restrictions.

## 2025-01-30 ENCOUNTER — OFFICE VISIT (OUTPATIENT)
Dept: INTERNAL MEDICINE CLINIC | Facility: CLINIC | Age: OVER 89
End: 2025-01-30
Payer: MEDICARE

## 2025-01-30 VITALS
OXYGEN SATURATION: 95 % | RESPIRATION RATE: 16 BRPM | SYSTOLIC BLOOD PRESSURE: 132 MMHG | HEIGHT: 60 IN | TEMPERATURE: 98 F | HEART RATE: 90 BPM | BODY MASS INDEX: 31.22 KG/M2 | DIASTOLIC BLOOD PRESSURE: 78 MMHG | WEIGHT: 159 LBS

## 2025-01-30 DIAGNOSIS — K58.0 IRRITABLE BOWEL SYNDROME WITH DIARRHEA: ICD-10-CM

## 2025-01-30 DIAGNOSIS — E78.5 DYSLIPIDEMIA: ICD-10-CM

## 2025-01-30 DIAGNOSIS — Z00.00 ENCOUNTER FOR MEDICARE ANNUAL WELLNESS EXAM: Primary | ICD-10-CM

## 2025-01-30 DIAGNOSIS — K86.89 PANCREATIC INSUFFICIENCY (HCC): ICD-10-CM

## 2025-01-30 DIAGNOSIS — J45.30 MILD PERSISTENT ASTHMA WITHOUT COMPLICATION (HCC): ICD-10-CM

## 2025-01-30 DIAGNOSIS — G30.9 MILD ALZHEIMER'S DEMENTIA, UNSPECIFIED TIMING OF DEMENTIA ONSET, UNSPECIFIED WHETHER BEHAVIORAL, PSYCHOTIC, OR MOOD DISTURBANCE OR ANXIETY (HCC): ICD-10-CM

## 2025-01-30 DIAGNOSIS — F41.9 ANXIETY: ICD-10-CM

## 2025-01-30 DIAGNOSIS — Z98.890 S/P LUMBAR LAMINECTOMY: ICD-10-CM

## 2025-01-30 DIAGNOSIS — M17.0 PRIMARY OSTEOARTHRITIS OF BOTH KNEES: ICD-10-CM

## 2025-01-30 DIAGNOSIS — F02.A0 MILD ALZHEIMER'S DEMENTIA, UNSPECIFIED TIMING OF DEMENTIA ONSET, UNSPECIFIED WHETHER BEHAVIORAL, PSYCHOTIC, OR MOOD DISTURBANCE OR ANXIETY (HCC): ICD-10-CM

## 2025-01-30 DIAGNOSIS — Z86.19 HISTORY OF CLOSTRIDIOIDES DIFFICILE INFECTION: ICD-10-CM

## 2025-01-30 DIAGNOSIS — G56.01 CARPAL TUNNEL SYNDROME OF RIGHT WRIST: ICD-10-CM

## 2025-01-30 DIAGNOSIS — I25.10 CORONARY ARTERY DISEASE INVOLVING NATIVE CORONARY ARTERY OF NATIVE HEART WITHOUT ANGINA PECTORIS: ICD-10-CM

## 2025-01-30 DIAGNOSIS — M81.0 SENILE OSTEOPOROSIS: ICD-10-CM

## 2025-01-30 DIAGNOSIS — I10 HYPERTENSION, BENIGN: ICD-10-CM

## 2025-01-30 PROBLEM — K86.1 OTHER CHRONIC PANCREATITIS (HCC): Status: RESOLVED | Noted: 2025-01-06 | Resolved: 2025-01-30

## 2025-01-30 PROBLEM — J42 CHRONIC BRONCHITIS, UNSPECIFIED CHRONIC BRONCHITIS TYPE (HCC): Status: RESOLVED | Noted: 2021-10-22 | Resolved: 2025-01-30

## 2025-01-30 PROCEDURE — 99499 UNLISTED E&M SERVICE: CPT | Performed by: INTERNAL MEDICINE

## 2025-01-30 PROCEDURE — G0439 PPPS, SUBSEQ VISIT: HCPCS | Performed by: INTERNAL MEDICINE

## 2025-01-30 PROCEDURE — 99214 OFFICE O/P EST MOD 30 MIN: CPT | Performed by: INTERNAL MEDICINE

## 2025-01-30 NOTE — PROGRESS NOTES
Subjective:   Karla Vallecillo is a 89 year old female who presents for a Medicare Subsequent Annual Wellness visit (Pt already had Initial Annual Wellness) and scheduled follow up of multiple significant but stable problems.  1. Encounter for Medicare annual wellness exam (Primary)- Patient is doing well for her age of 89. She declined all vaccinations due to concerns about allergic reaction, has 44 drug allergies. She declined dexa scan, screening colonoscopy no longer indicated at age 89.  2. Mild Alzheimer's dementia, unspecified timing of dementia onset, unspecified whether behavioral, psychotic, or mood disturbance or anxiety (HCC)-  patient notes memory not as sharp as before, still good with names, bills, most ADLs. She is following with neurology, family looking into assisted living in [place] for patient. Forms filled out for assisted living.  3. Pancreatic insufficiency (HCC)- she stopped pancrelipase (says too expensive and did not help symptoms anyway)  4. Irritable bowel syndrome with diarrhea- doing better, on imodium daily   5. Mild persistent asthma without complication (HCC)- stable, she is back on Flovent and albuterol prn  6. Hypertension, benign- controlled, no cardiac complaints  7. Coronary artery disease involving native coronary artery of native heart without angina pectoris- stable, she follows with cardiology  8. S/P lumbar laminectomy- stable, she wears a back brace  9. Senile osteoporosis- discussed ca and vit d, she declined dexa and other rxn for osteoporosis  10. Primary osteoarthritis of both knees- pt with chronic pain, follows with rheumatology  11. Dyslipidemia- intolerant to statins, will check lipids with next labs, discussed heart healthy diet  12. Anxiety- on xanax from psychiatry, taking xanax 0.25mg 2 during the day and 2 at night. Discussed f/u with psychiatry for weaning down on xanax, likely contributing to memory decline.  13. Carpal tunnel syndrome of right wrist- doing  well at this time  14. History of Clostridioides difficile infection- no acute issues    History/Other:   Fall Risk Assessment:   She has been screened for Falls and is High Risk. Fall Prevention information provided to patient in After Visit Summary.    Do you feel unsteady when standing or walking?: Yes  Do you worry about falling?: No  Have you fallen in the past year?: No     Cognitive Assessment:   Abnormal  What day of the week is this?: Incorrect  What month is it?: Incorrect  What year is it?: Correct  Recall \"Ball\": Correct  Recall \"Flag\": Incorrect  Recall \"Tree\": Incorrect    Functional Ability/Status:   Kalra Vallecillo has some abnormal functions as listed below:  She has Driving difficulties based on screening of functional status. She has difficulties Shopping for Groceries based on screening of functional status. She has Walking problems based on screening of functional status. She has problems with Memory based on screening of functional status.       Depression Screening (PHQ):  PHQ-2 SCORE: 0  , done 1/30/2025   Last [place] Suicide Screening on 1/30/2025 was No Risk.         Advanced Directives:   She does have a Living Will but we do NOT have it on file in Epic.    She does have a POA but we do NOT have it on file in Epic.    Not discussed      Patient Active Problem List   Diagnosis    Cervical radiculopathy    Primary osteoarthritis of right knee    Lumbar disc herniation    Irritable bowel syndrome with diarrhea    CAD (coronary artery disease), native coronary artery    Senile osteoporosis    Hypertension, benign    Dyslipidemia    Osteoarthritis of left knee    S/P lumbar laminectomy    Spondylosis of lumbar region without myelopathy or radiculopathy    Osteoarthritis of one hip, left    History of heart artery stent    Pancreatic insufficiency (HCC)    Pure hypercholesterolemia    Carpal tunnel syndrome of right wrist    Anxiety    Toe infection    Primary osteoarthritis of both  knees    Allergic reaction to nonsteroidal anti-inflammatory drug (NSAID)    Mild Alzheimer's dementia, unspecified timing of dementia onset, unspecified whether behavioral, psychotic, or mood disturbance or anxiety (HCC)    Mild persistent asthma without complication (HCC)    History of Clostridioides difficile infection     Allergies:  She is allergic to aspirin, breo ellipta, budesonide, celebrex [celecoxib], citalopram, clotrimazole, erythromycin, famotidine, fish oil, fluticasone, fluticasone-salmeterol, gabapentin, iodine (topical), iodine solution [povidone iodine], levofloxacin, lidocaine, lisinopril, metamucil [psyllium], nitrofurantoin, nsaids, other, penicillins, plavix [clopidogrel], pulmicort, radiology contrast iodinated dyes, shellfish, mupirocin, advair hfa, bupropion, celecoxib, codeine, effient [prasugrel hydrochloride], linezolid, metronidazole, mold, penicillin g, questran [cholestyramine], risperdal [risperidone], shellfish-derived products, tramadol, warfarin, flulaval, kenalog [triamcinolone], and vancomycin.    Current Medications:  Outpatient Medications Marked as Taking for the 1/30/25 encounter (Office Visit) with Ghada Carballo MD   Medication Sig    fluticasone propionate 44 MCG/ACT Inhalation Aerosol Inhale 2 puffs into the lungs 2 (two) times daily.    albuterol (PROAIR HFA) 108 (90 Base) MCG/ACT Inhalation Aero Soln Inhale 2 puffs into the lungs every 4 (four) hours as needed for Wheezing.    predniSONE 2.5 MG Oral Tab Take 1 tablet (2.5 mg total) by mouth daily.    LOPERAMIDE 2 MG Oral Cap TAKE 1 CAPSULE BY MOUTH DAILY    Verapamil HCl  MG Oral Capsule SR 24 Hr Take 240 mg by mouth daily.    Ascorbic Acid (VITAMIN C OR) Take 1 tablet by mouth every evening.    NON FORMULARY Vitamin B complex with Vit B-12 one daily    ALPRAZolam 0.25 MG Oral Tab Take 1 tablet (0.25 mg total) by mouth every 6 (six) hours as needed.    IMODIUM MULTI-SYMPTOM RELIEF OR Take 1 tablet by mouth as  needed.    acetaminophen 500 MG Oral Tab Take 1 tablet (500 mg total) by mouth every 6 (six) hours as needed for Pain.    Vitamin D3, Cholecalciferol, 50 MCG (2000 UT) Oral Cap Take 1 capsule (2,000 Units total) by mouth daily.    loratadine (CLARITIN) 10 MG Oral Tab Take 1 tablet (10 mg total) by mouth daily.    vitamin E 400 UNITS Oral Cap Take 1 capsule (400 Units total) by mouth daily.       Medical History:  She  has a past medical history of Anemia, Anesthesia complication, Anxiety state, unspecified, Arthritis, Asthma (HCC), Atherosclerosis of coronary artery, Back pain, Back problem, Black stools, Blood transfusion reaction, CAD (coronary artery disease), Calculus of kidney, Cancer (HCC) (2010), Change in hair, Chest pain on exertion, Coronary atherosclerosis of unspecified type of vessel, native or graft, Diarrhea, unspecified, Dyslipidemia, Easy bruising, Extrinsic asthma, unspecified, Fatigue, Glucose intolerance (pre-diabetes), Hearing impairment, Hearing loss, Heart palpitations, HIGH BLOOD PRESSURE, High cholesterol, Hip pain, History of blood transfusion (AT AGE 39), History of depression, Hoarseness, chronic, HTN (hypertension), Indigestion, Leg swelling, Loss of appetite, Mouth sores, Nausea, Osteoarthrosis, unspecified whether generalized or localized, unspecified site, Osteoporosis, Other and unspecified hyperlipidemia, Pain in joints, Pinched nerve in neck, Pneumonia, organism unspecified(486), PONV (postoperative nausea and vomiting), Shortness of breath, Sleep apnea, Stented coronary artery, Stress, Uncomfortable fullness after meals, Unspecified essential hypertension, Visual impairment, Wears glasses, and Weight loss.  Surgical History:  She  has a past surgical history that includes hysterectomy; cholecystectomy; benign biopsy left (a long time ago); injection, w/wo contrast, dx/therapeutic substance, epidural/subarachnoid; cervical/thoracic (9/23/2013); fluor gid & loclzj ndl/cath spi  dx/ther njx (9/23/2013); patient withough preoperative order for iv antibiotic surgical site infection prophylaxis. (9/23/2013); patient documented not to have experienced any of the following events (9/23/2013); colonoscopy (10/21/2013); cath bare metal stent (bms) (2012); upper gi endoscopy,exam; injection, w/wo contrast, dx/therapeutic substance, epidural/subarachnoid; cervical/thoracic (N/A, 8/17/2015); patient withough preoperative order for iv antibiotic surgical site infection prophylaxis. (N/A, 8/17/2015); patient documented not to have experienced any of the following events (N/A, 8/17/2015); daniel localization wire 1 site left (cpt=19281) (1980'S); appendectomy; total abdom hysterectomy; tonsillectomy; egd; stent, coated/cov w/del sys; angioplasty (coronary); back surgery; other surgical history (8/7/12); excis lumbar disk,one level; hemorrhoidectomy,int/ext,simple; spine surgery procedure unlisted; and colonoscopy (N/A, 6/22/2021).   Family History:  Her family history includes Allergies in her daughter, father, and paternal grandmother; Arthritis in her paternal grandmother; Asthma in her father and sister; Autoimmune disease in her brother; Breast Cancer (age of onset: 70) in her mother; Cancer in her brother and maternal grandfather; Colon Cancer in her father; Fibromyalgia in her daughter; Heart Attack in her brother, father, maternal grandfather, maternal grandmother, and paternal grandfather; Heart Disease in her father and paternal grandfather; Hepatitis C in her daughter; High Blood Pressure in her mother; Melanoma in her daughter; Thyroid Disorder in her sister.  Social History:  She  reports that she quit smoking about 53 years ago. Her smoking use included cigarettes. She started smoking about 56 years ago. She has a 0.9 pack-year smoking history. She has never used smokeless tobacco. She reports that she does not currently use alcohol. She reports that she does not use drugs.    Tobacco:  She  smoked tobacco in the past but quit greater than 12 months ago.  Social History     Tobacco Use   Smoking Status Former    Current packs/day: 0.00    Average packs/day: 0.3 packs/day for 3.0 years (0.9 ttl pk-yrs)    Types: Cigarettes    Start date: 1969    Quit date: 1972    Years since quittin.0   Smokeless Tobacco Never        CAGE Alcohol Screen:   CAGE screening score of 0 on 2025, showing low risk of alcohol abuse.      Patient Care Team:  Ghada Carballo MD as PCP - General (Internal Medicine)  Juan Vigil as Consulting Physician (CARDIOLOGY)  Chi Nassar MD as Consulting Physician (GASTROENTEROLOGY)  Piter Henning MD as Consulting Physician (NEUROLOGY)  Tobi Larson MD (Internal Medicine)  Jerod Pierce MD (GASTROENTEROLOGY)  Omar Rubio (SURGERY, ORTHOPEDIC)  Baldemar Canchola ()  Tonya Martínez PA as Psychiatrist/APN (Physician Assistant)  Melissa Jackson MD (PULMONARY DISEASES)  Gloria Valentine APRN (GASTROENTEROLOGY)  Tomás Degroot CMA as CCM Care Manager  Tonya Orozco APRN (Nurse Practitioner)  Judy Reddy MD as Consulting Physician (Cardiovascular Diseases)  Lakisha Sepulveda APRN (Nurse Practitioner)  Josef Mcneill APRN (Nurse Practitioner)  Rebeca Denise PA-C (Physician Assistant Medical)  Froilan Hawthorne MD as Neurologist (NEUROLOGY)  Raina Morales APRN (Nurse Practitioner)    Review of Systems     Negative for f/c/CP. Bowels are doing ok at this time. Memory not as sharp- new diagnosis of mild dementia. Chronic anxiety- on xanax from psychiatry    Objective:   Physical Exam  General Appearance:  Alert, cooperative, no distress, appears stated age   Head:  Normocephalic, without obvious abnormality, atraumatic   Eyes:  PERRL, conjunctiva/corneas clear, EOM's intact both eyes   Ears:  Normal TM's and external ear canals, both ears   Nose: Nares normal, septum midline,mucosa normal   Throat: Lips, mucosa, and tongue  normal   Neck: Supple, no LAD   Back:   Chronic lumbar spine TTP   Lungs:   Clear to auscultation bilaterally, respirations unlabored   Heart:  Regular rate and rhythm, S1 and S2 normal   Abdomen:   Soft, non-tender, bowel sounds WNL   Pelvic: Deferred       Pulses: 2+ and symmetric             Neurologic: Alert and oriented       /78   Pulse 90   Temp 97.7 °F (36.5 °C) (Temporal)   Resp 16   Ht 5' (1.524 m)   Wt 159 lb (72.1 kg)   LMP  (LMP Unknown)   SpO2 95%   BMI 31.05 kg/m²  Estimated body mass index is 31.05 kg/m² as calculated from the following:    Height as of this encounter: 5' (1.524 m).    Weight as of this encounter: 159 lb (72.1 kg).    Medicare Hearing Assessment:   Hearing Screening    Time taken: 1/30/2025 11:45 AM  Entry User: Bryosn Urbina CMA  Screening Method: Finger Rub  Finger Rub Result: Pass           Assessment & Plan:   Karla Vallecillo is a 89 year old female who presents for a Medicare Assessment.     1. Encounter for Medicare annual wellness exam (Primary)- Patient is doing well for her age of 89. She declined all vaccinations due to concerns about allergic reaction, has 44 drug allergies. She declined dexa scan, screening colonoscopy no longer indicated at age 89.  2. Mild Alzheimer's dementia, unspecified timing of dementia onset, unspecified whether behavioral, psychotic, or mood disturbance or anxiety (HCC)- she is following with neurology, family looking into assisted living in [place] for patient. Forms filled out for assisted living.  3. Pancreatic insufficiency (HCC)- she stopped pancrelipase (says too expensive and did not help symptoms anyway)  4. Irritable bowel syndrome with diarrhea- doing better, on imodium daily   5. Mild persistent asthma without complication (HCC)- stable, she is back on Flovent and albuterol prn  6. Hypertension, benign- controlled, no cardiac complaints  7. Coronary artery disease involving native coronary artery of native heart  without angina pectoris- stable, she follows with cardiology  8. S/P lumbar laminectomy- stable, she wears a back brace  9. Senile osteoporosis- discussed ca and vit d, she declined dexa and other rxn for osteoporosis  10. Primary osteoarthritis of both knees- pt with chronic pain, follows with rheumatology  11. Dyslipidemia- intolerant to statins, will check lipids with next labs  12. Anxiety- on xanax from psychiatry, taking xanax 0.25mg 2 during the day and 2 at night. Discussed f/u with psychiatry for weaning down on xanax, likely contributing to memory decline  13. Carpal tunnel syndrome of right wrist- doing well at this time  14. History of Clostridioides difficile infection- no acute issues    The patient indicates understanding of these issues and agrees to the plan.  Continue with current treatment plan.  Reinforced healthy diet, lifestyle, and exercise.      Return in about 6 months (around 7/30/2025), or if symptoms worsen or fail to improve, for chronic issues.     Ghada Carballo MD, 1/30/2025     Supplementary Documentation:   General Health:  In the past six months, have you lost more than 10 pounds without trying?: 2 - No  Has your appetite been poor?: No  Type of Diet: Balanced  How does the patient maintain a good energy level?: Other  How would you describe your daily physical activity?: Light  How would you describe your current health state?: Fair  How do you maintain positive mental well-being?: Social Interaction;Puzzles;Games;Visiting Friends;Visiting Family  On a scale of 0 to 10, with 0 being no pain and 10 being severe pain, what is your pain level?: 4 - (Moderate)  In the past six months, have you experienced urine leakage?: 0-No  At any time do you feel concerned for the safety/well-being of yourself and/or your children, in your home or elsewhere?: No  Have you had any immunizations at another office such as Influenza, Hepatitis B, Tetanus, or Pneumococcal?: No    Health Maintenance    Topic Date Due    Pneumococcal Vaccine: 50+ Years (1 of 2 - PCV) Never done    Zoster Vaccines (1 of 2) Never done    COVID-19 Vaccine (2 - 2024-25 season) 09/01/2024    Influenza Vaccine (1) Never done    Annual Physical  01/03/2025    Annual Depression Screening  Completed    Fall Risk Screening (Annual)  Completed    Meningococcal B Vaccine  Aged Out

## 2025-02-03 NOTE — TELEPHONE ENCOUNTER
Per 1/30/25 ov with [facility] form completed at time of appointment.

## 2025-02-06 NOTE — TELEPHONE ENCOUNTER
Pt daughter [place] notified form completed and placed at the . Pt daughter verbalized that she understood and stated she would picked it but didn't know if it would be this week or next week. Placed copy in the hold bin and in the bin for scan

## 2025-02-07 ENCOUNTER — PATIENT OUTREACH (OUTPATIENT)
Dept: CASE MANAGEMENT | Age: OVER 89
End: 2025-02-07

## 2025-02-07 DIAGNOSIS — E78.5 DYSLIPIDEMIA: ICD-10-CM

## 2025-02-07 DIAGNOSIS — M16.12 OSTEOARTHRITIS OF ONE HIP, LEFT: ICD-10-CM

## 2025-02-07 DIAGNOSIS — E78.00 PURE HYPERCHOLESTEROLEMIA: ICD-10-CM

## 2025-02-07 DIAGNOSIS — G30.9 MILD ALZHEIMER'S DEMENTIA, UNSPECIFIED TIMING OF DEMENTIA ONSET, UNSPECIFIED WHETHER BEHAVIORAL, PSYCHOTIC, OR MOOD DISTURBANCE OR ANXIETY (HCC): ICD-10-CM

## 2025-02-07 DIAGNOSIS — I10 HYPERTENSION, BENIGN: ICD-10-CM

## 2025-02-07 DIAGNOSIS — F02.A0 MILD ALZHEIMER'S DEMENTIA, UNSPECIFIED TIMING OF DEMENTIA ONSET, UNSPECIFIED WHETHER BEHAVIORAL, PSYCHOTIC, OR MOOD DISTURBANCE OR ANXIETY (HCC): ICD-10-CM

## 2025-02-07 DIAGNOSIS — M17.12 PRIMARY OSTEOARTHRITIS OF LEFT KNEE: ICD-10-CM

## 2025-02-07 DIAGNOSIS — M17.11 PRIMARY OSTEOARTHRITIS OF RIGHT KNEE: ICD-10-CM

## 2025-02-07 DIAGNOSIS — I25.10 CORONARY ARTERY DISEASE INVOLVING NATIVE CORONARY ARTERY OF NATIVE HEART WITHOUT ANGINA PECTORIS: Primary | ICD-10-CM

## 2025-02-07 NOTE — PROGRESS NOTES
Spoke to Karla for Chronic Care Management.      Updates to patient care team/comments: UTD  Patient reported changes in medications: UTD  Med Adherence  Comment: pt taking medications as prescribed     Health Maintenance:   Health Maintenance   Topic Date Due    Pneumococcal Vaccine: 50+ Years (1 of 2 - PCV) Never done    Zoster Vaccines (1 of 2) Never done    COVID-19 Vaccine (2 - 2024-25 season) 09/01/2024    Influenza Vaccine (1) Never done    Annual Physical  01/30/2026    Annual Depression Screening  Completed    Fall Risk Screening (Annual)  Completed    Meningococcal B Vaccine  Aged Out       Patient updates/concerns:    Spoke to pt for monthly outreach   Reviewed pcp notes with pt from most recent visit. Pt verbalized understanding that lab work was requested by pcp.    Pt has completed all necessary testing for allergies that was required by oral surgeon. She has had to reschedule the visit on a few occasions because of sinus infection.  She has noticed a black spot on her gums and will be getting a biopsy. Pt states that in the mean time she is not having any difficulty with pain in the gums or chewing she states that her food choices have been unchanged. Pt has been told to schedule as soon as her symptoms improve. Pt thinks that Monday she will call for appt.    Pt is still having regular bouts of diahrrea and takes imodium every morning.   Pt has not committed to any time line in her desire to move to [place]. She is still considering the plan. She has family there that can help her manager her care. She still has a daughter here that helps with her care as well. Daughter lives close by and visits once a week to help shop for pt.  Her son in law has also been able to be relied upon to help with transportation.   Pt has been using the flovent. She has not had any issues with shortness of breath   Pt congestion is improving. She is not currently taking any otc meds for management.    Pt is still relying  heavily on meals on wheels for her diet pt family is trying to encourage more regular use of fresh ingredients when cooking.    Pt keeps her legs elevated often throughout the day. She uses recliner and has not had any issue with swelling in feet or legs.    Pt sees psych  every three months.  She has not discussed tapering xanax and confirms she is using one in the morning and one in afternoon.    Pt balance is good she uses walker.   Pt still participates in the social activities at the facility. She enjoys spending time playing [employer]  Goals/Action Plan:    Active goal from previous outreach:   Stay healthy   Patient reported progress towards goals: pt continues to see providers as needed and takes meds as prescribed               - What: hydration           - Where/When/How: pt goal to have 40 oz water daily   Patient Reported Barriers and Concerns: na                   - Plan for overcoming barriers: none    Care Managers Interventions: continue to provide encouragement and education for healthy coping and dx    Future Appointments:   Future Appointments   Date Time Provider Department Center   6/2/2025 11:20 AM Abundio Tuttle MD [facility]   6/17/2025  2:00 PM Froilan Hawthorne MD [facility]         Next Care Manager Follow Up Date: one month    Reason For Follow Up: review progress and or barriers towards patient's goals.     Time Spent This Encounter Total: 25 min medical record review, telephone communication, care plan updates where needed, education, goals, and action plan recreation/update. Provided acknowledgment and validation to patient's concerns.   Monthly Minute Total including today: 25  Physical assessment, complete health history, and need for CCM established by Ghada Carballo MD.

## 2025-02-10 ENCOUNTER — NURSE TRIAGE (OUTPATIENT)
Dept: INTERNAL MEDICINE CLINIC | Facility: CLINIC | Age: OVER 89
End: 2025-02-10

## 2025-02-10 NOTE — TELEPHONE ENCOUNTER
Action Requested: Summary for Provider     []  Critical Lab, Recommendations Needed  [] Need Additional Advice  []   FYI    []   Need Orders  [] Need Medications Sent to Pharmacy  []  Other     SUMMARY: Scheduled OV tomorrow. Provided home care advice. Provided IC/ER warning signs. Pt agreeable to plan.   Future Appointments   Date Time Provider Department Center   2/11/2025 10:30 AM Chasidy Flowers APRN [facility]   6/2/2025 11:20 AM Abundio Tuttle MD [facility]   6/17/2025  2:00 PM Froilan Hawthorne MD [facility]   Reason for call: Nausea and Derm Problem  Onset: Friday   Reason for Disposition   Patient wants to be seen    Protocols used: Nausea-A-OH    Friday AM woke up with 3 \"bites\" to upper arm. Looks like pimples per pt. Denies rash and itchiness. Stated it is red  Saturday and Sunday had nausea. Able to eat and drink over the weekend  Hasn't eaten yet today, c/o nausea now   Denies fever, abd pain, and vomiting/diarrhea   Has been tolerating fluids  Denies dizziness/lightheadedness. Feeling weak  Daughter coming to  medical records from our office today, pt wanting to be seen today. No openings with anyone  Offered apt tomorrow vs IC eval today  Pt wants to schedule apt tomorrow and she will get a ride  Advised to hydrate, bland diet, monitor symptoms, call with worsening sxs.   IC/ER warning signs given . Pt agreeable to plan

## 2025-02-11 ENCOUNTER — OFFICE VISIT (OUTPATIENT)
Dept: INTERNAL MEDICINE CLINIC | Facility: CLINIC | Age: OVER 89
End: 2025-02-11
Payer: MEDICARE

## 2025-02-11 ENCOUNTER — LAB ENCOUNTER (OUTPATIENT)
Dept: LAB | Age: OVER 89
End: 2025-02-11
Payer: MEDICARE

## 2025-02-11 VITALS
DIASTOLIC BLOOD PRESSURE: 70 MMHG | OXYGEN SATURATION: 94 % | HEIGHT: 60 IN | SYSTOLIC BLOOD PRESSURE: 114 MMHG | HEART RATE: 85 BPM | WEIGHT: 157 LBS | BODY MASS INDEX: 30.82 KG/M2 | RESPIRATION RATE: 21 BRPM | TEMPERATURE: 97 F

## 2025-02-11 DIAGNOSIS — R53.83 OTHER FATIGUE: ICD-10-CM

## 2025-02-11 DIAGNOSIS — R23.9 SKIN CHANGE: ICD-10-CM

## 2025-02-11 DIAGNOSIS — R11.0 NAUSEA: ICD-10-CM

## 2025-02-11 DIAGNOSIS — R35.0 FREQUENT URINATION: ICD-10-CM

## 2025-02-11 DIAGNOSIS — R35.0 FREQUENT URINATION: Primary | ICD-10-CM

## 2025-02-11 LAB
ALBUMIN SERPL-MCNC: 4.2 G/DL (ref 3.2–4.8)
ALBUMIN/GLOB SERPL: 1.6 {RATIO} (ref 1–2)
ALP LIVER SERPL-CCNC: 59 U/L
ALT SERPL-CCNC: 11 U/L
ANION GAP SERPL CALC-SCNC: 7 MMOL/L (ref 0–18)
AST SERPL-CCNC: 21 U/L (ref ?–34)
BASOPHILS # BLD AUTO: 0.09 X10(3) UL (ref 0–0.2)
BASOPHILS NFR BLD AUTO: 1 %
BILIRUB SERPL-MCNC: 0.5 MG/DL (ref 0.2–0.9)
BILIRUB UR QL STRIP.AUTO: NEGATIVE
BUN BLD-MCNC: 15 MG/DL (ref 9–23)
CALCIUM BLD-MCNC: 9.5 MG/DL (ref 8.7–10.6)
CHLORIDE SERPL-SCNC: 107 MMOL/L (ref 98–112)
CO2 SERPL-SCNC: 25 MMOL/L (ref 21–32)
CREAT BLD-MCNC: 0.89 MG/DL
EGFRCR SERPLBLD CKD-EPI 2021: 62 ML/MIN/1.73M2 (ref 60–?)
EOSINOPHIL # BLD AUTO: 0.12 X10(3) UL (ref 0–0.7)
EOSINOPHIL NFR BLD AUTO: 1.4 %
ERYTHROCYTE [DISTWIDTH] IN BLOOD BY AUTOMATED COUNT: 14.9 %
FASTING STATUS PATIENT QL REPORTED: NO
GLOBULIN PLAS-MCNC: 2.7 G/DL (ref 2–3.5)
GLUCOSE BLD-MCNC: 105 MG/DL (ref 70–99)
GLUCOSE UR STRIP.AUTO-MCNC: NORMAL MG/DL
HCT VFR BLD AUTO: 41 %
HGB BLD-MCNC: 13.4 G/DL
IMM GRANULOCYTES # BLD AUTO: 0.04 X10(3) UL (ref 0–1)
IMM GRANULOCYTES NFR BLD: 0.5 %
KETONES UR STRIP.AUTO-MCNC: NEGATIVE MG/DL
LEUKOCYTE ESTERASE UR QL STRIP.AUTO: 500
LYMPHOCYTES # BLD AUTO: 2.3 X10(3) UL (ref 1–4)
LYMPHOCYTES NFR BLD AUTO: 26.6 %
MCH RBC QN AUTO: 29.1 PG (ref 26–34)
MCHC RBC AUTO-ENTMCNC: 32.7 G/DL (ref 31–37)
MCV RBC AUTO: 88.9 FL
MONOCYTES # BLD AUTO: 0.71 X10(3) UL (ref 0.1–1)
MONOCYTES NFR BLD AUTO: 8.2 %
NEUTROPHILS # BLD AUTO: 5.39 X10 (3) UL (ref 1.5–7.7)
NEUTROPHILS # BLD AUTO: 5.39 X10(3) UL (ref 1.5–7.7)
NEUTROPHILS NFR BLD AUTO: 62.3 %
NITRITE UR QL STRIP.AUTO: NEGATIVE
OSMOLALITY SERPL CALC.SUM OF ELEC: 289 MOSM/KG (ref 275–295)
PH UR STRIP.AUTO: 5 [PH] (ref 5–8)
PLATELET # BLD AUTO: 264 10(3)UL (ref 150–450)
POTASSIUM SERPL-SCNC: 4 MMOL/L (ref 3.5–5.1)
PROT SERPL-MCNC: 6.9 G/DL (ref 5.7–8.2)
PROT UR STRIP.AUTO-MCNC: NEGATIVE MG/DL
RBC # BLD AUTO: 4.61 X10(6)UL
RBC UR QL AUTO: NEGATIVE
SODIUM SERPL-SCNC: 139 MMOL/L (ref 136–145)
SP GR UR STRIP.AUTO: 1.01 (ref 1–1.03)
UROBILINOGEN UR STRIP.AUTO-MCNC: NORMAL MG/DL
WBC # BLD AUTO: 8.7 X10(3) UL (ref 4–11)
WBC #/AREA URNS AUTO: >50 /HPF
WBC CLUMPS UR QL AUTO: PRESENT /HPF

## 2025-02-11 PROCEDURE — 81001 URINALYSIS AUTO W/SCOPE: CPT

## 2025-02-11 PROCEDURE — G2211 COMPLEX E/M VISIT ADD ON: HCPCS

## 2025-02-11 PROCEDURE — 87086 URINE CULTURE/COLONY COUNT: CPT

## 2025-02-11 PROCEDURE — 87077 CULTURE AEROBIC IDENTIFY: CPT

## 2025-02-11 PROCEDURE — 99214 OFFICE O/P EST MOD 30 MIN: CPT

## 2025-02-11 PROCEDURE — 85025 COMPLETE CBC W/AUTO DIFF WBC: CPT

## 2025-02-11 PROCEDURE — 80053 COMPREHEN METABOLIC PANEL: CPT

## 2025-02-11 PROCEDURE — 36415 COLL VENOUS BLD VENIPUNCTURE: CPT

## 2025-02-11 PROCEDURE — 87186 SC STD MICRODIL/AGAR DIL: CPT

## 2025-02-11 NOTE — PROGRESS NOTES
Karla Vallecillo is a 89 year old female.   Chief Complaint   Patient presents with    Nausea     Yb rm 16 A nausea after  the bite on arm        HPI:    Patient here today for evaluation of possible bite on right forearm which has been present for roughly 2 weeks. 3 small marks on right arm, not draining or erythematous. Patient also reports nausea and fatigue in last week which she feels is related to bites. She has not seen any insects or anything in her bed. She has someone coming to spray her home for insects to be safe. +increased urination. No abdominal pain. She is still tolerating PO. Remains afebrile.     Allergies:  Allergies[1]   Current Meds:  Current Outpatient Medications   Medication Sig Dispense Refill    fluticasone propionate 44 MCG/ACT Inhalation Aerosol Inhale 2 puffs into the lungs 2 (two) times daily. 10.6 g 1    albuterol (PROAIR HFA) 108 (90 Base) MCG/ACT Inhalation Aero Soln Inhale 2 puffs into the lungs every 4 (four) hours as needed for Wheezing. 1 each 1    predniSONE 2.5 MG Oral Tab Take 1 tablet (2.5 mg total) by mouth daily. 90 tablet 3    LOPERAMIDE 2 MG Oral Cap TAKE 1 CAPSULE BY MOUTH DAILY 90 capsule 0    Verapamil HCl  MG Oral Capsule SR 24 Hr Take 240 mg by mouth daily.      Ascorbic Acid (VITAMIN C OR) Take 1 tablet by mouth every evening.      NON FORMULARY Vitamin B complex with Vit B-12 one daily      ALPRAZolam 0.25 MG Oral Tab Take 1 tablet (0.25 mg total) by mouth every 6 (six) hours as needed. Taking 2 times daily      IMODIUM MULTI-SYMPTOM RELIEF OR Take 1 tablet by mouth as needed.      acetaminophen 500 MG Oral Tab Take 1 tablet (500 mg total) by mouth every 6 (six) hours as needed for Pain.      Vitamin D3, Cholecalciferol, 50 MCG (2000 UT) Oral Cap Take 1 capsule (2,000 Units total) by mouth daily.      loratadine (CLARITIN) 10 MG Oral Tab Take 1 tablet (10 mg total) by mouth daily.      vitamin E 400 UNITS Oral Cap Take 1 capsule (400 Units total) by  mouth daily.          PMH:     Past Medical History:    Anemia    Anesthesia complication    pt stated had a reacion with a medication that sounded like \"memo\"    Anxiety state, unspecified    Arthritis    Asthma (HCC)    Atherosclerosis of coronary artery    Back pain    Back problem    cervical pain    Black stools    Blood transfusion reaction    CAD (coronary artery disease)    Calculus of kidney    Cancer (HCC)    skin cancer in left arm    Change in hair    Chest pain on exertion    Coronary atherosclerosis of unspecified type of vessel, native or graft    Diarrhea, unspecified    Dyslipidemia    Easy bruising    Extrinsic asthma, unspecified    Fatigue    Glucose intolerance (pre-diabetes)    Hearing impairment    HEARING LOSS BUT NO AIDS    Hearing loss    Heart palpitations    HIGH BLOOD PRESSURE    High cholesterol    Hip pain    History of blood transfusion    AFTER HYST    History of depression    Hoarseness, chronic    HTN (hypertension)    Indigestion    Leg swelling    Loss of appetite    Mouth sores    Nausea    Osteoarthrosis, unspecified whether generalized or localized, unspecified site    knees, back    Osteoporosis    Other and unspecified hyperlipidemia    Pain in joints    Pinched nerve in neck    Pneumonia, organism unspecified(486)    PONV (postoperative nausea and vomiting)    Shortness of breath    Sleep apnea    Stented coronary artery    Stress    Uncomfortable fullness after meals    Unspecified essential hypertension    Visual impairment    glasses    Wears glasses    Weight loss       ROS:   Review of Systems   Constitutional: Negative.    HENT: Negative.     Respiratory: Negative.     Cardiovascular: Negative.    Gastrointestinal:  Positive for nausea. Negative for abdominal distention, abdominal pain, constipation, diarrhea and vomiting.   Skin:  Positive for wound (2 small abrasions on right forarem.).            PHYSICAL EXAM:    /70   Pulse 85   Temp 97 °F (36.1 °C)  (Temporal)   Resp 21   Ht 5' (1.524 m)   Wt 157 lb (71.2 kg)   LMP  (LMP Unknown)   SpO2 94%   BMI 30.66 kg/m²   Physical Exam  Constitutional:       Appearance: Normal appearance. She is not ill-appearing or toxic-appearing.   Pulmonary:      Effort: Pulmonary effort is normal.      Breath sounds: Normal breath sounds.   Skin:     Capillary Refill: Capillary refill takes less than 2 seconds.      Findings: Lesion (3 tiny abrasions to right forarm with routine healing no erythema present) present.   Neurological:      Mental Status: She is alert.         ASSESSMENT/ PLAN:   1. Frequent urination  Check labs and urine, management pending findings.   - UA/M With Culture Reflex [E]; Future  - CBC With Differential With Platelet; Future  - Comp Metabolic Panel (14) [E]; Future    2. Nausea  Check urine and labs. Discussed hydration needs.   - UA/M With Culture Reflex [E]; Future  - CBC With Differential With Platelet; Future  - Comp Metabolic Panel (14) [E]; Future    3. Other fatigue  Check labs and urine. Hx of UTI, discussed suspicious for UTI.   - UA/M With Culture Reflex [E]; Future  - CBC With Differential With Platelet; Future  - Comp Metabolic Panel (14) [E]; Future    4. Skin change  Keep site clean and dry. Site is healing well with no concerns. I do not believe abrasions and nausea/urinary symptoms related which I discussed with patient and daughter.       Health Maintenance Due   Topic Date Due    Pneumococcal Vaccine: 50+ Years (1 of 2 - PCV) Never done    Zoster Vaccines (1 of 2) Never done    COVID-19 Vaccine (2 - 2024-25 season) 09/01/2024    Influenza Vaccine (1) Never done     Pt indicates understanding and agrees to the plan.     No follow-ups on file.    Chasidy Flowers, MENDOZA          [1]   Allergies  Allergen Reactions    Aspirin DIARRHEA and SHORTNESS OF BREATH    Breo Ellipta OTHER (SEE COMMENTS) and SHORTNESS OF BREATH     Pneumonia  Pneumonia    Budesonide SHORTNESS OF BREATH     Coughing     Celebrex [Celecoxib] SHORTNESS OF BREATH    Citalopram SHORTNESS OF BREATH and OTHER (SEE COMMENTS)     Oral     Clotrimazole DIARRHEA and SHORTNESS OF BREATH    Erythromycin OTHER (SEE COMMENTS) and SHORTNESS OF BREATH     Short of Breath  Short of Breath  External  Oral   Short of Breath    Famotidine SHORTNESS OF BREATH    Fish Oil ANAPHYLAXIS, OTHER (SEE COMMENTS) and SHORTNESS OF BREATH     Oral     Fluticasone OTHER (SEE COMMENTS)     Per pt not true  Other reaction(s): Propensity to adverse reactions to drug    Fluticasone-Salmeterol Coughing, SHORTNESS OF BREATH and OTHER (SEE COMMENTS)     Inhalation    Gabapentin WHEEZING    Iodine (Topical) RASH and SHORTNESS OF BREATH     Rash with topical Iodine.   Rash with topical Iodine.   Rash with topical Iodine.     Iodine Solution [Povidone Iodine] ANAPHYLAXIS     IVP Dye    Levofloxacin SHORTNESS OF BREATH and OTHER (SEE COMMENTS)     Nausea, leg pain    Nausea, leg pain    Intravenous  Other reaction(s): Propensity to adverse reactions to drug    Lidocaine ANAPHYLAXIS and UNKNOWN     \"Breathing issues\"-- per patient okay with marcaine     Lisinopril SHORTNESS OF BREATH     Other reaction(s): Propensity to adverse reactions to drug    Metamucil [Psyllium] SHORTNESS OF BREATH     And abdominal discomfort.    Nitrofurantoin SHORTNESS OF BREATH and UNKNOWN    Nsaids SHORTNESS OF BREATH and UNKNOWN    Other RASH and SHORTNESS OF BREATH     Transdermal \"memo \" patch ;novacaine Pt states she is okay with marcaine  per allergy testing.    Penicillins SHORTNESS OF BREATH     Short of Breath    Plavix [Clopidogrel] SHORTNESS OF BREATH    Pulmicort SHORTNESS OF BREATH and UNKNOWN     Coughing      Radiology Contrast Iodinated Dyes ANAPHYLAXIS     Other reaction(s): Propensity to adverse reactions to drug    Shellfish SHORTNESS OF BREATH    Mupirocin OTHER (SEE COMMENTS)     Felt short of breath  Other reaction(s): Propensity to adverse reactions to drug    Advair Hfa  OTHER (SEE COMMENTS)    Bupropion OTHER (SEE COMMENTS)     constipation    Celecoxib OTHER (SEE COMMENTS)     Oral     Codeine OTHER (SEE COMMENTS)     Per pt not true pt takes this medication       Effient [Prasugrel Hydrochloride] SHORTNESS OF BREATH    Linezolid DIARRHEA     Other reaction(s): Propensity to adverse reactions to drug    Metronidazole ASTHMA and UNKNOWN     Other reaction(s): Propensity to adverse reactions to drug    Mold SHORTNESS OF BREATH    Penicillin G RASH     sob  Other reaction(s): Propensity to adverse reactions to drug    Questran [Cholestyramine] HIVES and Coughing    Risperdal [Risperidone] SHORTNESS OF BREATH    Shellfish-Derived Products      Sob      Tramadol UNKNOWN     Arms turned red like a sunburn    Warfarin OTHER (SEE COMMENTS)     Asthma attack  Other reaction(s): Propensity to adverse reactions to drug    Flulaval RASH     Other reaction(s): Propensity to adverse reactions to drug    Kenalog [Triamcinolone] NAUSEA ONLY    Vancomycin RASH     Rash to the face and neck

## 2025-02-14 ENCOUNTER — TELEPHONE (OUTPATIENT)
Dept: INTERNAL MEDICINE CLINIC | Facility: CLINIC | Age: OVER 89
End: 2025-02-14

## 2025-02-14 RX ORDER — SULFAMETHOXAZOLE AND TRIMETHOPRIM 800; 160 MG/1; MG/1
1 TABLET ORAL 2 TIMES DAILY
Qty: 6 TABLET | Refills: 0 | Status: SHIPPED | OUTPATIENT
Start: 2025-02-14 | End: 2025-02-17

## 2025-02-14 NOTE — TELEPHONE ENCOUNTER
Called patient with provider response. Advised her to call office after finishing abx if she is not feeling any better. Patient confirms understanding.

## 2025-02-14 NOTE — TELEPHONE ENCOUNTER
Bactrim BID with food x 3 days. Please have her monitor for worsening of diarrhea. Still strongly advise her to schedule with ID if treatment fails will likely need IV antibiotics. Please provide ER warnings.

## 2025-02-14 NOTE — TELEPHONE ENCOUNTER
Patient called statin she has a new Rx for cipro and she does not take cipro, states she has a lot of allergies and she would like the same medication she had last time.   Reviewed result note with her and she is insistent she get the medication she had last time.   Pulled result note from 9/22/24  Please advise       Chasidy Flowers, APRN  2/13/2025  1:39 PM CST       + UTI. Patient with 44 drug allergies. I sent 3 day course of cipro to pharmacy. Stop with any new symptoms, ER for severe symptoms. I would have patient scheduled with ID given multiple allergies in case symptoms fail to improve on medication as IV antibiotics might be needed- through ID. Please call patient and daughter with information.             Kandy Simeon PA-C  9/22/2024 11:56 AM CDT Back to Top      Called and spoke with pt about her ur cx results.  Cannot do 1st gen cephalosporin as resistant.  Pt picked up Bactrim and Vancomycin.  She will start Bactrim today and watch for possible reactions (hx of many reactions, \"allergies\" to meds).  If fails Bactrim will have to consider Cefdinir given her other allergies and intolerances.  Pt prefers to wait to start Vancomycin (hx of C Diff) but will watch for worsening of her chronic diarrhea.  Pt will call the office if any questions or problems.  Her only sx is mild urinary frequency, she denies developing any new problems since her OV with me last week.

## 2025-02-19 ENCOUNTER — TELEPHONE (OUTPATIENT)
Dept: INTERNAL MEDICINE CLINIC | Facility: CLINIC | Age: OVER 89
End: 2025-02-19

## 2025-02-19 NOTE — TELEPHONE ENCOUNTER
Patient called and she spoke with insurance regarding Flovent PA is approved.Informed patient that once we get notification from insurance we will relay to pharmacy. Patient verbalizes understanding.

## 2025-02-19 NOTE — TELEPHONE ENCOUNTER
Prior Authorization initiated for  Fluticasone Propionate HFA via CoverMyMeds.      CASE KEY [id number]    Awaiting outcome.

## 2025-02-20 NOTE — TELEPHONE ENCOUNTER
Triage call transferred.   Spoke with pt f/u on PA for fluticasone propionate. Informed our PA team is working on this.   Appears awaiting determination. Per pt, concerned as going into the weekend and has been waiting. Informed will send follow-up message to our PA team.   No further questions or concerns. Pt verbalized understanding and agreed with POC.    Triage support- pt following up on PA status. Thank you.

## 2025-02-24 ENCOUNTER — TELEPHONE (OUTPATIENT)
Dept: INTERNAL MEDICINE CLINIC | Facility: CLINIC | Age: OVER 89
End: 2025-02-24

## 2025-02-24 NOTE — TELEPHONE ENCOUNTER
Received [employer] form.  Form scanned and emailed.  Original form sent to Forms Department via Inter[employer] mail.

## 2025-02-24 NOTE — TELEPHONE ENCOUNTER
Called [facility] pharmacy. Stated it did go through insurance. One inhaler picked up 1/8/25. Cash price paid 2/22/25 (pt paid out of pocket on 2/22). Insurance paid for only one. Pharmacy stated they do not have alternate medications.     BD- Message from 2/21, triage support looking for diagnosis code for prior auth

## 2025-02-25 NOTE — TELEPHONE ENCOUNTER
Mild persistent asthma without complication per snap shot. I did not see patient for asthma looks like this was ordered from 1/2025 visit.

## 2025-02-26 NOTE — TELEPHONE ENCOUNTER
Received [employer] form in forms dept, sending back to providers office, for provider to complete.

## 2025-02-28 NOTE — TELEPHONE ENCOUNTER
Pt daughter Indiana Ferrell who is on HIPAA called and is asking for a phone call to her once the forms have been completed. [place] can be reached at [phone]

## 2025-03-01 NOTE — TELEPHONE ENCOUNTER
ES-medical assistant stated per Dr Ghada Carballo appointment is required.     I called spoke with patient scheduled an appointment for   Future Appointments   Date Time Provider Department Center   3/29/2025  9:40 AM Ghada Carballo MD [facility]        Forms given to ES-medical assistant

## 2025-03-13 ENCOUNTER — PATIENT OUTREACH (OUTPATIENT)
Dept: CASE MANAGEMENT | Age: OVER 89
End: 2025-03-13

## 2025-03-13 DIAGNOSIS — K86.89 PANCREATIC INSUFFICIENCY (HCC): Primary | ICD-10-CM

## 2025-03-13 DIAGNOSIS — I25.10 CORONARY ARTERY DISEASE INVOLVING NATIVE CORONARY ARTERY OF NATIVE HEART WITHOUT ANGINA PECTORIS: ICD-10-CM

## 2025-03-13 DIAGNOSIS — G30.9 MILD ALZHEIMER'S DEMENTIA, UNSPECIFIED TIMING OF DEMENTIA ONSET, UNSPECIFIED WHETHER BEHAVIORAL, PSYCHOTIC, OR MOOD DISTURBANCE OR ANXIETY (HCC): ICD-10-CM

## 2025-03-13 DIAGNOSIS — E78.5 DYSLIPIDEMIA: ICD-10-CM

## 2025-03-13 DIAGNOSIS — I10 HYPERTENSION, BENIGN: ICD-10-CM

## 2025-03-13 DIAGNOSIS — K58.0 IRRITABLE BOWEL SYNDROME WITH DIARRHEA: ICD-10-CM

## 2025-03-13 DIAGNOSIS — F02.A0 MILD ALZHEIMER'S DEMENTIA, UNSPECIFIED TIMING OF DEMENTIA ONSET, UNSPECIFIED WHETHER BEHAVIORAL, PSYCHOTIC, OR MOOD DISTURBANCE OR ANXIETY (HCC): ICD-10-CM

## 2025-03-13 DIAGNOSIS — M17.12 PRIMARY OSTEOARTHRITIS OF LEFT KNEE: ICD-10-CM

## 2025-03-13 DIAGNOSIS — M16.12 OSTEOARTHRITIS OF ONE HIP, LEFT: ICD-10-CM

## 2025-03-13 DIAGNOSIS — J45.30 MILD PERSISTENT ASTHMA WITHOUT COMPLICATION (HCC): ICD-10-CM

## 2025-03-13 DIAGNOSIS — M17.0 PRIMARY OSTEOARTHRITIS OF BOTH KNEES: ICD-10-CM

## 2025-03-13 DIAGNOSIS — M17.11 PRIMARY OSTEOARTHRITIS OF RIGHT KNEE: ICD-10-CM

## 2025-03-13 NOTE — PROGRESS NOTES
Spoke to Karla for Chronic Care Management.      Updates to patient care team/comments: UTD  Patient reported changes in medications: UTD  Med Adherence  Comment: pt taking medications as prescribed     Health Maintenance:   Health Maintenance   Topic Date Due    Pneumococcal Vaccine: 50+ Years (1 of 2 - PCV) Never done    Zoster Vaccines (1 of 2) Never done    COVID-19 Vaccine (2 - 2024-25 season) 09/01/2024    Influenza Vaccine (1) Never done    Annual Physical  01/30/2026    Annual Depression Screening  Completed    Fall Risk Screening (Annual)  Completed    Meningococcal B Vaccine  Aged Out       Patient updates/concerns:    Spoke to pt for monthly outreach   Pt feels her condition is stable overall.  She is two weeks removed from oral surgery and she feels that it was successful. She states that the sutures have dissolved and she does not have any more necessary follow ups. She has also seen eye doctor yesterday and specialist was satisfied with her condition.    Reviewed with pt strategies to keep herself from falling victim to the various types of fraud targeting seniors.  Pt confirmed she does not answer unfamiliar numbers and screens using voicemail.    Reviewed with pt her prescriptions. Pt states that all are current and up to date and she is not in need for any refills. Pt states that she can afford all of her medications. Pt is only taking her xanax 2 times daily and has no difficulty with sleeping.    Pt is still using a  to help keep apartment clean. Pt does not have any one else coming into the home regularly.   Pt will still be visited routinely by daughter who will do her grocery shopping. Pt daughter will sometimes provide transportation if she is not working. Pt will typically rely on neighbor to help or ride assist [place].   Pt confirmed that she has visit upcoming with pcp. She confirmed the appt was scheduled In order to complete some forms.    Pt has been participating in the  social events at her facility and when weather is good she is able to get outside to do some walking.         Goals/Action Plan:    Active goal from previous outreach: stay healthy    Patient reported progress towards goals: pt continues to see providers as needed and takes meds as prescribed               - What: hydration           - Where/When/How: pt continues with goal to have  a min of 40 oz water daily   Patient Reported Barriers and Concerns: n/a                   - Plan for overcoming barriers: none  =  Care Managers Interventions: continue to provide encouragement and education for healthy coping and dx    Future Appointments:   Future Appointments   Date Time Provider Department Center   3/29/2025  9:40 AM Ghada Carballo MD [facility]   6/2/2025 11:20 AM Abundio Tuttle MD [id number]   6/17/2025  2:00 PM Froilan Hawthorne MD [facility]         Next Care Manager Follow Up Date: one month    Reason For Follow Up: review progress and or barriers towards patient's goals.     Time Spent This Encounter Total: 30 min medical record review, telephone communication, care plan updates where needed, education, goals, and action plan recreation/update. Provided acknowledgment and validation to patient's concerns.   Monthly Minute Total including today: 30  Physical assessment, complete health history, and need for CCM established by Ghada Carballo MD.

## 2025-03-29 ENCOUNTER — OFFICE VISIT (OUTPATIENT)
Dept: INTERNAL MEDICINE CLINIC | Facility: CLINIC | Age: OVER 89
End: 2025-03-29
Payer: MEDICARE

## 2025-03-29 VITALS
BODY MASS INDEX: 30.51 KG/M2 | HEART RATE: 78 BPM | WEIGHT: 155.38 LBS | TEMPERATURE: 98 F | SYSTOLIC BLOOD PRESSURE: 134 MMHG | HEIGHT: 60 IN | DIASTOLIC BLOOD PRESSURE: 80 MMHG | RESPIRATION RATE: 16 BRPM | OXYGEN SATURATION: 98 %

## 2025-03-29 DIAGNOSIS — I10 HYPERTENSION, BENIGN: ICD-10-CM

## 2025-03-29 DIAGNOSIS — K52.9 CHRONIC DIARRHEA: ICD-10-CM

## 2025-03-29 DIAGNOSIS — M17.11 PRIMARY OSTEOARTHRITIS OF RIGHT KNEE: Primary | ICD-10-CM

## 2025-03-29 PROCEDURE — G2211 COMPLEX E/M VISIT ADD ON: HCPCS | Performed by: INTERNAL MEDICINE

## 2025-03-29 PROCEDURE — 99214 OFFICE O/P EST MOD 30 MIN: CPT | Performed by: INTERNAL MEDICINE

## 2025-03-29 NOTE — PROGRESS NOTES
Karla Vallecillo is a 89 year old female.    Chief Complaint   Patient presents with    Complete Form     Cg, Rm - 4 - [facility] from [facility]    Follow - Up     Knee pain, BP elevated today       HPI:     Pleasant patient with HTN, anxiety, advanced osteoarthritis, IBS with diarrhea, pancreatic insufficiency, DDD lumbar spine, CAD here for follow up and forms.  Her  was a  so she needs forms filled out to see if she can get benefits. She was rushing to get here this morning, BP initially 144/90.  By end of visit 134/80, she reports compliance with verapamil. No HA/CP. Still dealing with chronic diarrhea, saw [facility] and was advised to take imodium and Zenpep. She is not taking Zenpep, feels it does not help much with the diarrhea. She is c/o right knee pain, has known advanced arthritis of both knees. CSI has helped in the past. She would like to see ortho for options. Currently taking tylenol prn. She is looking into assisted living in [place], not sure right now of timing.       Patient Active Problem List   Diagnosis    Cervical radiculopathy    Primary osteoarthritis of right knee    Lumbar disc herniation    Irritable bowel syndrome with diarrhea    CAD (coronary artery disease), native coronary artery    Senile osteoporosis    Hypertension, benign    Dyslipidemia    Osteoarthritis of left knee    S/P lumbar laminectomy    Spondylosis of lumbar region without myelopathy or radiculopathy    Osteoarthritis of one hip, left    History of heart artery stent    Pancreatic insufficiency (HCC)    Pure hypercholesterolemia    Carpal tunnel syndrome of right wrist    Anxiety    Toe infection    Primary osteoarthritis of both knees    Allergic reaction to nonsteroidal anti-inflammatory drug (NSAID)    Mild Alzheimer's dementia, unspecified timing of dementia onset, unspecified whether behavioral, psychotic, or mood disturbance or anxiety (HCC)    Mild persistent asthma without complication (HCC)     History of Clostridioides difficile infection     Current Outpatient Medications   Medication Sig Dispense Refill    LOPERAMIDE 2 MG Oral Cap TAKE 1 CAPSULE BY MOUTH DAILY 90 capsule 2    Beclomethasone Diprop HFA 80 MCG/ACT Inhalation Aerosol, Breath Activated Inhale 2 puffs into the lungs in the morning and 2 puffs before bedtime. 1 each 3    fluticasone propionate 44 MCG/ACT Inhalation Aerosol Inhale 2 puffs into the lungs 2 (two) times daily. 10.6 g 1    albuterol (PROAIR HFA) 108 (90 Base) MCG/ACT Inhalation Aero Soln Inhale 2 puffs into the lungs every 4 (four) hours as needed for Wheezing. 1 each 1    predniSONE 2.5 MG Oral Tab Take 1 tablet (2.5 mg total) by mouth daily. 90 tablet 3    Verapamil HCl  MG Oral Capsule SR 24 Hr Take 240 mg by mouth daily.      Ascorbic Acid (VITAMIN C OR) Take 1 tablet by mouth every evening.      NON FORMULARY Vitamin B complex with Vit B-12 one daily      ALPRAZolam 0.25 MG Oral Tab Take 1 tablet (0.25 mg total) by mouth every 6 (six) hours as needed. Taking 2 times daily      IMODIUM MULTI-SYMPTOM RELIEF OR Take 1 tablet by mouth as needed.      acetaminophen 500 MG Oral Tab Take 1 tablet (500 mg total) by mouth every 6 (six) hours as needed for Pain.      Vitamin D3, Cholecalciferol, 50 MCG (2000 UT) Oral Cap Take 1 capsule (2,000 Units total) by mouth daily.      loratadine (CLARITIN) 10 MG Oral Tab Take 1 tablet (10 mg total) by mouth daily.      vitamin E 400 UNITS Oral Cap Take 1 capsule (400 Units total) by mouth daily.        Past Medical History:    Anemia    Anesthesia complication    pt stated had a reacion with a medication that sounded like \"memo\"    Anxiety state, unspecified    Arthritis    Asthma (HCC)    Atherosclerosis of coronary artery    Back pain    Back problem    cervical pain    Black stools    Blood transfusion reaction    CAD (coronary artery disease)    Calculus of kidney    Cancer (HCC)    skin cancer in left arm    Change in hair    Chest  pain on exertion    Coronary atherosclerosis of unspecified type of vessel, native or graft    Diarrhea, unspecified    Dyslipidemia    Easy bruising    Extrinsic asthma, unspecified    Fatigue    Glucose intolerance (pre-diabetes)    Hearing impairment    HEARING LOSS BUT NO AIDS    Hearing loss    Heart palpitations    HIGH BLOOD PRESSURE    High cholesterol    Hip pain    History of blood transfusion    AFTER HYST    History of depression    Hoarseness, chronic    HTN (hypertension)    Indigestion    Leg swelling    Loss of appetite    Mouth sores    Nausea    Osteoarthrosis, unspecified whether generalized or localized, unspecified site    knees, back    Osteoporosis    Other and unspecified hyperlipidemia    Pain in joints    Pinched nerve in neck    Pneumonia, organism unspecified(486)    PONV (postoperative nausea and vomiting)    Shortness of breath    Sleep apnea    Stented coronary artery    Stress    Uncomfortable fullness after meals    Unspecified essential hypertension    Visual impairment    glasses    Wears glasses    Weight loss      Social History:  Social History     Socioeconomic History    Marital status:    Tobacco Use    Smoking status: Former     Current packs/day: 0.00     Average packs/day: 0.3 packs/day for 3.0 years (0.9 ttl pk-yrs)     Types: Cigarettes     Start date: 1969     Quit date: 1972     Years since quittin.1    Smokeless tobacco: Never   Vaping Use    Vaping status: Never Used   Substance and Sexual Activity    Alcohol use: Not Currently    Drug use: No    Sexual activity: Never   Other Topics Concern    Caffeine Concern Yes     Comment: tea    Exercise No   Social History Narrative    ** Merged History Encounter **          Social Drivers of Health     Food Insecurity: No Food Insecurity (2025)    NCSS - Food Insecurity     Worried About Running Out of Food in the Last Year: No     Ran Out of Food in the Last Year: No   Transportation Needs: No  Transportation Needs (1/30/2025)    NCSS - Transportation     Lack of Transportation: No   Stress: No Stress Concern Present (6/15/2021)    Received from [facility] of Occupational Health - Occupational Stress Questionnaire     Feeling of Stress : Not at all   Housing Stability: Not At Risk (1/30/2025)    NCSS - Housing/Utilities     Has Housing: Yes     Worried About Losing Housing: No     Unable to Get Utilities: No     Family History   Problem Relation Age of Onset    Breast Cancer Mother 70    High Blood Pressure Mother     Allergies Father     Asthma Father     Colon Cancer Father     Heart Disease Father     Heart Attack Father     Heart Attack Paternal Grandfather     Heart Disease Paternal Grandfather     Arthritis Paternal Grandmother     Allergies Paternal Grandmother     Cancer Maternal Grandfather     Heart Attack Maternal Grandfather     Heart Attack Maternal Grandmother     Heart Attack Brother     Other (Autoimmune disease) Brother     Thyroid Disorder Sister     Asthma Sister     Cancer Brother     Allergies Daughter     Fibromyalgia Daughter     Melanoma Daughter     Other (Hepatitis C) Daughter         Allergies  Allergies[1]      REVIEW OF SYSTEMS:   GENERAL HEALTH:  no fevers   RESPIRATORY: no cough  CARDIOVASCULAR: denies chest pain  GI: denies abdominal pain  : no dysuria  NEURO: denies headaches  PSYCH: anxiety controlled with medication  HEME: No adenopathy      EXAM:   /80   Pulse 78   Temp 98 °F (36.7 °C) (Temporal)   Resp 16   Ht 5' (1.524 m)   Wt 155 lb 6.4 oz (70.5 kg)   LMP  (LMP Unknown)   SpO2 98%   BMI 30.35 kg/m²   GENERAL: well developed, well nourished,in no apparent distress  HEENT: atraumatic, normocephalic  NECK: chronic posterior neck TTP  LUNGS: normal rate without respiratory distress, lungs clear to auscultation  CARDIO: RRR nl S1 S2  GI: normal bowel sounds, soft, NT/ND  Spine: lumbar spine  TTP  EXTREMITIES: bony enlargement of both knees, crepitations, pain in knees with ambulation.  No redness or warmth  NEURO: Alert and oriented, uses walker to ambulate    ASSESSMENT AND PLAN:     Encounter Diagnoses   Name     Primary osteoarthritis of right knee- continue otc tylenol, f/u rheumatology or ortho for another CSI.  Surgery is not an option for her. Forms filled out for [facility] to see if she can get additional benefits since her  was a .     Hypertension, benign- overall controlled, CPM     Chronic diarrhea- continue imodium, add Zenpep as suggested by SGI       Code selection for this visit was based on time spent 35 min(on date of service in preparing to see the patient, obtaining and/or reviewing separately obtained history, performing a medically appropriate examination, counseling and educating the patient/family/caregiver, ordering medications or testing.  No orders of the defined types were placed in this encounter.      Meds & Refills for this Visit:  Requested Prescriptions      No prescriptions requested or ordered in this encounter       Imaging & Consults:  ORTHOPEDIC - INTERNAL    Return if symptoms worsen or fail to improve.  There are no Patient Instructions on file for this visit.      The patient indicates understanding of these issues and agrees to the plan.           [1]   Allergies  Allergen Reactions    Aspirin DIARRHEA and SHORTNESS OF BREATH    Breo Ellipta OTHER (SEE COMMENTS) and SHORTNESS OF BREATH     Pneumonia  Pneumonia    Budesonide SHORTNESS OF BREATH     Coughing    Celebrex [Celecoxib] SHORTNESS OF BREATH    Citalopram SHORTNESS OF BREATH and OTHER (SEE COMMENTS)     Oral     Clotrimazole DIARRHEA and SHORTNESS OF BREATH    Erythromycin OTHER (SEE COMMENTS) and SHORTNESS OF BREATH     Short of Breath  Short of Breath  External  Oral   Short of Breath    Famotidine SHORTNESS OF BREATH    Fish Oil ANAPHYLAXIS, OTHER (SEE COMMENTS) and SHORTNESS OF  BREATH     Oral     Fluticasone OTHER (SEE COMMENTS)     Per pt not true  Other reaction(s): Propensity to adverse reactions to drug    Fluticasone-Salmeterol Coughing, SHORTNESS OF BREATH and OTHER (SEE COMMENTS)     Inhalation    Gabapentin WHEEZING    Iodine (Topical) RASH and SHORTNESS OF BREATH     Rash with topical Iodine.   Rash with topical Iodine.   Rash with topical Iodine.     Iodine Solution [Povidone Iodine] ANAPHYLAXIS     IVP Dye    Levofloxacin SHORTNESS OF BREATH and OTHER (SEE COMMENTS)     Nausea, leg pain    Nausea, leg pain    Intravenous  Other reaction(s): Propensity to adverse reactions to drug    Lidocaine ANAPHYLAXIS and UNKNOWN     \"Breathing issues\"-- per patient okay with marcaine     Lisinopril SHORTNESS OF BREATH     Other reaction(s): Propensity to adverse reactions to drug    Metamucil [Psyllium] SHORTNESS OF BREATH     And abdominal discomfort.    Nitrofurantoin SHORTNESS OF BREATH and UNKNOWN    Nsaids SHORTNESS OF BREATH and UNKNOWN    Other RASH and SHORTNESS OF BREATH     Transdermal \"memo \" patch ;novacaine Pt states she is okay with marcaine  per allergy testing.    Penicillins SHORTNESS OF BREATH     Short of Breath    Plavix [Clopidogrel] SHORTNESS OF BREATH    Pulmicort SHORTNESS OF BREATH and UNKNOWN     Coughing      Radiology Contrast Iodinated Dyes ANAPHYLAXIS     Other reaction(s): Propensity to adverse reactions to drug    Shellfish SHORTNESS OF BREATH    Mupirocin OTHER (SEE COMMENTS)     Felt short of breath  Other reaction(s): Propensity to adverse reactions to drug    Advair Hfa OTHER (SEE COMMENTS)    Bupropion OTHER (SEE COMMENTS)     constipation    Celecoxib OTHER (SEE COMMENTS)     Oral     Codeine OTHER (SEE COMMENTS)     Per pt not true pt takes this medication       Effient [Prasugrel Hydrochloride] SHORTNESS OF BREATH    Linezolid DIARRHEA     Other reaction(s): Propensity to adverse reactions to drug    Metronidazole ASTHMA and UNKNOWN     Other  reaction(s): Propensity to adverse reactions to drug    Mold SHORTNESS OF BREATH    Penicillin G RASH     sob  Other reaction(s): Propensity to adverse reactions to drug    Questran [Cholestyramine] HIVES and Coughing    Risperdal [Risperidone] SHORTNESS OF BREATH    Shellfish-Derived Products      Sob      Tramadol UNKNOWN     Arms turned red like a sunburn    Warfarin OTHER (SEE COMMENTS)     Asthma attack  Other reaction(s): Propensity to adverse reactions to drug    Flulaval RASH     Other reaction(s): Propensity to adverse reactions to drug    Kenalog [Triamcinolone] NAUSEA ONLY    Vancomycin RASH     Rash to the face and neck

## 2025-04-03 ENCOUNTER — TELEPHONE (OUTPATIENT)
Dept: INTERNAL MEDICINE CLINIC | Facility: CLINIC | Age: OVER 89
End: 2025-04-03

## 2025-04-03 NOTE — TELEPHONE ENCOUNTER
Patient called stating that she got a form in the mail about having someone come to her home for help. Patient states that she did not discuss this with  so she is unsure why she is getting that letter. Informed patient that on LOV 3/29/25 there is no note about  setting up something for home health. Patient verbalizes understanding and thinks it might be spam mail.

## 2025-04-04 ENCOUNTER — TELEPHONE (OUTPATIENT)
Dept: INTERNAL MEDICINE CLINIC | Facility: CLINIC | Age: OVER 89
End: 2025-04-04

## 2025-04-04 RX ORDER — FLUTICASONE PROPIONATE 220 UG/1
2 AEROSOL, METERED RESPIRATORY (INHALATION) 2 TIMES DAILY
Qty: 24 G | Refills: 0 | Status: SHIPPED | OUTPATIENT
Start: 2025-04-04 | End: 2025-04-04 | Stop reason: CLARIF

## 2025-04-04 NOTE — TELEPHONE ENCOUNTER
Called patient. She requests a refill of Flovent . She likes it better than the fluticasone propionate 44 MCG/ACT Inhalation Aerosol. She states she tried the other medication but it irritated her lungs and she didn't like it.

## 2025-04-04 NOTE — TELEPHONE ENCOUNTER
Copy of completed Examination for Housebound Status or Permanent Need for Regular Aid and Attendance form put in  bin at the  and sent to scanning.   Note on paperwork states daughters were applying for veterans benefits for her care since her  was a .  Instructions note were to mail or call Indiana Ferrell (daughter) for . Per Judy Mcdonald [phone].  [email or web address].    Spoke to Liyah Zamarripa who is going to have her sister Chery Mcdonald give us a call and update the release form.  Form will be up at the  in the  bin.

## 2025-04-04 NOTE — TELEPHONE ENCOUNTER
Harriet from [facility] called to state they need a prior authorization for Flovent    Triage support - please assist with prior auth for flovent

## 2025-04-04 NOTE — TELEPHONE ENCOUNTER
Dear Nursing staff,    Please call patient to clarify what inhaler they are using? Pharmacy is re  questing Flovent 220. Discontinued 3/15/2024. Patient is currently on       fluticasone propionate 44 MCG/ACT Inhalation Aerosol . Written 1/7/2025 by Rebeca Denise. PA .     Thank You ,  Tonja Hutton

## 2025-04-05 ENCOUNTER — TELEPHONE (OUTPATIENT)
Dept: INTERNAL MEDICINE CLINIC | Facility: CLINIC | Age: OVER 89
End: 2025-04-05

## 2025-04-05 NOTE — TELEPHONE ENCOUNTER
Received fax that the insurance does not cover the medication originally prescribed.  There is a suggstion of an alternative one, Arnuity; CALIXTO REDIHALER.

## 2025-04-05 NOTE — TELEPHONE ENCOUNTER
Margy Law, RN   VG    4/4/25  4:08 PM  Note     Harriet from [facility] called to state they need a prior authorization for Flovent     Triage support - please assist with prior auth for flovent

## 2025-04-06 NOTE — TELEPHONE ENCOUNTER
The following alternative(s) is/are the preferred alternative(s): ARNUITY ELLIPTA, QVAR REDIHALER.

## 2025-04-07 ENCOUNTER — TELEPHONE (OUTPATIENT)
Dept: INTERNAL MEDICINE CLINIC | Facility: CLINIC | Age: OVER 89
End: 2025-04-07

## 2025-04-07 RX ORDER — FLUTICASONE PROPIONATE 44 UG/1
2 AEROSOL, METERED RESPIRATORY (INHALATION) 2 TIMES DAILY
Qty: 10.6 G | Refills: 1 | Status: SHIPPED | OUTPATIENT
Start: 2025-04-07

## 2025-04-07 NOTE — TELEPHONE ENCOUNTER
Patient called. She states she spoke to her insurance and they finally agreed they would cover her Flovent inhaler. She does not like Qvar as it has irritated her lungs in the past, and she has not picked up the prescription. She needs us to send new rx Flovent to [facility].     Refill pended.

## 2025-04-07 NOTE — TELEPHONE ENCOUNTER
Routing to triage support-pharmacy looking for alternative .    Received fax that the insurance does not cover the medication originally prescribed.  There is a suggstion of an alternative one, Arnuity; CALIXTO REDIHALER.

## 2025-04-08 NOTE — TELEPHONE ENCOUNTER
Denied    Note from payer: Request Reference Number: [id number].  FLUTICAS HFA AER 44MCG is denied for not meeting the prior authorization requirement(s).  Details of this decision are in the notice attached below or have been faxed to you.  Payer: [employer] P[employer] Part D Case ID: [id number]    [phone]    [phone]  Electronic appeal: Not supported  Appeal instructions: Appeals are not supported through ePA. Please refer to the fax case notice for appeals information and instructions.

## 2025-04-08 NOTE — TELEPHONE ENCOUNTER
Spoke to patient stating that she had talked to her insurance and Flovent was approved. Informed patient that from our end, it says it is denied. Patient states that she had already talked to her insurance and it was approved.

## 2025-04-17 ENCOUNTER — PATIENT OUTREACH (OUTPATIENT)
Dept: CASE MANAGEMENT | Age: OVER 89
End: 2025-04-17

## 2025-04-17 DIAGNOSIS — M17.12 PRIMARY OSTEOARTHRITIS OF LEFT KNEE: ICD-10-CM

## 2025-04-17 DIAGNOSIS — M17.11 PRIMARY OSTEOARTHRITIS OF RIGHT KNEE: ICD-10-CM

## 2025-04-17 DIAGNOSIS — M16.12 OSTEOARTHRITIS OF ONE HIP, LEFT: ICD-10-CM

## 2025-04-17 DIAGNOSIS — M81.0 SENILE OSTEOPOROSIS: ICD-10-CM

## 2025-04-17 DIAGNOSIS — G30.9 MILD ALZHEIMER'S DEMENTIA, UNSPECIFIED TIMING OF DEMENTIA ONSET, UNSPECIFIED WHETHER BEHAVIORAL, PSYCHOTIC, OR MOOD DISTURBANCE OR ANXIETY (HCC): ICD-10-CM

## 2025-04-17 DIAGNOSIS — F02.A0 MILD ALZHEIMER'S DEMENTIA, UNSPECIFIED TIMING OF DEMENTIA ONSET, UNSPECIFIED WHETHER BEHAVIORAL, PSYCHOTIC, OR MOOD DISTURBANCE OR ANXIETY (HCC): ICD-10-CM

## 2025-04-17 DIAGNOSIS — I10 HYPERTENSION, BENIGN: ICD-10-CM

## 2025-04-17 DIAGNOSIS — I25.10 CORONARY ARTERY DISEASE INVOLVING NATIVE CORONARY ARTERY OF NATIVE HEART WITHOUT ANGINA PECTORIS: ICD-10-CM

## 2025-04-17 DIAGNOSIS — E78.00 PURE HYPERCHOLESTEROLEMIA: Primary | ICD-10-CM

## 2025-04-17 DIAGNOSIS — M17.0 PRIMARY OSTEOARTHRITIS OF BOTH KNEES: ICD-10-CM

## 2025-04-17 DIAGNOSIS — K86.89 PANCREATIC INSUFFICIENCY (HCC): ICD-10-CM

## 2025-04-17 DIAGNOSIS — J45.30 MILD PERSISTENT ASTHMA WITHOUT COMPLICATION (HCC): ICD-10-CM

## 2025-04-17 DIAGNOSIS — E78.5 DYSLIPIDEMIA: ICD-10-CM

## 2025-04-17 NOTE — PROGRESS NOTES
Spoke to Karla for Chronic Care Management.      Updates to patient care team/comments: UTD  Patient reported changes in medications: UTD  Med Adherence  Comment: pt taking medications as prescribed     Health Maintenance:   Health Maintenance   Topic Date Due    Pneumococcal Vaccine: 50+ Years (1 of 2 - PCV) Never done    Zoster Vaccines (1 of 2) Never done    COVID-19 Vaccine (2 - 2024-25 season) 09/01/2024    Influenza Vaccine (Season Ended) 10/01/2025    Annual Physical  01/30/2026    Annual Depression Screening  Completed    Fall Risk Screening (Annual)  Completed    Meningococcal B Vaccine  Aged Out       Patient updates/concerns:    Spoke to pt for monthly outreach   Pt confirms that her daughters are working to coordinate a response from the [facility] regarding availability of benefits to surviving spouses of servicemmembers. The hope is that a  or caregiver could be provided if possible. Pt states that even if unable to get benefit this way, she is considering employing one in the near future. [facility] discussed budgeting and pt confirmed that she could afford the cost. Ccm will f/u at next outreach with list of potential service providers.    Pt confirms that she has received new inhaler fluticasone propianate. She states that although she thought this was covered she still incurred a cost. She states that she can afford it but would prefer not spend so much recurring. She will finish this rx and then request a new covered rx next visit likely return to qvar rx.    Pt discussed the zenpep use and how she was unable to get any more samples from pcp. She has never been contacted furhter about patient assistance but will likely not pursue this as she did not feel any improvement in her symptoms. She states that she takes immodium daily and her sypmptoms are tolerable and somewhat managed.   Pt is still making sure to participate in social events at her facility. She enjoys visiting with other residents during  monthly birthday parties.    Pt has no difficulty with her ADL's. She can get in and out of shower without assistance and can take meds as prescribed. She is still coordinating with daughter about a likely move to [place] to receive more comprehensive care. Pt is undecided yet because she does not need any assistance throughout the day and feels that a  would be sufficient to help her with some more tedious repetitive tasks. She states that daughter is concerned that vacancies are limited and this could delay any move significantly. Pt family is satisfied with pt ability to manage her own care until this decision is made.    Pt is sleeping well and waking rested.        Goals/Action Plan:    Active goal from previous outreach: stay healthy    Patient reported progress towards goals: pt continues to see providers as needed and takes meds as prescribed               - What: hydration           - Where/When/How: pt has goal of consuming 40 oz water a day   Patient Reported Barriers and Concerns: n/a                   - Plan for overcoming barriers: none    Care Managers Interventions: continue to provide encouragement and education for healthy coping and dx    Future Appointments:   Future Appointments   Date Time Provider Department Center   5/27/2025  1:00 PM Josef Mcneill APRN [facility]   6/2/2025 11:20 AM Abundio Tuttle MD [facility]   6/17/2025  2:00 PM Froilan Hawthorne MD [facility]         Next Care Manager Follow Up Date: one month    Reason For Follow Up: review progress and or barriers towards patient's goals.     Time Spent This Encounter Total: 20 min medical record review, telephone communication, care plan updates where needed, education, goals, and action plan recreation/update. Provided acknowledgment and validation to patient's concerns.   Monthly Minute Total including today: 20  Physical assessment, complete health history, and need for CCM  established by Ghada Carballo MD.

## 2025-05-16 ENCOUNTER — PATIENT OUTREACH (OUTPATIENT)
Dept: CASE MANAGEMENT | Age: OVER 89
End: 2025-05-16

## 2025-05-21 ENCOUNTER — PATIENT OUTREACH (OUTPATIENT)
Dept: CASE MANAGEMENT | Age: OVER 89
End: 2025-05-21

## 2025-05-27 ENCOUNTER — LAB ENCOUNTER (OUTPATIENT)
Dept: LAB | Age: OVER 89
End: 2025-05-27
Payer: MEDICARE

## 2025-05-27 DIAGNOSIS — R19.5 DARK STOOLS: ICD-10-CM

## 2025-05-27 PROBLEM — M96.1 POSTLAMINECTOMY SYNDROME, NOT ELSEWHERE CLASSIFIED: Status: ACTIVE | Noted: 2025-05-27

## 2025-05-27 PROBLEM — M47.817 SPONDYLOSIS OF LUMBOSACRAL SPINE WITHOUT MYELOPATHY: Status: ACTIVE | Noted: 2025-05-27

## 2025-05-27 PROBLEM — F02.80 LATE ONSET ALZHEIMER'S DISEASE WITHOUT BEHAVIORAL DISTURBANCE (HCC): Status: ACTIVE | Noted: 2024-11-04

## 2025-05-27 PROBLEM — M79.10 MYALGIA: Status: ACTIVE | Noted: 2025-05-27

## 2025-05-27 PROBLEM — M54.50 LOW BACK PAIN: Status: ACTIVE | Noted: 2025-05-27

## 2025-05-27 PROBLEM — G30.1 LATE ONSET ALZHEIMER'S DISEASE WITHOUT BEHAVIORAL DISTURBANCE (HCC): Status: ACTIVE | Noted: 2024-11-04

## 2025-05-27 LAB
BASOPHILS # BLD AUTO: 0.07 X10(3) UL (ref 0–0.2)
BASOPHILS NFR BLD AUTO: 0.8 %
EOSINOPHIL # BLD AUTO: 0.05 X10(3) UL (ref 0–0.7)
EOSINOPHIL NFR BLD AUTO: 0.5 %
ERYTHROCYTE [DISTWIDTH] IN BLOOD BY AUTOMATED COUNT: 14.8 %
HCT VFR BLD AUTO: 41.3 % (ref 35–48)
HGB BLD-MCNC: 13.5 G/DL (ref 12–16)
IMM GRANULOCYTES # BLD AUTO: 0.05 X10(3) UL (ref 0–1)
IMM GRANULOCYTES NFR BLD: 0.5 %
LYMPHOCYTES # BLD AUTO: 1.8 X10(3) UL (ref 1–4)
LYMPHOCYTES NFR BLD AUTO: 19.5 %
MCH RBC QN AUTO: 28.9 PG (ref 26–34)
MCHC RBC AUTO-ENTMCNC: 32.7 G/DL (ref 31–37)
MCV RBC AUTO: 88.4 FL (ref 80–100)
MONOCYTES # BLD AUTO: 0.53 X10(3) UL (ref 0.1–1)
MONOCYTES NFR BLD AUTO: 5.7 %
NEUTROPHILS # BLD AUTO: 6.72 X10 (3) UL (ref 1.5–7.7)
NEUTROPHILS # BLD AUTO: 6.72 X10(3) UL (ref 1.5–7.7)
NEUTROPHILS NFR BLD AUTO: 73 %
PLATELET # BLD AUTO: 283 10(3)UL (ref 150–450)
RBC # BLD AUTO: 4.67 X10(6)UL (ref 3.8–5.3)
WBC # BLD AUTO: 9.2 X10(3) UL (ref 4–11)

## 2025-05-27 PROCEDURE — 85025 COMPLETE CBC W/AUTO DIFF WBC: CPT

## 2025-05-27 PROCEDURE — 36415 COLL VENOUS BLD VENIPUNCTURE: CPT

## 2025-05-28 ENCOUNTER — TELEPHONE (OUTPATIENT)
Facility: CLINIC | Age: OVER 89
End: 2025-05-28

## 2025-05-28 DIAGNOSIS — M25.561 RIGHT KNEE PAIN, UNSPECIFIED CHRONICITY: Primary | ICD-10-CM

## 2025-05-28 NOTE — TELEPHONE ENCOUNTER
Patient scheduled for right knee pain.    Future Appointments   Date Time Provider Department Center   6/2/2025 11:20 AM Abundio Tuttle MD [facility]   6/17/2025  2:00 PM Froilan Hawthorne MD [facility] [place]   7/23/2025  8:40 AM Ab Katz MD [facility]     Please advise if imaging is needed.

## 2025-06-02 ENCOUNTER — OFFICE VISIT (OUTPATIENT)
Dept: RHEUMATOLOGY | Facility: CLINIC | Age: OVER 89
End: 2025-06-02
Payer: MEDICARE

## 2025-06-02 VITALS
RESPIRATION RATE: 18 BRPM | WEIGHT: 157 LBS | OXYGEN SATURATION: 95 % | SYSTOLIC BLOOD PRESSURE: 118 MMHG | DIASTOLIC BLOOD PRESSURE: 78 MMHG | TEMPERATURE: 98 F | HEART RATE: 89 BPM | BODY MASS INDEX: 31 KG/M2

## 2025-06-02 DIAGNOSIS — G31.9 CEREBRAL ATROPHY, MILD: ICD-10-CM

## 2025-06-02 DIAGNOSIS — M17.11 PRIMARY OSTEOARTHRITIS OF RIGHT KNEE: Primary | ICD-10-CM

## 2025-06-02 DIAGNOSIS — Z98.890 S/P LUMBAR LAMINECTOMY: ICD-10-CM

## 2025-06-02 DIAGNOSIS — M47.817 SPONDYLOSIS OF LUMBOSACRAL SPINE WITHOUT MYELOPATHY: ICD-10-CM

## 2025-06-02 DIAGNOSIS — M51.26 LUMBAR DISC HERNIATION: ICD-10-CM

## 2025-06-02 DIAGNOSIS — M17.12 PRIMARY OSTEOARTHRITIS OF LEFT KNEE: ICD-10-CM

## 2025-06-02 PROCEDURE — 20610 DRAIN/INJ JOINT/BURSA W/O US: CPT | Performed by: INTERNAL MEDICINE

## 2025-06-02 RX ORDER — METHYLPREDNISOLONE ACETATE 40 MG/ML
40 INJECTION, SUSPENSION INTRA-ARTICULAR; INTRALESIONAL; INTRAMUSCULAR; SOFT TISSUE ONCE
Status: COMPLETED | OUTPATIENT
Start: 2025-06-02 | End: 2025-06-02

## 2025-06-02 RX ORDER — PREDNISONE 2.5 MG/1
2.5 TABLET ORAL DAILY
Qty: 90 TABLET | Refills: 3 | Status: SHIPPED | OUTPATIENT
Start: 2025-06-02

## 2025-06-02 RX ADMIN — METHYLPREDNISOLONE ACETATE 40 MG: 40 INJECTION, SUSPENSION INTRA-ARTICULAR; INTRALESIONAL; INTRAMUSCULAR; SOFT TISSUE at 11:46:00

## 2025-06-02 NOTE — PROCEDURES
97048  Right Knee injection  Right Knee Osteoarthritis   After obtaining consent from the patient, timeout was done, right knee identified, right knee cleaned medial aspect with Hibiclens and alcohol wipes.  Then the right knee was injected with 40 mg of methylprednisolone and 1 cc of 1% lidocaine.  Patient tolerated the right knee injection without incident.

## 2025-06-02 NOTE — PATIENT INSTRUCTIONS
Current plan-right knee injection of cortisone done for the arthritis.  Allergic to NSAIDs therefore no ibuprofen or Aleve can be used.  Use prednisone 2.5 mg once a day for treatment of arthritis.  Okay to use heating pad if that helps.  Okay to use a brace on the right knee if that helps.  Plan to see Dr. Ab Katz of [facility] regarding further knee injections.  There is an injection of an artificial joint fluid called Synvisc that might help you.  If you have diarrhea the GI nurse suggested psyllium husk 500 mg capsules 1 twice a day.  If this does not help you will have to go back to using Imodium.  For brain issues and cerebral atrophy follow up with neurology.   Return to office for recheck 6 months.

## 2025-06-02 NOTE — PROGRESS NOTES
[facility]  Dr. Tuttle Progress Note     Subjective:   Karla Vallecillo is a(n) 89 year old female.   Current complaints:   Chief Complaint   Patient presents with    Follow - Up     6 month f/u. Pt states pain has remained the same since LOV; still have pain in right knee. PCP told pt to see Dr Katz; was told in the past pt let the right hip go \"too long\" to get a replacement.   Would like to further discuss getting a right knee injection during todays office visit.     Other     Is c/o numbness in left shoulder to left hand, specialist unknown of who said this to pt but claims she was told \"not to worry about it.\"   Pt would like Parag to know that she fell on her head a year ago but believes she had a concussion; \"I slept for days.\"; did get testing completed at [facility] and states she was diagnosed with schizophrenia but is still concerned that the falling on her head was \"not addressed properly.\"   6 month follow up.  History of osteoarthritis of both knees and osteoarthritis of the lumbar spine.  S/P lumbar laminectomy.  Allergic to NSAIDs.  Right hip pain.  Pain in her knees and right hip.  Having loose stools at times.  Using Imodium at times.  Walks with a walker.  On Prednisone 2.5 mg per day.  Requests right knee injection. Occasional back pain.   Objective:   /78   Pulse 89   Temp 98.1 °F (36.7 °C)   Resp 18   Wt 157 lb (71.2 kg)   LMP  (LMP Unknown)   SpO2 95%   BMI 30.66 kg/m²   Right knee hypertrophic and tender  Left knee non tender   Hands ok  Lungs clear Heart nsr.   4/17/24  CT Brain - Cerebral and cerebellar atrophy with chronic microvascular ischemic changes.    Assessment:     Encounter Diagnoses   Name Primary?    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  Allergic to NSAIDs therefore no ibuprofen or Aleve can be used.  Use prednisone 2.5 mg once a day for treatment of arthritis.  Okay to use heating pad if that helps.  Okay to use a brace on the right knee if that helps.  Plan to see Dr. Ab Katz of [facility] regarding further knee injections.  There is an injection of an artificial joint fluid called Synvisc that might help you.  If you have diarrhea the GI nurse suggested psyllium husk 500 mg capsules 1 twice a day.  If this does not help you will have to go back to using Imodium.  For brain issues and cerebral atrophy follow up with neurology.   Return to office for recheck 6 months.        Abundio Tuttle MD 6/2/2025 11:12 AM

## 2025-06-17 ENCOUNTER — TELEPHONE (OUTPATIENT)
Dept: INTERNAL MEDICINE CLINIC | Facility: CLINIC | Age: OVER 89
End: 2025-06-17

## 2025-06-17 NOTE — TELEPHONE ENCOUNTER
Patient called stating that she gets a cough right after using Qvar, informed patient that is a common side effect to medication. Patient verbalizes understanding. Patient wanted to ask before getting medication refilled. Advised that she has 3 additional refills of medication at her [facility]'s pharmacy.

## 2025-07-08 ENCOUNTER — LAB ENCOUNTER (OUTPATIENT)
Dept: LAB | Age: OVER 89
End: 2025-07-08
Attending: INTERNAL MEDICINE
Payer: MEDICARE

## 2025-07-08 ENCOUNTER — HOSPITAL ENCOUNTER (OUTPATIENT)
Facility: HOSPITAL | Age: OVER 89
Setting detail: OBSERVATION
Discharge: HOME | End: 2025-07-10
Attending: EMERGENCY MEDICINE | Admitting: HOSPITALIST
Payer: MEDICARE

## 2025-07-08 DIAGNOSIS — R06.00 DYSPNEA, PAROXYSMAL NOCTURNAL: ICD-10-CM

## 2025-07-08 DIAGNOSIS — I10 BENIGN HYPERTENSION: ICD-10-CM

## 2025-07-08 DIAGNOSIS — E78.5 HYPERLIPEMIA: ICD-10-CM

## 2025-07-08 DIAGNOSIS — I25.10 CAD (CORONARY ARTERY DISEASE): Primary | ICD-10-CM

## 2025-07-08 DIAGNOSIS — Z95.5 STENTED CORONARY ARTERY: ICD-10-CM

## 2025-07-08 DIAGNOSIS — K92.1 MELENA: Primary | ICD-10-CM

## 2025-07-08 LAB
ALBUMIN SERPL-MCNC: 4.2 G/DL (ref 3.2–4.8)
ALBUMIN SERPL-MCNC: 4.4 G/DL (ref 3.2–4.8)
ALBUMIN/GLOB SERPL: 1.6 {RATIO} (ref 1–2)
ALBUMIN/GLOB SERPL: 1.7 {RATIO} (ref 1–2)
ALP LIVER SERPL-CCNC: 61 U/L (ref 55–142)
ALP LIVER SERPL-CCNC: 70 U/L (ref 55–142)
ALT SERPL-CCNC: 8 U/L (ref 10–49)
ALT SERPL-CCNC: 8 U/L (ref 10–49)
ANION GAP SERPL CALC-SCNC: 10 MMOL/L (ref 0–18)
ANION GAP SERPL CALC-SCNC: 7 MMOL/L (ref 0–18)
ANTIBODY SCREEN: NEGATIVE
AST SERPL-CCNC: 15 U/L (ref ?–34)
AST SERPL-CCNC: 17 U/L (ref ?–34)
BASOPHILS # BLD AUTO: 0.06 X10(3) UL (ref 0–0.2)
BASOPHILS NFR BLD AUTO: 0.7 %
BILIRUB SERPL-MCNC: 0.5 MG/DL (ref 0.2–0.9)
BILIRUB SERPL-MCNC: 0.7 MG/DL (ref 0.2–0.9)
BUN BLD-MCNC: 13 MG/DL (ref 9–23)
BUN BLD-MCNC: 15 MG/DL (ref 9–23)
CALCIUM BLD-MCNC: 9.2 MG/DL (ref 8.7–10.6)
CALCIUM BLD-MCNC: 9.6 MG/DL (ref 8.7–10.6)
CHLORIDE SERPL-SCNC: 108 MMOL/L (ref 98–112)
CHLORIDE SERPL-SCNC: 110 MMOL/L (ref 98–112)
CHOLEST SERPL-MCNC: 221 MG/DL (ref ?–200)
CO2 SERPL-SCNC: 26 MMOL/L (ref 21–32)
CO2 SERPL-SCNC: 26 MMOL/L (ref 21–32)
CREAT BLD-MCNC: 0.85 MG/DL (ref 0.55–1.02)
CREAT BLD-MCNC: 0.89 MG/DL (ref 0.55–1.02)
EGFRCR SERPLBLD CKD-EPI 2021: 62 ML/MIN/1.73M2 (ref 60–?)
EGFRCR SERPLBLD CKD-EPI 2021: 65 ML/MIN/1.73M2 (ref 60–?)
EOSINOPHIL # BLD AUTO: 0.12 X10(3) UL (ref 0–0.7)
EOSINOPHIL NFR BLD AUTO: 1.3 %
ERYTHROCYTE [DISTWIDTH] IN BLOOD BY AUTOMATED COUNT: 14.7 %
FASTING PATIENT LIPID ANSWER: YES
FASTING STATUS PATIENT QL REPORTED: YES
GLOBULIN PLAS-MCNC: 2.5 G/DL (ref 2–3.5)
GLOBULIN PLAS-MCNC: 2.7 G/DL (ref 2–3.5)
GLUCOSE BLD-MCNC: 101 MG/DL (ref 70–99)
GLUCOSE BLD-MCNC: 92 MG/DL (ref 70–99)
HCT VFR BLD AUTO: 41.2 % (ref 35–48)
HDLC SERPL-MCNC: 58 MG/DL (ref 40–59)
HGB BLD-MCNC: 13.4 G/DL (ref 12–16)
IMM GRANULOCYTES # BLD AUTO: 0.04 X10(3) UL (ref 0–1)
IMM GRANULOCYTES NFR BLD: 0.4 %
LDLC SERPL CALC-MCNC: 133 MG/DL (ref ?–100)
LYMPHOCYTES # BLD AUTO: 3.29 X10(3) UL (ref 1–4)
LYMPHOCYTES NFR BLD AUTO: 36.6 %
MCH RBC QN AUTO: 28.8 PG (ref 26–34)
MCHC RBC AUTO-ENTMCNC: 32.5 G/DL (ref 31–37)
MCV RBC AUTO: 88.4 FL (ref 80–100)
MONOCYTES # BLD AUTO: 0.72 X10(3) UL (ref 0.1–1)
MONOCYTES NFR BLD AUTO: 8 %
NEUTROPHILS # BLD AUTO: 4.77 X10 (3) UL (ref 1.5–7.7)
NEUTROPHILS # BLD AUTO: 4.77 X10(3) UL (ref 1.5–7.7)
NEUTROPHILS NFR BLD AUTO: 53 %
NONHDLC SERPL-MCNC: 163 MG/DL (ref ?–130)
OSMOLALITY SERPL CALC.SUM OF ELEC: 296 MOSM/KG (ref 275–295)
OSMOLALITY SERPL CALC.SUM OF ELEC: 299 MOSM/KG (ref 275–295)
PLATELET # BLD AUTO: 264 10(3)UL (ref 150–450)
POTASSIUM SERPL-SCNC: 3.9 MMOL/L (ref 3.5–5.1)
POTASSIUM SERPL-SCNC: 4 MMOL/L (ref 3.5–5.1)
PROT SERPL-MCNC: 6.7 G/DL (ref 5.7–8.2)
PROT SERPL-MCNC: 7.1 G/DL (ref 5.7–8.2)
RBC # BLD AUTO: 4.66 X10(6)UL (ref 3.8–5.3)
RH BLOOD TYPE: POSITIVE
SODIUM SERPL-SCNC: 143 MMOL/L (ref 136–145)
SODIUM SERPL-SCNC: 144 MMOL/L (ref 136–145)
TRIGL SERPL-MCNC: 168 MG/DL (ref 30–149)
VLDLC SERPL CALC-MCNC: 31 MG/DL (ref 0–30)
WBC # BLD AUTO: 9 X10(3) UL (ref 4–11)

## 2025-07-08 PROCEDURE — 80061 LIPID PANEL: CPT

## 2025-07-08 PROCEDURE — 80053 COMPREHEN METABOLIC PANEL: CPT

## 2025-07-08 PROCEDURE — 99223 1ST HOSP IP/OBS HIGH 75: CPT | Performed by: HOSPITALIST

## 2025-07-08 PROCEDURE — 36415 COLL VENOUS BLD VENIPUNCTURE: CPT

## 2025-07-08 RX ORDER — SENNOSIDES 8.6 MG
17.2 TABLET ORAL NIGHTLY PRN
Status: DISCONTINUED | OUTPATIENT
Start: 2025-07-08 | End: 2025-07-10

## 2025-07-08 RX ORDER — ONDANSETRON 2 MG/ML
4 INJECTION INTRAMUSCULAR; INTRAVENOUS EVERY 6 HOURS PRN
Status: DISCONTINUED | OUTPATIENT
Start: 2025-07-08 | End: 2025-07-10

## 2025-07-08 RX ORDER — ALPRAZOLAM 0.5 MG
0.5 TABLET ORAL NIGHTLY PRN
Status: DISCONTINUED | OUTPATIENT
Start: 2025-07-08 | End: 2025-07-09

## 2025-07-08 RX ORDER — SODIUM CHLORIDE 9 MG/ML
INJECTION, SOLUTION INTRAVENOUS CONTINUOUS
Status: DISCONTINUED | OUTPATIENT
Start: 2025-07-08 | End: 2025-07-10

## 2025-07-08 RX ORDER — METOCLOPRAMIDE HYDROCHLORIDE 5 MG/ML
5 INJECTION INTRAMUSCULAR; INTRAVENOUS EVERY 8 HOURS PRN
Status: DISCONTINUED | OUTPATIENT
Start: 2025-07-08 | End: 2025-07-10

## 2025-07-08 RX ORDER — ACETAMINOPHEN 500 MG
500 TABLET ORAL EVERY 4 HOURS PRN
Status: DISCONTINUED | OUTPATIENT
Start: 2025-07-08 | End: 2025-07-10

## 2025-07-08 RX ORDER — POLYETHYLENE GLYCOL 3350 17 G/17G
17 POWDER, FOR SOLUTION ORAL DAILY PRN
Status: DISCONTINUED | OUTPATIENT
Start: 2025-07-08 | End: 2025-07-10

## 2025-07-08 RX ORDER — VITAMIN B COMPLEX
1 TABLET ORAL DAILY
COMMUNITY

## 2025-07-08 RX ORDER — ALPRAZOLAM 0.25 MG
0.25 TABLET ORAL 3 TIMES DAILY PRN
COMMUNITY

## 2025-07-08 RX ORDER — ALPRAZOLAM 1 MG/1
0.5 TABLET ORAL 2 TIMES DAILY
COMMUNITY

## 2025-07-08 RX ORDER — PANCRELIPASE LIPASE, PANCRELIPASE PROTEASE, PANCRELIPASE AMYLASE 40000; 126000; 168000 [USP'U]/1; [USP'U]/1; [USP'U]/1
6 CAPSULE, DELAYED RELEASE ORAL DAILY
COMMUNITY

## 2025-07-08 RX ORDER — SODIUM PHOSPHATE, DIBASIC AND SODIUM PHOSPHATE, MONOBASIC 7; 19 G/230ML; G/230ML
1 ENEMA RECTAL ONCE AS NEEDED
Status: DISCONTINUED | OUTPATIENT
Start: 2025-07-08 | End: 2025-07-10

## 2025-07-08 RX ORDER — BISACODYL 10 MG
10 SUPPOSITORY, RECTAL RECTAL
Status: DISCONTINUED | OUTPATIENT
Start: 2025-07-08 | End: 2025-07-10

## 2025-07-08 NOTE — ED PROVIDER NOTES
Patient Seen in: [facility] Emergency Department        History  Chief Complaint   Patient presents with    GI Bleeding     Stated Complaint: Exp black tarry stools    Subjective:   HPI    89-year-old female history of dementia, CAD, hypertension, hyperlipidemia, pancreatic insufficiency, Schatzki rings presents to ED with complaint of 2 episodes of black stool today and 2 episodes yesterday.  She told triage she felt weaker than normal but she tells me that she feels at baseline.  She denies lightheadedness, shortness of breath, fatigue.  She states that she had \" pieces of stool\" that appeared dark to her on these occasions.  She states that the stool was not hard.  She states that she is \"the opposite of constipation \".  She states she takes Imodium as she often has loose stool.  Denies fevers, chills, vomiting, abdominal pain.      Objective:     Past Medical History:    Anemia    Anesthesia complication    pt stated had a reacion with a medication that sounded like \"memo\"    Anxiety state, unspecified    Arthritis    Asthma (HCC)    Atherosclerosis of coronary artery    Back pain    Back problem    cervical pain    Black stools    Blood transfusion reaction    CAD (coronary artery disease)    Calculus of kidney    Cancer (HCC)    skin cancer in left arm    Change in hair    Chest pain on exertion    Coronary atherosclerosis of unspecified type of vessel, native or graft    Diarrhea, unspecified    Dyslipidemia    Easy bruising    Extrinsic asthma, unspecified    Fatigue    Glucose intolerance (pre-diabetes)    Hearing impairment    HEARING LOSS BUT NO AIDS    Hearing loss    Heart palpitations    HIGH BLOOD PRESSURE    High cholesterol    Hip pain    History of blood transfusion    AFTER HYST    History of depression    Hoarseness, chronic    HTN (hypertension)    Indigestion    Leg swelling    Loss of appetite    Mouth sores    Nausea    Osteoarthrosis, unspecified whether generalized or localized,  unspecified site    knees, back    Osteoporosis    Other and unspecified hyperlipidemia    Pain in joints    Pinched nerve in neck    Pneumonia, organism unspecified(486)    PONV (postoperative nausea and vomiting)    Shortness of breath    Sleep apnea    Stented coronary artery    Stress    Uncomfortable fullness after meals    Unspecified essential hypertension    Visual impairment    glasses    Wears glasses    Weight loss              Past Surgical History:   Procedure Laterality Date    Angioplasty (coronary)      stent    Appendectomy      Back surgery      lumbar x 4    Benign biopsy left  a long time ago    x 2    Cath bare metal stent (bms)  2012    Cholecystectomy      Colonoscopy  10/21/2013    Procedure: COLONOSCOPY;  Surgeon: Emmanuel Naranjo MD;  Location:  ENDOSCOPY    Colonoscopy N/A 6/22/2021    Procedure: COLONOSCOPY with biopsies and forcep, cold and hot snare polypectomy with saline lift and clip placement x3;  Surgeon: Jerod Pierce MD;  Location:  ENDOSCOPY    Egd      Excis lumbar disk,one level      Fluor gid & loclzj ndl/cath spi dx/ther njx  9/23/2013    Procedure: CERVICAL EPIDURAL;  Surgeon: Christiano Wiseman MD;  Location: [facility]    Hemorrhoidectomy,int/ext,simple      Hysterectomy      complete    Injection, w/wo contrast, dx/therapeutic substance, epidural/subarachnoid; cervical/thoracic  9/23/2013    Procedure: CERVICAL EPIDURAL;  Surgeon: Christiano Wiseman MD;  Location: [facility]    Injection, w/wo contrast, dx/therapeutic substance, epidural/subarachnoid; cervical/thoracic N/A 8/17/2015    Procedure: CERVICAL EPIDURAL;  Surgeon: Josue Stewart MD;  Location: [facility]    Dex localization wire 1 site left (cpt=[id number])  1980'S    Benign    Other surgical history  8/7/12    Diag cardiac stent, multi-link mini vision 2mmx 12mm.     Patient documented not to have experienced any of the following events  9/23/2013     Procedure: CERVICAL EPIDURAL;  Surgeon: Christiano Wiseman MD;  Location: [facility]    Patient documented not to have experienced any of the following events N/A 2015    Procedure: CERVICAL EPIDURAL;  Surgeon: Josue Stewart MD;  Location: [facility]    Patient withough preoperative order for iv antibiotic surgical site infection prophylaxis.  2013    Procedure: CERVICAL EPIDURAL;  Surgeon: Christiano Wiseman MD;  Location: [facility]    Patient withough preoperative order for iv antibiotic surgical site infection prophylaxis. N/A 2015    Procedure: CERVICAL EPIDURAL;  Surgeon: Josue Stewart MD;  Location: [facility]    Spine surgery procedure unlisted      Stent, coated/cov w/del sys      Tonsillectomy      Total abdom hysterectomy      Upper gi endoscopy,exam                  Social History     Socioeconomic History    Marital status:    Tobacco Use    Smoking status: Former     Current packs/day: 0.00     Average packs/day: 0.3 packs/day for 3.0 years (0.9 ttl pk-yrs)     Types: Cigarettes     Start date: 1969     Quit date: 1972     Years since quittin.4    Smokeless tobacco: Never   Vaping Use    Vaping status: Never Used   Substance and Sexual Activity    Alcohol use: Not Currently    Drug use: No    Sexual activity: Never   Other Topics Concern    Caffeine Concern Yes     Comment: tea    Exercise No   Social History Narrative    ** Merged History Encounter **          Social Drivers of Health     Food Insecurity: No Food Insecurity (2025)    NCSS - Food Insecurity     Worried About Running Out of Food in the Last Year: No     Ran Out of Food in the Last Year: No   Transportation Needs: No Transportation Needs (2025)    NCSS - Transportation     Lack of Transportation: No   Housing Stability: Not At Risk (2025)    NCSS - Housing/Utilities     Has Housing: Yes     Worried About  Losing Housing: No     Unable to Get Utilities: No                                Physical Exam    ED Triage Vitals   BP 07/08/25 1621 (!) 174/83   Pulse 07/08/25 1621 91   Resp 07/08/25 1621 18   Temp 07/08/25 1621 97.8 °F (36.6 °C)   Temp src 07/08/25 2130 Oral   SpO2 07/08/25 1621 96 %   O2 Device 07/08/25 1621 None (Room air)       Current Vitals:   Vital Signs  BP: (!) 166/93  Pulse: 98  Resp: 17  Temp: 97.5 °F (36.4 °C)  Temp src: Oral  MAP (mmHg): (!) 115    Oxygen Therapy  SpO2: 99 %  O2 Device: None (Room air)  Pulse Oximetry Type: Continuous  Oximetry Probe Site Changed: No  Pulse Ox Probe Location: Left hand            Physical Exam  Vital signs reviewed.  Nursing note reviewed.  Constitutional: Alert, well-appearing  Head: Normocephalic, atraumatic  Mouth: Moist  Eyes: Extraocular muscles intact, pupils equal  Abdomen: Soft, nontender nondistended  : Rectal exam poor stool sample size, no gross blood, Hemoccult negative  Skin: Warm and dry  Musculoskeletal range of motion grossly normal all extremities  Neuro: Alert, at baseline, no focal neuro deficit.  Moves all extremities against gravity  Psych: Mood normal              ED Course  Labs Reviewed   COMP METABOLIC PANEL (14) - Abnormal; Notable for the following components:       Result Value    Glucose 101 (*)     Calculated Osmolality 299 (*)     ALT 8 (*)     All other components within normal limits   CBC WITH DIFFERENTIAL WITH PLATELET   COMP METABOLIC PANEL (14)   MAGNESIUM   CBC, PLATELET; NO DIFFERENTIAL   CBC, PLATELET; NO DIFFERENTIAL   TYPE AND SCREEN    Narrative:     The following orders were created for panel order Type and screen.  Procedure                               Abnormality         Status                     ---------                               -----------         ------                     ABORH (Blood Type)[[id number]]                               Final result               Antibody Screen[[id number]]                                   Final result                 Please view results for these tests on the individual orders.   ABORH (BLOOD TYPE)   ANTIBODY SCREEN   RAINBOW DRAW BLUE                            MDM     Medical Decision Making:    Differential diagnosis before testing includes upper versus lower GI bleed versus artifact potential life threatening diagnosis which is a medical condition that poses a threat to life/function.     Comorbidities that add complexity to management: History of internal hemorrhoids    I reviewed prior external ED notes including reviewed GI note 2018 EGD esophagitis, 2020 Schatzki's ring, 2022 esophageal stricture.  2021 colonoscopy with internal hemorrhoids    Discussions of management with: Discussed with Dr. Turpin, GI, he recommends admitting patient under observation and repeat hemoglobin in the morning given her age and melena    Social determinants of health care: Lives alone      Shared decision making:    Hemoglobin 13.4, Hemoccult negative, vitals normal.  Patient is 89 years old female, denies blood thinner use presents to ED with 2 episodes of black stools yesterday and today.  Discussed with GI as above, recommended observation and repeat hemoglobin in the morning.  Admitted to hospitalist.  Admission disposition: 7/8/2025  8:18 PM           Medical Decision Making      Disposition and Plan     Clinical Impression:  1. Melena         Disposition:  Admit  7/8/2025  8:18 pm    Follow-up:  No follow-up provider specified.        Medications Prescribed:  Current Discharge Medication List                Supplementary Documentation:         Hospital Problems       Present on Admission  Date Reviewed: 6/2/2025          ICD-10-CM Noted POA    * (Principal) Melena K92.1 7/8/2025 Unknown

## 2025-07-08 NOTE — ED INITIAL ASSESSMENT (HPI)
Pt presents to ER with 2 episodes of black stools today. Pt states she does feel weaker today than normal. Denies fever, sob, or chest pain.

## 2025-07-09 PROBLEM — J45.20 MILD INTERMITTENT ASTHMA WITHOUT COMPLICATION (HCC): Status: ACTIVE | Noted: 2025-07-09

## 2025-07-09 PROBLEM — R19.7 INTRACTABLE DIARRHEA: Status: ACTIVE | Noted: 2025-07-09

## 2025-07-09 PROBLEM — I10 PRIMARY HYPERTENSION: Status: ACTIVE | Noted: 2025-07-09

## 2025-07-09 LAB
ADENOVIRUS F 40/41 PCR: NEGATIVE
ALBUMIN SERPL-MCNC: 3.8 G/DL (ref 3.2–4.8)
ALBUMIN/GLOB SERPL: 1.7 {RATIO} (ref 1–2)
ALP LIVER SERPL-CCNC: 58 U/L (ref 55–142)
ALT SERPL-CCNC: <7 U/L (ref 10–49)
ANION GAP SERPL CALC-SCNC: 5 MMOL/L (ref 0–18)
AST SERPL-CCNC: 12 U/L (ref ?–34)
ASTROVIRUS PCR: NEGATIVE
BILIRUB SERPL-MCNC: 0.7 MG/DL (ref 0.2–0.9)
BUN BLD-MCNC: 12 MG/DL (ref 9–23)
C CAYETANENSIS DNA SPEC QL NAA+PROBE: NEGATIVE
C DIFF GDH + TOXINS A+B STL QL IA.RAPID: NOT DETECTED
C DIFF TOX B STL QL: POSITIVE
CALCIUM BLD-MCNC: 8.7 MG/DL (ref 8.7–10.6)
CAMPY SP DNA.DIARRHEA STL QL NAA+PROBE: NEGATIVE
CHLORIDE SERPL-SCNC: 111 MMOL/L (ref 98–112)
CO2 SERPL-SCNC: 28 MMOL/L (ref 21–32)
CREAT BLD-MCNC: 0.75 MG/DL (ref 0.55–1.02)
CRYPTOSP DNA SPEC QL NAA+PROBE: NEGATIVE
EAEC PAA PLAS AGGR+AATA ST NAA+NON-PRB: NEGATIVE
EC STX1+STX2 + H7 FLIC SPEC NAA+PROBE: NEGATIVE
EGFRCR SERPLBLD CKD-EPI 2021: 76 ML/MIN/1.73M2 (ref 60–?)
ENTAMOEBA HISTOLYTICA PCR: NEGATIVE
EPEC EAE GENE STL QL NAA+NON-PROBE: NEGATIVE
ERYTHROCYTE [DISTWIDTH] IN BLOOD BY AUTOMATED COUNT: 14.6 %
ERYTHROCYTE [DISTWIDTH] IN BLOOD BY AUTOMATED COUNT: 14.7 %
ETEC LTA+ST1A+ST1B TOX ST NAA+NON-PROBE: NEGATIVE
GIARDIA LAMBLIA PCR: NEGATIVE
GLOBULIN PLAS-MCNC: 2.3 G/DL (ref 2–3.5)
GLUCOSE BLD-MCNC: 94 MG/DL (ref 70–99)
HCT VFR BLD AUTO: 37.9 % (ref 35–48)
HCT VFR BLD AUTO: 43.1 % (ref 35–48)
HGB BLD-MCNC: 12.4 G/DL (ref 12–16)
HGB BLD-MCNC: 14.1 G/DL (ref 12–16)
MAGNESIUM SERPL-MCNC: 2 MG/DL (ref 1.6–2.6)
MCH RBC QN AUTO: 28.4 PG (ref 26–34)
MCH RBC QN AUTO: 29 PG (ref 26–34)
MCHC RBC AUTO-ENTMCNC: 32.7 G/DL (ref 31–37)
MCHC RBC AUTO-ENTMCNC: 32.7 G/DL (ref 31–37)
MCV RBC AUTO: 86.9 FL (ref 80–100)
MCV RBC AUTO: 88.6 FL (ref 80–100)
NOROVIRUS GI/GII PCR: NEGATIVE
OSMOLALITY SERPL CALC.SUM OF ELEC: 298 MOSM/KG (ref 275–295)
P SHIGELLOIDES DNA STL QL NAA+PROBE: NEGATIVE
PLATELET # BLD AUTO: 225 10(3)UL (ref 150–450)
PLATELET # BLD AUTO: 279 10(3)UL (ref 150–450)
POTASSIUM SERPL-SCNC: 3.7 MMOL/L (ref 3.5–5.1)
PROT SERPL-MCNC: 6.1 G/DL (ref 5.7–8.2)
RBC # BLD AUTO: 4.28 X10(6)UL (ref 3.8–5.3)
RBC # BLD AUTO: 4.96 X10(6)UL (ref 3.8–5.3)
ROTAVIRUS A PCR: NEGATIVE
SALMONELLA DNA SPEC QL NAA+PROBE: NEGATIVE
SAPOVIRUS PCR: NEGATIVE
SHIGELLA SP+EIEC IPAH ST NAA+NON-PROBE: NEGATIVE
SODIUM SERPL-SCNC: 144 MMOL/L (ref 136–145)
V CHOLERAE DNA SPEC QL NAA+PROBE: NEGATIVE
VIBRIO DNA SPEC NAA+PROBE: NEGATIVE
WBC # BLD AUTO: 10.3 X10(3) UL (ref 4–11)
WBC # BLD AUTO: 7.7 X10(3) UL (ref 4–11)
YERSINIA DNA SPEC NAA+PROBE: NEGATIVE

## 2025-07-09 PROCEDURE — 99232 SBSQ HOSP IP/OBS MODERATE 35: CPT | Performed by: INTERNAL MEDICINE

## 2025-07-09 RX ORDER — ALPRAZOLAM 0.25 MG
0.25 TABLET ORAL 3 TIMES DAILY PRN
Status: DISCONTINUED | OUTPATIENT
Start: 2025-07-09 | End: 2025-07-10

## 2025-07-09 RX ORDER — ALPRAZOLAM 0.5 MG
0.5 TABLET ORAL 2 TIMES DAILY
Status: DISCONTINUED | OUTPATIENT
Start: 2025-07-09 | End: 2025-07-10

## 2025-07-09 RX ORDER — VERAPAMIL HYDROCHLORIDE 240 MG/1
240 TABLET, FILM COATED, EXTENDED RELEASE ORAL NIGHTLY
Status: DISCONTINUED | OUTPATIENT
Start: 2025-07-09 | End: 2025-07-10

## 2025-07-09 RX ORDER — VANCOMYCIN HYDROCHLORIDE 125 MG/1
125 CAPSULE ORAL 4 TIMES DAILY
Status: DISCONTINUED | OUTPATIENT
Start: 2025-07-09 | End: 2025-07-10

## 2025-07-09 RX ORDER — CETIRIZINE HYDROCHLORIDE 5 MG/1
5 TABLET ORAL DAILY
Status: DISCONTINUED | OUTPATIENT
Start: 2025-07-09 | End: 2025-07-10

## 2025-07-09 RX ORDER — ALBUTEROL SULFATE 90 UG/1
2 INHALANT RESPIRATORY (INHALATION) EVERY 4 HOURS PRN
Status: DISCONTINUED | OUTPATIENT
Start: 2025-07-09 | End: 2025-07-10

## 2025-07-09 NOTE — OCCUPATIONAL THERAPY NOTE
OCCUPATIONAL THERAPY EVALUATION - INPATIENT    Room Number: 516/516-A  Evaluation Date: 7/9/2025     Type of Evaluation: Initial  Presenting Problem: Melena    Physician Order: IP Consult to Occupational Therapy  Reason for Therapy:  ADL/IADL Dysfunction and Discharge Planning    OCCUPATIONAL THERAPY ASSESSMENT   Patient is a 89 year old female admitted on 7/8/2025 with Presenting Problem: Melena. Co-Morbidities : hiatal hernia, esophageal stricture, dementia, essential hypertension, hyperlipidemia, history of C. difficile, asthma, chronic pancreatic insufficiency  Patient is currently functioning near baseline with toileting, lower body dressing, bed mobility, transfers, static sitting balance, dynamic sitting balance, static standing balance, dynamic standing balance, maintaining seated position, functional standing tolerance, energy conservation strategies, and aerobic capacity.  Prior to admission, patient's baseline is modIND with ADLs.  Patient met all OT goals at supervision-SBA level.  Patient reports no further questions/concerns at this time.     Patient will benefit from continued skilled OT Services with no additional skilled services but increased support at home    Recommendations for nursing staff:   Transfers: up x 1 assist, supervision-SBA, RW  Toileting location: Toilet    EVALUATION SESSION:  Patient at start of session: semi supine in bed    FUNCTIONAL TRANSFER ASSESSMENT  Sit to Stand: Edge of Bed  Edge of Bed: Stand-by Assist  Toilet Transfer: Stand-by Assist    BED MOBILITY  Supine to Sit : Stand-by Assist  Sit to Supine (OT): Not Tested  Scooting: Supervision    BALANCE ASSESSMENT  Static Sitting: Supervision  Static Standing: Stand-by Assist    FUNCTIONAL ADL ASSESSMENT  Grooming Standing: Stand-by Assist (hand hygiene at sink)  LB Dressing Seated: Supervision (socks)  LB Dressing Standing: Stand-by Assist (brief)  Toileting Seated: Supervision    ACTIVITY TOLERANCE: WFL                          O2 SATURATIONS       COGNITION  Overall Cognitive Status:  WFL - within functional limits    COGNITION ASSESSMENTS     Upper Extremity:   ROM: within functional limits  Strength: is within functional limits     EDUCATION PROVIDED  Patient Education : Role of Occupational Therapy; Plan of Care; Functional Transfer Techniques; Fall Prevention; Posture/Positioning; Energy Conservation; Proper Body Mechanics  Patient's Response to Education: Verbalized Understanding; Returned Demonstration    Equipment used: RW, hospital bed functions  Demonstrates functional use    Therapist comments: Pt pleasant and cooperative this date. Pt demonstrated sufficient dynamic standing balance to simulate household distances and distance to bathroom for toilet task this date. Pt reports no other concerns with ADLs at end of session.    Patient End of Session: Up in chair, Needs met, Call light within reach, All patient questions and concerns addressed, RN aware of session/findings, Hospital anti-slip socks, Alarm set    OCCUPATIONAL PROFILE    HOME SITUATION  Type of Home: Apartment  Home Layout: One level  Lives With: Alone    Toilet and Equipment: Standard height toilet  Shower/Tub and Equipment: Tub-shower combo  Other Equipment:  (Rw)          Drives: No  Patient Regularly Uses: Glasses, Rolling walker    Prior Level of Function: modIND with ADLs. Pt reports her dtr helps her with getting groceries and driving pt around. Pt states she gets meals on wheels for lunch.     SUBJECTIVE  \"I'm not used to paper towels in my bathroom\"    PAIN ASSESSMENT  Ratin  Location: denies at this time       OBJECTIVE  Precautions: Bed/chair alarm  Fall Risk: Standard fall risk    WEIGHT BEARING RESTRICTION       AM-PAC ‘6-Clicks’ Inpatient Daily Activity Short Form  -   Putting on and taking off regular lower body clothing?: None  -   Bathing (including washing, rinsing, drying)?: None  -   Toileting, which includes using toilet, bedpan or  urinal? : None  -   Putting on and taking off regular upper body clothing?: None  -   Taking care of personal grooming such as brushing teeth?: None  -   Eating meals?: None    AM-PAC Score:  Score: 24  Approx Degree of Impairment: 0%  Standardized Score (AM-PAC Scale): 57.54    ADDITIONAL TESTS     NEUROLOGICAL FINDINGS      PLAN   Patient has been evaluated and presents with no skilled Occupational Therapy needs at this time.  Patient discharged from Occupational Therapy services.  Please re-order if a new functional limitation presents during this admission.         Patient Evaluation Complexity Level:   Occupational Profile/Medical History LOW - Brief history including review of medical or therapy records    Specific performance deficits impacting engagement in ADL/IADL LOW  1 - 3 performance deficits    Client Assessment/Performance Deficits LOW - No comorbidities nor modifications of tasks    Clinical Decision Making LOW - Analysis of occupational profile, problem-focused assessments, limited treatment options    Overall Complexity LOW     OT Session Time:  25 minutes  Self-Care Home Management: 15 minutes

## 2025-07-09 NOTE — PROGRESS NOTES
[facility]   part of [facility]     Hospitalist Progress Note     Karla Vallecillo Patient Status:  Observation    1934 MRN [id number]   Location [facility] 5NW-A Attending Omar Velasquez, DO   Hosp Day # 0 PCP Ghada Carballo MD     Chief Complaint: Constant diarrhea at home    Subjective:     Patient continues to have intractable diarrhea according to patient.  Had multiple diarrhea episodes overnight and today according to patient.  Patient denies any blood in stools.  Hemoglobin remained stable as discussed with RN.  Check stool studies including stool C. difficile and GI stool PCR as discussed with RN.    Objective:    Review of Systems:   A comprehensive review of systems was completed; pertinent positive and negatives stated in subjective.    Vital signs:  Temp:  [97.5 °F (36.4 °C)-98.3 °F (36.8 °C)] 98.3 °F (36.8 °C)  Pulse:  [] 82  Resp:  [14-18] 15  BP: (115-174)/(61-93) 147/74  SpO2:  [93 %-100 %] 96 %    Physical Exam:    /66 (BP Location: Right arm)   Pulse 100   Temp 99 °F (37.2 °C) (Oral)   Resp 15   Ht 5' (1.524 m)   Wt 155 lb (70.3 kg)   LMP  (LMP Unknown)   SpO2 98%   BMI 30.27 kg/m²     General: No acute distress  Respiratory: No wheezes, no rhonchi  Cardiovascular: S1, S2, regular rate and rhythm  Abdomen: Soft, Non-tender, non-distended, positive bowel sounds  Neuro: No new focal deficits.   Extremities: No edema      Diagnostic Data:    Labs:  Recent Labs   Lab 25  1728 25  0451   WBC 9.0 7.7   HGB 13.4 12.4   MCV 88.4 88.6   .0 225.0       Recent Labs   Lab 25  0851 25  1728 25  0451   GLU 92 101* 94   BUN 13 15 12   CREATSERUM 0.85 0.89 0.75   CA 9.6 9.2 8.7   ALB 4.2 4.4 3.8    144 144   K 3.9 4.0 3.7    108 111   CO2 26.0 26.0 28.0   ALKPHO 61 70 58   AST 15 17 12   ALT 8* 8* <7*   BILT 0.7 0.5 0.7   TP 6.7 7.1 6.1       Estimated Glomerular Filtration Rate: 76 mL/min/1.73m2 (result from lab).    No  results for input(s): \"TROP\", \"TROPHS\", \"CK\" in the last 168 hours.    No results for input(s): \"PTP\", \"INR\" in the last 168 hours.               Microbiology    No results found for this visit on 07/08/25.      Imaging: Reviewed in Epic.    Medications:    ALPRAZolam  0.5 mg Oral BID    cetirizine  5 mg Oral Daily    pancrelipase (Lip-Prot-Amyl)  2 capsule Oral Daily    verapamil ER  240 mg Oral Nightly    pantoprazole  40 mg Intravenous Q12H       Assessment & Plan:      # Intractable diarrhea  # Dark-colored stools with diarrhea, possible melena reported on admission  #History of esophageal stricture  # Hiatal hernia  - Check stool studies including stool C. difficile and GI stool PCR-addendum- stool C. difficile came back positive.  EIA pending.  Patient has history of allergy to vancomycin and Flagyl.  Will consult ID.  - GI consulted on admission  - Follow serial hemoglobin which remained stable  - Continue IV PPI twice daily    # History of dementia    #Anxiety  -Continue home Xanax as needed     #Essential hypertension  -Continue home verapamil  -Follow blood pressure     #History of C. difficile     # Mild intermittent asthma  -Stable  -Continue home albuterol inhaler as needed     #Chronic pancreatic insufficiency  -continue home pancrelipase    Discussed with patient, RN    Halina eDla Cruz MD    Supplementary Documentation:     Quality:  DVT Mechanical Prophylaxis:   SCDs,    DVT Pharmacologic Prophylaxis   Medication   None                Code Status: Full Code  Oliveira: No urinary catheter in place  Oliveira Duration (in days):   Central line:    MARYCHUY:     Discharge is dependent on: Clinical progress, GI workup and GI clearance  At this point Ms. Vallecillo is expected to be discharge to: Home    The 21st Century Cures Act makes medical notes like these available to patients in the interest of transparency. Please be advised this is a medical document. Medical documents are intended to carry relevant information,  facts as evident, and the clinical opinion of the practitioner. The medical note is intended as peer to peer communication and may appear blunt or direct. It is written in medical language and may contain abbreviations or verbiage that are unfamiliar.

## 2025-07-09 NOTE — ED QUICK NOTES
Orders for admission, patient is aware of plan and ready to go upstairs. Any questions, please call ED RN edna at extension [phone].     Patient Covid vaccination status: Partially vaccinated     COVID Test Ordered in ED: None    COVID Suspicion at Admission: N/A    Running Infusions: \None    Mental Status/LOC at time of transport: 4/4     Other pertinent information: use a walker and [place]  CIWA score: N/A   NIH score:  N/A

## 2025-07-09 NOTE — PHYSICAL THERAPY NOTE
PHYSICAL THERAPY EVALUATION - INPATIENT     Room Number: 516/516-A  Evaluation Date: 7/9/2025  Type of Evaluation: Initial  Physician Order: PT Eval and Treat    Presenting Problem: Dark/loose stools  Co-Morbidities : hiatal hernia, esophageal stricture, dementia, essential hypertension, hyperlipidemia, history of C. difficile, asthma, chronic pancreatic insufficiency  Reason for Therapy: Mobility Dysfunction and Discharge Planning    PHYSICAL THERAPY ASSESSMENT   Patient is a 89 year old female admitted 7/8/2025 for dark/loose stools.  Prior to admission, patient's baseline is mod I w/ adl's and gait w/ rw.  Patient is currently functioning near baseline with bed mobility, transfers, and gait.  Patient is requiring supervision and stand-by assist as a result of the following impairments: decreased functional strength, impaired standing balance, and decreased muscular endurance.  Physical Therapy will continue to follow for duration of hospitalization.    Patient will benefit from continued skilled PT Services for duration of hospitalization, however, given the patient is functioning near baseline level do not anticipate skilled therapy needs at discharge .    PLAN DURING HOSPITALIZATION  Nursing Mobility Recommendation : 1 Assist  PT Device Recommendation: Rolling walker  PT Treatment Plan: Patient education, Gait training, Strengthening, Transfer training, Balance training  Rehab Potential : Good  Frequency (Obs): 3-5x/week     CURRENT GOALS    Goal #1 Patient is able to demonstrate supine - sit EOB @ level: modified independent     Goal #2 Patient is able to demonstrate transfers EOB to/from [facility] [place] at assistance level: modified independent     Goal #3 Patient is able to ambulate 200 feet with assist device: walker - rolling at assistance level: supervision     Goal #4    Goal #5    Goal #6    Goal Comments: Goals established on 7/9/2025      PHYSICAL THERAPY MEDICAL/SOCIAL HISTORY  History related to current  admission: Patient is a 89 year old female admitted on 7/8/2025 from home for episodes of black stool.  Work-up remains ongoing - planned EGD tomorrow.    HOME SITUATION  Type of Home: Apartment  Home Layout: One level  Stairs to Enter : 0        Stairs to Bedroom: 0         Lives With: Alone    Drives: No   Patient Regularly Uses: Glasses, Rolling walker      Prior Level of [place]: Pt is typically independent w/ adl's and gait w/ rw.  Neighbor and dtr assist pt w/ driving for errands and IADL's.      SUBJECTIVE  \"I've had 4 back surgeries\"    OBJECTIVE  Precautions: Bed/chair alarm  Fall Risk: Standard fall risk    WEIGHT BEARING RESTRICTION     PAIN ASSESSMENT  Rating: Unable to rate  Location: Chronic back pain, did not c/o of any active pain during session  Management Techniques: Activity promotion, Repositioning    COGNITION  Overall Cognitive Status:  WFL - within functional limits    RANGE OF MOTION AND STRENGTH ASSESSMENT  Upper extremity ROM and strength-see OT evaluation    Lower extremity ROM is within functional limits     Lower extremity strength is within functional limits - grossly 4/5 throughout    BALANCE  Static Sitting: Good  Dynamic Sitting: Good  Static Standing: Fair -  Dynamic Standing: Fair -    ADDITIONAL TESTS                                    ACTIVITY TOLERANCE  Pulse: 117  Heart Rate Source: Monitor                   O2 WALK  Oxygen Therapy  SPO2% on Room Air at Rest: 95    NEUROLOGICAL FINDINGS                        AM-PAC '6-Clicks' INPATIENT SHORT FORM - BASIC MOBILITY  How much difficulty does the patient currently have...  Patient Difficulty: Turning over in bed (including adjusting bedclothes, sheets and blankets)?: None   Patient Difficulty: Sitting down on and standing up from a chair with arms (e.g., wheelchair, bedside commode, etc.): None   Patient Difficulty: Moving from lying on back to sitting on the side of the bed?: None   How much help from another person does the  patient currently need...   Help from Another: Moving to and from a bed to a chair (including a wheelchair)?: None   Help from Another: Need to walk in hospital room?: A Little   Help from Another: Climbing 3-5 steps with a railing?: A Little     AM-PAC Score:  Raw Score: 22   Approx Degree of Impairment: 20.91%   Standardized Score (AM-PAC Scale): 53.28   CMS Modifier (G-Code): CJ    FUNCTIONAL ABILITY STATUS  Gait Assessment   Functional Mobility/Gait Assessment  Gait Assistance: Supervision  Distance (ft): 100  Assistive Device: Rolling walker  Pattern: Within Functional Limits (Slow annelise)    Skilled Therapy Provided     Bed Mobility:  Rolling: Right w/ hob elevated - mod I  Supine to sit: Mod I w/ hob elevated   Sit to supine: NT     Transfer Mobility:  Sit to stand: Mod I both from eob and toilet   Stand to sit: Mod I both to toilet and bedside chair.  Gait = Pt completed gait 100'x1 w/ rw and supervision assist.    Therapist's Comments: Pt reports feeling comfortable in bedside recliner at end of session.  Encouraged pt to walk w/ staff again later today, expressed understanding.    Exercise/Education Provided:  Bed mobility  Functional activity tolerated  Gait training  Transfer training    Patient End of Session: Up in chair, Needs met, Call light within reach, RN aware of session/findings, All patient questions and concerns addressed, Alarm set      Patient Evaluation Complexity Level:  History Moderate - 1 or 2 personal factors and/or co-morbidities   Examination of body systems Moderate - addressing a total of 3 or more elements   Clinical Presentation Low- Stable   Clinical Decision Making Low Complexity       PT Session Time: 25 minutes  Gait Trainin minutes  Therapeutic Activity: 7 minutes  Neuromuscular Re-education: 0 minutes  Therapeutic Exercise: 0 minutes

## 2025-07-09 NOTE — H&P
[facility]     Karla Vallecillo Patient Status:  Emergency    1934 MRN [id number]   Location [facility] EMERGENCY DEPARTMENT Attending Nathalia Harman DO   Hosp Day # 0 PCP Ghada Carballo MD     Chief Complaint: x2 episodes dark stools     Subjective:    History of Present Illness:     Karla Vallecillo is a 89 year old female with past medical history hiatal hernia, esophageal stricture, dementia, essential hypertension, hyperlipidemia, history of C. difficile, asthma, chronic pancreatic insufficiency presents to hospital today for 2 episodes of dark stools earlier today and also 2 episodes of dark stools yesterday.  Patient otherwise denies any nausea or vomiting no fevers or chills no abdominal pain.  She denies any recent weight loss. Of note, pt lives at home by herself.         History/Other:    Past Medical History:  Past Medical History[1]  Past Surgical History:   Past Surgical History[2]   Family History:   Family History[3]  Social History:    reports that she quit smoking about 53 years ago. Her smoking use included cigarettes. She started smoking about 56 years ago. She has a 0.9 pack-year smoking history. She has never used smokeless tobacco. She reports that she does not currently use alcohol. She reports that she does not use drugs.     Allergies: Allergies[4]    Medications:  Medications Ordered Prior to Encounter[5]    Review of Systems:   A comprehensive review of systems was completed.    Pertinent positives and negatives noted in the HPI.    Objective:   Physical Exam:    /83   Pulse 87   Temp 97.8 °F (36.6 °C)   Resp 18   Ht 5' (1.524 m)   Wt 155 lb (70.3 kg)   LMP  (LMP Unknown)   SpO2 100%   BMI 30.27 kg/m²   General: No acute distress, Alert  Respiratory: No rhonchi, no wheezes  Cardiovascular: S1, S2. Regular rate and rhythm  Abdomen: Soft, Non-tender, non-distended, positive bowel sounds  Neuro: No new focal  deficits  Extremities: No edema      Results:    Labs:      Labs Last 24 Hours:    Recent Labs   Lab 07/08/25  1728   RBC 4.66   HGB 13.4   HCT 41.2   MCV 88.4   MCH 28.8   MCHC 32.5   RDW 14.7   NEPRELIM 4.77   WBC 9.0   .0       Recent Labs   Lab 07/08/25  0851 07/08/25  1728   GLU 92 101*   BUN 13 15   CREATSERUM 0.85 0.89   EGFRCR 65 62   CA 9.6 9.2   ALB 4.2 4.4    144   K 3.9 4.0    108   CO2 26.0 26.0   ALKPHO 61 70   AST 15 17   ALT 8* 8*   BILT 0.7 0.5   TP 6.7 7.1       Estimated Glomerular Filtration Rate: 62 mL/min/1.73m2 (result from lab).    Lab Results   Component Value Date    PT 13.5 01/22/2013    PT 13.4 12/21/2012    INR 0.95 07/16/2020    INR 0.95 09/23/2019    INR 0.97 08/07/2016       No results for input(s): \"TROP\", \"TROPHS\", \"CK\" in the last 168 hours.    No results for input(s): \"TROP\", \"PBNP\" in the last 168 hours.    No results for input(s): \"PCT\" in the last 168 hours.    Imaging: Imaging data reviewed in Epic.    Assessment & Plan:      #Melena  #Hx of hiatal hernia  #Hx of esophageal stricture   PTA: no antiplatelets or anticoagulation   GI to see  EGD in dec 2022 done for dysphagia and found to have hiatal hernia and esophageal stricture   CLD for now  Monitor hgb q12h  IV PPI BID for now    #Dementia    #Essential hypertension    #Hyperlipidemia    #History of C. difficile    #Asthma    #Chronic pancreatic insufficiency        Plan of care discussed with er physician & patient    Omar Velasquez DO    Supplementary Documentation:     The 21st Century Cures Act makes medical notes like these available to patients in the interest of transparency. Please be advised this is a medical document. Medical documents are intended to carry relevant information, facts as evident, and the clinical opinion of the practitioner. The medical note is intended as peer to peer communication and may appear blunt or direct. It is written in medical language and may contain abbreviations or  verbiage that are unfamiliar.                                       [1]   Past Medical History:   Anemia    Anesthesia complication    pt stated had a reacion with a medication that sounded like \"memo\"    Anxiety state, unspecified    Arthritis    Asthma (HCC)    Atherosclerosis of coronary artery    Back pain    Back problem    cervical pain    Black stools    Blood transfusion reaction    CAD (coronary artery disease)    Calculus of kidney    Cancer (HCC)    skin cancer in left arm    Change in hair    Chest pain on exertion    Coronary atherosclerosis of unspecified type of vessel, native or graft    Diarrhea, unspecified    Dyslipidemia    Easy bruising    Extrinsic asthma, unspecified    Fatigue    Glucose intolerance (pre-diabetes)    Hearing impairment    HEARING LOSS BUT NO AIDS    Hearing loss    Heart palpitations    HIGH BLOOD PRESSURE    High cholesterol    Hip pain    History of blood transfusion    AFTER HYST    History of depression    Hoarseness, chronic    HTN (hypertension)    Indigestion    Leg swelling    Loss of appetite    Mouth sores    Nausea    Osteoarthrosis, unspecified whether generalized or localized, unspecified site    knees, back    Osteoporosis    Other and unspecified hyperlipidemia    Pain in joints    Pinched nerve in neck    Pneumonia, organism unspecified(486)    PONV (postoperative nausea and vomiting)    Shortness of breath    Sleep apnea    Stented coronary artery    Stress    Uncomfortable fullness after meals    Unspecified essential hypertension    Visual impairment    glasses    Wears glasses    Weight loss   [2]   Past Surgical History:  Procedure Laterality Date    Angioplasty (coronary)      stent    Appendectomy      Back surgery      lumbar x 4    Benign biopsy left  a long time ago    x 2    Cath bare metal stent (bms)  2012    Cholecystectomy      Colonoscopy  10/21/2013    Procedure: COLONOSCOPY;  Surgeon: Emmanuel Naranjo MD;  Location:  ENDOSCOPY     Colonoscopy N/A 6/22/2021    Procedure: COLONOSCOPY with biopsies and forcep, cold and hot snare polypectomy with saline lift and clip placement x3;  Surgeon: Jerod Pierce MD;  Location:  ENDOSCOPY    Egd      Excis lumbar disk,one level      Fluor gid & loclzj ndl/cath spi dx/ther njx  9/23/2013    Procedure: CERVICAL EPIDURAL;  Surgeon: Christiano Wiseman MD;  Location: [facility]    Hemorrhoidectomy,int/ext,simple      Hysterectomy      complete    Injection, w/wo contrast, dx/therapeutic substance, epidural/subarachnoid; cervical/thoracic  9/23/2013    Procedure: CERVICAL EPIDURAL;  Surgeon: Christiano Wiseman MD;  Location: [facility]    Injection, w/wo contrast, dx/therapeutic substance, epidural/subarachnoid; cervical/thoracic N/A 8/17/2015    Procedure: CERVICAL EPIDURAL;  Surgeon: Josue Stewart MD;  Location: [facility]    Dex localization wire 1 site left (cpt=19281)  1980'S    Benign    Other surgical history  8/7/12    Diag cardiac stent, multi-link mini vision 2mmx 12mm.     Patient documented not to have experienced any of the following events  9/23/2013    Procedure: CERVICAL EPIDURAL;  Surgeon: Christiano Wiseman MD;  Location: [facility]    Patient documented not to have experienced any of the following events N/A 8/17/2015    Procedure: CERVICAL EPIDURAL;  Surgeon: Josue Stewart MD;  Location: [facility]    Patient withough preoperative order for iv antibiotic surgical site infection prophylaxis.  9/23/2013    Procedure: CERVICAL EPIDURAL;  Surgeon: Christiano Wiseman MD;  Location: [facility]    Patient withough preoperative order for iv antibiotic surgical site infection prophylaxis. N/A 8/17/2015    Procedure: CERVICAL EPIDURAL;  Surgeon: Josue Stewart MD;  Location: [facility]    Spine surgery procedure unlisted      Stent, coated/cov w/del sys       Tonsillectomy      Total abdom hysterectomy      Upper gi endoscopy,exam     [3]   Family History  Problem Relation Age of Onset    Breast Cancer Mother 70    High Blood Pressure Mother     Allergies Father     Asthma Father     Colon Cancer Father     Heart Disease Father     Heart Attack Father     Heart Attack Paternal Grandfather     Heart Disease Paternal Grandfather     Arthritis Paternal Grandmother     Allergies Paternal Grandmother     Cancer Maternal Grandfather     Heart Attack Maternal Grandfather     Heart Attack Maternal Grandmother     Heart Attack Brother     Other (Autoimmune disease) Brother     Thyroid Disorder Sister     Asthma Sister     Cancer Brother     Allergies Daughter     Fibromyalgia Daughter     Melanoma Daughter     Other (Hepatitis C) Daughter    [4]   Allergies  Allergen Reactions    Aspirin DIARRHEA and SHORTNESS OF BREATH    Breo Ellipta OTHER (SEE COMMENTS) and SHORTNESS OF BREATH     Pneumonia  Pneumonia    Budesonide SHORTNESS OF BREATH     Coughing    Celebrex [Celecoxib] SHORTNESS OF BREATH    Citalopram SHORTNESS OF BREATH and OTHER (SEE COMMENTS)     Oral     Clotrimazole DIARRHEA and SHORTNESS OF BREATH    Erythromycin OTHER (SEE COMMENTS) and SHORTNESS OF BREATH     Short of Breath  Short of Breath  External  Oral   Short of Breath    Famotidine SHORTNESS OF BREATH    Fish Oil ANAPHYLAXIS, OTHER (SEE COMMENTS) and SHORTNESS OF BREATH     Oral     Fluticasone OTHER (SEE COMMENTS)     Per pt not true  Other reaction(s): Propensity to adverse reactions to drug    Fluticasone-Salmeterol Coughing, SHORTNESS OF BREATH and OTHER (SEE COMMENTS)     Inhalation    Gabapentin WHEEZING    Iodine (Topical) RASH and SHORTNESS OF BREATH     Rash with topical Iodine.   Rash with topical Iodine.   Rash with topical Iodine.     Iodine Solution [Povidone Iodine] ANAPHYLAXIS     IVP Dye    Levofloxacin SHORTNESS OF BREATH and OTHER (SEE COMMENTS)     Nausea, leg pain    Nausea, leg  pain    Intravenous  Other reaction(s): Propensity to adverse reactions to drug    Lidocaine ANAPHYLAXIS and UNKNOWN     \"Breathing issues\"-- per patient okay with marcaine     Lisinopril SHORTNESS OF BREATH     Other reaction(s): Propensity to adverse reactions to drug    Metamucil [Psyllium] SHORTNESS OF BREATH     And abdominal discomfort.    Nitrofurantoin SHORTNESS OF BREATH and UNKNOWN    Nsaids SHORTNESS OF BREATH and UNKNOWN    Other RASH and SHORTNESS OF BREATH     Transdermal \"memo \" patch ;novacaine Pt states she is okay with marcaine  per allergy testing.    Penicillins SHORTNESS OF BREATH     Short of Breath    Plavix [Clopidogrel] SHORTNESS OF BREATH    Pulmicort SHORTNESS OF BREATH and UNKNOWN     Coughing      Radiology Contrast Iodinated Dyes ANAPHYLAXIS     Other reaction(s): Propensity to adverse reactions to drug    Shellfish SHORTNESS OF BREATH    Mupirocin OTHER (SEE COMMENTS)     Felt short of breath  Other reaction(s): Propensity to adverse reactions to drug    Advair Hfa OTHER (SEE COMMENTS)    Bupropion OTHER (SEE COMMENTS)     constipation    Celecoxib OTHER (SEE COMMENTS)     Oral     Codeine OTHER (SEE COMMENTS)     Per pt not true pt takes this medication       Effient [Prasugrel Hydrochloride] SHORTNESS OF BREATH    Linezolid DIARRHEA     Other reaction(s): Propensity to adverse reactions to drug    Metronidazole ASTHMA and UNKNOWN     Other reaction(s): Propensity to adverse reactions to drug    Mold SHORTNESS OF BREATH    Penicillin G RASH     sob  Other reaction(s): Propensity to adverse reactions to drug    Questran [Cholestyramine] HIVES and Coughing    Risperdal [Risperidone] SHORTNESS OF BREATH    Shellfish-Derived Products      Sob      Tramadol UNKNOWN     Arms turned red like a sunburn    Warfarin OTHER (SEE COMMENTS)     Asthma attack  Other reaction(s): Propensity to adverse reactions to drug    Flulaval RASH     Other reaction(s): Propensity to adverse reactions to drug     Kenalog [Triamcinolone] NAUSEA ONLY    Vancomycin RASH     Rash to the face and neck    [5]   Current Facility-Administered Medications on File Prior to Encounter   Medication Dose Route Frequency Provider Last Rate Last Admin    [COMPLETED] methylPREDNISolone acetate (DEPO-medrol) 40 MG/ML injection 40 mg  40 mg Intra-articular Once    40 mg at 25 1146     Current Outpatient Medications on File Prior to Encounter   Medication Sig Dispense Refill    predniSONE 2.5 MG Oral Tab Take 1 tablet (2.5 mg total) by mouth daily. 90 tablet 3    [] Pancrelipase, Lip-Prot-Amyl, (ZENPEP) 01543-366068 units Oral Cap DR Particles Take 1 capsule by mouth with breakfast, with lunch, and with evening meal. 90 capsule 0    Beclomethasone Diprop HFA 80 MCG/ACT Inhalation Aerosol, Breath Activated Inhale 2 puffs into the lungs in the morning and 2 puffs before bedtime. 1 each 3    fluticasone propionate 44 MCG/ACT Inhalation Aerosol Inhale 2 puffs into the lungs 2 (two) times daily. 10.6 g 1    LOPERAMIDE 2 MG Oral Cap TAKE 1 CAPSULE BY MOUTH DAILY 90 capsule 2    albuterol (PROAIR HFA) 108 (90 Base) MCG/ACT Inhalation Aero Soln Inhale 2 puffs into the lungs every 4 (four) hours as needed for Wheezing. 1 each 1    Verapamil HCl  MG Oral Capsule SR 24 Hr Take 240 mg by mouth in the morning.      Ascorbic Acid (VITAMIN C OR) Take 1 tablet by mouth every evening.      NON FORMULARY Vitamin B complex with Vit B-12 one daily      ALPRAZolam 0.25 MG Oral Tab Take 1 tablet (0.25 mg total) by mouth every 6 (six) hours as needed. Taking 2 times daily      IMODIUM MULTI-SYMPTOM RELIEF OR Take 1 tablet by mouth as needed.      acetaminophen 500 MG Oral Tab Take 1 tablet (500 mg total) by mouth every 6 (six) hours as needed for Pain.      Vitamin D3, Cholecalciferol, 50 MCG (2000 UT) Oral Cap Take 1 capsule (2,000 Units total) by mouth in the morning.      loratadine (CLARITIN) 10 MG Oral Tab Take 1 tablet (10 mg total) by  mouth in the morning.      vitamin E 400 UNITS Oral Cap Take 1 capsule (400 Units total) by mouth in the morning.

## 2025-07-09 NOTE — CONSULTS
[facility] IP Report of Consultation Gastroenterology    7/9/2025    Karla Vallecillo  female   Sree Ramos DO     [id number]  12/29/1934 Primary Care Physician  Ghada Carballo MD     Reason for Consultation:  GI consulted for melena     Previous history includes diarrhea. No source was found. The patient also is actively seen [facility] for the same problem.The patient had a CT scan of the abdomen on 3/22/23 revealing diverticulosis.  Stools were negative for C. difficile.  CBC was normal. Currently stable. No nausea or vomiting. No rectal bleeding. No weight loss        PROBLEM LIST:   Problem List[1]    PATIENT HISTORY:     Past Medical History[2]   Past Surgical History[3]   Family History[4]   Short Social Hx on File[5]     Allergies;  Allergies[6]     Medications:  Current Hospital Medications[7]     REVIEW OF SYSTEMS:   GENERAL: well developed, well nourished, in no apparent distress  HEENT: normocephalic; normal nose, pharynx and TM's  EYES: PERRLA, EOMI, sclera anicteric, conjunctiva normal; fundi normal  NECK: supple, FROM, no nodes, no JVD, no thyromegaly, no carotid bruits  RESPIRATORY: clear to percussion and auscultation  CARDIOVASCULAR: S1, S2 normal, RRR; no S3, no S4; no click; murmur negative  ABDOMEN: normal, active bowel sounds, no masses, HSM or tenderness  RECTAL: ---  EXTREMITIES: no cyanosis, clubbing or edema, peripheral pulses intact  PSYCHIATRIC: alert, oriented times 3        EXAM:   /74 (BP Location: Right arm)   Pulse 82   Temp 98.3 °F (36.8 °C) (Oral)   Resp 15   Ht 5' (1.524 m)   Wt 155 lb (70.3 kg)   LMP  (LMP Unknown)   SpO2 96%   BMI 30.27 kg/m²  Body mass index is 30.27 kg/m².  GENERAL: Well developed, well nourished, in no obvious distress.   ABDOMEN: Bowel sounds normoactive. Soft, no organomegaly or masses appreciated. Nontender.   EXTREMITIES: Without cyanosis. No peripheral edema appreciated.   RECTAL: Deferred.   NEURO: Motor and Gait  grossly intact. Alert and Oriented x 3.        LAB/IMAGING RESULTS:     Lab Results   Component Value Date    WBC 7.7 07/09/2025    HGB 12.4 07/09/2025    HCT 37.9 07/09/2025    .0 07/09/2025     [unfilled]  Lab Results   Component Value Date    WBC 7.7 07/09/2025    HGB 12.4 07/09/2025    HCT 37.9 07/09/2025    .0 07/09/2025    CREATSERUM 0.75 07/09/2025    BUN 12 07/09/2025     07/09/2025    K 3.7 07/09/2025     07/09/2025    CO2 28.0 07/09/2025    GLU 94 07/09/2025    CA 8.7 07/09/2025    ALB 3.8 07/09/2025    ALKPHO 58 07/09/2025    BILT 0.7 07/09/2025    TP 6.1 07/09/2025    AST 12 07/09/2025    ALT <7 07/09/2025    MG 2.0 07/09/2025         ASSESSMENT AND PLAN:   Melenic stools    EGD tomorrow under MAC for melenic stools   NPO after midnight   PPI 40 mg daily   Daily CBC     The patient indicates understanding of these issues and agrees to the plan.  Sree Ramos DO  7/9/2025  2:57 PM         [1]   Patient Active Problem List  Diagnosis    Cervical radiculopathy    Primary osteoarthritis of right knee    Lumbar disc herniation    Irritable bowel syndrome with diarrhea    CAD (coronary artery disease), native coronary artery    Senile osteoporosis    Hypertension, benign    Dyslipidemia    Osteoarthritis of left knee    S/P lumbar laminectomy    Spondylosis of lumbar region without myelopathy or radiculopathy    Osteoarthritis of one hip, left    History of heart artery stent    Pancreatic insufficiency (HCC)    Pure hypercholesterolemia    Carpal tunnel syndrome of right wrist    Anxiety    Toe infection    Primary osteoarthritis of both knees    Allergic reaction to nonsteroidal anti-inflammatory drug (NSAID)    Mild Alzheimer's dementia, unspecified timing of dementia onset, unspecified whether behavioral, psychotic, or mood disturbance or anxiety (HCC)    Mild persistent asthma without complication (HCC)    History of Clostridioides difficile infection    Late onset  Alzheimer's disease without behavioral disturbance (HCC)    Low back pain    Myalgia    Postlaminectomy syndrome, not elsewhere classified    Spondylosis of lumbosacral spine without myelopathy    Cerebral atrophy, mild    Melena   [2]   Past Medical History:   Anemia    Anesthesia complication    pt stated had a reacion with a medication that sounded like \"memo\"    Anxiety state, unspecified    Arthritis    Asthma (HCC)    Atherosclerosis of coronary artery    Back pain    Back problem    cervical pain    Black stools    Blood transfusion reaction    CAD (coronary artery disease)    Calculus of kidney    Cancer (HCC)    skin cancer in left arm    Change in hair    Chest pain on exertion    Coronary atherosclerosis of unspecified type of vessel, native or graft    Diarrhea, unspecified    Dyslipidemia    Easy bruising    Extrinsic asthma, unspecified    Fatigue    Glucose intolerance (pre-diabetes)    Hearing impairment    HEARING LOSS BUT NO AIDS    Hearing loss    Heart palpitations    HIGH BLOOD PRESSURE    High cholesterol    Hip pain    History of blood transfusion    AFTER HYST    History of depression    Hoarseness, chronic    HTN (hypertension)    Indigestion    Leg swelling    Loss of appetite    Mouth sores    Nausea    Osteoarthrosis, unspecified whether generalized or localized, unspecified site    knees, back    Osteoporosis    Other and unspecified hyperlipidemia    Pain in joints    Pinched nerve in neck    Pneumonia, organism unspecified(486)    PONV (postoperative nausea and vomiting)    Shortness of breath    Sleep apnea    Stented coronary artery    Stress    Uncomfortable fullness after meals    Unspecified essential hypertension    Visual impairment    glasses    Wears glasses    Weight loss   [3]   Past Surgical History:  Procedure Laterality Date    Angioplasty (coronary)      stent    Appendectomy      Back surgery      lumbar x 4    Benign biopsy left  a long time ago    x 2    Cath bare  metal stent (bms)  2012    Cholecystectomy      Colonoscopy  10/21/2013    Procedure: COLONOSCOPY;  Surgeon: Emmanuel Naranjo MD;  Location:  ENDOSCOPY    Colonoscopy N/A 6/22/2021    Procedure: COLONOSCOPY with biopsies and forcep, cold and hot snare polypectomy with saline lift and clip placement x3;  Surgeon: Jerod Pierce MD;  Location:  ENDOSCOPY    Egd      Excis lumbar disk,one level      Fluor gid & loclzj ndl/cath spi dx/ther njx  9/23/2013    Procedure: CERVICAL EPIDURAL;  Surgeon: Christiano Wiseman MD;  Location: [facility]    Hemorrhoidectomy,int/ext,simple      Hysterectomy      complete    Injection, w/wo contrast, dx/therapeutic substance, epidural/subarachnoid; cervical/thoracic  9/23/2013    Procedure: CERVICAL EPIDURAL;  Surgeon: Christiano Wiseman MD;  Location: [facility]    Injection, w/wo contrast, dx/therapeutic substance, epidural/subarachnoid; cervical/thoracic N/A 8/17/2015    Procedure: CERVICAL EPIDURAL;  Surgeon: Josue Stewart MD;  Location: [facility]    Dex localization wire 1 site left (cpt=19281)  1980'S    Benign    Other surgical history  8/7/12    Diag cardiac stent, multi-link mini vision 2mmx 12mm.     Patient documented not to have experienced any of the following events  9/23/2013    Procedure: CERVICAL EPIDURAL;  Surgeon: Christiano Wiseman MD;  Location: [facility]    Patient documented not to have experienced any of the following events N/A 8/17/2015    Procedure: CERVICAL EPIDURAL;  Surgeon: Josue Stewart MD;  Location: [facility]    Patient withough preoperative order for iv antibiotic surgical site infection prophylaxis.  9/23/2013    Procedure: CERVICAL EPIDURAL;  Surgeon: Christiano Wiseman MD;  Location: [facility]    Patient withough preoperative order for iv antibiotic surgical site infection prophylaxis. N/A 8/17/2015    Procedure: CERVICAL  EPIDURAL;  Surgeon: Josue Stewart MD;  Location[facility]    Spine surgery procedure unlisted      Stent, coated/cov w/del sys      Tonsillectomy      Total abdom hysterectomy      Upper gi endoscopy,exam     [4]   Family History  Problem Relation Age of Onset    Breast Cancer Mother 70    High Blood Pressure Mother     Allergies Father     Asthma Father     Colon Cancer Father     Heart Disease Father     Heart Attack Father     Heart Attack Paternal Grandfather     Heart Disease Paternal Grandfather     Arthritis Paternal Grandmother     Allergies Paternal Grandmother     Cancer Maternal Grandfather     Heart Attack Maternal Grandfather     Heart Attack Maternal Grandmother     Heart Attack Brother     Other (Autoimmune disease) Brother     Thyroid Disorder Sister     Asthma Sister     Cancer Brother     Allergies Daughter     Fibromyalgia Daughter     Melanoma Daughter     Other (Hepatitis C) Daughter    [5]   Social History  Socioeconomic History    Marital status:    Tobacco Use    Smoking status: Former     Current packs/day: 0.00     Average packs/day: 0.3 packs/day for 3.0 years (0.9 ttl pk-yrs)     Types: Cigarettes     Start date: 1969     Quit date: 1972     Years since quittin.4    Smokeless tobacco: Never   Vaping Use    Vaping status: Never Used   Substance and Sexual Activity    Alcohol use: Not Currently    Drug use: No    Sexual activity: Never   Other Topics Concern    Caffeine Concern Yes     Comment: tea    Exercise No   Social History Narrative    ** Merged History Encounter **          Social Drivers of Health     Food Insecurity: No Food Insecurity (2025)    NCSS - Food Insecurity     Worried About Running Out of Food in the Last Year: No     Ran Out of Food in the Last Year: No   Transportation Needs: No Transportation Needs (2025)    NCSS - Transportation     Lack of Transportation: No   Housing Stability: Not At Risk (2025)    NCSS  - Housing/Utilities     Has Housing: Yes     Worried About Losing Housing: No     Unable to Get Utilities: No   [6]   Allergies  Allergen Reactions    Aspirin DIARRHEA and SHORTNESS OF BREATH    Breo Ellipta OTHER (SEE COMMENTS) and SHORTNESS OF BREATH     Pneumonia  Pneumonia    Budesonide SHORTNESS OF BREATH     Coughing    Celebrex [Celecoxib] SHORTNESS OF BREATH    Citalopram SHORTNESS OF BREATH and OTHER (SEE COMMENTS)     Oral     Clotrimazole DIARRHEA and SHORTNESS OF BREATH    Erythromycin OTHER (SEE COMMENTS) and SHORTNESS OF BREATH     Short of Breath  Short of Breath  External  Oral   Short of Breath    Famotidine SHORTNESS OF BREATH    Fish Oil ANAPHYLAXIS, OTHER (SEE COMMENTS) and SHORTNESS OF BREATH     Oral     Fluticasone OTHER (SEE COMMENTS)     Per pt not true  Other reaction(s): Propensity to adverse reactions to drug    Fluticasone-Salmeterol Coughing, SHORTNESS OF BREATH and OTHER (SEE COMMENTS)     Inhalation    Gabapentin WHEEZING    Iodine (Topical) RASH and SHORTNESS OF BREATH     Rash with topical Iodine.   Rash with topical Iodine.   Rash with topical Iodine.     Iodine Solution [Povidone Iodine] ANAPHYLAXIS     IVP Dye    Levofloxacin SHORTNESS OF BREATH and OTHER (SEE COMMENTS)     Nausea, leg pain    Nausea, leg pain    Intravenous  Other reaction(s): Propensity to adverse reactions to drug    Lidocaine ANAPHYLAXIS and UNKNOWN     \"Breathing issues\"-- per patient okay with marcaine     Lisinopril SHORTNESS OF BREATH     Other reaction(s): Propensity to adverse reactions to drug    Metamucil [Psyllium] SHORTNESS OF BREATH     And abdominal discomfort.    Nitrofurantoin SHORTNESS OF BREATH and UNKNOWN    Nsaids SHORTNESS OF BREATH and UNKNOWN    Other RASH and SHORTNESS OF BREATH     Transdermal \"memo \" patch ;novacaine Pt states she is okay with marcaine  per allergy testing.    Penicillins SHORTNESS OF BREATH     Short of Breath    Plavix [Clopidogrel] SHORTNESS OF BREATH    Pulmicort  SHORTNESS OF BREATH and UNKNOWN     Coughing      Radiology Contrast Iodinated Dyes ANAPHYLAXIS     Other reaction(s): Propensity to adverse reactions to drug    Shellfish SHORTNESS OF BREATH    Mupirocin OTHER (SEE COMMENTS)     Felt short of breath  Other reaction(s): Propensity to adverse reactions to drug    Advair Hfa OTHER (SEE COMMENTS)    Bupropion OTHER (SEE COMMENTS)     constipation    Celecoxib OTHER (SEE COMMENTS)     Oral     Codeine OTHER (SEE COMMENTS)     Per pt not true pt takes this medication       Effient [Prasugrel Hydrochloride] SHORTNESS OF BREATH    Linezolid DIARRHEA     Other reaction(s): Propensity to adverse reactions to drug    Metronidazole ASTHMA and UNKNOWN     Other reaction(s): Propensity to adverse reactions to drug    Mold SHORTNESS OF BREATH    Penicillin G RASH     sob  Other reaction(s): Propensity to adverse reactions to drug    Questran [Cholestyramine] HIVES and Coughing    Risperdal [Risperidone] SHORTNESS OF BREATH    Shellfish-Derived Products      Sob      Tramadol UNKNOWN     Arms turned red like a sunburn    Warfarin OTHER (SEE COMMENTS)     Asthma attack  Other reaction(s): Propensity to adverse reactions to drug    Flulaval RASH     Other reaction(s): Propensity to adverse reactions to drug    Kenalog [Triamcinolone] NAUSEA ONLY    Vancomycin RASH     Rash to the face and neck    [7]   Current Facility-Administered Medications:     ALPRAZolam (Xanax) tab 0.25 mg, 0.25 mg, Oral, TID PRN    albuterol (Ventolin HFA) 108 (90 Base) MCG/ACT inhaler 2 puff, 2 puff, Inhalation, Q4H PRN    ALPRAZolam (Xanax) tab 0.5 mg, 0.5 mg, Oral, BID    cetirizine (ZyrTEC) tab 5 mg, 5 mg, Oral, Daily    pancrelipase (Lip-Prot-Amyl) (Zenpep) DR particles cap 2 capsule, 2 capsule, Oral, Daily    verapamil ER (Calan-SR) tab 240 mg, 240 mg, Oral, Nightly    pantoprazole (Protonix) 40 mg in sodium chloride 0.9% PF 10 mL IV push, 40 mg, Intravenous, Q12H    acetaminophen (Tylenol Extra  Strength) tab 500 mg, 500 mg, Oral, Q4H PRN    melatonin tab 3 mg, 3 mg, Oral, Nightly PRN    ondansetron (Zofran) 4 MG/2ML injection 4 mg, 4 mg, Intravenous, Q6H PRN    metoclopramide (Reglan) 5 mg/mL injection 5 mg, 5 mg, Intravenous, Q8H PRN    sodium chloride 0.9% infusion, , Intravenous, Continuous    polyethylene glycol (PEG 3350) (Miralax) 17 g oral packet 17 g, 17 g, Oral, Daily PRN    sennosides (Senokot) tab 17.2 mg, 17.2 mg, Oral, Nightly PRN    bisacodyl (Dulcolax) 10 MG rectal suppository 10 mg, 10 mg, Rectal, Daily PRN    fleet enema (Fleet) rectal enema 133 mL, 1 enema, Rectal, Once PRN

## 2025-07-10 ENCOUNTER — ANESTHESIA EVENT (OUTPATIENT)
Dept: ENDOSCOPY | Facility: HOSPITAL | Age: OVER 89
End: 2025-07-10
Payer: MEDICARE

## 2025-07-10 ENCOUNTER — ANESTHESIA (OUTPATIENT)
Dept: ENDOSCOPY | Facility: HOSPITAL | Age: OVER 89
End: 2025-07-10
Payer: MEDICARE

## 2025-07-10 VITALS
OXYGEN SATURATION: 93 % | BODY MASS INDEX: 30.43 KG/M2 | TEMPERATURE: 98 F | DIASTOLIC BLOOD PRESSURE: 99 MMHG | SYSTOLIC BLOOD PRESSURE: 160 MMHG | HEIGHT: 60 IN | WEIGHT: 155 LBS | HEART RATE: 98 BPM | RESPIRATION RATE: 16 BRPM

## 2025-07-10 PROBLEM — K44.9 HIATAL HERNIA: Status: ACTIVE | Noted: 2025-07-10

## 2025-07-10 LAB
ERYTHROCYTE [DISTWIDTH] IN BLOOD BY AUTOMATED COUNT: 14.7 %
ERYTHROCYTE [DISTWIDTH] IN BLOOD BY AUTOMATED COUNT: 14.8 %
HCT VFR BLD AUTO: 40 % (ref 35–48)
HCT VFR BLD AUTO: 42.7 % (ref 35–48)
HGB BLD-MCNC: 13.5 G/DL (ref 12–16)
HGB BLD-MCNC: 14.3 G/DL (ref 12–16)
MCH RBC QN AUTO: 29 PG (ref 26–34)
MCH RBC QN AUTO: 29 PG (ref 26–34)
MCHC RBC AUTO-ENTMCNC: 33.5 G/DL (ref 31–37)
MCHC RBC AUTO-ENTMCNC: 33.8 G/DL (ref 31–37)
MCV RBC AUTO: 85.8 FL (ref 80–100)
MCV RBC AUTO: 86.6 FL (ref 80–100)
PLATELET # BLD AUTO: 193 10(3)UL (ref 150–450)
PLATELET # BLD AUTO: 279 10(3)UL (ref 150–450)
RBC # BLD AUTO: 4.66 X10(6)UL (ref 3.8–5.3)
RBC # BLD AUTO: 4.93 X10(6)UL (ref 3.8–5.3)
WBC # BLD AUTO: 7 X10(3) UL (ref 4–11)
WBC # BLD AUTO: 9.2 X10(3) UL (ref 4–11)

## 2025-07-10 PROCEDURE — 99239 HOSP IP/OBS DSCHRG MGMT >30: CPT | Performed by: INTERNAL MEDICINE

## 2025-07-10 RX ORDER — NALOXONE HYDROCHLORIDE 0.4 MG/ML
0.08 INJECTION, SOLUTION INTRAMUSCULAR; INTRAVENOUS; SUBCUTANEOUS ONCE AS NEEDED
Status: DISCONTINUED | OUTPATIENT
Start: 2025-07-10 | End: 2025-07-10 | Stop reason: HOSPADM

## 2025-07-10 RX ORDER — SODIUM CHLORIDE, SODIUM LACTATE, POTASSIUM CHLORIDE, CALCIUM CHLORIDE 600; 310; 30; 20 MG/100ML; MG/100ML; MG/100ML; MG/100ML
INJECTION, SOLUTION INTRAVENOUS CONTINUOUS
Status: DISCONTINUED | OUTPATIENT
Start: 2025-07-10 | End: 2025-07-10

## 2025-07-10 RX ORDER — VANCOMYCIN HYDROCHLORIDE 125 MG/1
125 CAPSULE ORAL 4 TIMES DAILY
Qty: 56 CAPSULE | Refills: 0 | Status: SHIPPED | OUTPATIENT
Start: 2025-07-10 | End: 2025-07-24

## 2025-07-10 RX ORDER — SODIUM CHLORIDE, SODIUM LACTATE, POTASSIUM CHLORIDE, CALCIUM CHLORIDE 600; 310; 30; 20 MG/100ML; MG/100ML; MG/100ML; MG/100ML
INJECTION, SOLUTION INTRAVENOUS CONTINUOUS PRN
Status: DISCONTINUED | OUTPATIENT
Start: 2025-07-10 | End: 2025-07-10 | Stop reason: SURG

## 2025-07-10 RX ORDER — PANTOPRAZOLE SODIUM 40 MG/1
40 TABLET, DELAYED RELEASE ORAL
Qty: 60 TABLET | Refills: 0 | Status: SHIPPED | OUTPATIENT
Start: 2025-07-10

## 2025-07-10 RX ADMIN — SODIUM CHLORIDE, SODIUM LACTATE, POTASSIUM CHLORIDE, CALCIUM CHLORIDE: 600; 310; 30; 20 INJECTION, SOLUTION INTRAVENOUS at 15:29:00

## 2025-07-10 RX ADMIN — SODIUM CHLORIDE, SODIUM LACTATE, POTASSIUM CHLORIDE, CALCIUM CHLORIDE: 600; 310; 30; 20 INJECTION, SOLUTION INTRAVENOUS at 15:22:00

## 2025-07-10 NOTE — DISCHARGE INSTRUCTIONS
Pantoprazole 40 mg twice daily for 8 weeks and then 40 mg daily per GI, please follow-up with GI and regular primary care physician regarding follow-up on this and for further refills on this as outpatient.

## 2025-07-10 NOTE — PLAN OF CARE
Problem: Patient/Family Goals  Goal: Patient/Family Long Term Goal  Description: Patient's Long Term Goal: discharge    Interventions:  - consults  - See additional Care Plan goals for specific interventions  Outcome: Progressing  Goal: Patient/Family Short Term Goal  Description: Patient's Short Term Goal: 7/8 noc: monitor melana  7/9: start/tolerate oral vanco, EGD in the am, npo at 0500    Interventions:   -   - See additional Care Plan goals for specific interventions  Outcome: Progressing     
Admission navigator completed.     Lives alone.   Up with walker. Reports no falls.   +RADHA. Doesn't wear a cpap  
Pt is A&Ox4. VSS, afebrile and denies any pain. SPO2 maintained on room air. Continuous pulse ox. Denies any SOB, cough. Tele/NSR. Clear liquid diet. Denies any n/v/d. Voids. Up with Standby w/ walker. IV fluids.Safety precautions in place. Pt is updated with plan of care.    Problem: Patient/Family Goals  Goal: Patient/Family Long Term Goal  Description: Patient's Long Term Goal: discharge    Interventions:  - consults  - See additional Care Plan goals for specific interventions  Outcome: Progressing  Goal: Patient/Family Short Term Goal  Description: Patient's Short Term Goal: 7/8 noc: monitor melana    Interventions:   -   - See additional Care Plan goals for specific interventions  Outcome: Progressing     
Pt is A&Ox4. VSS, afebrile and denies any pain. SPO2 maintained on room air. Continuous pulse ox. Denies any SOB, cough. Tele/NSR. Low fiber soft diet, npo since five am. Denies any n/v/d. Voids. Up with Standby w/ walker. IV fluids.Safety precautions in place. Pt is updated with plan of care.   Problem: Patient/Family Goals  Goal: Patient/Family Long Term Goal  Description: Patient's Long Term Goal: discharge    Interventions:  - consults  - See additional Care Plan goals for specific interventions  Outcome: Progressing  Goal: Patient/Family Short Term Goal  Description: Patient's Short Term Goal: 7/8 noc: monitor melana  7/9: start/tolerate oral vanco, EGD in the am, npo at 0500    Interventions:   -   - See additional Care Plan goals for specific interventions  Outcome: Progressing     
Spoke to patients daughter benita to get consent, she states [facility] is patients medical POA.  Attempted to call [facility] and left message  
Unable to assess due to medical condition

## 2025-07-10 NOTE — DISCHARGE SUMMARY
[facility]  Discharge Summary    Karla Vallecillo Patient Status:  Observation    1934 MRN [id number]   Location [facility] 5NW-A Attending Halina Dela Cruz MD    Day # 0 PCP Ghdaa Carballo MD     Date of Admission: 2025    Date of Discharge: 7/10/2025      Disposition: Home or Self Care    Discharge Diagnosis:   # Intractable diarrhea on admission possibly due to C. difficile colitis  # Dark-colored stools with diarrhea, possible melena reported on admission  #History of esophageal stricture  #narrowed Schatzki ring in distal esophagus status post balloon dilation done by duly GI Dr. Ramos on 7/10/2025  # Hiatal hernia    # History of dementia     #Anxiety    #Essential hypertension    #History of C. difficile     # Mild intermittent asthma    #Chronic pancreatic insufficiency    Chief Complaint:   Chief Complaint   Patient presents with    GI Bleeding       History of Present Illness:   Karla Vallecillo is a 89 year old female with past medical history hiatal hernia, esophageal stricture, dementia, essential hypertension, hyperlipidemia, history of C. difficile, asthma, chronic pancreatic insufficiency presents to hospital today for 2 episodes of dark stools earlier today and also 2 episodes of dark stools yesterday.  Patient otherwise denies any nausea or vomiting no fevers or chills no abdominal pain.  She denies any recent weight loss. Of note, pt lives at home by herself.   Please refer to history and physical done by Dr. Velasquez for details of admission    Brief Synopsis:   # Intractable diarrhea on admission possibly due to C. difficile colitis  # Dark-colored stools with diarrhea, possible melena reported on admission  #History of esophageal stricture  # Hiatal hernia  - GI stool PCR negative.  Stool C. difficile came back positive.  EIA not detected.  Patient has history of allergy to vancomycin and Flagyl.  Consulted ID who started patient on oral vancomycin on 2025.   Diarrhea improved today.  - GI consulted on admission,  EGD with balloon dilatation done by GI Dr. Ramos on 7/10/2025  - Followed serial hemoglobin which remained stable  - Treated with IV PPI twice daily while in hospital, GI advised Pantoprazole 40 mg twice daily for 8 weeks and then 40 mg daily at the time of discharge per GI     # History of dementia     #Anxiety  -Continue home Xanax as needed     #Essential hypertension  -Continue home verapamil  -Follow blood pressure     #History of C. difficile     # Mild intermittent asthma  -Stable  -Continue home albuterol inhaler as needed     #Chronic pancreatic insufficiency  -continue home pancrelipase    Cleared for discharge after the EGD and dilatation by GI.  Tolerating diet with no nausea vomiting.  Denies any chest pain or shortness of breath abdominal pain.  Patient eager for discharge.  Discharged in stable condition.  Follow-up with GI as outpatient as directed.  Follow-up with regular outpatient primary care physician Ghada Carballo MD within 1 week in office      TCM Diagnosis at discharge from Hospital: Other: See above; still recommend for TCM follow-up    Lace+ Score: 69  59-90 High Risk  29-58 Medium Risk  0-28   Low Risk.     TCM Follow-Up Recommendation:Karla Vallecillo   LACE > 58: High Risk of readmission after discharge from the hospital.      PCP: Ghada Carballo MD    Consultations:   Consultants         Provider   Role Specialty     Dick Jacques MD      Consulting Physician INFECTIOUS DISEASES     Sree Ramos DO       Consulting Physician GASTROENTEROLOGY       Procedures during hospitalization:   EGD with balloon dilatation done by Sree Mckay DO on 7/10/2025 which showed    ESOPHAGUS:  Evidence of narrowed Schatzki ring in distal esophagus. Balloon dilation performed. Maximum balloon size of 20 mm was used. Medium sized 4-5 cm in length hiatal hernia               STOMACH:  Mild diffuse gastritis.                 DUODENUM:  Normal duodenum   Incidental or significant findings and recommendations (brief descriptions):  None    Lab/Test results pending at Discharge:   None      Discharge Medications:        Discharge Medications        START taking these medications        Instructions Prescription details   pantoprazole 40 MG Tbec  Commonly known as: Protonix      Take 1 tablet (40 mg total) by mouth 2 (two) times daily before meals.   Quantity: 60 tablet  Refills: 0     vancomycin 125 MG Caps  Commonly known as: Vancocin      Take 1 capsule (125 mg total) by mouth 4 (four) times daily for 14 days.   Stop taking on: July 24, 2025  Quantity: 56 capsule  Refills: 0            CONTINUE taking these medications        Instructions Prescription details   acetaminophen 500 MG Tabs  Commonly known as: Tylenol Extra Strength      Take 1 tablet (500 mg total) by mouth every 6 (six) hours as needed for Pain.   Refills: 0     albuterol 108 (90 Base) MCG/ACT Aers  Commonly known as: ProAir HFA      Inhale 2 puffs into the lungs every 4 (four) hours as needed for Wheezing.   Quantity: 1 each  Refills: 1     ALPRAZolam 0.25 MG Tabs  Commonly known as: Xanax      Take 1 tablet (0.25 mg total) by mouth as needed in the morning and 1 tablet (0.25 mg total) as needed at noon and 1 tablet (0.25 mg total) as needed before bedtime for Sleep or Anxiety.   Refills: 0     ALPRAZolam 1 MG Tabs  Commonly known as: Xanax      Take 0.5 tablets (0.5 mg total) by mouth in the morning and 0.5 tablets (0.5 mg total) before bedtime.   Refills: 0     Beclomethasone Diprop HFA 80 MCG/ACT Aerb      Inhale 2 puffs into the lungs in the morning and 2 puffs before bedtime.   Quantity: 1 each  Refills: 3     cholecalciferol 50 MCG (2000 UT) Caps  Commonly known as: Vitamin D3      Take 1 capsule (2,000 Units total) by mouth in the morning.   Refills: 0     fluticasone propionate 44 MCG/ACT Aero  Commonly known as: Flovent HFA      Inhale 2 puffs into the lungs 2  (two) times daily.   Quantity: 10.6 g  Refills: 1     loratadine 10 MG Tabs  Commonly known as: Claritin      Take 1 tablet (10 mg total) by mouth in the morning.   Refills: 0     predniSONE 2.5 MG Tabs  Commonly known as: Deltasone      Take 1 tablet (2.5 mg total) by mouth daily.   Quantity: 90 tablet  Refills: 3     Verapamil HCl  MG Cp24      Take 240 mg by mouth in the morning.   Refills: 0     Vitamin B-Complex Tabs      Take 1 tablet by mouth daily.   Refills: 0     VITAMIN C OR      Take 1 tablet by mouth every evening.   Refills: 0     vitamin E 400 UNITS Caps  Commonly known as: Alpha-E      Take 1 capsule (400 Units total) by mouth in the morning.   Refills: 0     Zenpep 27026-936829 units Cpep  Generic drug: Pancrelipase (Lip-Prot-Amyl)      Take 6 capsules by mouth daily.   Refills: 0            STOP taking these medications      loperamide 2 MG Caps  Commonly known as: Imodium                  Where to Get Your Medications        These medications were sent to [id number] - [place], [place] - [address] [place] [address], [phone], [phone]  [address] [place] [id number]      Phone: [phone]   pantoprazole 40 MG Tbec  vancomycin 125 MG Caps          [place] prescription monitoring program data reviewed in epic before prescribing narcotics/controlled substances: Yes    Follow up Visits: Follow-up with Ghada Carballo MD in 1 week    Ghada Carballo MD  [address] [place] [address] [address]  [facility] [address] [phone]    Schedule an appointment as soon as possible for a visit in 1 week(s)      Sree Ramos DO  [address] [facility] [address]  [facility] [id number] [phone]    Schedule an appointment as soon as possible for a visit in 1 week(s)      Appointments for Next 30 Days 7/10/2025 - 8/9/2025        Date Arrival Time Visit Type Length Department Provider     7/23/2025  8:40 AM  EXAM - ESTABLISHED [2844] 20 min [facility], [facility]  Ashly Rodrigez Ryan, MD    Patient Instructions:         Location Instructions:     Masks are optional for all patients and visitors, unless otherwise indicated. Note: A $50 fee will be charged for missed appointments or same-day cancellations. Please provide 24 hours' notice if you need to cancel or reschedule your appointment.                        Physical Exam:  BP (!) 160/99 (BP Location: Right arm)   Pulse 98   Temp 97.9 °F (36.6 °C) (Oral)   Resp 16   Ht 5' (1.524 m)   Wt 155 lb (70.3 kg)   LMP  (LMP Unknown)   SpO2 93%   BMI 30.27 kg/m²   General: No acute distress  Respiratory: No wheezes, no rhonchi  Cardiovascular: S1, S2, regular rate and rhythm  Abdomen: Soft, Non-tender, non-distended, positive bowel sounds  Neuro: No new focal deficits.   Extremities: No edema       Discharge Condition: stable    Patient Discharge Instructions: See electronic chart      More than 30 minutes on discharge    Halina Dela Cruz MD  7/10/2025  5:49 PM

## 2025-07-10 NOTE — CONSULTS
INFECTIOUS DISEASE CONSULTATION    Karla Vallecillo Patient Status:  Observation    1934 MRN [id number]   [facility]NW-A Attending Halina Dela Cruz MD   Hosp Day # 0 PCP Ghada Carballo MD       Requested by Dr. Dela Cruz    Reason for Consultation:    C diff positive    History of Present Illness:  Karla Vallecillo is a a(n) 89 year old female was brought to ED on  with co of black stools.  HG is 13.5 today, same as baseline.  She is having an EGD today.  Stool tested positive for CDIFF by PCR and was started on oral vancomycin.   Per chart review she had a rash while on po vancomycin, but then tolerated  re challenge in , with no reaction.  Family and patient are unaware of allergy to vancocmycin.       She has history of hiatal hernia and esophageal stricture, sp multiple EGD and esophageal dilation, last in  with Dr. Pierce.   She follows outaptient with [facility].         History:  Past Medical History[1]  Past Surgical History[2]  Family History[3]   reports that she quit smoking about 53 years ago. Her smoking use included cigarettes. She started smoking about 56 years ago. She has a 0.9 pack-year smoking history. She has never used smokeless tobacco. She reports that she does not currently use alcohol. She reports that she does not use drugs.      Allergies:  Allergies[4]    Medications:  Current Hospital Medications[5]  Medications Ordered Prior to Encounter[6]    Review of Systems:    A comprehensive 10 point review of systems was completed.  Pertinent positives and negatives noted in the the HPI.      Physical Exam:    General: No acute distress. Alert and oriented x 3.  Vital signs: Temp:  [97.4 °F (36.3 °C)-99 °F (37.2 °C)] 97.6 °F (36.4 °C)  Pulse:  [] 88  Resp:  [15-16] 16  BP: (101-165)/(66-79) 165/79  SpO2:  [95 %-98 %] 95 %  Body mass index is 30.27 kg/m².  HEENT: Moist mucous membranes.  Extraocular muscles are intact.  Neck: No swelling, no masses  Respiratory: Non labored, symmetric exursion  Cardiovascular: No irregularities in rhythm  Abdomen: Soft, nontender, nondistended.    Musculoskeletal: Full range of motion of all extremities.  No swelling noted.  Joints: no effusions  Skin: No lesions. No erythema, no open wounds    Laboratory Data:  Laboratory data reviewed      Recent Labs   Lab 07/08/25  1728 07/09/25  0451 07/10/25  0647   RBC 4.66   < > 4.66   HGB 13.4   < > 13.5   HCT 41.2   < > 40.0   MCV 88.4   < > 85.8   MCH 28.8   < > 29.0   MCHC 32.5   < > 33.8   RDW 14.7   < > 14.8   NEPRELIM 4.77  --   --    WBC 9.0   < > 7.0   .0   < > 193.0    < > = values in this interval not displayed.       Recent Labs   Lab 07/08/25  0851 07/08/25  1728 07/09/25  0451   GLU 92 101* 94   BUN 13 15 12   CREATSERUM 0.85 0.89 0.75   CA 9.6 9.2 8.7   ALB 4.2 4.4 3.8    144 144   K 3.9 4.0 3.7    108 111   CO2 26.0 26.0 28.0   ALKPHO 61 70 58   AST 15 17 12   ALT 8* 8* <7*   BILT 0.7 0.5 0.7   TP 6.7 7.1 6.1                Established Problem list:  Problem List[7]    ASSESSMENT/PLAN:  1. Melena reported, HG stable  -history of esophageal stricture, multiple dilation procedures , last 2022 with Dr. Pierce  -GI consulted yesterday and EGD was scheduled, will [facility] GI    2. C DIFF positive  Unclear if represents true infection or incidental finding  Ho of recurrent CDIFF in 2020  -remote allergy to vancomycin, but has tolerated PO vanc                Dick Jacques MD, MD  [facility]  [phone]         [1]   Past Medical History:   Anemia    Anesthesia complication    pt stated had a reacion with a medication that sounded like \"memo\"    Anxiety state, unspecified    Arthritis    Asthma (HCC)    Atherosclerosis of coronary artery    Back pain    Back problem    cervical pain    Black stools    Blood transfusion reaction    CAD (coronary artery disease)     Calculus of kidney    Cancer (HCC)    skin cancer in left arm    Change in hair    Chest pain on exertion    Coronary atherosclerosis of unspecified type of vessel, native or graft    Diarrhea, unspecified    Dyslipidemia    Easy bruising    Extrinsic asthma, unspecified    Fatigue    Glucose intolerance (pre-diabetes)    Hearing impairment    HEARING LOSS BUT NO AIDS    Hearing loss    Heart palpitations    HIGH BLOOD PRESSURE    High cholesterol    Hip pain    History of blood transfusion    AFTER HYST    History of depression    Hoarseness, chronic    HTN (hypertension)    Indigestion    Leg swelling    Loss of appetite    Mouth sores    Nausea    Osteoarthrosis, unspecified whether generalized or localized, unspecified site    knees, back    Osteoporosis    Other and unspecified hyperlipidemia    Pain in joints    Pinched nerve in neck    Pneumonia, organism unspecified(486)    PONV (postoperative nausea and vomiting)    Shortness of breath    Sleep apnea    Stented coronary artery    Stress    Uncomfortable fullness after meals    Unspecified essential hypertension    Visual impairment    glasses    Wears glasses    Weight loss   [2]   Past Surgical History:  Procedure Laterality Date    Angioplasty (coronary)      stent    Appendectomy      Back surgery      lumbar x 4    Benign biopsy left  a long time ago    x 2    Cath bare metal stent (bms)  2012    Cholecystectomy      Colonoscopy  10/21/2013    Procedure: COLONOSCOPY;  Surgeon: Emmanuel Naranjo MD;  Location:  ENDOSCOPY    Colonoscopy N/A 6/22/2021    Procedure: COLONOSCOPY with biopsies and forcep, cold and hot snare polypectomy with saline lift and clip placement x3;  Surgeon: Jerod Pierce MD;  Location:  ENDOSCOPY    Egd      Excis lumbar disk,one level      Fluor gid & loclzj ndl/cath spi dx/ther njx  9/23/2013    Procedure: CERVICAL EPIDURAL;  Surgeon: Christiano Wiseman MD;  Location: [facility]     Hemorrhoidectomy,int/ext,simple      Hysterectomy      complete    Injection, w/wo contrast, dx/therapeutic substance, epidural/subarachnoid; cervical/thoracic  9/23/2013    Procedure: CERVICAL EPIDURAL;  Surgeon: Christiano Wiseman MD;  Location: [facility]    Injection, w/wo contrast, dx/therapeutic substance, epidural/subarachnoid; cervical/thoracic N/A 8/17/2015    Procedure: CERVICAL EPIDURAL;  Surgeon: Josue Stewart MD;  Location: [facility]    Dex localization wire 1 site left (cpt=19281)  1980'S    Benign    Other surgical history  8/7/12    Diag cardiac stent, multi-link mini vision 2mmx 12mm.     Patient documented not to have experienced any of the following events  9/23/2013    Procedure: CERVICAL EPIDURAL;  Surgeon: Christiano Wiseman MD;  Location: [facility]    Patient documented not to have experienced any of the following events N/A 8/17/2015    Procedure: CERVICAL EPIDURAL;  Surgeon: Josue Stewart MD;  Location: [facility]    Patient withough preoperative order for iv antibiotic surgical site infection prophylaxis.  9/23/2013    Procedure: CERVICAL EPIDURAL;  Surgeon: Christiano Wiseman MD;  Location: [facility]    Patient withough preoperative order for iv antibiotic surgical site infection prophylaxis. N/A 8/17/2015    Procedure: CERVICAL EPIDURAL;  Surgeon: Josue Stewart MD;  Location: [facility]    Spine surgery procedure unlisted      Stent, coated/cov w/del sys      Tonsillectomy      Total abdom hysterectomy      Upper gi endoscopy,exam     [3]   Family History  Problem Relation Age of Onset    Breast Cancer Mother 70    High Blood Pressure Mother     Allergies Father     Asthma Father     Colon Cancer Father     Heart Disease Father     Heart Attack Father     Heart Attack Paternal Grandfather     Heart Disease Paternal Grandfather     Arthritis Paternal Grandmother      Allergies Paternal Grandmother     Cancer Maternal Grandfather     Heart Attack Maternal Grandfather     Heart Attack Maternal Grandmother     Heart Attack Brother     Other (Autoimmune disease) Brother     Thyroid Disorder Sister     Asthma Sister     Cancer Brother     Allergies Daughter     Fibromyalgia Daughter     Melanoma Daughter     Other (Hepatitis C) Daughter    [4]   Allergies  Allergen Reactions    Aspirin DIARRHEA and SHORTNESS OF BREATH    Breo Ellipta OTHER (SEE COMMENTS) and SHORTNESS OF BREATH     Pneumonia  Pneumonia    Budesonide SHORTNESS OF BREATH     Coughing    Celebrex [Celecoxib] SHORTNESS OF BREATH    Citalopram SHORTNESS OF BREATH and OTHER (SEE COMMENTS)     Oral     Clotrimazole DIARRHEA and SHORTNESS OF BREATH    Erythromycin OTHER (SEE COMMENTS) and SHORTNESS OF BREATH     Short of Breath  Short of Breath  External  Oral   Short of Breath    Famotidine SHORTNESS OF BREATH    Fish Oil ANAPHYLAXIS, OTHER (SEE COMMENTS) and SHORTNESS OF BREATH     Oral     Fluticasone OTHER (SEE COMMENTS)     Per pt not true  Other reaction(s): Propensity to adverse reactions to drug    Fluticasone-Salmeterol Coughing, SHORTNESS OF BREATH and OTHER (SEE COMMENTS)     Inhalation    Gabapentin WHEEZING    Iodine (Topical) RASH and SHORTNESS OF BREATH     Rash with topical Iodine.   Rash with topical Iodine.   Rash with topical Iodine.     Iodine Solution [Povidone Iodine] ANAPHYLAXIS     IVP Dye    Levofloxacin SHORTNESS OF BREATH and OTHER (SEE COMMENTS)     Nausea, leg pain    Nausea, leg pain    Intravenous  Other reaction(s): Propensity to adverse reactions to drug    Lidocaine ANAPHYLAXIS and UNKNOWN     \"Breathing issues\"-- per patient okay with marcaine     Lisinopril SHORTNESS OF BREATH     Other reaction(s): Propensity to adverse reactions to drug    Metamucil [Psyllium] SHORTNESS OF BREATH     And abdominal discomfort.    Nitrofurantoin SHORTNESS OF BREATH and UNKNOWN    Nsaids SHORTNESS OF BREATH  and UNKNOWN    Other RASH and SHORTNESS OF BREATH     Transdermal \"memo \" patch ;novacaine Pt states she is okay with marcaine  per allergy testing.    Penicillins SHORTNESS OF BREATH     Short of Breath    Plavix [Clopidogrel] SHORTNESS OF BREATH    Pulmicort SHORTNESS OF BREATH and UNKNOWN     Coughing      Radiology Contrast Iodinated Dyes ANAPHYLAXIS     Other reaction(s): Propensity to adverse reactions to drug    Shellfish SHORTNESS OF BREATH    Mupirocin OTHER (SEE COMMENTS)     Felt short of breath  Other reaction(s): Propensity to adverse reactions to drug    Advair Hfa OTHER (SEE COMMENTS)    Bupropion OTHER (SEE COMMENTS)     constipation    Celecoxib OTHER (SEE COMMENTS)     Oral     Codeine OTHER (SEE COMMENTS)     Per pt not true pt takes this medication       Effient [Prasugrel Hydrochloride] SHORTNESS OF BREATH    Linezolid DIARRHEA     Other reaction(s): Propensity to adverse reactions to drug    Metronidazole ASTHMA and UNKNOWN     Other reaction(s): Propensity to adverse reactions to drug    Mold SHORTNESS OF BREATH    Penicillin G RASH     sob  Other reaction(s): Propensity to adverse reactions to drug    Questran [Cholestyramine] HIVES and Coughing    Risperdal [Risperidone] SHORTNESS OF BREATH    Shellfish-Derived Products      Sob      Tramadol UNKNOWN     Arms turned red like a sunburn    Warfarin OTHER (SEE COMMENTS)     Asthma attack  Other reaction(s): Propensity to adverse reactions to drug    Flulaval RASH     Other reaction(s): Propensity to adverse reactions to drug    Kenalog [Triamcinolone] NAUSEA ONLY    Vancomycin RASH     Rash to the face and neck    [5]   Current Facility-Administered Medications:     ALPRAZolam (Xanax) tab 0.25 mg, 0.25 mg, Oral, TID PRN    albuterol (Ventolin HFA) 108 (90 Base) MCG/ACT inhaler 2 puff, 2 puff, Inhalation, Q4H PRN    ALPRAZolam (Xanax) tab 0.5 mg, 0.5 mg, Oral, BID    cetirizine (ZyrTEC) tab 5 mg, 5 mg, Oral, Daily    pancrelipase  (Lip-Prot-Amyl) (Zenpep) DR particles cap 2 capsule, 2 capsule, Oral, Daily    verapamil ER (Calan-SR) tab 240 mg, 240 mg, Oral, Nightly    vancomycin (Vancocin) cap 125 mg, 125 mg, Oral, QID    pantoprazole (Protonix) 40 mg in sodium chloride 0.9% PF 10 mL IV push, 40 mg, Intravenous, Q12H    acetaminophen (Tylenol Extra Strength) tab 500 mg, 500 mg, Oral, Q4H PRN    melatonin tab 3 mg, 3 mg, Oral, Nightly PRN    ondansetron (Zofran) 4 MG/2ML injection 4 mg, 4 mg, Intravenous, Q6H PRN    metoclopramide (Reglan) 5 mg/mL injection 5 mg, 5 mg, Intravenous, Q8H PRN    sodium chloride 0.9% infusion, , Intravenous, Continuous    polyethylene glycol (PEG 3350) (Miralax) 17 g oral packet 17 g, 17 g, Oral, Daily PRN    sennosides (Senokot) tab 17.2 mg, 17.2 mg, Oral, Nightly PRN    bisacodyl (Dulcolax) 10 MG rectal suppository 10 mg, 10 mg, Rectal, Daily PRN    fleet enema (Fleet) rectal enema 133 mL, 1 enema, Rectal, Once PRN  [6]   No current facility-administered medications on file prior to encounter.     Current Outpatient Medications on File Prior to Encounter   Medication Sig Dispense Refill    B Complex Vitamins (VITAMIN B-COMPLEX) Oral Tab Take 1 tablet by mouth daily.      ALPRAZolam 0.25 MG Oral Tab Take 1 tablet (0.25 mg total) by mouth as needed in the morning and 1 tablet (0.25 mg total) as needed at noon and 1 tablet (0.25 mg total) as needed before bedtime for Sleep or Anxiety.      ALPRAZolam 1 MG Oral Tab Take 0.5 tablets (0.5 mg total) by mouth in the morning and 0.5 tablets (0.5 mg total) before bedtime.      Pancrelipase, Lip-Prot-Amyl, (ZENPEP) 47198-522489 units Oral Cap DR Particles Take 6 capsules by mouth daily. (Patient taking differently: Take 1 capsule by mouth daily.)      predniSONE 2.5 MG Oral Tab Take 1 tablet (2.5 mg total) by mouth daily. 90 tablet 3    Beclomethasone Diprop HFA 80 MCG/ACT Inhalation Aerosol, Breath Activated Inhale 2 puffs into the lungs in the morning and 2 puffs before  bedtime. 1 each 3    fluticasone propionate 44 MCG/ACT Inhalation Aerosol Inhale 2 puffs into the lungs 2 (two) times daily. 10.6 g 1    LOPERAMIDE 2 MG Oral Cap TAKE 1 CAPSULE BY MOUTH DAILY 90 capsule 2    albuterol (PROAIR HFA) 108 (90 Base) MCG/ACT Inhalation Aero Soln Inhale 2 puffs into the lungs every 4 (four) hours as needed for Wheezing. 1 each 1    Verapamil HCl  MG Oral Capsule SR 24 Hr Take 240 mg by mouth in the morning.      Ascorbic Acid (VITAMIN C OR) Take 1 tablet by mouth every evening.      acetaminophen 500 MG Oral Tab Take 1 tablet (500 mg total) by mouth every 6 (six) hours as needed for Pain.      Vitamin D3, Cholecalciferol, 50 MCG (2000 UT) Oral Cap Take 1 capsule (2,000 Units total) by mouth in the morning.      loratadine (CLARITIN) 10 MG Oral Tab Take 1 tablet (10 mg total) by mouth in the morning.      vitamin E 400 UNITS Oral Cap Take 1 capsule (400 Units total) by mouth in the morning.     [7]   Patient Active Problem List  Diagnosis    Cervical radiculopathy    Primary osteoarthritis of right knee    Lumbar disc herniation    Irritable bowel syndrome with diarrhea    CAD (coronary artery disease), native coronary artery    Senile osteoporosis    Hypertension, benign    Dyslipidemia    Osteoarthritis of left knee    S/P lumbar laminectomy    Spondylosis of lumbar region without myelopathy or radiculopathy    Osteoarthritis of one hip, left    History of heart artery stent    Pancreatic insufficiency (HCC)    Pure hypercholesterolemia    Carpal tunnel syndrome of right wrist    Anxiety    Toe infection    Primary osteoarthritis of both knees    Allergic reaction to nonsteroidal anti-inflammatory drug (NSAID)    Mild Alzheimer's dementia, unspecified timing of dementia onset, unspecified whether behavioral, psychotic, or mood disturbance or anxiety (HCC)    Mild persistent asthma without complication (HCC)    History of Clostridioides difficile infection    Late onset Alzheimer's  disease without behavioral disturbance (HCC)    Low back pain    Myalgia    Postlaminectomy syndrome, not elsewhere classified    Spondylosis of lumbosacral spine without myelopathy    Cerebral atrophy, mild    Melena    Mild intermittent asthma without complication (HCC)    Primary hypertension    Intractable diarrhea

## 2025-07-10 NOTE — ANESTHESIA PREPROCEDURE EVALUATION
PRE-OP EVALUATION    Patient Name: Karla Vallecillo    Admit Diagnosis: Melena [K92.1]    Pre-op Diagnosis: melena    ESOPHAGOGASTRODUODENOSCOPY (EGD)    Anesthesia Procedure: ESOPHAGOGASTRODUODENOSCOPY (EGD)    Surgeons and Role:     * Sree Ramos,  - Primary    Pre-op vitals reviewed.  Temp: 97.9 °F (36.6 °C)  Pulse: 89  Resp: 16  BP: 161/82  SpO2: 96 %  Body mass index is 30.27 kg/m².    Current medications reviewed.  Hospital Medications:  Current Medications[1]    Outpatient Medications:   Prescriptions Prior to Admission[2]    Allergies: Aspirin, Breo ellipta, Budesonide, Celebrex [celecoxib], Citalopram, Clotrimazole, Erythromycin, Famotidine, Fish oil, Fluticasone, Fluticasone-salmeterol, Gabapentin, Iodine (topical), Iodine solution [povidone iodine], Levofloxacin, Lidocaine, Lisinopril, Metamucil [psyllium], Nitrofurantoin, Nsaids, Other, Penicillins, Plavix [clopidogrel], Pulmicort, Radiology contrast iodinated dyes, Shellfish, Mupirocin, Advair hfa, Bupropion, Celecoxib, Codeine, Effient [prasugrel hydrochloride], Linezolid, Metronidazole, Mold, Penicillin g, Questran [cholestyramine], Risperdal [risperidone], Shellfish-derived products, Tramadol, Warfarin, Flulaval, Kenalog [triamcinolone], and Vancomycin      Anesthesia Evaluation    Patient summary reviewed.    Anesthetic Complications  (+) history of anesthetic complications  History of: PONV       GI/Hepatic/Renal  Comment: Melena  hiatal hernia  esophageal stricture       (+) hiatal hernia                   (+) irritable bowel syndrome     Cardiovascular                  (+) hypertension   (+) hyperlipidemia  (+) CAD    (+) CABG/stent                            Endo/Other               (+) anemia            (+) arthritis       Pulmonary      (+) asthma                     Neuro/Psych  Comment: Late onset Alzheimer's disease without behavioral disturbance                (+) neuromuscular disease             Hoarseness,  chronic        Past Surgical History[3]  Social Hx on file[4]  History   Drug Use No     Available pre-op labs reviewed.  Lab Results   Component Value Date    WBC 7.0 07/10/2025    RBC 4.66 07/10/2025    HGB 13.5 07/10/2025    HCT 40.0 07/10/2025    MCV 85.8 07/10/2025    MCH 29.0 07/10/2025    MCHC 33.8 07/10/2025    RDW 14.8 07/10/2025    .0 07/10/2025     Lab Results   Component Value Date     07/09/2025    K 3.7 07/09/2025     07/09/2025    CO2 28.0 07/09/2025    BUN 12 07/09/2025    CREATSERUM 0.75 07/09/2025    GLU 94 07/09/2025    CA 8.7 07/09/2025            Airway      Mallampati: II  Mouth opening: >3 FB  TM distance: 4 - 6 cm  Neck ROM: limited Cardiovascular    Cardiovascular exam normal.         Dental  Comment: Some diseased, compromised teeth           Pulmonary    Pulmonary exam normal.                 Other findings              ASA: 3   Plan: MAC  NPO status verified and patient meets guidelines.    Post-procedure pain management plan discussed with surgeon and patient.    Comment: Plan IV sedation with GA backup.  Risks and benefits of MAC/GA explained including but not limited to aspiration, mouth/dental/airway injury, PONV explained.  Understanding expressed as well as a wish to proceed.    Plan/risks discussed with: patient, child/children and healthcare power of                 Present on Admission:  **None**             [1]    ALPRAZolam (Xanax) tab 0.25 mg  0.25 mg Oral TID PRN    albuterol (Ventolin HFA) 108 (90 Base) MCG/ACT inhaler 2 puff  2 puff Inhalation Q4H PRN    ALPRAZolam (Xanax) tab 0.5 mg  0.5 mg Oral BID    cetirizine (ZyrTEC) tab 5 mg  5 mg Oral Daily    pancrelipase (Lip-Prot-Amyl) (Zenpep) DR particles cap 2 capsule  2 capsule Oral Daily    verapamil ER (Calan-SR) tab 240 mg  240 mg Oral Nightly    vancomycin (Vancocin) cap 125 mg  125 mg Oral QID    pantoprazole (Protonix) 40 mg in sodium chloride 0.9% PF 10 mL IV push  40 mg Intravenous Q12H     acetaminophen (Tylenol Extra Strength) tab 500 mg  500 mg Oral Q4H PRN    melatonin tab 3 mg  3 mg Oral Nightly PRN    ondansetron (Zofran) 4 MG/2ML injection 4 mg  4 mg Intravenous Q6H PRN    metoclopramide (Reglan) 5 mg/mL injection 5 mg  5 mg Intravenous Q8H PRN    sodium chloride 0.9% infusion   Intravenous Continuous    polyethylene glycol (PEG 3350) (Miralax) 17 g oral packet 17 g  17 g Oral Daily PRN    sennosides (Senokot) tab 17.2 mg  17.2 mg Oral Nightly PRN    bisacodyl (Dulcolax) 10 MG rectal suppository 10 mg  10 mg Rectal Daily PRN    fleet enema (Fleet) rectal enema 133 mL  1 enema Rectal Once PRN   [2]   Facility-Administered Medications Prior to Admission   Medication Dose Route Frequency Provider Last Rate Last Admin    [COMPLETED] methylPREDNISolone acetate (DEPO-medrol) 40 MG/ML injection 40 mg  40 mg Intra-articular Once    40 mg at 06/02/25 1146     Medications Prior to Admission   Medication Sig Dispense Refill Last Dose/Taking    B Complex Vitamins (VITAMIN B-COMPLEX) Oral Tab Take 1 tablet by mouth daily.   7/8/2025 Morning    ALPRAZolam 0.25 MG Oral Tab Take 1 tablet (0.25 mg total) by mouth as needed in the morning and 1 tablet (0.25 mg total) as needed at noon and 1 tablet (0.25 mg total) as needed before bedtime for Sleep or Anxiety.   7/8/2025 Morning    ALPRAZolam 1 MG Oral Tab Take 0.5 tablets (0.5 mg total) by mouth in the morning and 0.5 tablets (0.5 mg total) before bedtime.   7/7/2025 Bedtime    Pancrelipase, Lip-Prot-Amyl, (ZENPEP) 21570-051127 units Oral Cap DR Particles Take 6 capsules by mouth daily. (Patient taking differently: Take 1 capsule by mouth daily.)   Taking Differently    predniSONE 2.5 MG Oral Tab Take 1 tablet (2.5 mg total) by mouth daily. 90 tablet 3 7/8/2025 Morning    Beclomethasone Diprop HFA 80 MCG/ACT Inhalation Aerosol, Breath Activated Inhale 2 puffs into the lungs in the morning and 2 puffs before bedtime. 1 each 3 7/8/2025 Evening    fluticasone  propionate 44 MCG/ACT Inhalation Aerosol Inhale 2 puffs into the lungs 2 (two) times daily. 10.6 g 1 Past Week    LOPERAMIDE 2 MG Oral Cap TAKE 1 CAPSULE BY MOUTH DAILY 90 capsule 2 7/8/2025 Morning    albuterol (PROAIR HFA) 108 (90 Base) MCG/ACT Inhalation Aero Soln Inhale 2 puffs into the lungs every 4 (four) hours as needed for Wheezing. 1 each 1 Taking As Needed    Verapamil HCl  MG Oral Capsule SR 24 Hr Take 240 mg by mouth in the morning.   7/8/2025 Morning    Ascorbic Acid (VITAMIN C OR) Take 1 tablet by mouth every evening.   7/8/2025 Morning    acetaminophen 500 MG Oral Tab Take 1 tablet (500 mg total) by mouth every 6 (six) hours as needed for Pain.   Taking As Needed    Vitamin D3, Cholecalciferol, 50 MCG (2000 UT) Oral Cap Take 1 capsule (2,000 Units total) by mouth in the morning.   7/8/2025 Morning    loratadine (CLARITIN) 10 MG Oral Tab Take 1 tablet (10 mg total) by mouth in the morning.   7/8/2025 Morning    vitamin E 400 UNITS Oral Cap Take 1 capsule (400 Units total) by mouth in the morning.   7/8/2025 Morning   [3]   Past Surgical History:  Procedure Laterality Date    Angioplasty (coronary)      stent    Appendectomy      Back surgery      lumbar x 4    Benign biopsy left  a long time ago    x 2    Cath bare metal stent (bms)  2012    Cholecystectomy      Colonoscopy  10/21/2013    Procedure: COLONOSCOPY;  Surgeon: Emmanuel Naranjo MD;  Location:  ENDOSCOPY    Colonoscopy N/A 6/22/2021    Procedure: COLONOSCOPY with biopsies and forcep, cold and hot snare polypectomy with saline lift and clip placement x3;  Surgeon: Jerod Pierce MD;  Location:  ENDOSCOPY    Egd      Excis lumbar disk,one level      Fluor gid & loclzj ndl/cath spi dx/ther njx  9/23/2013    Procedure: CERVICAL EPIDURAL;  Surgeon: Christiano Wiseman MD;  Location: [facility]    Hemorrhoidectomy,int/ext,simple      Hysterectomy      complete    Injection, w/wo contrast, dx/therapeutic substance,  epidural/subarachnoid; cervical/thoracic  2013    Procedure: CERVICAL EPIDURAL;  Surgeon: Christiano Wiseman MD;  Location: [facility]    Injection, w/wo contrast, dx/therapeutic substance, epidural/subarachnoid; cervical/thoracic N/A 2015    Procedure: CERVICAL EPIDURAL;  Surgeon: Josue Stewart MD;  Location: [facility]    Dex localization wire 1 site left (cpt=19281)      Benign    Other surgical history  12    Diag cardiac stent, multi-link mini vision 2mmx 12mm.     Patient documented not to have experienced any of the following events  2013    Procedure: CERVICAL EPIDURAL;  Surgeon: Christiano Wiseman MD;  Location: [facility]    Patient documented not to have experienced any of the following events N/A 2015    Procedure: CERVICAL EPIDURAL;  Surgeon: Josue Stewart MD;  Location: [facility]    Patient withough preoperative order for iv antibiotic surgical site infection prophylaxis.  2013    Procedure: CERVICAL EPIDURAL;  Surgeon: Christiano Wiseman MD;  Location: [facility]    Patient withough preoperative order for iv antibiotic surgical site infection prophylaxis. N/A 2015    Procedure: CERVICAL EPIDURAL;  Surgeon: Josue Stewart MD;  Location: [facility]    Spine surgery procedure unlisted      Stent, coated/cov w/del sys      Tonsillectomy      Total abdom hysterectomy      Upper gi endoscopy,exam     [4]   Social History  Socioeconomic History    Marital status:    Tobacco Use    Smoking status: Former     Current packs/day: 0.00     Average packs/day: 0.3 packs/day for 3.0 years (0.9 ttl pk-yrs)     Types: Cigarettes     Start date: 1969     Quit date: 1972     Years since quittin.4    Smokeless tobacco: Never   Vaping Use    Vaping status: Never Used   Substance and Sexual Activity    Alcohol use: Not Currently    Drug use:  No    Sexual activity: Never   Other Topics Concern    Caffeine Concern Yes     Comment: tea    Exercise No

## 2025-07-10 NOTE — OPERATIVE REPORT
EGD Operative Report    Karla Vallecillo Patient Status:  Observation    1934 MRN [id number]   [facility] Attending Halina Dela Cruz MD   Hosp Day #   0 PCP Ghada Carballo MD       Pre-op Diagnosis: melena    Post-Op Diagnosis:    ESOPHAGUS:  Evidence of narrowed Schatzki ring in distal esophagus. Balloon dilation performed. Maximum balloon size of 20 mm was used. Medium sized 4-5 cm in length hiatal hernia   STOMACH:  Mild diffuse gastritis.    DUODENUM:  Normal duodenum     Procedure Performed: EGD     Informed Consent: Informed consent for both the procedure and sedation were obtained from the patient. The potentially life-threatening complications of sedation, bleeding,  Perforation, transfusion or repeat endoscopy were reviewed along with the possible need for hospitalization, surgical management, transfusion or repeat endoscopy should one of these complications arise. The patient understands and is agreeable to proceed.  Sedation Type: MAC-Patient received sedation with monitored anesthesia provided by an anesthesiologist  Moderate Sedation Time: None.  Deep sedation provided by anesthesia.  Procedure Description: The patient was placed in the left lateral decubitus position.  A bite block was placed in the patient’s mouth.  The endoscope was inserted through the mouth and advanced under direct visualization to the 3rd portion of the duodenum and was then withdrawn to examine the duodenal bulb and gastric antrum.  The endoscope was then retroflexed to examine the angulus, GE junction, cardia, body and fundus and then withdrawn to examine the esophagus. The endoscope was then removed from the patient. The patient tolerated the procedure well with no immediate complications and was transferred to the recovery area in stable  condition.  Findings:    ESOPHAGUS:  Evidence of narrowed Schatzki ring in distal esophagus. Balloon dilation performed. Maximum balloon size of 20 mm was used. Medium sized 4-5 cm in length hiatal hernia   STOMACH:  Mild diffuse gastritis.    DUODENUM:  Normal duodenum   Recommendations:   Resume previous diet   Pantoprazole 40 mg twice daily for 8 weeks and then 40 mg daily   GI will sign off. Ok to discharge patient home   Discharge:  The patient was given an after visit summary detailing the procedure, findings, recommendations and follow up plans.  Sree Ramos DO  7/10/2025  3:33 PM

## 2025-07-10 NOTE — PROGRESS NOTES
[facility]   part of [facility]     Hospitalist Progress Note     Karlamaribel Vallecillo Patient Status:  Observation    1934 MRN [id number]   Location [facility] 5NW-A Attending Omar Velasquez,    Hosp Day # 0 PCP Ghada Carballo MD     Chief Complaint: Constant diarrhea at home    Subjective:     No more diarrhea is reported by patient.  Denies abdominal pain.  GI plans EGD and patient n.p.o. for this at this time    Objective:    Review of Systems:   A comprehensive review of systems was completed; pertinent positive and negatives stated in subjective.    Vital signs:  Temp:  [97.4 °F (36.3 °C)-99 °F (37.2 °C)] 97.9 °F (36.6 °C)  Pulse:  [] 89  Resp:  [15-16] 16  BP: (101-165)/(66-82) 161/82  SpO2:  [95 %-98 %] 96 %    Physical Exam:    BP (!) 161/82 (BP Location: Right arm)   Pulse 89   Temp 97.9 °F (36.6 °C) (Oral)   Resp 16   Ht 5' (1.524 m)   Wt 155 lb (70.3 kg)   LMP  (LMP Unknown)   SpO2 96%   BMI 30.27 kg/m²     General: No acute distress  Respiratory: No wheezes, no rhonchi  Cardiovascular: S1, S2, regular rate and rhythm  Abdomen: Soft, Non-tender, non-distended, positive bowel sounds  Neuro: No new focal deficits.   Extremities: No edema      Diagnostic Data:    Labs:  Recent Labs   Lab 25  1728 25  0451 25  1827 07/10/25  0647   WBC 9.0 7.7 10.3 7.0   HGB 13.4 12.4 14.1 13.5   MCV 88.4 88.6 86.9 85.8   .0 225.0 279.0 193.0       Recent Labs   Lab 25  0851 25  1728 25  0451   GLU 92 101* 94   BUN 13 15 12   CREATSERUM 0.85 0.89 0.75   CA 9.6 9.2 8.7   ALB 4.2 4.4 3.8    144 144   K 3.9 4.0 3.7    108 111   CO2 26.0 26.0 28.0   ALKPHO 61 70 58   AST 15 17 12   ALT 8* 8* <7*   BILT 0.7 0.5 0.7   TP 6.7 7.1 6.1       Estimated Glomerular Filtration Rate: 76 mL/min/1.73m2 (result from lab).    No results for input(s): \"TROP\", \"TROPHS\", \"CK\" in the last 168 hours.    No results for input(s): \"PTP\", \"INR\" in the last 168  hours.               Microbiology    No results found for this visit on 07/08/25.      Imaging: Reviewed in Epic.    Medications:    ALPRAZolam  0.5 mg Oral BID    cetirizine  5 mg Oral Daily    pancrelipase (Lip-Prot-Amyl)  2 capsule Oral Daily    verapamil ER  240 mg Oral Nightly    vancomycin  125 mg Oral QID    pantoprazole  40 mg Intravenous Q12H       Assessment & Plan:      # Intractable diarrhea on admission possibly due to C. difficile colitis  # Dark-colored stools with diarrhea, possible melena reported on admission  #History of esophageal stricture  # Hiatal hernia  - GI stool PCR negative.  Stool C. difficile came back positive.  EIA not detected.  Patient has history of allergy to vancomycin and Flagyl.  Consulted ID who started patient on oral vancomycin on 7/9/2025.  Diarrhea improved today.  - GI consulted on admission, GI planning EGD  - Follow serial hemoglobin which remained stable  - Continue IV PPI twice daily    # History of dementia    #Anxiety  -Continue home Xanax as needed     #Essential hypertension  -Continue home verapamil  -Follow blood pressure     #History of C. difficile     # Mild intermittent asthma  -Stable  -Continue home albuterol inhaler as needed     #Chronic pancreatic insufficiency  -continue home pancrelipase    Discussed with patient, RN  After EGD, will plan discharge if cleared by GI and ID, oral vancomycin at the time of discharge per ID.  Follow-up with GI and ID as outpatient as directed.  Follow-up with regular outpatient primary care physician Ghada Carballo MD within 1 week in office    Halina Dela Cruz MD    Supplementary Documentation:     Quality:  DVT Mechanical Prophylaxis:   SCDs,    DVT Pharmacologic Prophylaxis   Medication   None                Code Status: Full Code  Oliveira: No urinary catheter in place  Oliveira Duration (in days):   Central line:    MARYCHUY:     Discharge is dependent on: Clinical progress, GI workup and GI clearance  At this point Ms. Vallecillo is  expected to be discharge to: Home    The 21st Century Cures Act makes medical notes like these available to patients in the interest of transparency. Please be advised this is a medical document. Medical documents are intended to carry relevant information, facts as evident, and the clinical opinion of the practitioner. The medical note is intended as peer to peer communication and may appear blunt or direct. It is written in medical language and may contain abbreviations or verbiage that are unfamiliar.

## 2025-07-10 NOTE — ANESTHESIA POSTPROCEDURE EVALUATION
[facility]    Karla Vallecillo Patient Status:  Observation   Age/Gender 89 year old female MRN [id number]   Location [facility]COPY [facility] Attending Halina Dela Cruz MD   Hosp Day # 0 PCP Gahda Carballo MD       Anesthesia Post-op Note    ESOPHAGOGASTRODUODENOSCOPY (EGD) W/ BALLOON DILATION TO 20MM    Procedure Summary       Date: 07/10/25 Room / Location:  ENDOSCOPY 01 /  ENDOSCOPY    Anesthesia Start: 1522 Anesthesia Stop: 1541    Procedure: ESOPHAGOGASTRODUODENOSCOPY (EGD) W/ BALLOON DILATION TO 20MM Diagnosis: (SCHATZKI'S RING, HIATAL HERNIA)    Surgeons: Sree Ramos DO Anesthesiologist:     Anesthesia Type: MAC ASA Status: 3            Anesthesia Type: MAC    Vitals Value Taken Time   /55 07/10/25 15:42   Temp  07/10/25 15:43   Pulse 81 07/10/25 15:42   Resp 16 07/10/25 15:43   SpO2 92 % 07/10/25 15:42   Vitals shown include unfiled device data.        Patient Location: Endoscopy    Anesthesia Type: MAC    Airway Patency: patent    Postop Pain Control: adequate    Mental Status: mildly sedated but able to meaningfully participate in the post-anesthesia evaluation    Nausea/Vomiting: none    Cardiopulmonary/Hydration status: stable euvolemic    Complications: no apparent anesthesia related complications    Postop vital signs: stable    Dental Exam: Unchanged from Preop    Patient to be discharged home when criteria met.

## 2025-07-11 ENCOUNTER — PATIENT OUTREACH (OUTPATIENT)
Age: OVER 89
End: 2025-07-11

## 2025-07-11 ENCOUNTER — TELEPHONE (OUTPATIENT)
Dept: INTERNAL MEDICINE CLINIC | Facility: CLINIC | Age: OVER 89
End: 2025-07-11

## 2025-07-11 PROBLEM — K22.2 SCHATZKI'S RING OF DISTAL ESOPHAGUS: Status: ACTIVE | Noted: 2025-07-11

## 2025-07-11 PROBLEM — A04.72 C. DIFFICILE COLITIS: Status: ACTIVE | Noted: 2025-07-11

## 2025-07-11 NOTE — PROGRESS NOTES
This writer contacted patient at [phone] for LIANA Navigation post-hospital discharge follow up call on 7/11/2025 at 9:19 AM. There was no answer and I was unable to reach the patient at this time. I left a nondescript message with my contact information and the reason for my call on voicemail.      Discharge Diagnosis:   # Intractable diarrhea on admission possibly due to C. difficile colitis  # Dark-colored stools with diarrhea, possible melena reported on admission  #History of esophageal stricture  #narrowed Schatzki ring in distal esophagus status post balloon dilation done by orlin Ramos on 7/10/2025  # Hiatal hernia     # History of dementia     #Anxiety     #Essential hypertension     #History of C. difficile     # Mild intermittent asthma     #Chronic pancreatic insufficiency      START taking: pantoprazole (Protonix) vancomycin (Vancocin)  STOP taking: loperamide 2 MG Caps (Imodium)    Pantoprazole 40 mg twice daily for 8 weeks and then 40 mg daily per GI, please follow-up with GI and regular primary care physician regarding follow-up on this and for further refills on this as outpatient.    Follow-up Information    Follow up With Specialties Details Why Contact Info   Ghada Carballo MD Internal Medicine Schedule an appointment as soon as possible for a visit in 1 week(s)  [address] [place] [address] [address]  [facility] [address]  [phone]   Sree Ramos DO GASTROENTEROLOGY Schedule an appointment as soon as possible for a visit in 1 week(s)  [address] [facility] [address]  [facility] [address]  [phone]     Future Appointments   Date Time Provider Department Center   7/23/2025  8:40 AM Ab Katz MD [facility] 75 [facility]   8/15/2025 10:00 AM Josef Mcneill, APRN SGINP [facility] SUB GI   8/21/2025 10:20 AM Froilan Hawthorne MD ENINAPER [facility] [place]   12/2/2025 11:00 AM Abundio Tuttle MD [facility] [place]

## 2025-07-11 NOTE — TELEPHONE ENCOUNTER
Patient called stating that she was admitted on 7/8/25 for melena, would like her results on her EGD. Informed that she needs to follow up with GI for results and to see how she is doing. Patient verbalizes understanding. HFU scheduled with  on 7/18/25.

## 2025-07-11 NOTE — PROGRESS NOTES
This writer contacted patient at [phone] for LIANA Navigation post-hospital discharge follow up call on 7/11/2025 at 12:13 PM. There was no answer and I was unable to reach the patient at this time. I left a nondescript message with my contact information and the reason for my call on voicemail. [email or web address] message also sent for follow up.    Future Appointments   Date Time Provider Department Center   7/18/2025 10:20 AM Ghada Carballo MD [facility]   7/23/2025  8:40 AM Ab Katz MD [facility] 75 [facility]   8/15/2025 10:00 AM Josef Mcneill APRN [facility] SUB GI   8/21/2025 10:20 AM Froilan Hawthorne MD ENINAPER [facility] [place]   12/2/2025 11:00 AM Abundio Tuttle MD [facility] [place]

## 2025-07-11 NOTE — DISCHARGE PLANNING
No abd pain or recent illness. No diarrhea. Nausea worse with standing. Was able to eat breakfast 2/22 prior to return of nausea.  IV fluids as above  zofran     Patient cleared for discharge by MD's. Discharge paperwork gone over with patient and daughter, all questions answered. IV removed, tele monitor removed. Discharged off unit via wheelchair.

## 2025-07-15 NOTE — PROGRESS NOTES
This writer contacted patient at [phone] for LIANA Navigation post-hospital discharge follow up call on 7/15/2025 at 1:04 PM. There was no answer and I was unable to reach the patient at this time. I left a nondescript message with my contact information and the reason for my call on voicemail.      Future Appointments   Date Time Provider Department Center   7/18/2025 10:20 AM Ghada Carballo MD [facility]   7/23/2025  8:40 AM Ab Katz MD [facility] 75 [facility]   8/15/2025 10:00 AM Josef Mcneill APRN SGINP [facility] SUB GI   8/21/2025 10:20 AM Froilan Hawthorne MD ENINAPER [facility] [place]   12/2/2025 11:00 AM Abundio Tuttle MD [facility] [place]

## 2025-07-25 ENCOUNTER — OFFICE VISIT (OUTPATIENT)
Dept: INTERNAL MEDICINE CLINIC | Facility: CLINIC | Age: OVER 89
End: 2025-07-25
Payer: MEDICARE

## 2025-07-25 ENCOUNTER — HOSPITAL ENCOUNTER (OUTPATIENT)
Dept: ULTRASOUND IMAGING | Facility: HOSPITAL | Age: OVER 89
Discharge: HOME | End: 2025-07-25
Attending: INTERNAL MEDICINE
Payer: MEDICARE

## 2025-07-25 ENCOUNTER — TELEPHONE (OUTPATIENT)
Dept: INTERNAL MEDICINE CLINIC | Facility: CLINIC | Age: OVER 89
End: 2025-07-25

## 2025-07-25 VITALS
HEART RATE: 94 BPM | HEIGHT: 60 IN | WEIGHT: 152.81 LBS | TEMPERATURE: 97 F | SYSTOLIC BLOOD PRESSURE: 134 MMHG | DIASTOLIC BLOOD PRESSURE: 68 MMHG | BODY MASS INDEX: 30 KG/M2 | RESPIRATION RATE: 16 BRPM | OXYGEN SATURATION: 94 %

## 2025-07-25 DIAGNOSIS — Z86.19 HISTORY OF CLOSTRIDIOIDES DIFFICILE INFECTION: ICD-10-CM

## 2025-07-25 DIAGNOSIS — I10 HYPERTENSION, BENIGN: ICD-10-CM

## 2025-07-25 DIAGNOSIS — K29.70 GASTRITIS, PRESENCE OF BLEEDING UNSPECIFIED, UNSPECIFIED CHRONICITY, UNSPECIFIED GASTRITIS TYPE: ICD-10-CM

## 2025-07-25 DIAGNOSIS — R22.41 LOCALIZED SWELLING OF RIGHT LOWER LEG: Primary | ICD-10-CM

## 2025-07-25 DIAGNOSIS — K22.2 SCHATZKI'S RING: ICD-10-CM

## 2025-07-25 DIAGNOSIS — R22.41 LOCALIZED SWELLING OF RIGHT LOWER LEG: ICD-10-CM

## 2025-07-25 PROCEDURE — 93971 EXTREMITY STUDY: CPT | Performed by: INTERNAL MEDICINE

## 2025-07-25 PROCEDURE — G2211 COMPLEX E/M VISIT ADD ON: HCPCS | Performed by: INTERNAL MEDICINE

## 2025-07-25 PROCEDURE — 99214 OFFICE O/P EST MOD 30 MIN: CPT | Performed by: INTERNAL MEDICINE

## 2025-07-25 NOTE — PROGRESS NOTES
The following individual(s) verbally consented to be recorded using ambient AI listening technology and understand that they can each withdraw their consent to this listening technology at any point by asking the clinician to turn off or pause the recording:    Patient name: Karla Vallecillo  Subjective:   Karla Vallecillo is a 89 year old female who presents for TCM (Transition Of Care Management) (EJ Rm 3- Pt is here for a TCM, pt was in the hospital for melena and C-Diff)       History/Other:   History of Present Illness  Karla Vallecillo is a 89 year old patient who is here for HFU-  She was recently hospitalized from July 8 to July 10 due to stomach upset and dark stools. During this hospitalization, she was diagnosed with possible melena r/t gastritis, Schatzki's ring, and c dif infection. Schatzki's ring was dilated and she was placed on Protonix BID and vancomycin. She had follow up with GI Dr. Ramos since hospitalization. GI symptoms are better except for low appetite.  She finished 14 day course of vancomycin 2 days ago. No nausea, vomiting, or stomach pain. Her last bowel movement was this morning, formed and of normal color.  She noticed swelling in her right leg a couple of days ago, which was not present during her hospital stay. No pain in the leg unless pressed, and there is a bruise present. No trouble breathing, chest pain, or other pain. BP is controlled, 134/68 today.  Her daughter Liyah is the only family member in town, as her other daughters have moved to [place].   Chief Complaint Reviewed and Verified  Nursing Notes Reviewed and   Verified  Allergies Reviewed  Medications Reviewed  OB Status Reviewed           Tobacco:  She smoked tobacco in the past but quit greater than 12 months ago.  Tobacco Use[1]     Current Medications[2]      Review of Systems:  Review of Systems  Fatigue, right leg swelling  No f/c/CP/SOB/n/v/diarrhea    Objective:   /68   Pulse 94    Temp 96.8 °F (36 °C) (Temporal)   Resp 16   Ht 5' (1.524 m)   Wt 152 lb 12.8 oz (69.3 kg)   LMP  (LMP Unknown)   SpO2 94%   BMI 29.84 kg/m²  Estimated body mass index is 29.84 kg/m² as calculated from the following:    Height as of this encounter: 5' (1.524 m).    Weight as of this encounter: 152 lb 12.8 oz (69.3 kg).  Physical Exam  Gen: NAD, appears stated age  CV: Regular, nl S1 S2  RESP: Clear to auscultation bilaterally  ABD: soft, NT, ND, +BS  EXT: RLE from knee down has swelling, TTP of her calf, pulses intact  NEURO: Alert and oriented, ambulating with walker  Results  Endoscopy: Diffuse gastritis, Schatzki's ring dilation (2025)      Assessment & Plan:   1. Localized swelling of right lower leg (Primary)  Acute swelling of the right leg post-hospitalization, Differential diagnosis includes deep vein thrombosis (DVT) versus fluid retention.   - Order STAT venous Doppler ultrasound of the right leg to rule out DVT  - Advise leg elevation if no clot is found and low sodium diet    2. Gastritis, presence of bleeding unspecified, unspecified chronicity, unspecified gastritis type-  continue protonix BID, f/u with GI as directed  3. History of Clostridioides difficile infection- resolved, she finished 14 days of vancomycin. Stool is formed and normal color today.  4. Hypertension, benign- controlled, CPM   5.Schatzki's ring- s/p dilation, she is doing better now with her swallowing. F/u GI as directed    Assessment & Plan           Return in about 3 months (around 10/25/2025), or if symptoms worsen or fail to improve, for chronic issues.      Ghada Carballo MD, 2025, 11:13 AM             [1]   Social History  Tobacco Use   Smoking Status Former    Current packs/day: 0.00    Average packs/day: 0.3 packs/day for 3.0 years (0.9 ttl pk-yrs)    Types: Cigarettes    Start date: 1969    Quit date: 1972    Years since quittin.4   Smokeless Tobacco Never   [2]   Current Outpatient  Medications   Medication Sig Dispense Refill    pantoprazole 40 MG Oral Tab EC Take 1 tablet (40 mg total) by mouth 2 (two) times daily before meals. 60 tablet 0    B Complex Vitamins (VITAMIN B-COMPLEX) Oral Tab Take 1 tablet by mouth daily.      ALPRAZolam 0.25 MG Oral Tab Take 1 tablet (0.25 mg total) by mouth as needed in the morning and 1 tablet (0.25 mg total) as needed at noon and 1 tablet (0.25 mg total) as needed before bedtime for Sleep or Anxiety.      ALPRAZolam 1 MG Oral Tab Take 0.5 tablets (0.5 mg total) by mouth in the morning and 0.5 tablets (0.5 mg total) before bedtime.      Pancrelipase, Lip-Prot-Amyl, (ZENPEP) 13976-746793 units Oral Cap DR Particles Take 6 capsules by mouth daily. (Patient taking differently: Take 1 capsule by mouth daily.)      predniSONE 2.5 MG Oral Tab Take 1 tablet (2.5 mg total) by mouth daily. 90 tablet 3    Beclomethasone Diprop HFA 80 MCG/ACT Inhalation Aerosol, Breath Activated Inhale 2 puffs into the lungs in the morning and 2 puffs before bedtime. 1 each 3    fluticasone propionate 44 MCG/ACT Inhalation Aerosol Inhale 2 puffs into the lungs 2 (two) times daily. 10.6 g 1    albuterol (PROAIR HFA) 108 (90 Base) MCG/ACT Inhalation Aero Soln Inhale 2 puffs into the lungs every 4 (four) hours as needed for Wheezing. 1 each 1    Verapamil HCl  MG Oral Capsule SR 24 Hr Take 240 mg by mouth in the morning.      Ascorbic Acid (VITAMIN C OR) Take 1 tablet by mouth every evening.      acetaminophen 500 MG Oral Tab Take 1 tablet (500 mg total) by mouth every 6 (six) hours as needed for Pain.      Vitamin D3, Cholecalciferol, 50 MCG (2000 UT) Oral Cap Take 1 capsule (2,000 Units total) by mouth in the morning.      loratadine (CLARITIN) 10 MG Oral Tab Take 1 tablet (10 mg total) by mouth in the morning.      vitamin E 400 UNITS Oral Cap Take 1 capsule (400 Units total) by mouth in the morning.

## 2025-07-25 NOTE — TELEPHONE ENCOUNTER
Patient called stating she has an appointment today but not sure if she should come to her appointment or got to the ER  States she just finished with c diff   States she had black stools on 2 days ago, these were loose.  Concerned she needs to go to ER for black stools     Last bowel movement was this morning states this bowel movement was soft brown in color.     Advised she can come to appointment as she is not actively having black stools   Verbalizes understanding

## (undated) DIAGNOSIS — E78.5 DYSLIPIDEMIA: ICD-10-CM

## (undated) DIAGNOSIS — Z85.828 HISTORY OF SKIN CANCER: ICD-10-CM

## (undated) DIAGNOSIS — R19.7 DIARRHEA, UNSPECIFIED TYPE: ICD-10-CM

## (undated) DIAGNOSIS — M17.12 PRIMARY OSTEOARTHRITIS OF LEFT KNEE: ICD-10-CM

## (undated) DIAGNOSIS — M47.816 OSTEOARTHRITIS OF SPINE WITHOUT MYELOPATHY OR RADICULOPATHY, LUMBAR REGION: ICD-10-CM

## (undated) DIAGNOSIS — K86.89 PANCREATIC INSUFFICIENCY: ICD-10-CM

## (undated) DIAGNOSIS — E78.00 PURE HYPERCHOLESTEROLEMIA: ICD-10-CM

## (undated) DIAGNOSIS — R35.0 URINARY FREQUENCY: Primary | ICD-10-CM

## (undated) DIAGNOSIS — Z86.69 HX OF MIGRAINES: ICD-10-CM

## (undated) DIAGNOSIS — J45.40 MODERATE PERSISTENT ASTHMA WITHOUT COMPLICATION: ICD-10-CM

## (undated) DIAGNOSIS — M54.12 CERVICAL RADICULOPATHY AT C5: ICD-10-CM

## (undated) DIAGNOSIS — I10 PRIMARY HYPERTENSION: ICD-10-CM

## (undated) DIAGNOSIS — M17.11 PRIMARY OSTEOARTHRITIS OF RIGHT KNEE: ICD-10-CM

## (undated) DIAGNOSIS — G56.01 CARPAL TUNNEL SYNDROME OF RIGHT WRIST: ICD-10-CM

## (undated) DIAGNOSIS — K58.0 IRRITABLE BOWEL SYNDROME WITH DIARRHEA: ICD-10-CM

## (undated) DIAGNOSIS — M16.12 OSTEOARTHRITIS OF ONE HIP, LEFT: ICD-10-CM

## (undated) DIAGNOSIS — Z91.81 AT RISK FOR FALLING: ICD-10-CM

## (undated) DIAGNOSIS — I10 HYPERTENSION, BENIGN: ICD-10-CM

## (undated) DIAGNOSIS — J42 CHRONIC BRONCHITIS, UNSPECIFIED CHRONIC BRONCHITIS TYPE (HCC): ICD-10-CM

## (undated) DEVICE — GLOVE SURG TRIUMPH SZ 6-1/2

## (undated) DEVICE — DRAPE,TAPE STRIPS,STERILE: Brand: MEDLINE

## (undated) DEVICE — CLIP LGT 11MM OPEN 2.8MM 235CM

## (undated) DEVICE — SUTURE VICRYL 3-0 SH

## (undated) DEVICE — FILTERLINE NASAL ADULT O2/CO2

## (undated) DEVICE — 3M™ RED DOT™ MONITORING ELECTRODE WITH FOAM TAPE AND STICKY GEL 2570-3, 3/BAG, 200/CASE, 54/PLT: Brand: RED DOT™

## (undated) DEVICE — 3M™ RED DOT™ MONITORING ELECTRODE WITH FOAM TAPE AND STICKY GEL, 50/BAG, 20/CASE, 72/PLT 2570: Brand: RED DOT™

## (undated) DEVICE — FORCEP BIOPSY RJ4 LG CAP W/ND

## (undated) DEVICE — CATH BALLOON CRE 15-18MM 5837

## (undated) DEVICE — ESOPHAGEAL/PYLORIC/COLONIC WIREGUIDED BALLOON DILATATION CATHETER: Brand: CRE WIREGUIDED

## (undated) DEVICE — SOL  .9 1000ML BTL

## (undated) DEVICE — NEEDLE CONTRAST INTERJECT 25G

## (undated) DEVICE — FORCEP RADIAL JAW 4

## (undated) DEVICE — KENDALL SCD EXPRESS SLEEVES, KNEE LENGTH, MEDIUM: Brand: KENDALL SCD

## (undated) DEVICE — DEVICE INFL 60ML 15ATM PRSS COOK SPHR

## (undated) DEVICE — SYRINGE/GUAGE ASSEMBLY

## (undated) DEVICE — 1200CC GUARDIAN II: Brand: GUARDIAN

## (undated) DEVICE — ENDOSCOPY PACK UPPER: Brand: MEDLINE INDUSTRIES, INC.

## (undated) DEVICE — SNARE CAPTIFLEX MICRO-OVL OLY

## (undated) DEVICE — GOWN,SIRUS,FABRIC-REINFORCED,LARGE: Brand: MEDLINE

## (undated) DEVICE — HERCULES 3 STAGE BALLOON ESOPHAGEAL: Brand: HERCULES

## (undated) DEVICE — SUTURE MONOCRYL 4-0 PS-2

## (undated) DEVICE — TRAP 4 CPTR CHMBR N EZ INLN

## (undated) DEVICE — Device: Brand: DEFENDO AIR/WATER/SUCTION AND BIOPSY VALVE

## (undated) DEVICE — BITEBLOCK ENDOSCP 60FR MAXI STRP

## (undated) DEVICE — 10FT COMBINED O2 DELIVERY/CO2 MONITORING. FILTER WITH MICROSTREAM TYPE LUER: Brand: DUAL ADULT NASAL CANNULA

## (undated) DEVICE — ENDOSCOPY PACK - LOWER: Brand: MEDLINE INDUSTRIES, INC.

## (undated) DEVICE — V2 SPECIMEN COLLECTION MANIFOLD KIT: Brand: NEPTUNE

## (undated) DEVICE — CAUTERY BLADE 2IN INS E1455

## (undated) DEVICE — KIT CUSTOM ENDOPROCEDURE STERIS

## (undated) DEVICE — SNARE CAPTI HEX STIFF SMALL

## (undated) DEVICE — DRAPE PACK CHEST & U BAR

## (undated) DEVICE — TOWEL: OR BLU 80/CS: Brand: MEDICAL ACTION INDUSTRIES

## (undated) DEVICE — REM POLYHESIVE ADULT PATIENT RETURN ELECTRODE: Brand: VALLEYLAB

## (undated) DEVICE — DRAPE HALF 40X58 DYNJP2410

## (undated) DEVICE — HEMOCLIP HORIZON MED 002200

## (undated) DEVICE — VALVE AIR/H20 DEFENDO SCT BX

## (undated) DEVICE — BREAST-HERNIA-PORT CDS-LF: Brand: MEDLINE INDUSTRIES, INC.

## (undated) DEVICE — HEMOCLIP HORIZON SM 001200

## (undated) NOTE — ED AVS SNAPSHOT
Caity Hunt   MRN: [id number]    Department:  [facility] Emergency Department   Date of Visit:  6/5/2018           Disclosure     Insurance plans vary and the physician(s) referred by the ER may not be covered by your plan.  Please contac tell this physician (or your personal doctor if your instructions are to return to your personal doctor) about any new or lasting problems. The primary care or specialist physician will see patients referred from the [facility] Emergency Department.  Arthor Bernheim

## (undated) NOTE — LETTER
04/22/21          [employer]  [address] [place] [address] [address] [facility] [address]        John Ortiz,    We have been trying to reach you since 3/17/21 regarding the Vitamin B12 injections.     Dr Rj Rocha indicates due to your reaction, stop the Vitamin B12 in

## (undated) NOTE — MR AVS SNAPSHOT
[facility] [address] [address] [phone]               Thank you for choosing us for your health care visit with Cari Salas MD.  We are glad to serve you and happy to provide you with this summar Nausea, leg pain      Linezolid Diarrhea    Marcaine Hcl [Bupivacaine]     Mold     Nsaids     Short of Breath    Other Rash, Shortness of Breath    Transdermal \"memo \" patch ;novacaine Pt states she is okay with marcaine and xylocaine per allergy test loratadine 10 MG Tabs   Take 10 mg by mouth daily. Commonly known as:  CLARITIN           * predniSONE 5 MG Tabs   Take 1 tablet (5 mg total) by mouth daily. What changed:  Another medication with the same name was added.  Make sure you understand h Visit [facility] online at  [email or web address]

## (undated) NOTE — MR AVS SNAPSHOT
[facility]  [address] [facility] [address] [phone]               Thank you for choosing us for your health care visit with JAZZMINE Strickland.   We are glad to serve you and happy to provide you with this summary The phrases \"bladder infection,\" \"UTI,\" and \"cystitis\" are often used to describe the same thing. But they are not always the same. Cystitis is an inflammation of the bladder. The most common cause of cystitis is an infection.   Symptoms  The infectio important to finish them to make sure the infection has cleared. · You can use acetaminophen or ibuprofen for pain, fever, or discomfort, unless another medicine was prescribed.  If you have chronic liver or kidney disease, talk with your healthcare provid Call 911 if any of the following occur:  · Trouble breathing  · Hard to wake up or confusion  · Fainting or loss of consciousness  · Rapid heart rate  When to seek medical advice  Call your healthcare provider right away if any of these occur:  · Fever of Shellfish-Derived Products     Sob      Sulfa Antibiotics Wheezing    sob    Tramadol     Arms turned red like a sunburn    Vancomycin Rash    Rash to the face and neck     Wellbutrin [Bupropion]     constipation                Today's Vital Signs     BP Half tab po daily   Commonly known as:  DIOVAN           Verapamil HCl  MG Cp24   TK ONE C PO D   Commonly known as:  VERELAN PM           VITAMIN B COMPLEX OR   Inject 1 tablet as directed daily.            Vitamin D3 5000 units Caps   Take 1 tablet

## (undated) NOTE — ED AVS SNAPSHOT
Bandar Krishnamurthy   MRN: [id number]    Department:  [facility] Emergency Department   Date of Visit:  12/14/2019           Disclosure     Insurance plans vary and the physician(s) referred by the ER may not be covered by your plan.  Please cont tell this physician (or your personal doctor if your instructions are to return to your personal doctor) about any new or lasting problems. The primary care or specialist physician will see patients referred from the [facility] Emergency Department.  Ravindra Guerrero

## (undated) NOTE — LETTER
12/12/17    Beni Solis  [birth year]    Dear Courtney Kitchen is a pleasant 80year old who has been under my care for Myositis  Unspecified which has resolved.   She is cleared for the left total hip arthroplasty from a neurol

## (undated) NOTE — LETTER
Date: 2018  Patient Name:  Meera Fan             Address: [address] [place] [address]    : 1934    Dear Meera Fan,    I hope this letter finds you doing well.   I am writing to inform you of the followin

## (undated) NOTE — ED AVS SNAPSHOT
Mary Roman   MRN: [id number]    Department:  [facility] Emergency Department   Date of Visit:  10/17/2018           Disclosure     Insurance plans vary and the physician(s) referred by the ER may not be covered by your plan.  Please cont tell this physician (or your personal doctor if your instructions are to return to your personal doctor) about any new or lasting problems. The primary care or specialist physician will see patients referred from the [facility] Emergency Department.  Ravindra Guerrero

## (undated) NOTE — Clinical Note
Shoshana Ibarra! Pt is in TCM. I saw you already talked to her today. =)  She refused to go to [facility] and will see PCP as scheduled on 2/24.   She states she is feeling better but was concerned about her family getting sick as well (which she already expressed to you

## (undated) NOTE — LETTER
[facility] [address], [address] [place] [address]    Consent for Anesthesia   1.    Sean Cheng agree to be cared for by an anesthesiologist, who is specially trained to monitor me and give me medicine to put me to sleep or keep me co vision, nerves, or muscles and in extremely rare instances death. 5. My doctor has explained to me other choices available to me for my care (alternatives).   6. Pregnant Patients (“epidural”):  I understand that the risks of having an epidural (medicine g

## (undated) NOTE — LETTER
10/30/2024     Allergies      Aspirin Drug Ingredient DIARRHEA, SHORTNESS OF BREATH High  11/23/2022      Breo Ellipta Drug OTHER (SEE COMMENTS), SHORTNESS OF BREATH High  4/3/2018   Pneumonia Pneumonia   Budesonide Drug Ingredient SHORTNESS OF BREATH High  6/7/2021   Coughing   Celebrex [Celecoxib] Drug SHORTNESS OF BREATH High Side effect 6/22/2013      Citalopram Drug Ingredient SHORTNESS OF BREATH, OTHER (SEE COMMENTS) High  8/17/2015   Oral    Clotrimazole Drug Ingredient DIARRHEA, SHORTNESS OF BREATH High  10/2/2023      Erythromycin Drug Ingredient OTHER (SEE COMMENTS), SHORTNESS OF BREATH High  5/23/2013   Short of Breath Short of Breath External Oral Short of Breath   Famotidine Drug Ingredient SHORTNESS OF BREATH High Side effect 1/16/2023      Fish Oil Drug Ingredient ANAPHYLAXIS, OTHER (SEE COMMENTS), SHORTNESS OF BREATH High Side effect 2/5/2013   Oral    Fluticasone Drug Ingredient OTHER (SEE COMMENTS) High  6/7/2021   Per pt not true Other reaction(s): Propensity to adverse reactions to drug   Fluticasone-salmeterol Drug Coughing, SHORTNESS OF BREATH, OTHER (SEE COMMENTS) High  7/24/2014   Inhalation   Gabapentin Drug Ingredient WHEEZING High Systemic 11/15/2021      Iodine (Topical) Drug Ingredient RASH, SHORTNESS OF BREATH High Side effect 1/29/2013   Rash with topical Iodine. Rash with topical Iodine. Rash with topical Iodine.    Iodine Solution [Povidone Iodine] Drug ANAPHYLAXIS High  5/23/2013   IVP Dye   Levofloxacin Drug Ingredient SHORTNESS OF BREATH, OTHER (SEE COMMENTS) High  8/1/2016   Nausea, leg pain Nausea, leg pain Intravenous Other reaction(s): Propensity to adverse reactions to drug   Lidocaine Drug Ingredient ANAPHYLAXIS, UNKNOWN High  6/30/2015   \"Breathing issues\"-- per patient okay with marcaine    Lisinopril Drug Ingredient SHORTNESS OF BREATH High  2/6/2020   Other reaction(s): Propensity to adverse reactions to drug   Metamucil [Psyllium] Drug Ingredient SHORTNESS OF BREATH  High  12/27/2021   And abdominal discomfort.   Nitrofurantoin Drug Ingredient SHORTNESS OF BREATH, UNKNOWN High  2/4/2019      Nsaids Drug Class SHORTNESS OF BREATH, UNKNOWN High Side effect 6/22/2013      Other  RASH, SHORTNESS OF BREATH High Systemic 2/13/2014   Transdermal \"memo \" patch ;novacaine Pt states she is okay with marcaine  per allergy testing.   Penicillins Drug Class SHORTNESS OF BREATH High  5/23/2013   Short of Breath   Plavix [Clopidogrel] Drug Ingredient SHORTNESS OF BREATH High  11/7/2022      Pulmicort Drug SHORTNESS OF BREATH, UNKNOWN High  6/7/2021   Coughing    Radiology Contrast Iodinated Dyes Drug Class ANAPHYLAXIS High  7/18/2017   Other reaction(s): Propensity to adverse reactions to drug   Shellfish Food SHORTNESS OF BREATH High  11/7/2022      Mupirocin Drug Ingredient OTHER (SEE COMMENTS) Medium  10/25/2018   Felt short of breath Other reaction(s): Propensity to adverse reactions to drug   Advair Hfa Drug OTHER (SEE COMMENTS) Not Specified  11/18/2022      Bupropion Drug Ingredient OTHER (SEE COMMENTS) Not Specified  9/15/2016   constipation   Celecoxib Drug Ingredient OTHER (SEE COMMENTS) Not Specified  6/23/2017   Oral    Codeine Drug Ingredient OTHER (SEE COMMENTS) Not Specified  5/7/2018   Per pt not true pt takes this medication    Effient [Prasugrel Hydrochloride] Drug SHORTNESS OF BREATH Not Specified  5/12/2013      Linezolid Drug Ingredient DIARRHEA Not Specified  1/29/2015   Other reaction(s): Propensity to adverse reactions to drug   Metronidazole Drug Ingredient ASTHMA, UNKNOWN Not Specified  11/26/2013   Other reaction(s): Propensity to adverse reactions to drug   Mold Environmental SHORTNESS OF BREATH Not Specified  8/1/2016      Penicillin G Drug Ingredient RASH Not Specified Side effect 1/29/2013   sob Other reaction(s): Propensity to adverse reactions to drug   Questran [Cholestyramine] Drug Ingredient HIVES, Coughing Not Specified  9/7/2017      Risperdal  [Risperidone] Drug Ingredient SHORTNESS OF BREATH Not Specified  7/18/2017      Shellfish-derived Products Drug Class  Not Specified  4/23/2014   Sob    Tramadol Drug Ingredient UNKNOWN Not Specified Side effect 6/22/2013   Arms turned red like a sunburn   Warfarin Drug Ingredient OTHER (SEE COMMENTS) Not Specified  1/29/2013   Asthma attack Other reaction(s): Propensity to adverse reactions to drug   Flulaval Drug RASH Low  9/25/2019   Other reaction(s): Propensity to adverse reactions to drug   Kenalog [Triamcinolone] Drug Ingredient NAUSEA ONLY Low  12/13/2022      Vancomycin Drug Ingredient RASH Low  1/18/2015   Rash to the face and neck

## (undated) NOTE — MR AVS SNAPSHOT
[facility] [address] [address] [phone]               Thank you for choosing us for your health care visit with Sandeep Mijares NP.   We are glad to serve you and happy to provide you with this summary Marcaine Hcl [Bupivacaine]     Mold     Nsaids     Short of Breath    Other Rash, Shortness of Breath    Transdermal \"memo \" patch ;novacaine Pt states she is okay with marcaine and xylocaine per allergy testing.     Pcn [Penicillins]     Short of Breat Take 10 mg by mouth daily. Commonly known as:  CLARITIN           predniSONE 5 MG Tabs   Take 1 tablet (5 mg total) by mouth daily.    Commonly known as:  DELTASONE           valsartan 80 MG Tabs   Half tab po daily   Commonly known as:  DIOVAN

## (undated) NOTE — LETTER
04/10/19    Tierney Carolina  [address] [facility] [address] [facility]      Dear Laith Mary: It was a pleasure speaking with you over the phone recently.  To follow up, I wanted to send you my contact information to utilize when you have a question an

## (undated) NOTE — MR AVS SNAPSHOT
[facility] [place] [id number] [phone]               Thank you for choosing us for your health care visit with Diane Chapman MD.  We are glad to serve you and happy to provide you with this summar Follow-up Instructions     Return in about 3 months (around 7/27/2017) for wellness.          Referral Details     Referred By    Referred To    Holley Luna, Marilee[address] K[address] [address]   Phone:  [phone]   Fax:  [phone] Celexa [Citalopram]     Ctd Aspirin [Aspirin] Wheezing    Asthma attack    Effient [Prasugrel Hydrochloride] Shortness of Breath    Erythromycin     Short of Breath    Flagyl [Metronidazole] Asthma    Iodine (Topical) Rash, Shortness of Breath    Rash wit Take 1 tablet by mouth every 6 (six) hours as needed for Pain.    Commonly known as:  NORCO           Levalbuterol Tartrate 45 MCG/ACT Aero   INHALE 2 PUFFS INTO THE LUNGS EVERY 4 HOURS AS NEEDED FOR WHEEZING   Commonly known as:  Edmonia Alma Rosa ? Install sturdy handrails on both sides. ? Make sure carpeting is secure. FLOORS:  ? Remove all loose wires, cords and throw rugs. ? Keep floors clear of clutter. ? Make sure carpets and area rugs have skid proof backing.   ? Do not use slippery wax on

## (undated) NOTE — MR AVS SNAPSHOT
After Visit Summary   9/29/2021    Duane Danes   MRN: [id number]           Visit Information     Date & Time  9/29/2021  2:15 PM Provider  India Ely MD Department  [address] [place] [address] Dept.  Phone  [phone] DIARRHEA    Metronidazole And Related 02/07/2017    SHORTNESS OF BREATH    Mold 08/01/2016    SHORTNESS OF BREATH    Pcn [penicillins] 05/23/2013        Short of Breath    Penicillin G 01/29/2013   Side Effect RASH    sob    Plavix [clopidogrel Bisulfate] Units by mouth daily.       Diagnoses for This Visit    Carpal tunnel syndrome of right wrist   [217505]  -  Primary  Cervical radiculitis   [582087]    Cervical stenosis of spinal canal   [532643]    Arm numbness   [410503]             Future Appointments

## (undated) NOTE — MR AVS SNAPSHOT
[facility]  [address] [facility],Fourth [address], [address]  [address] [place] [address] [phone]               Thank you for choosing us for your health care visit with Kathleen Buckley MD.  We are glad to serve you and happy to provide you with this Return 2 months.        Allergies as of Mar 31, 2017     Celebrex [Celecoxib] Shortness of Breath    Fish Oil Anaphylaxis    Iodine Solution [Povidone Iodine] Anaphylaxis    IVP Dye    Lidocaine Anaphylaxis    \"Breathing issues\"-- per patient okay with ma Commonly known as:  MIACALCIN           Calcium 250 MG Caps   Take 2 tablets by mouth 2 (two) times daily. EPINEPHrine 0.3 MG/0.3ML Soaj   Inject 0.3 mL as directed one time.            Fluticasone Propionate  MCG/ACT Aero   Inhale 2 puffs - HYDROcodone-acetaminophen 5-325 MG Tabs            Emanuel     Call the [employer] for assistance with your inactive [employer] account    If you have questions, you can call [phone] to talk to our [facility] Staff.  Remember, [employer] is NOT to be

## (undated) NOTE — ED AVS SNAPSHOT
[facility] Emergency Department    [facility]  [address] [address]    Phone:  [phone]    Fax:  [phone]           Duane Danes   MRN: [id number]    Department:  [facility] Emergency Department   Date of Visit: DIARRHEA, UNKNOWN CAUSE (ENGLISH)      Disclosure     Insurance plans vary and the physician(s) referred by the ER may not be covered by your plan. Please contact your insurance company to determine coverage for follow-up care and referrals.     Rvaen prescription right away and begin taking the medication(s) as directed    If the emergency physician has read X-rays, these will be re-interpreted by a radiologist.  If there is a significant change in your reading, you will be contacted.  Please make sure Medicaid plans. To get signed up and covered, please call [phone] and ask to get set up for an insurance coverage that is in-network with Agustín Laurent.         [place]     Call the [employer] for assistance with your inactive [employer] account

## (undated) NOTE — ED AVS SNAPSHOT
[facility] Emergency Department    [facility]  [address] [address]    Phone:  [phone]    Fax:  [phone]           Marichuy Lewis   MRN: [id number]    Department:  [facility] Emergency Department   Date of Visit: IF THERE IS ANY CHANGE OR WORSENING OF YOUR CONDITION, CALL YOUR PRIMARY CARE PHYSICIAN AT ONCE OR RETURN IMMEDIATELY TO THE EMERGENCY DEPARTMENT.     If you have been prescribed any medication(s), please fill your prescription right away and begin taking t

## (undated) NOTE — MR AVS SNAPSHOT
[facility]  [address] [facility],[address], [address]  [address] [facility] [address] [phone]               Thank you for choosing us for your health care visit with Shahid Abreu MD.  We are glad to serve you and happy to provide you with [facility] For stress or anxiety take Xanax 1 tablet up to 3 times a day. It is okay to use your half a tablet 3 times a day and a whole one at night. Try to stay active as best you can, try to lose some weight as best you can.   For ankle swelling definitely wear s This list is accurate as of: 6/6/17  3:49 PM.  Always use your most recent med list.                acetaminophen 500 MG Tabs   Take 500 mg by mouth every 6 (six) hours as needed for Pain.    Commonly known as:  [address] Enjoy your food, but eat less. Fully enjoy your food when eating. Don’t eat while distracted and slow down. Avoid over sized portions. Don’t eat while when you’re bored.      EAT THESE FOODS MORE OFTEN: EAT THESE FOODS LESS OFTEN:   Make half your pl

## (undated) NOTE — ED AVS SNAPSHOT
Germaine Ellington   MRN: [id number]    Department:  [facility] Emergency Department   Date of Visit:  1/17/2018           Disclosure     Insurance plans vary and the physician(s) referred by the ER may not be covered by your plan.  Please conta tell this physician (or your personal doctor if your instructions are to return to your personal doctor) about any new or lasting problems. The primary care or specialist physician will see patients referred from the [facility] Emergency Department.  Carmina Persaud

## (undated) NOTE — ED AVS SNAPSHOT
Beni Irma   MRN: [id number]    Department:  [facility] Emergency Department   Date of Visit:  9/29/2019           Disclosure     Insurance plans vary and the physician(s) referred by the ER may not be covered by your plan.  Please conta tell this physician (or your personal doctor if your instructions are to return to your personal doctor) about any new or lasting problems. The primary care or specialist physician will see patients referred from the [facility] Emergency Department.  Wing Carpio

## (undated) NOTE — MR AVS SNAPSHOT
[address], [address] [address]               Thank you for choosing us for your health care visit with Javi Bhagat MD.  We are glad to serve you and happy to provide you with this ? By law, narcotics cannot be faxed or phoned into your pharmacy. The prescription must be signed by the provider, picked up in our office and physically brought to the pharmacy. A 30 day supply with no refills is the maximum allowed.   ? If your prescript Ultimately the patient is responsible for payment. Thank you for your understanding in the matter.          Allergies as of Apr 05, 2017     Celebrex [Celecoxib] Shortness of Breath    Fish Oil Anaphylaxis    Iodine Solution [Povidone Iodine] Anaphylaxis Calcium 250 MG Caps   Take 2 tablets by mouth 2 (two) times daily. EPINEPHrine 0.3 MG/0.3ML Soaj   Inject 0.3 mL as directed one time. Fluticasone Propionate  MCG/ACT Aero   Inhale 2 puffs into the lungs 2 (two) times daily.    Tal Gibbons

## (undated) NOTE — ED AVS SNAPSHOT
Kenneth Banks   MRN: [id number]    Department:  [facility] Emergency Department   Date of Visit:  9/30/2018           Disclosure     Insurance plans vary and the physician(s) referred by the ER may not be covered by your plan.  Please conta tell this physician (or your personal doctor if your instructions are to return to your personal doctor) about any new or lasting problems. The primary care or specialist physician will see patients referred from the [facility] Emergency Department.  Lois Noel

## (undated) NOTE — ED AVS SNAPSHOT
Bandar Krishnamurthy   MRN: [id number]    Department:  [facility] Emergency Department   Date of Visit:  2/16/2020           Disclosure     Insurance plans vary and the physician(s) referred by the ER may not be covered by your plan.  Please conta tell this physician (or your personal doctor if your instructions are to return to your personal doctor) about any new or lasting problems. The primary care or specialist physician will see patients referred from the [facility] Emergency Department.  Joanna Colon

## (undated) NOTE — LETTER
Date: 12/6/2019    Patient Name: Clari Cho          To Whom it may concern: The above patient was seen at the [facility] for treatment of a medical condition.  I strongly believe the odor(s) emitted from mechanical work being co

## (undated) NOTE — ED AVS SNAPSHOT
Mera Vickers   MRN: [id number]    Department:  [facility] Emergency Department   Date of Visit:  6/25/2018           Disclosure     Insurance plans vary and the physician(s) referred by the ER may not be covered by your plan.  Please conta tell this physician (or your personal doctor if your instructions are to return to your personal doctor) about any new or lasting problems. The primary care or specialist physician will see patients referred from the [facility] Emergency Department.  Mike Rueda

## (undated) NOTE — MR AVS SNAPSHOT
[facility] [address] [address] [address] [phone]               Thank you for choosing us for your health care visit with Valdez Patel MD.  We are glad to serve you and happy to provide you with this summar Levaquin [Levofloxacin]     Nausea, leg pain      Linezolid Diarrhea    Marcaine Hcl [Bupivacaine]     Metronidazole And Related     Mold     Nsaids     Short of Breath    Other Rash, Shortness of Breath    Transdermal \"memo \" patch ;novacaine Pt state TAKE ONE CAPSULE BY MOUTH AS NEEDED   Commonly known as:  IMODIUM           loratadine 10 MG Tabs   Take 10 mg by mouth daily.    Commonly known as:  CLARITIN           * predniSONE 10 MG Tabs   2 tabs po daily for 3 days, 1 tab po daily for 3 days, then ha Assoc Dx:  Functional diarrhea [K59.1], Right sided abdominal pain [R10.9], Generalized weakness [R53.1]           Urine Dip, auto without Micro    Complete by:  As directed    Assoc Dx:  Urinary frequency [R35.0]           US ABDOMEN COMPLETE (CPT=76700)

## (undated) NOTE — ED AVS SNAPSHOT
Clari Cho   MRN: [id number]    Department:  [facility] Emergency Department   Date of Visit:  2/13/2020           Disclosure     Insurance plans vary and the physician(s) referred by the ER may not be covered by your plan.  Please conta tell this physician (or your personal doctor if your instructions are to return to your personal doctor) about any new or lasting problems. The primary care or specialist physician will see patients referred from the [facility] Emergency Department.  Sally Underwood

## (undated) NOTE — ED AVS SNAPSHOT
Bandar Krishnamurthy   MRN: [id number]    Department:  [facility] Emergency Department   Date of Visit:  1/5/2019           Disclosure     Insurance plans vary and the physician(s) referred by the ER may not be covered by your plan.  Please contac tell this physician (or your personal doctor if your instructions are to return to your personal doctor) about any new or lasting problems. The primary care or specialist physician will see patients referred from the [facility] Emergency Department.  Ravindra Guerrero

## (undated) NOTE — LETTER
[address]  [address]  Authorization for Surgical Operation and Procedure     Date:___________                                                                                                         Time:__________  I hereby authorize Surgeon(s):  Sree Ramos DO, my physician and his/her assistants (if applicable), which may include medical students, residents, and/or fellows, to perform the following surgical operation/ procedure and administer such anesthesia as may be determined necessary by my physician:  Operation/Procedure name (s) Procedure(s):  ESOPHAGOGASTRODUODENOSCOPY (EGD) on Karla Vallecillo   2.   I recognize that during the surgical operation/procedure, unforeseen conditions may necessitate additional or different procedures than those listed above.  I, therefore, further authorize and request that the above-named surgeon, assistants, or designees perform such procedures as are, in their judgment, necessary and desirable.    3.   My surgeon/physician has discussed prior to my surgery the potential benefits, risks and side effects of this procedure; the likelihood of achieving goals; and potential problems that might occur during recuperation.  They also discussed reasonable alternatives to the procedure, including risks, benefits, and side effects related to the alternatives and risks related to not receiving this procedure.  I have had all my questions answered and I acknowledge that no guarantee has been made as to the result that may be obtained.    4.   Should the need arise during my operation/procedure, which includes change of level of care prior to discharge, I also consent to the administration of blood and/or blood products.  Further, I understand that despite careful testing and screening of blood or blood products by collecting agencies, I may still be subject to ill effects as a result of receiving a blood transfusion and/or blood  products.  The following are some, but not all, of the potential risks that can occur: fever and allergic reactions, hemolytic reactions, transmission of diseases such as Hepatitis, AIDS and Cytomegalovirus (CMV) and fluid overload.  In the event that I wish to have an autologous transfusion of my own blood, or a directed donor transfusion, I will discuss this with my physician.  Check only if Refusing Blood or Blood Products  I understand refusal of blood or blood products as deemed necessary by my physician may have serious consequences to my condition to include possible death. I hereby assume responsibility for my refusal and release the hospital, its personnel, and my physicians from any responsibility for the consequences of my refusal.          o  Refuse      5.   I authorize the use of any specimen, organs, tissues, body parts or foreign objects that may be removed from my body during the operation/procedure for diagnosis, research or teaching purposes and their subsequent disposal by hospital authorities.  I also authorize the release of specimen test results and/or written reports to my treating physician on the hospital medical staff or other referring or consulting physicians involved in my care, at the discretion of the Pathologist or my treating physician.    6.   I consent to the photographing or videotaping of the operations or procedures to be performed, including appropriate portions of my body for medical, scientific, or educational purposes, provided my identity is not revealed by the pictures or by descriptive texts accompanying them.  If the procedure has been photographed/videotaped, the surgeon will obtain the original picture, image, videotape or CD.  The hospital will not be responsible for storage, release or maintenance of the picture, image, tape or CD.    7.   I consent to the presence of a  or observers in the operating room as deemed necessary by my physician or  their designees.    8.   I recognize that in the event my procedure results in extended X-Ray/fluoroscopy time, I may develop a skin reaction.    9. If I have a Do Not Attempt Resuscitation (DNAR) order in place, that status will be suspended while in the operating room, procedural suite, and during the recovery period unless otherwise explicitly stated by me (or a person authorized to consent on my behalf). The surgeon or my attending physician will determine when the applicable recovery period ends for purposes of reinstating the DNAR order.  10. Patients having a sterilization procedure: I understand that if the procedure is successful the results will be permanent and it will therefore be impossible for me to inseminate, conceive, or bear children.  I also understand that the procedure is intended to result in sterility, although the result has not been guaranteed.   11. I acknowledge that my physician has explained sedation/analgesia administration to me including the risk and benefits I consent to the administration of sedation/analgesia as may be necessary or desirable in the judgment of my physician.    I CERTIFY THAT I HAVE READ AND FULLY UNDERSTAND THE ABOVE CONSENT TO OPERATION and/or OTHER PROCEDURE.    _________________________________________  __________________________________  Signature of Patient     Signature of Responsible Person         ___________________________________         Printed Name of Responsible Person           _________________________________                 Relationship to Patient  _________________________________________  ______________________________  Signature of Witness          Date  Time      Patient Name: Karla Vallecillo     : 1934                 Printed: July 10, 2025     Medical Record #: [id number]                     Page 1 of [address] [place] [address]  [address]    Consent for Anesthesia    I,  Karla Vallecillo agree to be cared for by an anesthesiologist, who is specially trained to monitor me and give me medicine to put me to sleep or keep me comfortable during my procedure    I understand that my anesthesiologist is not an employee or agent of [facility] or [employer]. He or she works for [employer].    As the patient asking for anesthesia services, I agree to:  Allow the anesthesiologist (anesthesia doctor) to give me medicine and do additional procedures as necessary. Some examples are: Starting or using an “IV” to give me medicine, fluids or blood during my procedure, and having a breathing tube placed to help me breathe when I’m asleep (intubation). In the event that my heart stops working properly, I understand that my anesthesiologist will make every effort to sustain my life, unless otherwise directed by [facility] Do Not Resuscitate documents.  Tell my anesthesia doctor before my procedure:  If I am pregnant.  The last time that I ate or drank.  All of the medicines I take (including prescriptions, herbal supplements, and pills I can buy without a prescription (including street drugs/illegal medications). Failure to inform my anesthesiologist about these medicines may increase my risk of anesthetic complications.  If I am allergic to anything or have had a reaction to anesthesia before.  I understand how the anesthesia medicine will help me (benefits).  I understand that with any type of anesthesia medicine there are risks:  The most common risks are: nausea, vomiting, sore throat, muscle soreness, damage to my eyes, mouth, or teeth (from breathing tube placement).  Rare risks include: remembering what happened during my procedure, allergic reactions to medications, injury to my airway, heart, lungs, vision, nerves, or muscles and in extremely rare instances death.  My doctor has explained to me other choices available to me for my care  (alternatives).  Pregnant Patients (“epidural”):  I understand that the risks of having an epidural (medicine given into my back to help control pain during labor), include itching, low blood pressure, difficulty urinating, headache or slowing of the baby’s heart. Very rare risks include infection, bleeding, seizure, irregular heart rhythms and nerve injury.  Regional Anesthesia (“spinal”, “epidural”, & “nerve blocks”):  I understand that rare but potential complications include headache, bleeding, infection, seizure, irregular heart rhythms, and nerve injury.    I can change my mind about having anesthesia services at any time before I get the medicine.    _____________________________________________________________________________  Patient (or Representative) Signature/Relationship to Patient  Date   Time    _____________________________________________________________________________   Name (if used)    Language/Organization   Time    _____________________________________________________________________________  Anesthesiologist Signature     Date   Time  I have discussed the procedure and information above with the patient (or patient’s representative) and answered their questions. The patient or their representative has agreed to have anesthesia services.    _____________________________________________________________________________  Witness        Date   Time  I have verified that the signature is that of the patient or patient’s representative, and that it was signed before the procedure  Patient Name: Karla Vallecillo     : 1934                 Printed: July 10, 2025     Medical Record #: [id number]                     Page 2 of 2

## (undated) NOTE — ED AVS SNAPSHOT
Tierney Carolina   MRN: [id number]    Department:  [facility] Emergency Department   Date of Visit:  12/13/2017           Disclosure     Insurance plans vary and the physician(s) referred by the ER may not be covered by your plan.  Please cont tell this physician (or your personal doctor if your instructions are to return to your personal doctor) about any new or lasting problems. The primary care or specialist physician will see patients referred from the [facility] Emergency Department.  Arthor Bernheim

## (undated) NOTE — MR AVS SNAPSHOT
[facility]  [address] [facility],[address], [address]  [address] [place] [address] [phone]               Thank you for choosing us for your health care visit with Timbo Joel MD.  We are glad to serve you and happy to provide you with this Feb 28, 2017 10:20 AM   Follow up with Michelle Price MD   [address] [facility] ([facility])    [address], [address] [place] [address]              Allergies as of Feb 08, 201 alprazolam 0.5 MG Tabs   Take 0.25 mg by mouth TID CC and HS. Pt takes 0.5mg at night   Commonly known as:  XANAX           Calcium 250 MG Caps   Take 2 tablets by mouth 2 (two) times daily.            EPINEPHrine 0.3 MG/0.3ML Soaj   Inject 0.3 mL as direc Call the [facility] for assistance with your inactive [employer] account    If you have questions, you can call [phone] to talk to our [facility] Staff. Remember, [employer] is NOT to be used for urgent needs. For medical emergencies, dial 911.     Vi

## (undated) NOTE — MR AVS SNAPSHOT
[facility] Count includes the [facility] [address] [phone]               Thank you for choosing us for your health care visit with Valdez Patel MD.  We are glad to serve you and happy to provide you with this summar procedure. IF A CHILD is scheduled for this procedure, parents should be advised that they will not be able to accompany the child in the testing room.  Parents will remain in the MRI waiting area and be reunited with the child upon completion of the MR and xylocaine per allergy testing.     Pcn [Penicillins]     Short of Breath    Penicillin G Rash    sob    Plavix [Clopidogrel Bisulfate]     Redness/choking    Shellfish-Derived Products     Sob      Sulfa Antibiotics Wheezing    sob    Tramadol     Arms What changed:  Another medication with the same name was removed. Continue taking this medication, and follow the directions you see here.    Commonly known as:  DELTASONE           valsartan 80 MG Tabs   Half tab po daily   Commonly known as:  DIOVAN

## (undated) NOTE — LETTER
[address]  [address]  Consent for Procedure/Sedation  Date: ***         Time: ***    {[facility] ivs consent:7286}

## (undated) NOTE — ED AVS SNAPSHOT
Chelly Mg   MRN: [id number]    Department:  [facility] Emergency Department   Date of Visit:  1/31/2020           Disclosure     Insurance plans vary and the physician(s) referred by the ER may not be covered by your plan.  Please conta tell this physician (or your personal doctor if your instructions are to return to your personal doctor) about any new or lasting problems. The primary care or specialist physician will see patients referred from the [facility] Emergency Department.  Stephanie Maldonado

## (undated) NOTE — MR AVS SNAPSHOT
[address], [place] [address]               Thank you for choosing us for your health care visit with Arelis Perales MD.  We are glad to serve you and happy to provide you with this ? Contact your pharmacy at least 5 days prior to running out of medication and have them send an electronic request or submit request through the “request refill” option in your [employer] account. ?  Refills are not addressed on weekends; covering physicians Unfortunately, JOHAN has seen an increase in denial of payment even though the procedure/test has been pre-certified.   You are strongly encouraged to contact your insurance carrier to verify that your procedure/test has been approved and is a COVERED benefit This list is accurate as of: 5/8/17 11:59 PM.  Always use your most recent med list.                acetaminophen 500 MG Tabs   Take 500 mg by mouth every 6 (six) hours as needed for Pain.    Commonly known as:  [address]

## (undated) NOTE — LETTER
ASTHMA ACTION PLAN for Gomez Cheng     : [id number]     Date: 2018  Provider:  Dorothy Medina MD  Phone for doctor or clinic: [facility], [address] [place] [address], [address] [place] [address] [phone]

## (undated) NOTE — MR AVS SNAPSHOT
[place] [id number]               Thank you for choosing us for your health care visit with Unruly Pitts MD.  We are glad to serve you and happy to provide you with this ? If your prescription is due for a refill, you may be due for a follow up appointment. ? To best provide you care, patients receiving routine medications need to be seen at least once a year.      protocol for controlled substances:  Written prescr understanding in the matter.            Allergies as of Feb 27, 2017     Celebrex [Celecoxib] Shortness of Breath    Fish Oil Anaphylaxis    Iodine Solution [Povidone Iodine] Anaphylaxis    IVP Dye    Lidocaine Anaphylaxis    \"Breathing issues\"-- per rené Take 2 tablets by mouth 2 (two) times daily. EPINEPHrine 0.3 MG/0.3ML Soaj   Inject 0.3 mL as directed one time. Fluticasone Propionate  MCG/ACT Aero   Inhale 2 puffs into the lungs 2 (two) times daily.    What changed:  when to t

## (undated) NOTE — ED AVS SNAPSHOT
Ansel Burkitt   MRN: [id number]    Department:  [facility] Emergency Department   Date of Visit:  9/23/2019           Disclosure     Insurance plans vary and the physician(s) referred by the ER may not be covered by your plan.  Please conta tell this physician (or your personal doctor if your instructions are to return to your personal doctor) about any new or lasting problems. The primary care or specialist physician will see patients referred from the [facility] Emergency Department.  Leonid Magaña

## (undated) NOTE — ED AVS SNAPSHOT
[facility] Emergency Department    [place]  [address]    [place] [address]    Phone:  [phone]    Fax:  [phone]           Beni Jordanjessie   MRN: [id number]    Department:  [facility] Emergency Department   Date of Visit: Insurance plans vary and the physician(s) referred by the ER may not be covered by your plan. Please contact your insurance company to determine coverage for follow-up care and referrals.     [facility]  Main [phone]  Pediatric If the emergency physician has read X-rays, these will be re-interpreted by a radiologist.  If there is a significant change in your reading, you will be contacted. Please make sure we have your correct phone number before you leave.  After you leave, you s and ask to get set up for an insurance coverage that is in-network with [employer] [facility].         Imaging Results         XR CHEST AP PORTABLE  (CPT=71010) (Final result) Result time:  01/10/17 09:52:31    Final result    Impression:    CONCLUSION:  N

## (undated) NOTE — LETTER
ASTHMA ACTION PLAN for Tierney Carolina     :      Date: 1/10/2019  Provider:  Saba Dill MD  Phone for doctor or clinic: [facility], [address] [place] [address], [address] [place] [address] [phone]

## (undated) NOTE — LETTER
Ofelia Ziegler 182 6 [address] [address]  [place], [address] [facility]    Consent for Operation  Date: __________________                                Time: _______________    1.  I authorize the performance upon Makayla Alonzo the following operation:  Pro revealed by the pictures or by descriptive texts accompanying them. If the procedure has been videotaped, the surgeon will obtain the original videotape. The hospital will not be responsible for storage or maintenance of this tape.     6. For the purpose of THAT MY DOCTOR PROVIDED ME WITH THE ABOVE EXPLANATIONS, THAT ALL BLANKS OR STATEMENTS REQUIRING INSERTION OR COMPLETION WERE FILLED IN.     Signature of Patient:   ___________________________    When the patient is a minor or mentally incompetent to give co

## (undated) NOTE — Clinical Note
ASTHMA ACTION PLAN for Allegra Haynes     :      Date: 2017  Provider:  Zoe Hill MD  Phone for doctor or clinic: [facility], [id number] [place] [address], [address] [place] [address] [phone] Patient Caretaker

## (undated) NOTE — LETTER
ASTHMA ACTION PLAN for Makayla Alonzo     : [id number]     Date: 2019  Provider:  Ana María Rodriguez MD  Phone for doctor or clinic: [facility], [facility], [address] [place] [address] [phone]

## (undated) NOTE — ED AVS SNAPSHOT
Susie Lynch   MRN: [id number]    Department:  [facility] Emergency Department   Date of Visit:  12/15/2017           Disclosure     Insurance plans vary and the physician(s) referred by the ER may not be covered by your plan.  Please cont tell this physician (or your personal doctor if your instructions are to return to your personal doctor) about any new or lasting problems. The primary care or specialist physician will see patients referred from the [facility] Emergency Department.  Arthor Bernheim

## (undated) NOTE — ED AVS SNAPSHOT
Makayla Alonzo   MRN: [id number]    Department:  [facility] Emergency Department   Date of Visit:  9/2/2017           Disclosure     Insurance plans vary and the physician(s) referred by the ER may not be covered by your plan.  Please contac If you have been prescribed any medication(s), please fill your prescription right away and begin taking the medication(s) as directed    If the emergency physician has read X-rays, these will be re-interpreted by a radiologist.  If there is a significant